# Patient Record
Sex: FEMALE | Race: WHITE | NOT HISPANIC OR LATINO | Employment: OTHER | URBAN - METROPOLITAN AREA
[De-identification: names, ages, dates, MRNs, and addresses within clinical notes are randomized per-mention and may not be internally consistent; named-entity substitution may affect disease eponyms.]

---

## 2017-01-16 ENCOUNTER — ALLSCRIPTS OFFICE VISIT (OUTPATIENT)
Dept: OTHER | Facility: OTHER | Age: 77
End: 2017-01-16

## 2017-02-06 DIAGNOSIS — I10 ESSENTIAL (PRIMARY) HYPERTENSION: ICD-10-CM

## 2017-02-07 ENCOUNTER — TRANSCRIBE ORDERS (OUTPATIENT)
Dept: LAB | Facility: CLINIC | Age: 77
End: 2017-02-07

## 2017-02-07 ENCOUNTER — APPOINTMENT (OUTPATIENT)
Dept: LAB | Facility: CLINIC | Age: 77
End: 2017-02-07
Payer: MEDICARE

## 2017-02-07 DIAGNOSIS — I10 ESSENTIAL HYPERTENSION, MALIGNANT: Primary | ICD-10-CM

## 2017-02-07 DIAGNOSIS — E87.6 HYPOKALEMIA: ICD-10-CM

## 2017-02-07 LAB
ANION GAP SERPL CALCULATED.3IONS-SCNC: 6 MMOL/L (ref 4–13)
BUN SERPL-MCNC: 12 MG/DL (ref 5–25)
CALCIUM SERPL-MCNC: 9 MG/DL (ref 8.3–10.1)
CHLORIDE SERPL-SCNC: 101 MMOL/L (ref 100–108)
CO2 SERPL-SCNC: 32 MMOL/L (ref 21–32)
CREAT SERPL-MCNC: 0.68 MG/DL (ref 0.6–1.3)
GFR SERPL CREATININE-BSD FRML MDRD: >60 ML/MIN/1.73SQ M
GLUCOSE SERPL-MCNC: 159 MG/DL (ref 65–140)
POTASSIUM SERPL-SCNC: 3.5 MMOL/L (ref 3.5–5.3)
SODIUM SERPL-SCNC: 139 MMOL/L (ref 136–145)

## 2017-02-07 PROCEDURE — 80048 BASIC METABOLIC PNL TOTAL CA: CPT

## 2017-02-07 PROCEDURE — 84244 ASSAY OF RENIN: CPT

## 2017-02-07 PROCEDURE — 82088 ASSAY OF ALDOSTERONE: CPT

## 2017-02-07 PROCEDURE — 36415 COLL VENOUS BLD VENIPUNCTURE: CPT

## 2017-02-08 ENCOUNTER — GENERIC CONVERSION - ENCOUNTER (OUTPATIENT)
Dept: OTHER | Facility: OTHER | Age: 77
End: 2017-02-08

## 2017-02-11 LAB — ALDOST SERPL-MCNC: 17.7 NG/DL (ref 0–30)

## 2017-02-15 LAB — RENIN PLAS-CCNC: <0.167 NG/ML/HR (ref 0.17–5.38)

## 2017-02-21 ENCOUNTER — APPOINTMENT (OUTPATIENT)
Dept: LAB | Facility: CLINIC | Age: 77
End: 2017-02-21
Payer: MEDICARE

## 2017-02-21 DIAGNOSIS — E11.9 TYPE 2 DIABETES MELLITUS WITHOUT COMPLICATIONS (HCC): ICD-10-CM

## 2017-02-21 LAB
EST. AVERAGE GLUCOSE BLD GHB EST-MCNC: 148 MG/DL
HBA1C MFR BLD: 6.8 % (ref 4.2–6.3)

## 2017-02-21 PROCEDURE — 36415 COLL VENOUS BLD VENIPUNCTURE: CPT

## 2017-02-21 PROCEDURE — 83036 HEMOGLOBIN GLYCOSYLATED A1C: CPT

## 2017-03-03 ENCOUNTER — ALLSCRIPTS OFFICE VISIT (OUTPATIENT)
Dept: OTHER | Facility: OTHER | Age: 77
End: 2017-03-03

## 2017-05-21 DIAGNOSIS — I10 ESSENTIAL (PRIMARY) HYPERTENSION: ICD-10-CM

## 2017-05-21 DIAGNOSIS — E78.5 HYPERLIPIDEMIA: ICD-10-CM

## 2017-05-21 DIAGNOSIS — Z12.31 ENCOUNTER FOR SCREENING MAMMOGRAM FOR MALIGNANT NEOPLASM OF BREAST: ICD-10-CM

## 2017-05-21 DIAGNOSIS — E11.29 TYPE 2 DIABETES MELLITUS WITH OTHER DIABETIC KIDNEY COMPLICATION (HCC): ICD-10-CM

## 2017-05-21 DIAGNOSIS — E55.9 VITAMIN D DEFICIENCY: ICD-10-CM

## 2017-05-23 ENCOUNTER — APPOINTMENT (OUTPATIENT)
Dept: LAB | Facility: CLINIC | Age: 77
End: 2017-05-23
Payer: MEDICARE

## 2017-05-23 DIAGNOSIS — E78.5 HYPERLIPIDEMIA: ICD-10-CM

## 2017-05-23 DIAGNOSIS — I10 ESSENTIAL (PRIMARY) HYPERTENSION: ICD-10-CM

## 2017-05-23 DIAGNOSIS — E11.29 TYPE 2 DIABETES MELLITUS WITH OTHER DIABETIC KIDNEY COMPLICATION (HCC): ICD-10-CM

## 2017-05-23 DIAGNOSIS — E55.9 VITAMIN D DEFICIENCY: ICD-10-CM

## 2017-05-23 LAB
25(OH)D3 SERPL-MCNC: 36.4 NG/ML (ref 30–100)
ALBUMIN SERPL BCP-MCNC: 3.5 G/DL (ref 3.5–5)
ALP SERPL-CCNC: 77 U/L (ref 46–116)
ALT SERPL W P-5'-P-CCNC: 18 U/L (ref 12–78)
ANION GAP SERPL CALCULATED.3IONS-SCNC: 5 MMOL/L (ref 4–13)
AST SERPL W P-5'-P-CCNC: 13 U/L (ref 5–45)
BACTERIA UR QL AUTO: ABNORMAL /HPF
BASOPHILS # BLD AUTO: 0.04 THOUSANDS/ΜL (ref 0–0.1)
BASOPHILS NFR BLD AUTO: 1 % (ref 0–1)
BILIRUB SERPL-MCNC: 0.45 MG/DL (ref 0.2–1)
BILIRUB UR QL STRIP: NEGATIVE
BUN SERPL-MCNC: 8 MG/DL (ref 5–25)
CALCIUM SERPL-MCNC: 9 MG/DL (ref 8.3–10.1)
CHLORIDE SERPL-SCNC: 101 MMOL/L (ref 100–108)
CHOLEST SERPL-MCNC: 167 MG/DL (ref 50–200)
CLARITY UR: ABNORMAL
CO2 SERPL-SCNC: 32 MMOL/L (ref 21–32)
COLOR UR: YELLOW
CREAT SERPL-MCNC: 0.62 MG/DL (ref 0.6–1.3)
CREAT UR-MCNC: 70.2 MG/DL
EOSINOPHIL # BLD AUTO: 0.28 THOUSAND/ΜL (ref 0–0.61)
EOSINOPHIL NFR BLD AUTO: 3 % (ref 0–6)
ERYTHROCYTE [DISTWIDTH] IN BLOOD BY AUTOMATED COUNT: 14.6 % (ref 11.6–15.1)
EST. AVERAGE GLUCOSE BLD GHB EST-MCNC: 137 MG/DL
GFR SERPL CREATININE-BSD FRML MDRD: >60 ML/MIN/1.73SQ M
GLUCOSE P FAST SERPL-MCNC: 142 MG/DL (ref 65–99)
GLUCOSE UR STRIP-MCNC: NEGATIVE MG/DL
HBA1C MFR BLD: 6.4 % (ref 4.2–6.3)
HCT VFR BLD AUTO: 40.6 % (ref 34.8–46.1)
HDLC SERPL-MCNC: 39 MG/DL (ref 40–60)
HGB BLD-MCNC: 12.8 G/DL (ref 11.5–15.4)
HGB UR QL STRIP.AUTO: NEGATIVE
HYALINE CASTS #/AREA URNS LPF: ABNORMAL /LPF
KETONES UR STRIP-MCNC: NEGATIVE MG/DL
LDLC SERPL CALC-MCNC: 78 MG/DL (ref 0–100)
LEUKOCYTE ESTERASE UR QL STRIP: NEGATIVE
LYMPHOCYTES # BLD AUTO: 2.31 THOUSANDS/ΜL (ref 0.6–4.47)
LYMPHOCYTES NFR BLD AUTO: 28 % (ref 14–44)
MCH RBC QN AUTO: 22.1 PG (ref 26.8–34.3)
MCHC RBC AUTO-ENTMCNC: 31.5 G/DL (ref 31.4–37.4)
MCV RBC AUTO: 70 FL (ref 82–98)
MICROALBUMIN UR-MCNC: 549 MG/L (ref 0–20)
MICROALBUMIN/CREAT 24H UR: 782 MG/G CREATININE (ref 0–30)
MONOCYTES # BLD AUTO: 0.67 THOUSAND/ΜL (ref 0.17–1.22)
MONOCYTES NFR BLD AUTO: 8 % (ref 4–12)
NEUTROPHILS # BLD AUTO: 4.85 THOUSANDS/ΜL (ref 1.85–7.62)
NEUTS SEG NFR BLD AUTO: 60 % (ref 43–75)
NITRITE UR QL STRIP: NEGATIVE
NON-SQ EPI CELLS URNS QL MICRO: ABNORMAL /HPF
NRBC BLD AUTO-RTO: 0 /100 WBCS
PH UR STRIP.AUTO: 8 [PH] (ref 4.5–8)
PLATELET # BLD AUTO: 252 THOUSANDS/UL (ref 149–390)
PMV BLD AUTO: 10.4 FL (ref 8.9–12.7)
POTASSIUM SERPL-SCNC: 3.1 MMOL/L (ref 3.5–5.3)
PROT SERPL-MCNC: 7.4 G/DL (ref 6.4–8.2)
PROT UR STRIP-MCNC: ABNORMAL MG/DL
RBC # BLD AUTO: 5.8 MILLION/UL (ref 3.81–5.12)
RBC #/AREA URNS AUTO: ABNORMAL /HPF
SODIUM SERPL-SCNC: 138 MMOL/L (ref 136–145)
SP GR UR STRIP.AUTO: 1.01 (ref 1–1.03)
TRIGL SERPL-MCNC: 252 MG/DL
TSH SERPL DL<=0.05 MIU/L-ACNC: 3.57 UIU/ML (ref 0.36–3.74)
UROBILINOGEN UR QL STRIP.AUTO: 0.2 E.U./DL
WBC # BLD AUTO: 8.16 THOUSAND/UL (ref 4.31–10.16)
WBC #/AREA URNS AUTO: ABNORMAL /HPF

## 2017-05-23 PROCEDURE — 80053 COMPREHEN METABOLIC PANEL: CPT

## 2017-05-23 PROCEDURE — 85025 COMPLETE CBC W/AUTO DIFF WBC: CPT

## 2017-05-23 PROCEDURE — 80061 LIPID PANEL: CPT

## 2017-05-23 PROCEDURE — 82306 VITAMIN D 25 HYDROXY: CPT

## 2017-05-23 PROCEDURE — 83036 HEMOGLOBIN GLYCOSYLATED A1C: CPT

## 2017-05-23 PROCEDURE — 81001 URINALYSIS AUTO W/SCOPE: CPT

## 2017-05-23 PROCEDURE — 36415 COLL VENOUS BLD VENIPUNCTURE: CPT

## 2017-05-23 PROCEDURE — 82570 ASSAY OF URINE CREATININE: CPT

## 2017-05-23 PROCEDURE — 84443 ASSAY THYROID STIM HORMONE: CPT

## 2017-05-23 PROCEDURE — 82043 UR ALBUMIN QUANTITATIVE: CPT

## 2017-06-06 ENCOUNTER — HOSPITAL ENCOUNTER (OUTPATIENT)
Dept: MAMMOGRAPHY | Facility: CLINIC | Age: 77
Discharge: HOME | End: 2017-06-06
Payer: MEDICARE

## 2017-06-06 ENCOUNTER — ALLSCRIPTS OFFICE VISIT (OUTPATIENT)
Dept: OTHER | Facility: OTHER | Age: 77
End: 2017-06-06

## 2017-06-06 ENCOUNTER — TRANSCRIBE ORDERS (OUTPATIENT)
Dept: MAMMOGRAPHY | Facility: CLINIC | Age: 77
End: 2017-06-06

## 2017-06-06 DIAGNOSIS — Z12.31 ENCOUNTER FOR SCREENING MAMMOGRAM FOR MALIGNANT NEOPLASM OF BREAST: ICD-10-CM

## 2017-06-06 PROCEDURE — G0202 SCR MAMMO BI INCL CAD: HCPCS

## 2017-06-19 ENCOUNTER — GENERIC CONVERSION - ENCOUNTER (OUTPATIENT)
Dept: OTHER | Facility: OTHER | Age: 77
End: 2017-06-19

## 2017-06-21 ENCOUNTER — LAB CONVERSION - ENCOUNTER (OUTPATIENT)
Dept: OTHER | Facility: OTHER | Age: 77
End: 2017-06-21

## 2017-06-21 LAB — FECAL GLOBIN BY IMMUNOCHEM (HISTORICAL): NORMAL

## 2017-07-03 DIAGNOSIS — I10 ESSENTIAL (PRIMARY) HYPERTENSION: ICD-10-CM

## 2017-07-25 ENCOUNTER — APPOINTMENT (OUTPATIENT)
Dept: LAB | Facility: CLINIC | Age: 77
End: 2017-07-25
Payer: MEDICARE

## 2017-07-25 ENCOUNTER — TRANSCRIBE ORDERS (OUTPATIENT)
Dept: LAB | Facility: CLINIC | Age: 77
End: 2017-07-25

## 2017-07-25 DIAGNOSIS — R80.9 PROTEINURIA, UNSPECIFIED TYPE: Primary | ICD-10-CM

## 2017-07-25 DIAGNOSIS — I10 ESSENTIAL HYPERTENSION, MALIGNANT: ICD-10-CM

## 2017-07-25 LAB
ALBUMIN SERPL BCP-MCNC: 3.7 G/DL (ref 3.5–5)
ALP SERPL-CCNC: 90 U/L (ref 46–116)
ALT SERPL W P-5'-P-CCNC: 19 U/L (ref 12–78)
ANION GAP SERPL CALCULATED.3IONS-SCNC: 6 MMOL/L (ref 4–13)
AST SERPL W P-5'-P-CCNC: 13 U/L (ref 5–45)
BACTERIA UR QL AUTO: NORMAL /HPF
BILIRUB SERPL-MCNC: 0.47 MG/DL (ref 0.2–1)
BILIRUB UR QL STRIP: NEGATIVE
BUN SERPL-MCNC: 10 MG/DL (ref 5–25)
CALCIUM SERPL-MCNC: 9 MG/DL (ref 8.3–10.1)
CHLORIDE SERPL-SCNC: 102 MMOL/L (ref 100–108)
CLARITY UR: CLEAR
CO2 SERPL-SCNC: 33 MMOL/L (ref 21–32)
COLOR UR: YELLOW
CREAT SERPL-MCNC: 0.58 MG/DL (ref 0.6–1.3)
CREAT UR-MCNC: 80.1 MG/DL
GFR SERPL CREATININE-BSD FRML MDRD: 91 ML/MIN/1.73SQ M
GLUCOSE P FAST SERPL-MCNC: 136 MG/DL (ref 65–99)
GLUCOSE UR STRIP-MCNC: NEGATIVE MG/DL
HCT VFR BLD AUTO: 40.4 % (ref 34.8–46.1)
HGB BLD-MCNC: 13.2 G/DL (ref 11.5–15.4)
HGB UR QL STRIP.AUTO: NEGATIVE
HYALINE CASTS #/AREA URNS LPF: NORMAL /LPF
KETONES UR STRIP-MCNC: NEGATIVE MG/DL
LEUKOCYTE ESTERASE UR QL STRIP: NEGATIVE
NITRITE UR QL STRIP: NEGATIVE
NON-SQ EPI CELLS URNS QL MICRO: NORMAL /HPF
PH UR STRIP.AUTO: 7 [PH] (ref 4.5–8)
PHOSPHATE SERPL-MCNC: 3.1 MG/DL (ref 2.3–4.1)
POTASSIUM SERPL-SCNC: 3.4 MMOL/L (ref 3.5–5.3)
PROT SERPL-MCNC: 7.7 G/DL (ref 6.4–8.2)
PROT UR STRIP-MCNC: ABNORMAL MG/DL
PROT UR-MCNC: 41 MG/DL
PROT/CREAT UR: 0.51 MG/G{CREAT} (ref 0–0.1)
RBC #/AREA URNS AUTO: NORMAL /HPF
SODIUM SERPL-SCNC: 141 MMOL/L (ref 136–145)
SP GR UR STRIP.AUTO: 1.02 (ref 1–1.03)
UROBILINOGEN UR QL STRIP.AUTO: 0.2 E.U./DL
WBC #/AREA URNS AUTO: NORMAL /HPF

## 2017-07-25 PROCEDURE — 85014 HEMATOCRIT: CPT

## 2017-07-25 PROCEDURE — 84156 ASSAY OF PROTEIN URINE: CPT

## 2017-07-25 PROCEDURE — 80053 COMPREHEN METABOLIC PANEL: CPT

## 2017-07-25 PROCEDURE — 85018 HEMOGLOBIN: CPT

## 2017-07-25 PROCEDURE — 36415 COLL VENOUS BLD VENIPUNCTURE: CPT

## 2017-07-25 PROCEDURE — 81001 URINALYSIS AUTO W/SCOPE: CPT

## 2017-07-25 PROCEDURE — 82570 ASSAY OF URINE CREATININE: CPT

## 2017-07-25 PROCEDURE — 84100 ASSAY OF PHOSPHORUS: CPT

## 2017-07-31 ENCOUNTER — ALLSCRIPTS OFFICE VISIT (OUTPATIENT)
Dept: OTHER | Facility: OTHER | Age: 77
End: 2017-07-31

## 2017-08-03 ENCOUNTER — ALLSCRIPTS OFFICE VISIT (OUTPATIENT)
Dept: OTHER | Facility: OTHER | Age: 77
End: 2017-08-03

## 2017-08-07 ENCOUNTER — TRANSCRIBE ORDERS (OUTPATIENT)
Dept: LAB | Facility: CLINIC | Age: 77
End: 2017-08-07

## 2017-08-07 ENCOUNTER — APPOINTMENT (OUTPATIENT)
Dept: LAB | Facility: CLINIC | Age: 77
End: 2017-08-07
Payer: MEDICARE

## 2017-08-07 DIAGNOSIS — R80.9 PROTEINURIA, UNSPECIFIED TYPE: Primary | ICD-10-CM

## 2017-08-07 DIAGNOSIS — E11.65 UNCONTROLLED TYPE 2 DIABETES MELLITUS WITH COMPLICATION, UNSPECIFIED LONG TERM INSULIN USE STATUS: ICD-10-CM

## 2017-08-07 DIAGNOSIS — E11.8 UNCONTROLLED TYPE 2 DIABETES MELLITUS WITH COMPLICATION, UNSPECIFIED LONG TERM INSULIN USE STATUS: ICD-10-CM

## 2017-08-07 DIAGNOSIS — I10 ESSENTIAL HYPERTENSION, MALIGNANT: ICD-10-CM

## 2017-08-07 DIAGNOSIS — E11.21 DIABETIC GLOMERULOPATHY (HCC): ICD-10-CM

## 2017-08-07 LAB
ANION GAP SERPL CALCULATED.3IONS-SCNC: 7 MMOL/L (ref 4–13)
BUN SERPL-MCNC: 15 MG/DL (ref 5–25)
CALCIUM SERPL-MCNC: 9.6 MG/DL (ref 8.3–10.1)
CHLORIDE SERPL-SCNC: 101 MMOL/L (ref 100–108)
CO2 SERPL-SCNC: 30 MMOL/L (ref 21–32)
CREAT SERPL-MCNC: 0.79 MG/DL (ref 0.6–1.3)
GFR SERPL CREATININE-BSD FRML MDRD: 74 ML/MIN/1.73SQ M
GLUCOSE P FAST SERPL-MCNC: 223 MG/DL (ref 65–99)
POTASSIUM SERPL-SCNC: 3.5 MMOL/L (ref 3.5–5.3)
SODIUM SERPL-SCNC: 138 MMOL/L (ref 136–145)

## 2017-08-07 PROCEDURE — 36415 COLL VENOUS BLD VENIPUNCTURE: CPT

## 2017-08-07 PROCEDURE — 80048 BASIC METABOLIC PNL TOTAL CA: CPT

## 2017-08-23 DIAGNOSIS — E11.9 TYPE 2 DIABETES MELLITUS WITHOUT COMPLICATIONS (HCC): ICD-10-CM

## 2017-08-29 ENCOUNTER — ALLSCRIPTS OFFICE VISIT (OUTPATIENT)
Dept: OTHER | Facility: OTHER | Age: 77
End: 2017-08-29

## 2017-09-07 ENCOUNTER — APPOINTMENT (OUTPATIENT)
Dept: LAB | Facility: CLINIC | Age: 77
End: 2017-09-07
Payer: MEDICARE

## 2017-09-07 ENCOUNTER — TRANSCRIBE ORDERS (OUTPATIENT)
Dept: LAB | Facility: CLINIC | Age: 77
End: 2017-09-07

## 2017-09-07 DIAGNOSIS — I10 ESSENTIAL HYPERTENSION, MALIGNANT: ICD-10-CM

## 2017-09-07 DIAGNOSIS — R80.9 PROTEINURIA, UNSPECIFIED TYPE: Primary | ICD-10-CM

## 2017-09-07 DIAGNOSIS — E11.9 TYPE 2 DIABETES MELLITUS WITHOUT COMPLICATIONS (HCC): ICD-10-CM

## 2017-09-07 DIAGNOSIS — E11.8 UNCONTROLLED TYPE 2 DIABETES MELLITUS WITH COMPLICATION, UNSPECIFIED LONG TERM INSULIN USE STATUS: ICD-10-CM

## 2017-09-07 DIAGNOSIS — E11.65 UNCONTROLLED TYPE 2 DIABETES MELLITUS WITH COMPLICATION, UNSPECIFIED LONG TERM INSULIN USE STATUS: ICD-10-CM

## 2017-09-07 DIAGNOSIS — E11.21 DIABETIC GLOMERULOPATHY (HCC): ICD-10-CM

## 2017-09-07 LAB
ANION GAP SERPL CALCULATED.3IONS-SCNC: 8 MMOL/L (ref 4–13)
BUN SERPL-MCNC: 14 MG/DL (ref 5–25)
CALCIUM SERPL-MCNC: 9.4 MG/DL (ref 8.3–10.1)
CHLORIDE SERPL-SCNC: 104 MMOL/L (ref 100–108)
CO2 SERPL-SCNC: 28 MMOL/L (ref 21–32)
CREAT SERPL-MCNC: 0.71 MG/DL (ref 0.6–1.3)
EST. AVERAGE GLUCOSE BLD GHB EST-MCNC: 143 MG/DL
GFR SERPL CREATININE-BSD FRML MDRD: 84 ML/MIN/1.73SQ M
GLUCOSE P FAST SERPL-MCNC: 127 MG/DL (ref 65–99)
HBA1C MFR BLD: 6.6 % (ref 4.2–6.3)
POTASSIUM SERPL-SCNC: 3.9 MMOL/L (ref 3.5–5.3)
SODIUM SERPL-SCNC: 140 MMOL/L (ref 136–145)

## 2017-09-07 PROCEDURE — 83036 HEMOGLOBIN GLYCOSYLATED A1C: CPT

## 2017-09-07 PROCEDURE — 36415 COLL VENOUS BLD VENIPUNCTURE: CPT

## 2017-09-07 PROCEDURE — 80048 BASIC METABOLIC PNL TOTAL CA: CPT

## 2017-09-21 ENCOUNTER — GENERIC CONVERSION - ENCOUNTER (OUTPATIENT)
Dept: OTHER | Facility: OTHER | Age: 77
End: 2017-09-21

## 2017-10-05 ENCOUNTER — GENERIC CONVERSION - ENCOUNTER (OUTPATIENT)
Dept: OTHER | Facility: OTHER | Age: 77
End: 2017-10-05

## 2017-10-09 ENCOUNTER — GENERIC CONVERSION - ENCOUNTER (OUTPATIENT)
Dept: FAMILY MEDICINE CLINIC | Facility: CLINIC | Age: 77
End: 2017-10-09

## 2017-12-07 DIAGNOSIS — E11.9 TYPE 2 DIABETES MELLITUS WITHOUT COMPLICATIONS (HCC): ICD-10-CM

## 2018-01-09 ENCOUNTER — ALLSCRIPTS OFFICE VISIT (OUTPATIENT)
Dept: OTHER | Facility: OTHER | Age: 78
End: 2018-01-09

## 2018-01-09 ENCOUNTER — APPOINTMENT (OUTPATIENT)
Dept: LAB | Facility: CLINIC | Age: 78
End: 2018-01-09
Payer: MEDICARE

## 2018-01-09 DIAGNOSIS — I10 ESSENTIAL (PRIMARY) HYPERTENSION: ICD-10-CM

## 2018-01-09 LAB
ALBUMIN SERPL BCP-MCNC: 3.7 G/DL (ref 3.5–5)
ALP SERPL-CCNC: 64 U/L (ref 46–116)
ALT SERPL W P-5'-P-CCNC: 15 U/L (ref 12–78)
ANION GAP SERPL CALCULATED.3IONS-SCNC: 7 MMOL/L (ref 4–13)
AST SERPL W P-5'-P-CCNC: 11 U/L (ref 5–45)
BILIRUB SERPL-MCNC: 0.44 MG/DL (ref 0.2–1)
BUN SERPL-MCNC: 24 MG/DL (ref 5–25)
CALCIUM SERPL-MCNC: 9.6 MG/DL (ref 8.3–10.1)
CHLORIDE SERPL-SCNC: 102 MMOL/L (ref 100–108)
CO2 SERPL-SCNC: 28 MMOL/L (ref 21–32)
CREAT SERPL-MCNC: 0.97 MG/DL (ref 0.6–1.3)
GFR SERPL CREATININE-BSD FRML MDRD: 57 ML/MIN/1.73SQ M
GLUCOSE SERPL-MCNC: 148 MG/DL (ref 65–140)
HBA1C MFR BLD HPLC: 6 %
POTASSIUM SERPL-SCNC: 4.3 MMOL/L (ref 3.5–5.3)
PROT SERPL-MCNC: 7.8 G/DL (ref 6.4–8.2)
SODIUM SERPL-SCNC: 137 MMOL/L (ref 136–145)

## 2018-01-09 PROCEDURE — 80053 COMPREHEN METABOLIC PANEL: CPT

## 2018-01-09 PROCEDURE — 36415 COLL VENOUS BLD VENIPUNCTURE: CPT

## 2018-01-10 NOTE — MISCELLANEOUS
Message   Recorded as Task   Date: 12/21/2016 10:01 AM, Created By: John Perales   Task Name: Call Back   Assigned To: Ramon Course   Regarding Patient: Glynn Cline, Status: Active   CommentVela John - 21 Dec 2016 10:01 AM     TASK CREATED  Caller: Self; [phone] (Home); [phone] (Work)  PT STATES ANOTHER DR José Miguel Zamorano HER TO INCREASE HER KLOR CON BUT SHE WANTS TO KNOW IF DR Nahomy Boothe IS OK WITH IT  PT # [phone]   Ramon Course - 23 Dec 2016 9:05 AM     TASK REPLIED TO: Previously Assigned To Arielle Evans - 23 Dec 2016 9:32 AM     TASK EDITED  pt informed          Signatures   Electronically signed by : Shannan Chun DO; Dec 23 2016  9:40AM EST                       (Author)

## 2018-01-11 NOTE — MISCELLANEOUS
Message   Recorded as Task   Date: 06/27/2016 10:26 AM, Created By: Bethany Jean-Baptiste   Task Name: Medical Complaint Callback   Assigned To: Cameron Desai   Regarding Patient: Jess Ardon, Status: Active   CommentEderk Hernan - 27 Jun 2016 10:26 AM     TASK CREATED  Caller: Self; Medical Complaint; [phone] (Home); [phone] (Work)  pt said you wanted her to come back to office to give urine sample  she was wondering if we can give her a lab slip to get her urine tested at a lab  She insisted on waiting for you to return tomorrow and she will go to a lab instead of giving sample here  Cameron Desai - 27 Jun 2016 2:56 PM     TASK REPLIED TO: Previously Assigned To Vishnu Diehl  Have one of the MAs explain exactly to her what a clean catch urine is  Ry Stakes - 27 Jun 2016 3:02 PM     TASK REASSIGNED: Previously Assigned To Amelia Reeves - 27 Jun 2016 5:09 PM     TASK REPLIED TO: Previously Assigned To Fatemeh Corrales with patient  She will stop in the office on Tuesday or Wed to give a clean catch urine sample to be sent to the lab          Signatures   Electronically signed by : Elaine Ley DO; Jun 28 2016  1:10PM EST                       (Author)

## 2018-01-12 VITALS
WEIGHT: 181.1 LBS | DIASTOLIC BLOOD PRESSURE: 70 MMHG | SYSTOLIC BLOOD PRESSURE: 130 MMHG | RESPIRATION RATE: 16 BRPM | BODY MASS INDEX: 35.56 KG/M2 | HEIGHT: 60 IN | HEART RATE: 60 BPM

## 2018-01-12 VITALS
DIASTOLIC BLOOD PRESSURE: 80 MMHG | WEIGHT: 180.4 LBS | HEART RATE: 80 BPM | SYSTOLIC BLOOD PRESSURE: 124 MMHG | RESPIRATION RATE: 16 BRPM | BODY MASS INDEX: 35.23 KG/M2

## 2018-01-13 VITALS
RESPIRATION RATE: 16 BRPM | TEMPERATURE: 98 F | WEIGHT: 177 LBS | HEART RATE: 72 BPM | BODY MASS INDEX: 34.75 KG/M2 | DIASTOLIC BLOOD PRESSURE: 80 MMHG | SYSTOLIC BLOOD PRESSURE: 138 MMHG | HEIGHT: 60 IN

## 2018-01-13 NOTE — RESULT NOTES
Verified Results  (1) RENAL FUNCTION PANEL [id number] 08:40AM RFEyeD Order Number: [id number]  TW Order Number: [id number]     Test Name Result Flag Reference   ANION GAP (CALC) 5 mmol/L  4-13   ALBUMIN 3 5 g/dL  3 5-5 0   BLOOD UREA NITROGEN 12 mg/dL  5-25   CALCIUM 9 1 mg/dL  8 3-10 1   CHLORIDE 101 mmol/L  100-108   CARBON DIOXIDE 34 mmol/L H 21-32   CREATININE 0 60 mg/dL  0 60-1 30   Standardized to IDMS reference method   GLUCOSE,RANDM 156 mg/dL H    POTASSIUM 3 2 mmol/L L 3 5-5 3   eGFR Non-African American      >60 0 ml/min/1 73sq [facility] Disease Education Program recommendations are as follows:  GFR calculation is accurate only with a steady state creatinine  Chronic Kidney disease less than 60 ml/min/1 73 sq  meters  Kidney failure less than 15 ml/min/1 73 sq  meters  SODIUM 140 mmol/L  136-145   PHOSPHORUS 2 8 mg/dL  2 3-4 1     (1) MICROALBUMIN CREATININE RATIO, RANDOM URINE [id number] 08:40AM RFEyeD Order Number: [id number]  TW Order Number: [id number]     Test Name Result Flag Reference   MICROALBUMIN/ CREAT R 304 mg/g creatinine H 0-30   MICROALBUMIN,URINE 275 0 mg/L H 0 0-20 0   CREATININE URINE 90 4 mg/dL         Plan  Hypertension    · Klor-Con M20 20 MEQ Oral Tablet Extended Release; TAKE 1 TABLET IN THE  MORNING  Hypokalemia    · (1) BASIC METABOLIC PROFILE; Status:Active;  Requested for:77Kxl5840;

## 2018-01-14 VITALS
BODY MASS INDEX: 35.39 KG/M2 | SYSTOLIC BLOOD PRESSURE: 146 MMHG | HEIGHT: 60 IN | DIASTOLIC BLOOD PRESSURE: 86 MMHG | HEART RATE: 72 BPM | RESPIRATION RATE: 16 BRPM | WEIGHT: 180.25 LBS

## 2018-01-14 VITALS
HEIGHT: 60 IN | HEART RATE: 72 BPM | DIASTOLIC BLOOD PRESSURE: 70 MMHG | WEIGHT: 180.6 LBS | SYSTOLIC BLOOD PRESSURE: 120 MMHG | RESPIRATION RATE: 16 BRPM | TEMPERATURE: 97.9 F | BODY MASS INDEX: 35.46 KG/M2

## 2018-01-14 NOTE — MISCELLANEOUS
Message   Recorded as Task   Date: 09/30/2016 11:28 AM, Created By: Rozina Gaspar   Task Name: Call Back   Assigned To: Rozina Gaspar   Regarding Patient: Moo Alvarado, Status: Active   [facility] - 30 Sep 2016 11:28 AM     TASK CREATED  Received a request for patient's diabetic medicine  She was to here in September with an A1c prior  A1c is in the order section  Please call patient and ask her to make an appointment for follow-up  No medications called in yet  If she needs them I will call in enough to get her to her appointment  Christina Chan - 30 Sep 2016 12:14 PM     TASK EDITED  left msg for pt to call back   Precious Godoy - 03 Oct 2016 9:51 AM     TASK REPLIED TO: Previously Assigned To Christina Chan  patient stopped in and given message  appt made for 10/17  Rozina Gaspar - 65 Oct 2016 1:02 PM     TASK REPLIED TO: Previously Assigned To Precious Godoy  Please call patient and see if she needs any medications called in before her appointment  Tenzin Lopez - 03 Oct 2016 1:08 PM     TASK REPLIED TO: Previously Assigned To Precious Godoy  I asked her before she left and she told me she is fine with meds          Signatures   Electronically signed by : Rosetta Main DO; Oct  3 2016  1:34PM EST                       (Author)

## 2018-01-14 NOTE — RESULT NOTES
Verified Results  (1) BASIC METABOLIC PROFILE 57FDI4563 09:42AM Digna Gonzalez Order Number: [id number]     Test Name Result Flag Reference   GLUCOSE,RANDM 159 mg/dL H    If the patient is fasting, the ADA then defines impaired fasting glucose as > 100 mg/dL and diabetes as > or equal to 123 mg/dL  SODIUM 139 mmol/L  136-145   POTASSIUM 3 5 mmol/L  3 5-5 3   CHLORIDE 101 mmol/L  100-108   CARBON DIOXIDE 32 mmol/L  21-32   ANION GAP (CALC) 6 mmol/L  4-13   BLOOD UREA NITROGEN 12 mg/dL  5-25   CREATININE 0 68 mg/dL  0 60-1 30   Standardized to IDMS reference method   CALCIUM 9 0 mg/dL  8 3-10 1   eGFR Non-African American      >60 0 ml/min/1 73sq m   [facility] Disease Education Program recommendations are as follows:  GFR calculation is accurate only with a steady state creatinine  Chronic Kidney disease less than 60 ml/min/1 73 sq  meters  Kidney failure less than 15 ml/min/1 73 sq  meters

## 2018-01-15 VITALS
HEART RATE: 68 BPM | DIASTOLIC BLOOD PRESSURE: 70 MMHG | SYSTOLIC BLOOD PRESSURE: 130 MMHG | WEIGHT: 181.4 LBS | RESPIRATION RATE: 16 BRPM | HEIGHT: 60 IN | BODY MASS INDEX: 35.61 KG/M2

## 2018-01-15 NOTE — CONSULTS
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)   2  Hypertension (401 9) (I10)   3  Hypokalemia (276 8) (E87 6)    Plan  Hypokalemia    · (1) BASIC METABOLIC PROFILE; Status:Active; Requested for:2016;    Perform:[facility]; [id number];EVBPPJH;  Lonzell Dale; Ordered By:Nigel Rosas;   · (1) MICROALBUMIN CREATININE RATIO, RANDOM URINE; Status:Active; Requested  for:2016;    Perform:[facility]; MMQ:36JGC6575;KSGAGDX;  Eduardl Dale; Ordered By:Nigel Rosas;   · (1) RENAL FUNCTION PANEL; Status:Active; Requested for:2016;    Perform:[facility]; ASQ:70HRC4762;BEOITYQ;  Ivette Hunter; Ordered By:Paresh Rosas;   · (Q) PROTEIN, TOTAL W/CREAT, 24 HOUR URINE; Status:Active; Requested  for:2016;    Perform:[facility]; Due:27Lsp5990;Ordered;  For:Hypokalemia; Ordered By:Paresh Rosas;   · VAS RENAL ARTERY COMPLETE BILATERAL; Status:Hold For - Scheduling; Requested  for:2016;    Perform:[facility] [place]; YGM:35IWQ7572;FAEOXGG;  Ivette Hunter; Ordered By:Nigel Rosas;   · Follow-up visit in 4 Months Evaluation and Treatment  Follow-up  Status: Hold For -  Scheduling  Requested for: 2016   Ordered; For: Hypokalemia; Ordered By: Erlene Goldberg Performed:  Due: 66XEM2835    Discussion/Summary    As far as Maxine's kidney function is concerned it appears that her creatinine is quite stable at 0 6  She does have microalbuminuria  This likely is from underlying hypertension as well as diabetes  In any case it appears low-grade but will confirm/quantify with a 24-hour urine for protein  In regards to her blood pressure management she does appear to be on fairly high dose lisinopril, I have not changed at this time he consider backing off to 40 mg as long she does not have significant proteinuria      In lieu of her blood pressure issues as well as hypokalemia have decided to check a renal Doppler for size echogenicity and any evidence of renal artery stenosis which certainly could explain her hypertension and hypokalemia  We'll also plan on repeating serum chemistries now that she is on potassium supplement  Unfortunately do not feel that an aldosteronoma/renin level would be helpful considering her feel high dose of lisinopril  So this point time we'll wait the above laboratory studies otherwise we'll plan to see her in approximately 3-4 months with repeat laboratory studies at that time  The was counseled regarding instructions for management  Reason For Visit  Proteinuria      History of Present Illness  With the pleasure of seeing Elma Kong for nephrology consultation secondary to microalbuminuria  As we know Elma Kong is a 70-year-old female with diabetes for at least 10 years and hypertension since the 1990s  Most recently she has started potassium due to hypokalemia  Does appear that she's on a 4 drug regimen for blood pressure control  Overall she states her sugars been fairly stable although certainly could be better in terms of dietary control  Review of Systems    Constitutional: no fever, no fatigue and not feeling poorly  Eyes: no eyesight problems  Cardiovascular: no chest pain and no lower extremity edema  Respiratory: no shortness of breath and no shortness of breath during exertion  Gastrointestinal: no abdominal pain, no nausea, no diarrhea and no vomiting  Genitourinary: no dysuria  Musculoskeletal: no arthralgias  Integumentary: no rashes  Neurological: no headache, no lightheadedness and no numbness  Endocrine: no muscle weakness  Hematologic/Lymphatic: no tendency for easy bleeding  Active Problems    1  Allergic rhinitis (477 9) (J30 9)   2  Colonoscopy (Fiberoptic) Screening   3  Encounter for screening mammogram for malignant neoplasm of breast (V76 12)   (Z12 31)   4  Encounter for therapeutic drug monitoring (V58 83) (Z51 81)   5   Ganglion cyst (727 43) (M67 40)   6  Hyperlipidemia (272 4) (E78 5)   7  Hypertension (401 9) (I10)   8  Hypokalemia (276 8) (E87 6)   9  History of Localized Osteoarthritis Of Hand (715 34)   10  Long term use of drug (V58 69) (Z79 899)   11  Mass of thigh, left (782 2) (R22 42)   12  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,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)   13  Need for diphtheria-tetanus-pertussis (Tdap) vaccine, adult/adolescent (V06 1) (Z23)   14  Need for prophylactic vaccination and inoculation against influenza (V04 81) (Z23)   15  Need for vaccination with 13-polyvalent pneumococcal conjugate vaccine (V03 82) (Z23)   16  OA (osteoarthritis) (715 90) (M19 90)   17  Osteopenia (733 90) (M85 80)   18  Other anaphylactic reaction (995 0) (T78 2XXA)   19  Proteinuria (791 0) (R80 9)   20  Screening for genitourinary condition (V81 6) (Z13 89)   21  Thalassemia minor (282 46) (D56 3)   22  Type 2 diabetes mellitus (250 00) (E11 9)   23  Type 2 diabetes mellitus with renal manifestations, 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)   24  Visit For: Screening Exam Neurological Disorders (V80 09)   25  Vitamin D deficiency (268 9) (E55 9)    Past Medical History    1  History of Contact dermatitis (692 9) (L25 9)   2  History of Cough (786 2) (R05)   3  History of Cough (786 2) (R05)   4  History of Disorder of tympanic membrane (384 9) (H73 90)   5  History of Dry Skin (782 9)   6  History of Dysfunction of eustachian tube, unspecified laterality (381 81) (H69 80)   7  History of Encounter for routine gynecological examination (V72 31) (Z01 419)   8  History of Glaucoma Screening   9  History of Hip pain, unspecified laterality   10  History of acute sinusitis (V12 69) (Z87 09)   11  History of anemia (V12 3) (Z86 2)   12  History of labyrinthitis (V12 49) (Z86 69)   13  History of upper respiratory infection (V12 09) (Z87 09)   14  History of vertigo (V12 49) (Z87 898)   15  History of Leg mass, left (782 2) (R22 42)   16   History of Limb pain (729 5) (M79 609)   17  History of Localized Osteoarthritis Of Hand (715 34)   18  History of Mammogram abnormal (793 80) (R92 8)   19  History of Mammogram abnormal (793 80) (R92 8)   20  Need for prophylactic vaccination and inoculation against influenza (V04 81) (Z23)   21  History of Pain in ankle joint (719 47) (M25 579)   22  History of Pain, foot (729 5) (M79 673)   23  History of Pes planus, unspecified laterality (734) (M21 40)    The active problems and past medical history were reviewed and updated today  Surgical History    1  History Of Prior Surgery    The surgical history was reviewed and updated today  Family History  Mother    1  Family history of Acute Myocardial Infarction (V17 3)   2  Family history of Mother  At Age 72  Father    3  Family history of Cerebral Embolism   4  Family history of Father  At Age 80   11  Family history of Lung Cancer (V16 1)  Sister    6  Family history of anemia (V18 2) (Z83 2)   7  Family history of glaucoma (V19 11) (Z83 511)   8  Family history of hypertension (V17 49) (Z82 49)   9  Family history of thyroid disease (V18 19) (Z83 49)  Brother    8  Family history of cardiac disorder (V17 49) (Z82 49)   11  Family history of diabetes mellitus (V18 0) (Z83 3)   12  Family history of hypertension (V17 49) (Z82 49)    The family history was reviewed and updated today  Social History    · Always uses seat belt   · Being A Social Drinker   · Daily caffeine consumption, 1 serving a day   · Drinks coffee   · Exercises, 3x week   · Former smoker (V15 82) (K49 044)   · No drug use  The social history was reviewed and updated today  Current Meds   1  AmLODIPine Besylate 10 MG Oral Tablet; take 1 tablet by mouth every evening; Therapy: 10VST9815 to (Cecil Acosta)  Requested for: 33QKT9812; Last   Rx:2016 Ordered   2  Aspirin 81 MG TABS; Take 1 tablet daily; Therapy: (Recorded:76Afu3174) to Recorded   3   Caltrate 600 1500 (600 Ca) MG Oral Tablet; One 3 times a day, that is one with each   meal;   Therapy: 71DSU9902 to (Last Rx:23Oct2012) Ordered   4  Carvedilol 25 MG Oral Tablet; take 1 tablet twice a day; Therapy: 55UBD2003 to (Evaluate:86Elu8936)  Requested for: 96NGL6899; Last   Rx:16Jun2016 Ordered   5  EpiPen 2-Delgado 0 3 MG/0 3ML Injection Solution Auto-injector; use as directed for insect   sting  Requested for: 34HFV3510; Last Rx:16Jun2016 Ordered   6  Fluticasone Propionate 50 MCG/ACT Nasal Suspension; USE 2 SPRAYS IN EACH   NOSTRIL DAILY; Therapy: 61Zeq0926 to (Evaluate:39Jpz2557)  Requested for: 11DGH2797; Last   Rx:16Jun2016 Ordered   7  Glimepiride 4 MG Oral Tablet; TAKE 2 TABLETS BY MOUTH WITH BREAKFAST; Therapy: 60EMJ6180 to (Evaluate:05Oct2016)  Requested for: 33KKD8140; Last   Rx:19Tan0415 Ordered   8  Glucosamine CAPS; TAKE 2 CAPSULE Daily; Therapy: (Recorded:16Jun2016) to Recorded   9  Hydrochlorothiazide 25 MG Oral Tablet; take 1 tablet by mouth every day; Therapy: 08MAF2763 to (Evaluate:21Uaj0834)  Requested for: 48MPT2924; Last   Rx:16Jun2016 Ordered   10  Lisinopril 40 MG Oral Tablet; take 1 tablet by mouth twice a day; Therapy: 80QMJ2467 to (Evaluate:15Dzj3886)  Requested for: 97KNJ4275; Last    Rx:16Jun2016 Ordered   11  MetFORMIN HCl  MG Oral Tablet Extended Release 24 Hour; take 2 tablets twice    a day; Therapy: 35COT8197 to (Evaluate:05Oct2016)  Requested for: 86ACI2337; Last    Rx:36Rgi1601 Ordered   12  Bolton-3 Fish Oil CAPS; TAKE 2 CAPSULE Daily; Therapy: (Recorded:16Jun2016) to Recorded   13  Potassium Chloride Ariela ER 20 MEQ Oral Tablet Extended Release; One tablet by    mouth each morning; Therapy: 29VEK4073 to (Last Rx:16Jun2016)  Requested for: 83WLU0610 Ordered   14  Simvastatin 20 MG Oral Tablet; take 1 tablet by mouth every day; Therapy: 25XEQ1110 to (Evaluate:01Apr2017)  Requested for: 46BHS3035; Last    Rx:06Apr2016 Ordered   15  Vitamin D3 2000 UNIT Oral Tablet;  Take 1 tablet daily; Therapy: 96Ezn3388 to (Last Rx:15Apr2014) Ordered   16  Womens One Daily TABS; TAKE 1 TABLET DAILY; Therapy: (Recorded:16Jun2016) to Recorded    The medication list was reviewed and updated today  Allergies    1  Cortisone POWD    2  Bee sting   3  Wasp   4  No Known Food Allergies    Vitals  Vital Signs    Recorded: 22Aug2016 09:44AM Recorded: 33NFC0479 31:50WV   Systolic 483, LUE, Sitting 162, RUE, Sitting   Diastolic 92, LUE, Sitting 100, RUE, Sitting   Heart Rate  68   Respiration  16   Height  5 ft    Weight  180 lb 4 00 oz   BMI Calculated  35 2   BSA Calculated  1 78     Physical Exam    Constitutional: General appearance: No acute distress, well appearing and well nourished  Eyes: Anicteric sclerae  JVD:  No JVD present  Pulmonary: Respiratory effort: No increased work of breathing or signs of respiratory distress  Auscultation of lungs: Clear to auscultation  Cardiovascular: Auscultation of heart: Normal rate and rhythm, normal S1 and S2, without murmurs  Abdomen: Non-tender, no masses  Extremities: No cyanosis, clubbing or edema  Rash: No rash present  Neurologic: Non Focal      Psychiatric: Orientation to person, place, and time: Normal   and Mood and affect: Normal        Results/Data  Urine Dip Non-Automated- POC 22Aug2016 09:23AM Burnie Ray     Test Name Result Flag Reference   Color Yellow     Clarity Transparent     Leukocytes trace     Nitrite -     Blood trace     Bilirubin -     Urobilinogen -     Protein 100     Ph 6 0     Specific Gravity 1 015     Ketone small     Glucose -       I have reviewed the office records as summarized above in the HPI        Future Appointments    Date/Time Provider Specialty Site   09/27/2016 09:20 AM Juanjose Yeager MD [facility]astro[facility]ology [facility] [address] [place] [address] SPECIAL     Signatures   Electronically signed by : Radha Goss DO; Aug 22 2016 10:45AM EST                       (Author)

## 2018-01-16 NOTE — RESULT NOTES
Verified Results  VAS RENAL ARTERY COMPLETE BILATERAL 09Guf0948 10:11AM Analy Mohamud Order Number: [id number]    - Patient Instructions: To schedule this appointment, please contact Central Scheduling at [phone]   Order Number: [id number]    - Patient Instructions: To schedule this appointment, please contact Central Scheduling at [phone]  Test Name Result Flag Reference   VAS RENAL ARTERY COMPLETE BILATERAL (Report)     [facility] REPORT   CLINICAL:   Indications: Benign Essential Hypertension [I10]  Pt  reports she has had   high blood pressure for years and it now seems to be somewhat controlled with   four medications  Risk Factors   The patient has history of hypertension, diabetes (NIDDM (oral meds)),   hyperlipidemia and previous smoking (quit >10yrs ago)  Clinical   Right Pressure: 140/82 mm Hg    Left Pressure: 138/82 mm Hg  FINDINGS:      Unilateral   PSV (cm/s) EDV (cm/s)  RI    Sup-Graciela Ao       74     10 0 87    Px Inf-Delvis Ao     90      2 0 98       Right     PSV (cm/s) EDV (cm/s)  RAR  RI Kidney (cm)    Prox Renal       83     19 1 12 0 80           Mid Renal       68     14 0 92 0 80           Dist Renal       63     17 0 86 0 72           Celiac Artery     23      7    0 69           Kidney                          11 60    Hilum 3        22      7    0 70           Hilum         14      6    0 61              Left      PSV (cm/s) EDV (cm/s)  RAR  RI Kidney (cm)    Prox Renal      164     34 2 23 0 79           Mid Renal       159     37 2 16 0 82           Dist Renal       66     19 0 89 0 71           Celiac Artery     24      8    0 66           Kidney                          12 30    Hilum         20      8    0 60                    CONCLUSION:      Impression   The abdominal aorta is widely patent and normal caliber, with the greatest   diameter measurement of 1 9cm  Distal aorta not visualized due to overlying   bowel gas        RIGHT RENAL: No evidence of significant arterial occlusive disease in the main renal artery  Adequate parenchymal flow noted with a renovascular resistive index of 0 69  Renal/Aorta Ratio: 1 12  The Kidney measures 11 6cm, Prior 12 6cm  Tech Note: Hypoechoic, avascular area measuring 1 96 x 2 30 x 2 43cm seen   within the renal       LEFT RENAL:   No evidence of significant arterial occlusive disease in the main renal artery  Adequate parenchymal flow noted with a renovascular resistive index of 0 66  Renal/Aorta Ratio: 2 23  The Kidney measures 12 3cm, Prior 12 7cm         Compared to previous study on 9/4/2009, there is no significant change  SIGNATURE:   Electronically Signed by: Dana White on 2016-08-26 06:54:55 PM     (1) PROTEIN, URINE 24HR 14Ajv4407 09:51AM Toni Idol     Test Name Result Flag Reference   24 HR URINE VOLUME 1350 mL     PROTEIN 24 HOUR URINE 243 mg/24 hrs H      (1) RENAL FUNCTION PANEL 67Fnx7046 11:06AM Toni Idol     Test Name Result Flag Reference   ANION GAP (CALC) 8 mmol/L  4-13   ALBUMIN 3 8 g/dL  3 5-5 0   BLOOD UREA NITROGEN 16 mg/dL  5-25   National Kidney Disease Education Program recommendations are as follows:  GFR calculation is accurate only with a steady state creatinine  Chronic Kidney disease less than 60 ml/min/1 73 sq  meters  Kidney failure less than 15 ml/min/1 73 sq  meters     CALCIUM 9 3 mg/dL  8 3-10 1   CHLORIDE 102 mmol/L  100-108   CARBON DIOXIDE 31 mmol/L  21-32   CREATININE 0 70 mg/dL  0 60-1 30   Standardized to IDMS reference method   GLUCOSE,RANDM 119 mg/dL     POTASSIUM 3 6 mmol/L  3 5-5 3   eGFR Non-African American      >60 0 ml/min/1 73sq m   SODIUM 141 mmol/L  136-145   PHOSPHORUS 4 2 mg/dL H 2 3-4 1     (1) MICROALBUMIN CREATININE RATIO, RANDOM URINE 51Zdd4046 11:06AM Toni Idol     Test Name Result Flag Reference   MICROALBUMIN/ CREAT R 73 mg/g creatinine H 0-30   MICROALBUMIN,URINE 99 9 mg/L H 0 0-20 0   CREATININE URINE 136 0 mg/dL

## 2018-01-22 VITALS
DIASTOLIC BLOOD PRESSURE: 80 MMHG | WEIGHT: 176.1 LBS | HEIGHT: 60 IN | HEART RATE: 72 BPM | SYSTOLIC BLOOD PRESSURE: 130 MMHG | RESPIRATION RATE: 16 BRPM | BODY MASS INDEX: 34.57 KG/M2

## 2018-01-23 VITALS
BODY MASS INDEX: 35.05 KG/M2 | RESPIRATION RATE: 16 BRPM | WEIGHT: 178.5 LBS | DIASTOLIC BLOOD PRESSURE: 62 MMHG | HEIGHT: 60 IN | SYSTOLIC BLOOD PRESSURE: 112 MMHG | HEART RATE: 72 BPM

## 2018-02-28 ENCOUNTER — OFFICE VISIT (OUTPATIENT)
Dept: GERIATRICS | Facility: CLINIC | Age: 78
End: 2018-02-28
Payer: MEDICARE

## 2018-02-28 VITALS
TEMPERATURE: 98.8 F | HEART RATE: 53 BPM | DIASTOLIC BLOOD PRESSURE: 60 MMHG | BODY MASS INDEX: 34.39 KG/M2 | WEIGHT: 176.1 LBS | RESPIRATION RATE: 16 BRPM | SYSTOLIC BLOOD PRESSURE: 112 MMHG

## 2018-02-28 DIAGNOSIS — R05.9 COUGH: Primary | ICD-10-CM

## 2018-02-28 DIAGNOSIS — J06.9 VIRAL UPPER RESPIRATORY TRACT INFECTION: Primary | ICD-10-CM

## 2018-02-28 DIAGNOSIS — R80.9 TYPE 2 DIABETES MELLITUS WITH MICROALBUMINURIA, WITHOUT LONG-TERM CURRENT USE OF INSULIN (HCC): ICD-10-CM

## 2018-02-28 DIAGNOSIS — E11.21 MICROALBUMINURIC DIABETIC NEPHROPATHY (HCC): ICD-10-CM

## 2018-02-28 DIAGNOSIS — E11.29 TYPE 2 DIABETES MELLITUS WITH MICROALBUMINURIA, WITHOUT LONG-TERM CURRENT USE OF INSULIN (HCC): ICD-10-CM

## 2018-02-28 PROBLEM — J30.9 CHRONIC ALLERGIC RHINITIS: Status: ACTIVE | Noted: 2017-11-13

## 2018-02-28 PROCEDURE — 99214 OFFICE O/P EST MOD 30 MIN: CPT | Performed by: INTERNAL MEDICINE

## 2018-02-28 RX ORDER — ATORVASTATIN CALCIUM 10 MG/1
1 TABLET, FILM COATED ORAL
COMMUNITY
Start: 2017-03-03 | End: 2018-03-16 | Stop reason: SDUPTHER

## 2018-02-28 RX ORDER — AMLODIPINE BESYLATE 10 MG/1
1 TABLET ORAL
COMMUNITY
Start: 2012-04-06 | End: 2018-04-03 | Stop reason: SDUPTHER

## 2018-02-28 RX ORDER — POTASSIUM CHLORIDE 20 MEQ/1
TABLET, EXTENDED RELEASE ORAL DAILY
COMMUNITY
Start: 2016-09-01 | End: 2018-04-11

## 2018-02-28 RX ORDER — CHLORAL HYDRATE 500 MG
1 CAPSULE ORAL DAILY
COMMUNITY

## 2018-02-28 RX ORDER — MULTIVIT WITH CALCIUM,IRON,MIN 27MG-0.4MG
1 TABLET ORAL DAILY
COMMUNITY

## 2018-02-28 RX ORDER — ACETAMINOPHEN 160 MG
1 TABLET,DISINTEGRATING ORAL DAILY
COMMUNITY
Start: 2014-04-15

## 2018-02-28 RX ORDER — FLUTICASONE PROPIONATE 50 MCG
2 SPRAY, SUSPENSION (ML) NASAL DAILY
COMMUNITY
Start: 2012-08-09 | End: 2018-04-21 | Stop reason: SDUPTHER

## 2018-02-28 RX ORDER — HYDROCHLOROTHIAZIDE 25 MG/1
1 TABLET ORAL DAILY
COMMUNITY
Start: 2013-07-29 | End: 2018-03-16 | Stop reason: SDUPTHER

## 2018-02-28 RX ORDER — METFORMIN HYDROCHLORIDE 500 MG/1
2 TABLET, EXTENDED RELEASE ORAL 2 TIMES DAILY
COMMUNITY
Start: 2013-05-23 | End: 2018-03-16 | Stop reason: SDUPTHER

## 2018-02-28 RX ORDER — LISINOPRIL 40 MG/1
1 TABLET ORAL 2 TIMES DAILY
COMMUNITY
Start: 2012-04-06 | End: 2018-05-16 | Stop reason: SDUPTHER

## 2018-02-28 RX ORDER — CARVEDILOL 25 MG/1
1 TABLET ORAL 2 TIMES DAILY
COMMUNITY
Start: 2012-06-01 | End: 2018-05-16 | Stop reason: SDUPTHER

## 2018-02-28 RX ORDER — GUAIFENESIN AND CODEINE PHOSPHATE 100; 10 MG/5ML; MG/5ML
5 SOLUTION ORAL 4 TIMES DAILY PRN
Qty: 120 ML | Refills: 0 | Status: SHIPPED | OUTPATIENT
Start: 2018-02-28 | End: 2018-03-10

## 2018-02-28 RX ORDER — GLIMEPIRIDE 4 MG/1
TABLET ORAL
COMMUNITY
Start: 2012-04-23 | End: 2018-04-03 | Stop reason: SDUPTHER

## 2018-02-28 RX ORDER — SPIRONOLACTONE 25 MG/1
1 TABLET ORAL DAILY
COMMUNITY
Start: 2017-07-31 | End: 2018-05-16 | Stop reason: SDUPTHER

## 2018-02-28 RX ORDER — GLUCOSAMINE SULFATE 500 MG
1 CAPSULE ORAL DAILY
COMMUNITY

## 2018-02-28 NOTE — PATIENT INSTRUCTIONS
1 -to take Robitussin with 5 cc at nighttime as a cough suppressant      2 -routine diabetic blood work    3 -reassess blood work in 4 weeks    4 -if not improved she is to call

## 2018-03-02 DIAGNOSIS — R05.8 PRODUCTIVE COUGH: Primary | ICD-10-CM

## 2018-03-02 NOTE — PROGRESS NOTES
C/o productive cough with no improvement since OV 02/28  Per Kady Morin verbal please order stat chest x-ray

## 2018-03-16 DIAGNOSIS — I10 HYPERTENSION, UNSPECIFIED TYPE: ICD-10-CM

## 2018-03-16 DIAGNOSIS — E11.8 TYPE 2 DIABETES MELLITUS WITH COMPLICATION, WITHOUT LONG-TERM CURRENT USE OF INSULIN (HCC): ICD-10-CM

## 2018-03-16 DIAGNOSIS — E78.5 HYPERLIPIDEMIA, UNSPECIFIED HYPERLIPIDEMIA TYPE: Primary | ICD-10-CM

## 2018-03-19 RX ORDER — HYDROCHLOROTHIAZIDE 25 MG/1
25 TABLET ORAL DAILY
Qty: 90 TABLET | Refills: 1 | Status: SHIPPED | OUTPATIENT
Start: 2018-03-19 | End: 2018-08-28 | Stop reason: SDUPTHER

## 2018-03-19 RX ORDER — ATORVASTATIN CALCIUM 10 MG/1
10 TABLET, FILM COATED ORAL DAILY
Qty: 90 TABLET | Refills: 1 | Status: SHIPPED | OUTPATIENT
Start: 2018-03-19 | End: 2018-08-28 | Stop reason: SDUPTHER

## 2018-03-19 RX ORDER — METFORMIN HYDROCHLORIDE 500 MG/1
TABLET, EXTENDED RELEASE ORAL
Qty: 360 TABLET | Refills: 1 | Status: SHIPPED | OUTPATIENT
Start: 2018-03-19 | End: 2018-08-28 | Stop reason: SDUPTHER

## 2018-04-03 DIAGNOSIS — I10 HYPERTENSION, UNSPECIFIED TYPE: Primary | ICD-10-CM

## 2018-04-03 DIAGNOSIS — E11.8 TYPE 2 DIABETES MELLITUS WITH COMPLICATION, WITHOUT LONG-TERM CURRENT USE OF INSULIN (HCC): ICD-10-CM

## 2018-04-03 RX ORDER — GLIMEPIRIDE 4 MG/1
TABLET ORAL
Qty: 180 TABLET | Refills: 0 | OUTPATIENT
Start: 2018-04-03

## 2018-04-03 RX ORDER — AMLODIPINE BESYLATE 10 MG/1
TABLET ORAL
Qty: 90 TABLET | Refills: 3 | OUTPATIENT
Start: 2018-04-03

## 2018-04-03 RX ORDER — GLIMEPIRIDE 4 MG/1
TABLET ORAL
Qty: 180 TABLET | Refills: 1 | Status: SHIPPED | OUTPATIENT
Start: 2018-04-03 | End: 2018-10-13 | Stop reason: SDUPTHER

## 2018-04-03 RX ORDER — AMLODIPINE BESYLATE 10 MG/1
TABLET ORAL
Qty: 90 TABLET | Refills: 1 | Status: SHIPPED | OUTPATIENT
Start: 2018-04-03 | End: 2018-08-28 | Stop reason: SDUPTHER

## 2018-04-03 NOTE — TELEPHONE ENCOUNTER
Patient moved to [facility]  Her provider there will call in her prescriptions therefore we had to refuse them

## 2018-04-03 NOTE — TELEPHONE ENCOUNTER
Patient called our office for renewal of meds( glimepiride 4mg and [id number]  Epic showed that an order for glimepiride was also pended by Dr Justino Loyd for approval  Patient stated this is an error  No longer following Dr Justino Loyd office

## 2018-04-05 ENCOUNTER — OFFICE VISIT (OUTPATIENT)
Dept: GERIATRICS | Facility: CLINIC | Age: 78
End: 2018-04-05
Payer: MEDICARE

## 2018-04-05 VITALS
HEART RATE: 58 BPM | BODY MASS INDEX: 35.02 KG/M2 | WEIGHT: 179.3 LBS | SYSTOLIC BLOOD PRESSURE: 124 MMHG | OXYGEN SATURATION: 97 % | RESPIRATION RATE: 16 BRPM | DIASTOLIC BLOOD PRESSURE: 60 MMHG

## 2018-04-05 DIAGNOSIS — R25.2 LEG CRAMPS: Primary | ICD-10-CM

## 2018-04-05 PROCEDURE — 99213 OFFICE O/P EST LOW 20 MIN: CPT | Performed by: NURSE PRACTITIONER

## 2018-04-05 NOTE — ASSESSMENT & PLAN NOTE
· Bilateral mild leg cramps x3 months  · Worse at night, able to relieve with stretching exercises  · Labs done 2 weeks ago - patient is unsure how much potassium she was taking  Is now taking aldactone and 20mEq KCl x3 days - unsure if this is helping cramps  · Repeat CMP with current KCl supplementation, check Magnesium  · Continue to stay hydrated drinking 60-80oz fluids/day  · See Aminta Wellington for gentle stretching exercises to increase strength and flexibility of muscles  · Follow up next week with Dr Anna Soliman to review labs and report on status of cramps

## 2018-04-05 NOTE — PROGRESS NOTES
Assessment/Plan:    Leg cramps  · Bilateral mild leg cramps x3 months  · Worse at night, able to relieve with stretching exercises  · Labs done 2 weeks ago - patient is unsure how much potassium she was taking  Is now taking aldactone and 20mEq KCl x3 days - unsure if this is helping cramps  · Repeat CMP with current KCl supplementation, check Magnesium  · Continue to stay hydrated drinking 60-80oz fluids/day  · See Tasia Noland for gentle stretching exercises to increase strength and flexibility of muscles  · Follow up next week with Dr Morelia Plascencia to review labs and report on status of cramps  Diagnoses and all orders for this visit:    Leg cramps  -     Comprehensive metabolic panel; Future  -     Magnesium; Future        Subjective:   CC  C/o leg cramps x 2wks   Previously D/C Potassu[facility] meds based on Dr Daniel Smith order but started it again 3 days ago because she thought it would help with her Sx   Patient ID: Ben Muniz is a 78 y o  female  Pt c/o ongoing leg cramps which started about 3 mo ago  These are mild in nature and relieved with point/flex of ankle  Patient reports decreased activity since moving to [facility], she notices more difficulties with stair climbing d/t muscle weakness since the decreased activity  Patient reports h/o "flat footed" - has seen therapy, orthopedics  Has had specialty shoes and inserts  Reports good shoes at this time  No recent falls  Patient has started to use potassium 20mEq last 3 days  She had previous blood work, but is unsure whether she was taking potassium supplement - requesting recheck with current supplementation- thinks cramps are a little better  The following portions of the patient's history were reviewed and updated as appropriate: past family history, past social history and past surgical history  Review of Systems   Constitutional: Negative  HENT: Negative  Cardiovascular: Negative  Gastrointestinal: Negative      Genitourinary: Negative  Musculoskeletal: Positive for gait problem and myalgias  Neurological: Negative for dizziness, light-headedness and numbness  Psychiatric/Behavioral: Negative  Objective:      /60 (BP Location: Right arm, Patient Position: Sitting, Cuff Size: Standard)   Pulse 58   Resp 16   Wt 81 3 kg (179 lb 4 8 oz)   SpO2 97%   BMI 35 02 kg/m²          Physical Exam   Constitutional: She is oriented to person, place, and time  She appears well-developed and well-nourished  No distress  HENT:   Head: Normocephalic  Cardiovascular: Normal rate and normal heart sounds  Exam reveals no gallop and no friction rub  No murmur heard  Pulmonary/Chest: Effort normal and breath sounds normal  No respiratory distress  She has no wheezes  She has no rales  Abdominal: Soft  Bowel sounds are normal  She exhibits no distension  There is no tenderness  There is no rebound  Musculoskeletal: Normal range of motion  Neurological: She is alert and oriented to person, place, and time  Skin: Skin is warm and dry  She is not diaphoretic  Psychiatric: She has a normal mood and affect  Her behavior is normal  Judgment and thought content normal    Nursing note and vitals reviewed

## 2018-04-05 NOTE — PATIENT INSTRUCTIONS
Leg cramps  · Repeat CMP with current KCl supplementation, check Magnesium  · Continue to stay hydrated drinking 60-80oz fluids/day  · See Vania Atwood for gentle stretching exercises to increase strength and flexibility of muscles  · Follow up next week with Dr Gaviota Garnett to review labs and report on status of cramps  Leg Cramps   WHAT YOU SHOULD KNOW:   A leg cramp is a sudden, painful squeeze in your calf (lower leg) or thigh (upper leg) muscles  The muscle may twitch under the skin or feel hard  Your leg may feel sore long after the muscles relax  INSTRUCTIONS:   To stretch your leg: Warm up your muscles before you stretch  Take a short walk or run slowly in place  If you get leg cramps while you sleep, you may also want to stretch before bedtime  The following exercises stretch the calves and thighs to help stop or prevent leg cramps:  · To stretch your calf and heel:     ? Stand up and place the palms of your hands against a wall     ? Lean into the wall, with one leg behind the other  Bend the front leg and keep the back leg straight  ? Press the heel of your back leg into the floor  ? Hold for 15 to 30 seconds, then switch sides  · To stretch the back of your knee and thigh:     ? Sit on the floor with your legs straight in front of you  Do not point your toes  ? Place the palms of your hands at your sides on the floor  Slide your hands past your hips toward your ankles  ? Move toward your ankles until you feel a stretch in the back of your legs  Do not try to touch your head to your knees or round your back  ? Hold for 30 seconds  Drink plenty of liquids during exercise: Water and other liquids help prevent cramps by replacing fluids lost in sweat  Drink more liquids when you are active in hot weather  To stop a leg cramp if it happens again:  · Stretch and massage your muscle to stop the cramp  · Apply heat or ice packs to the cramp   A heating pad or hot pack may help relieve tight muscles  An ice pack or crushed ice in a bag, wrapped in a towel can soothe tender or sore muscles  Take your medicine as directed: Call your primary healthcare provider if you think your medicine is not helping or if you have side effects  Tell him if you are taking any vitamins, herbs, or other medicines  Keep a list of the medicines you take  Include the amounts, and when and why you take them  Bring the list or the pill bottles to follow-up visits  Follow up with your primary healthcare provider as directed: Write down any questions you have so you remember to ask them in your follow-up visits  Contact your primary healthcare provider if:  · Your leg cramps get worse or happen more often  · Your leg feels numb  · Your leg cramps do not go away with stretching or massage  · You feel dizzy, lightheaded, or confused when you exercise in hot weather  You may have a headache or blurred vision  © 2014 [address] is for End User's use only and may not be sold, redistributed or otherwise used for commercial purposes  All illustrations and images included in CareNotes® are the copyrighted property of [employer]  or [facility]  The above information is an  only  It is not intended as medical advice for individual conditions or treatments  Talk to your doctor, nurse or pharmacist before following any medical regimen to see if it is safe and effective for you

## 2018-04-11 ENCOUNTER — OFFICE VISIT (OUTPATIENT)
Dept: GERIATRICS | Facility: CLINIC | Age: 78
End: 2018-04-11
Payer: MEDICARE

## 2018-04-11 VITALS
RESPIRATION RATE: 16 BRPM | WEIGHT: 178.6 LBS | SYSTOLIC BLOOD PRESSURE: 140 MMHG | BODY MASS INDEX: 34.88 KG/M2 | OXYGEN SATURATION: 97 % | DIASTOLIC BLOOD PRESSURE: 78 MMHG | HEART RATE: 60 BPM

## 2018-04-11 DIAGNOSIS — E11.29 CONTROLLED TYPE 2 DIABETES MELLITUS WITH MICROALBUMINURIA, WITHOUT LONG-TERM CURRENT USE OF INSULIN (HCC): ICD-10-CM

## 2018-04-11 DIAGNOSIS — R80.9 CONTROLLED TYPE 2 DIABETES MELLITUS WITH MICROALBUMINURIA, WITHOUT LONG-TERM CURRENT USE OF INSULIN (HCC): ICD-10-CM

## 2018-04-11 DIAGNOSIS — I10 ESSENTIAL HYPERTENSION: Primary | ICD-10-CM

## 2018-04-11 PROCEDURE — 99214 OFFICE O/P EST MOD 30 MIN: CPT | Performed by: INTERNAL MEDICINE

## 2018-04-11 RX ORDER — EPINEPHRINE 0.3 MG/.3ML
INJECTION SUBCUTANEOUS
COMMUNITY
End: 2018-08-31 | Stop reason: SDUPTHER

## 2018-04-11 NOTE — PROGRESS NOTES
Assessment/Plan:  1 -diabetes mellitus type 2 -well controlled at present will recheck a full diabetic panel in approximately 3 months and reassess her numbers  She should stop her potassium I will recheck a CMP in 2 weeks and review those test with her and make sure she is not developing leg cramps  2 -essential hypertension  -well controlled at present  No problem-specific Assessment & Plan notes found for this encounter  Diagnoses and all orders for this visit:    Controlled type 2 diabetes mellitus with microalbuminuria, without long-term current use of insulin (HCC)    Essential hypertension    Other orders  -     EPINEPHrine (EPIPEN 2-LIVIA) 0 3 mg/0 3 mL SOAJ; Inject as directed          Subjective:   CC  Pt here for 1wk f/u- Leg cramps  Review BW done 04/10/18  Pt was previously R/S due to weather  Review OV notes 02/28/18   Patient ID: Oli Hubbard is a 78 y o  female  Patient returns to the office she currently is 78years old has had history of diabetes mellitus type 2 well controlled with a hemoglobin A1c of 6 5 estimated blood sugar of 140  The patient has kidney function with a GFR of 70 electrolytes reveal a sodium 139 potassium 5 1 chloride 105 CO2 is 28 fasting blood sugar is 115  I am concerned with the fact that the patient is taking Aldactone, lisinopril, and potassium replacement  Both of Dr  zach and lisinopril retain potassium I will need to discontinue her potassium altogether and recheck her numbers in approximately 2 weeks  The only medication that would play a role in decreasing her potassium levels is the hydrochlorothiazide however her potassium on April 10th of this year was 5 1 top normal   She states that she took the potassium because she was getting leg cramps but I doubt that this was secondary to low potassium her magnesium level is 2 2  Patient will need to continue exercising regularly and follow dietary modification cutting back on her carbohydrates          The following portions of the patient's history were reviewed and updated as appropriate: allergies, current medications, past family history, past medical history, past social history, past surgical history and problem list     Review of Systems   Constitutional: Negative  HENT: Negative  Eyes: Negative  Respiratory: Negative  Cardiovascular: Negative  Gastrointestinal: Negative  Endocrine: Negative  Genitourinary: Negative  Musculoskeletal: Negative  Skin: Negative  Allergic/Immunologic: Negative  Neurological: Negative  Hematological: Negative  Psychiatric/Behavioral: Negative  Objective:      /78 (BP Location: Right arm, Patient Position: Sitting, Cuff Size: Standard)   Pulse 60   Resp 16   Wt 81 kg (178 lb 9 6 oz)   BMI 34 88 kg/m²          Physical Exam   Constitutional: She is oriented to person, place, and time  She appears well-developed and well-nourished  HENT:   Head: Normocephalic and atraumatic  Right Ear: External ear normal    Left Ear: External ear normal    Nose: Nose normal    Mouth/Throat: Oropharynx is clear and moist    Eyes: Conjunctivae and EOM are normal  Pupils are equal, round, and reactive to light  Neck: Normal range of motion  Neck supple  Cardiovascular: Normal rate, regular rhythm, normal heart sounds and intact distal pulses  Pulmonary/Chest: Effort normal and breath sounds normal    Abdominal: Soft  Bowel sounds are normal    Musculoskeletal: Normal range of motion  Neurological: She is alert and oriented to person, place, and time  She has normal reflexes  Skin: Skin is warm  Psychiatric: She has a normal mood and affect  Her behavior is normal  Judgment and thought content normal    Nursing note and vitals reviewed

## 2018-04-14 ENCOUNTER — APPOINTMENT (OUTPATIENT)
Dept: RADIOLOGY | Age: 78
End: 2018-04-14
Payer: MEDICARE

## 2018-04-14 ENCOUNTER — TRANSCRIBE ORDERS (OUTPATIENT)
Dept: ADMINISTRATIVE | Age: 78
End: 2018-04-14

## 2018-04-14 ENCOUNTER — OFFICE VISIT (OUTPATIENT)
Dept: URGENT CARE | Age: 78
End: 2018-04-14
Payer: MEDICARE

## 2018-04-14 VITALS
WEIGHT: 175 LBS | DIASTOLIC BLOOD PRESSURE: 68 MMHG | HEART RATE: 60 BPM | TEMPERATURE: 98.1 F | RESPIRATION RATE: 18 BRPM | SYSTOLIC BLOOD PRESSURE: 129 MMHG | BODY MASS INDEX: 34.18 KG/M2 | OXYGEN SATURATION: 96 %

## 2018-04-14 DIAGNOSIS — S99.922A FOOT INJURY, LEFT, INITIAL ENCOUNTER: Primary | ICD-10-CM

## 2018-04-14 DIAGNOSIS — S99.922A FOOT INJURY, LEFT, INITIAL ENCOUNTER: ICD-10-CM

## 2018-04-14 PROCEDURE — 73630 X-RAY EXAM OF FOOT: CPT

## 2018-04-14 PROCEDURE — G0463 HOSPITAL OUTPT CLINIC VISIT: HCPCS | Performed by: FAMILY MEDICINE

## 2018-04-14 PROCEDURE — 99214 OFFICE O/P EST MOD 30 MIN: CPT | Performed by: FAMILY MEDICINE

## 2018-04-14 RX ORDER — LIDOCAINE 50 MG/G
1 PATCH TOPICAL DAILY
Qty: 30 PATCH | Refills: 0 | Status: SHIPPED | OUTPATIENT
Start: 2018-04-14 | End: 2018-08-03 | Stop reason: ALTCHOICE

## 2018-04-14 NOTE — PROGRESS NOTES
[facility]        NAME: Leopoldo Cove is a 78 y o  female  : 1940    MRN: [id number]  DATE: 2018  TIME: 2:53 PM    Assessment and Plan   Foot injury, left, initial encounter [L59 [id number]]  1  Foot injury, left, initial encounter  XR foot 3+ vw left    Cane    Compression bandages     Cane provided and use demonstrated in office  Ace bandage applied in office by provider  Patient Instructions   Use cane for all walking and activity  RICE (rest, ice, compression, elevation) measures as discussed  Rest and elevate injured area as much as possible  Ice for 20 to 30 minutes at time 3-4 times per day  Wear compression device during all waking hours and remove at bedtime  Tylenol as directed, for pain  If you develop numbness, tingling, increased pain, redness or swelling that is not resolved by position changing  go to the ER  Follow up with your family doctor in 3-5 days  Proceed to the ER if symptoms worsen  Chief Complaint     Chief Complaint   Patient presents with    Ankle Injury     stood up on left akle and went down      History of Present Illness   77 y/o female presenting with c/o left foot pain x today  She notes legs often become numb after sitting on a cushioned chair for a prolonged period of time, which occurred this am  She stood up abruptly, despite this sensation and fell laterally  She is unsure of how foot was injured but notes immediate pain and subsequent swelling of the outer foot  She does have a history of rupture/tear of multiple ligaments in the foot and ankle, reportedly due to activity on flat feet  No numbness, tingling, or weakness  No radiation of pain, heal or ankle pain, or calf pain  She denies any cardiac, respiratory, or other neurological sx relating to fall  She denies hitting head or LOC  Review of Systems   Review of Systems   Eyes: Negative for visual disturbance     Respiratory: Negative for cough, chest tightness, shortness of breath and wheezing  Cardiovascular: Negative for chest pain and palpitations  Gastrointestinal: Negative for abdominal pain, nausea and vomiting  Musculoskeletal: Negative for neck pain  Skin: Negative for rash and wound  Neurological: Negative for dizziness, tremors, syncope, facial asymmetry, weakness, light-headedness, numbness and headaches  Psychiatric/Behavioral: Negative for confusion and decreased concentration       Current Medications       Current Outpatient Prescriptions:     amLODIPine (NORVASC) 10 mg tablet, TAKE ONE TABLET BY MOUTH IN THE EVENING, Disp: 90 tablet, Rfl: 1    aspirin 81 MG tablet, Take 1 tablet by mouth daily, Disp: , Rfl:     atorvastatin (LIPITOR) 10 mg tablet, Take 1 tablet (10 mg total) by mouth daily, Disp: 90 tablet, Rfl: 1    Calcium Carbonate (CALTRATE 600) 1500 (600 Ca) MG TABS, Take by mouth, Disp: , Rfl:     carvedilol (COREG) 25 mg tablet, Take 1 tablet by mouth 2 (two) times a day, Disp: , Rfl:     Cholecalciferol (VITAMIN D3) 2000 units capsule, Take 1 tablet by mouth daily, Disp: , Rfl:     EPINEPHrine (EPIPEN 2-LIVIA) 0 3 mg/0 3 mL SOAJ, Inject as directed, Disp: , Rfl:     fluticasone (FLONASE) 50 mcg/act nasal spray, 2 sprays into each nostril daily, Disp: , Rfl:     glimepiride (AMARYL) 4 mg tablet, Take two tablets by mouth with breakfast, Disp: 180 tablet, Rfl: 1    glucosamine 500 MG CAPS capsule, Take 2 capsules by mouth daily, Disp: , Rfl:     hydrochlorothiazide (HYDRODIURIL) 25 mg tablet, Take 1 tablet (25 mg total) by mouth daily, Disp: 90 tablet, Rfl: 1    lisinopril (ZESTRIL) 40 mg tablet, Take 1 tablet by mouth 2 (two) times a day, Disp: , Rfl:     metFORMIN (GLUCOPHAGE-XR) 500 mg 24 hr tablet, TAKE 2 TABLETS TWICE A DAY, Disp: 360 tablet, Rfl: 1    Multiple Vitamins-Minerals (WOMENS ONE DAILY) TABS, Take 1 tablet by mouth daily, Disp: , Rfl:     Omega-3 Fatty Acids (OMEGA-3 FISH OIL) 1000 MG CAPS, Take 2 capsules by mouth daily, Disp: , Rfl:   spironolactone (ALDACTONE) 25 mg tablet, Take 1 tablet by mouth daily, Disp: , Rfl:     pseudoephedrine-codeine-guaifenesin (MYTUSSIN DAC) 30- MG/5ML solution, Take 10 mL by mouth 4 (four) times a day as needed for allergies, Disp: 120 mL, Rfl: 0    Current Allergies     Allergies as of 04/14/2018 - Reviewed 04/14/2018   Allergen Reaction Noted    Cortisone Hyperactivity 04/14/2018          The following portions of the patient's history were reviewed and updated as appropriate: allergies, current medications, past family history, past medical history, past social history, past surgical history and problem list      Past Medical History:   Diagnosis Date    Abnormal mammogram     Acute sinusitis     Allergic reaction     Subsequent encounter, Resolved - 1/9/18    Allergic rhinitis 01/16/2012    Anaphylactic reaction     Other, Last assessed - 6/16/16    Anemia 01/16/2012    Ataxia     Cellulitis of great toe of right foot     Chronic allergic rhinitis     Unspec seasonality, unspec trigger;  Last assessed - 11/13/17    Contact dermatitis     Last assessed - 6/3/14    Dermatitis     Diabetes mellitus ([facility] [address] )     Disorder of tympanic membrane     Last assessed - 10/15/12    Dry skin     Last assessed - 4/15/14    Dysfunction of eustachian tube     Unspec laterality, Last assessed - 10/19/12    Ganglion cyst     Glaucoma     Hip pain, acute, unspecified laterality     Hyperlipidemia     Hypertension     Labyrinthitis 11/17/2011    Leg mass, left     Limb pain     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    Long term use of drug     Last assessed - 4/6/14    Mammogram abnormal     Last assessed - 4/11/14    Mass of thigh, left     Pain in ankle joint     Pes planus     Unspec laterality, Last assessed - 4/9/13    Shoulder pain, right     Vertigo 01/16/2012     Past Surgical History:   Procedure Laterality Date    OTHER SURGICAL HISTORY      Surgery unk     TONSILLECTOMY Family History   Problem Relation Age of Onset    Heart attack Mother      Ac MI   Sarina Christensen Lung cancer Father     Other Father      Cerebral Embolism    Anemia Sister    Sarina Christensen Glaucoma Sister     Hypertension Sister     Thyroid disease Sister     Diabetes Brother     Hypertension Brother     Other Brother      Cardiac Disorder   Medications have been verified  Objective   /68 (BP Location: Left arm, Patient Position: Sitting, Cuff Size: Large)   Pulse 60   Temp 98 1 °F (36 7 °C) (Temporal)   Resp 18   Wt 79 4 kg (175 lb)   SpO2 96%   BMI 34 18 kg/m²      Left foot Xr: no acute osseous abn  Will await final report  Physical Exam     Physical Exam   Constitutional: She is oriented to person, place, and time  She appears well-developed and well-nourished  No distress  HENT:   Head: Normocephalic and atraumatic  Eyes: Conjunctivae are normal    Cardiovascular: Normal rate, regular rhythm and normal heart sounds  Pulmonary/Chest: Effort normal and breath sounds normal  No respiratory distress  Musculoskeletal:        Left ankle: Normal  She exhibits normal range of motion, no swelling, no ecchymosis, no deformity, no laceration and normal pulse  No tenderness  Achilles tendon normal  Achilles tendon exhibits no pain, no defect and normal Stevenson's test results  Right foot: There is tenderness (overlying distal 3rd-5th metatarsals  ) and swelling (of mid to distal 3rd - 5th metatarsals  )  There is normal range of motion, no bony tenderness, normal capillary refill, no crepitus, no deformity and no laceration  Patient has flat feet  Neurological: She is alert and oriented to person, place, and time  She has normal reflexes  No cranial nerve deficit  She exhibits normal muscle tone  Coordination normal    Skin: Skin is warm and dry  No rash noted  She is not diaphoretic  Psychiatric: She has a normal mood and affect   Her behavior is normal    Nursing note and vitals reviewed

## 2018-04-14 NOTE — PATIENT INSTRUCTIONS
Use cane for all walking and activity  RICE (rest, ice, compression, elevation) measures as discussed  Rest and elevate injured area as much as possible  Ice for 20 to 30 minutes at time 3-4 times per day  Wear compression device during all waking hours and remove at bedtime  Tylenol as directed, for pain  If you develop numbness, tingling, increased pain, redness or swelling that is not resolved by position changing  go to the ER  Follow up with your family doctor in 3-5 days  Proceed to the ER if symptoms worsen

## 2018-04-16 ENCOUNTER — OFFICE VISIT (OUTPATIENT)
Dept: GERIATRICS | Facility: CLINIC | Age: 78
End: 2018-04-16
Payer: MEDICARE

## 2018-04-16 VITALS
DIASTOLIC BLOOD PRESSURE: 70 MMHG | BODY MASS INDEX: 34.45 KG/M2 | WEIGHT: 176.4 LBS | SYSTOLIC BLOOD PRESSURE: 124 MMHG | HEART RATE: 58 BPM | OXYGEN SATURATION: 98 % | RESPIRATION RATE: 18 BRPM | TEMPERATURE: 97.4 F

## 2018-04-16 DIAGNOSIS — S99.922A FOOT INJURY, LEFT, INITIAL ENCOUNTER: ICD-10-CM
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
## 2018-04-16 PROCEDURE — 99214 OFFICE O/P EST MOD 30 MIN: CPT | Performed by: INTERNAL MEDICINE

## 2018-04-16 NOTE — PROGRESS NOTES
Assessment/Plan:  1 -left foot injury initial encounter -I will order an MRI of her left foot also will order follow-up Orthopedics  2 -fall -she has had numbness in her feet in the past she has to be very careful when she gets out of the chair  Should have some form of supportive device to help  No problem-specific Assessment & Plan notes found for this encounter  Diagnoses and all orders for this visit:    Fall, initial encounter    Foot injury, left, initial encounter          Subjective:   CC  Pt here for f/u S/P Fall 04/14/18- Review Urgent Care notes and  Xray 04/14/18   Patient ID: Vito Marsh is a 78 y o  female  Patient comes to the office she sustained a left foot injury on April 14th  She noted that her legs become numb of her sitting on a question chair for prolonged period of time  Apparently on the morning of the event this happened she stood up abruptly and fell laterally  She is unsure as our foot was injured but noted immediate pain and subsequent swelling of the outer foot  X-ray reveals a radiolucency in that area and she has point tenderness at the base of the 5th metatarsal   She does note a previous history of rupture tear on multiple ligaments in both feet in the past and she is concerned about a recurrence of issues  She also has history of claustrophobia  I will order an MRI of that foot I spoke with the MRI technologist and she assured me that they can do up to the waist in the MRI for her foot imaging  The patient will also be referred to Orthopedics for follow-up  The following portions of the patient's history were reviewed and updated as appropriate: allergies, current medications, past family history, past medical history, past social history, past surgical history and problem list     Review of Systems   Constitutional: Negative  HENT: Negative  Eyes: Negative  Respiratory: Negative  Cardiovascular: Negative  Gastrointestinal: Negative  Endocrine: Negative  Genitourinary: Negative  Musculoskeletal: Positive for gait problem  Patient with increased discomfort at the base of her 5th metatarsal    Skin: Negative  Allergic/Immunologic: Negative  Hematological: Negative  Objective:      /70 (BP Location: Left arm, Patient Position: Sitting, Cuff Size: Large)   Pulse 58   Resp 18   Wt 80 kg (176 lb 6 4 oz)   BMI 34 45 kg/m²          Physical Exam   Constitutional: She is oriented to person, place, and time  She appears well-developed and well-nourished  HENT:   Head: Normocephalic and atraumatic  Right Ear: External ear normal    Left Ear: External ear normal    Mouth/Throat: Oropharynx is clear and moist    Eyes: Conjunctivae and EOM are normal  Pupils are equal, round, and reactive to light  Neck: Normal range of motion  Neck supple  Cardiovascular: Normal rate, normal heart sounds and intact distal pulses  Pulmonary/Chest: Effort normal and breath sounds normal    Abdominal: Soft  Bowel sounds are normal    Musculoskeletal: Normal range of motion  Neurological: She is alert and oriented to person, place, and time  She has normal reflexes  Skin: Skin is warm and dry  Psychiatric: She has a normal mood and affect  Her behavior is normal  Judgment and thought content normal    Nursing note and vitals reviewed

## 2018-04-16 NOTE — PATIENT INSTRUCTIONS
1 -have order MRI of her left foot and also on orthopedic follow-up  2 -will follow the patient up next week make sure that she is doing well

## 2018-04-20 ENCOUNTER — HOSPITAL ENCOUNTER (OUTPATIENT)
Dept: RADIOLOGY | Age: 78
Discharge: HOME | End: 2018-04-20
Payer: MEDICARE

## 2018-04-20 DIAGNOSIS — S99.922A FOOT INJURY, LEFT, INITIAL ENCOUNTER: ICD-10-CM

## 2018-04-20 PROCEDURE — 73718 MRI LOWER EXTREMITY W/O DYE: CPT

## 2018-04-21 DIAGNOSIS — J30.89 ALLERGIC RHINITIS DUE TO OTHER ALLERGIC TRIGGER, UNSPECIFIED SEASONALITY: Primary | ICD-10-CM

## 2018-04-22 RX ORDER — FLUTICASONE PROPIONATE 50 MCG
SPRAY, SUSPENSION (ML) NASAL
Qty: 1 BOTTLE | Refills: 4 | Status: SHIPPED | OUTPATIENT
Start: 2018-04-22 | End: 2018-08-03 | Stop reason: ALTCHOICE

## 2018-04-26 ENCOUNTER — OFFICE VISIT (OUTPATIENT)
Dept: OBGYN CLINIC | Facility: CLINIC | Age: 78
End: 2018-04-26
Payer: MEDICARE

## 2018-04-26 VITALS — DIASTOLIC BLOOD PRESSURE: 73 MMHG | HEIGHT: 61 IN | SYSTOLIC BLOOD PRESSURE: 113 MMHG | HEART RATE: 59 BPM

## 2018-04-26 DIAGNOSIS — S92.355D CLOSED NONDISPLACED FRACTURE OF FIFTH METATARSAL BONE OF LEFT FOOT WITH ROUTINE HEALING: Primary | ICD-10-CM

## 2018-04-26 PROCEDURE — 99213 OFFICE O/P EST LOW 20 MIN: CPT | Performed by: ORTHOPAEDIC SURGERY

## 2018-04-26 PROCEDURE — 28470 CLTX METATARSAL FX WO MNP EA: CPT | Performed by: ORTHOPAEDIC SURGERY

## 2018-04-26 NOTE — PROGRESS NOTES
Assessment:     1  Closed nondisplaced fracture of fifth metatarsal bone of left foot with routine healing          Plan:     Problem List Items Addressed This Visit        Musculoskeletal and Integument    Closed nondisplaced fracture of fifth metatarsal bone of left foot with routine healing - Primary     74-year-old female with a left 5th metatarsal base fracture  I reviewed the radiographs with her and discussed treatment options  We discussed appropriate footwear  She was counseled on ice and elevation  She has no restrictions  I will plan on seeing her back in six weeks with x-rays of the left foot  Patient ID: Erna Diez is a 78 y o  female  Chief Complaint:  Left foot injury    Subjective:  74-year-old female here today for her left foot which she injured on April 12, 2018  She fell when she got up from a chair after her leg fall asleep  After the 1st few days she started feeling better and better and up and doing more  She is now complaining of increased pain and swelling compared to a week ago  She had an MRI of the foot  She has had history of injury to her bilateral feet with multiple ligament tears many many years ago  She is anxious to get back to her normal daily activities  Last week she was able to wear a standard sneaker, this week she is wearing a sandal in using a cane  Allergy:  Allergies   Allergen Reactions    Cortisone Hyperactivity    Bee Pollen Edema     Category: Allergy; Medications:  all current active meds have been reviewed    ROS:  Review of Systems   Constitutional: Negative  HENT: Negative  Eyes: Negative  Respiratory: Negative  Gastrointestinal: Negative  Endocrine: Negative  Genitourinary: Negative  Musculoskeletal: Positive for arthralgias and joint swelling  Skin: Negative  Allergic/Immunologic: Negative  Neurological: Negative  Hematological: Negative  Psychiatric/Behavioral: Negative  Objective:  BP Readings from Last 1 Encounters:   04/26/18 113/73      Wt Readings from Last 1 Encounters:   04/20/18 79 4 kg (175 lb)        Exam:   Physical Exam  Left Ankle Exam     Comments:  Patient's left foot has brisk cap refill of all toes, sensation light touch is intact throughout the toes  She has good ankle range of motion  Ecchymosis around the base of the toes  Tenderness to palpation on the lateral aspect of the foot at the base of the 5th metatarsal   Significant swelling of the entire foot and toes  No lacerations or abrasions  Radiographs:  I have personally reviewed pertinent films in PACS and my interpretation is X-rays of the left foot and an MRI of the left foot is reviewed  It is consistent with a 5th metatarsal base fracture, nondisplaced  Carmen Cuneladia

## 2018-04-26 NOTE — ASSESSMENT & PLAN NOTE
65-year-old female with a left 5th metatarsal base fracture  I reviewed the radiographs with her and discussed treatment options  We discussed appropriate footwear  She was counseled on ice and elevation  She has no restrictions  I will plan on seeing her back in six weeks with x-rays of the left foot

## 2018-04-27 ENCOUNTER — APPOINTMENT (OUTPATIENT)
Dept: LAB | Facility: CLINIC | Age: 78
End: 2018-04-27
Payer: MEDICARE

## 2018-04-27 DIAGNOSIS — I10 ESSENTIAL HYPERTENSION: ICD-10-CM

## 2018-04-27 LAB
ANION GAP SERPL CALCULATED.3IONS-SCNC: 6 MMOL/L (ref 4–13)
BUN SERPL-MCNC: 24 MG/DL (ref 5–25)
CALCIUM SERPL-MCNC: 9.2 MG/DL (ref 8.3–10.1)
CHLORIDE SERPL-SCNC: 105 MMOL/L (ref 100–108)
CO2 SERPL-SCNC: 28 MMOL/L (ref 21–32)
CREAT SERPL-MCNC: 0.92 MG/DL (ref 0.6–1.3)
GFR SERPL CREATININE-BSD FRML MDRD: 61 ML/MIN/1.73SQ M
GLUCOSE P FAST SERPL-MCNC: 108 MG/DL (ref 65–99)
POTASSIUM SERPL-SCNC: 4.1 MMOL/L (ref 3.5–5.3)
SODIUM SERPL-SCNC: 139 MMOL/L (ref 136–145)

## 2018-04-27 PROCEDURE — 80048 BASIC METABOLIC PNL TOTAL CA: CPT

## 2018-04-27 PROCEDURE — 36415 COLL VENOUS BLD VENIPUNCTURE: CPT

## 2018-05-02 ENCOUNTER — OFFICE VISIT (OUTPATIENT)
Dept: GERIATRICS | Facility: CLINIC | Age: 78
End: 2018-05-02
Payer: MEDICARE

## 2018-05-02 VITALS
SYSTOLIC BLOOD PRESSURE: 118 MMHG | RESPIRATION RATE: 18 BRPM | WEIGHT: 172.4 LBS | OXYGEN SATURATION: 99 % | HEART RATE: 59 BPM | DIASTOLIC BLOOD PRESSURE: 60 MMHG | BODY MASS INDEX: 33.85 KG/M2 | HEIGHT: 60 IN

## 2018-05-02 DIAGNOSIS — E13.9 DIABETES 1.5, MANAGED AS TYPE 2 (HCC): Primary | ICD-10-CM

## 2018-05-02 DIAGNOSIS — S92.355D CLOSED NONDISPLACED FRACTURE OF FIFTH METATARSAL BONE OF LEFT FOOT WITH ROUTINE HEALING: ICD-10-CM

## 2018-05-02 PROCEDURE — 99214 OFFICE O/P EST MOD 30 MIN: CPT | Performed by: INTERNAL MEDICINE

## 2018-05-02 NOTE — PROGRESS NOTES
Assessment/Plan:  1 -fracture of 5th metatarsal on the left foot -this is healing at present  She was seen by Orthopedics and recommendations were made  No problem-specific Assessment & Plan notes found for this encounter  Diagnoses and all orders for this visit:    Closed nondisplaced fracture of fifth metatarsal bone of left foot with routine healing          Subjective:   CC  Pt here for f/u- Left foot injury     Patient ID: Dre Trimble is a 78 y o  female  Patient is a 79-year-old  female who had injured her left foot  She had initial x-rays of the foot which were read initially as no fracture however she still had symptoms  An MRI was done of the foot revealing evidence of a fracture on on further review it was also noticeable on the plain x-ray  The patient appears to be improving as far as her discomfort is concerned  She is walking without any particular problems  She would like a temporary handicap sticker for her longer hikes specially the market  Review of Systems   Constitutional: Negative  HENT: Negative  Eyes: Negative  Respiratory: Negative  Cardiovascular: Negative  Gastrointestinal: Negative  Endocrine: Negative  Genitourinary: Negative  Musculoskeletal: Negative  Skin: Negative  Allergic/Immunologic: Negative  Neurological: Negative  Hematological: Negative  Psychiatric/Behavioral: Negative  Objective:      /60 (BP Location: Right arm, Patient Position: Sitting, Cuff Size: Large)   Pulse 59   Resp 18   Ht 5' 0 05" (1 525 m)   Wt 78 2 kg (172 lb 6 4 oz)   LMP  (LMP Unknown)   BMI 33 61 kg/m²          Physical Exam   Constitutional: She is oriented to person, place, and time  She appears well-developed and well-nourished  HENT:   Head: Normocephalic and atraumatic     Right Ear: External ear normal    Left Ear: External ear normal    Mouth/Throat: Oropharynx is clear and moist    Eyes: Conjunctivae and EOM are normal  Pupils are equal, round, and reactive to light  Neck: Normal range of motion  Neck supple  Cardiovascular: Normal rate, regular rhythm, normal heart sounds and intact distal pulses  Pulmonary/Chest: Effort normal and breath sounds normal    Abdominal: Soft  Bowel sounds are normal    Musculoskeletal: Normal range of motion  Neurological: She is alert and oriented to person, place, and time  She has normal reflexes  Skin: Skin is warm and dry  Psychiatric: She has a normal mood and affect   Her behavior is normal  Judgment and thought content normal

## 2018-05-16 DIAGNOSIS — I10 HYPERTENSION, UNSPECIFIED TYPE: Primary | ICD-10-CM

## 2018-05-17 RX ORDER — CARVEDILOL 25 MG/1
25 TABLET ORAL 2 TIMES DAILY
Qty: 180 TABLET | Refills: 1 | Status: SHIPPED | OUTPATIENT
Start: 2018-05-17 | End: 2018-11-12 | Stop reason: SDUPTHER

## 2018-05-17 RX ORDER — SPIRONOLACTONE 25 MG/1
25 TABLET ORAL DAILY
Qty: 90 TABLET | Refills: 1 | Status: SHIPPED | OUTPATIENT
Start: 2018-05-17 | End: 2018-11-30 | Stop reason: SDUPTHER

## 2018-05-17 RX ORDER — LISINOPRIL 40 MG/1
40 TABLET ORAL 2 TIMES DAILY
Qty: 180 TABLET | Refills: 1 | Status: SHIPPED | OUTPATIENT
Start: 2018-05-17 | End: 2018-11-12 | Stop reason: SDUPTHER

## 2018-07-17 ENCOUNTER — APPOINTMENT (OUTPATIENT)
Dept: RADIOLOGY | Facility: CLINIC | Age: 78
End: 2018-07-17
Payer: MEDICARE

## 2018-07-17 ENCOUNTER — OFFICE VISIT (OUTPATIENT)
Dept: OBGYN CLINIC | Facility: CLINIC | Age: 78
End: 2018-07-17

## 2018-07-17 VITALS
SYSTOLIC BLOOD PRESSURE: 108 MMHG | WEIGHT: 178.8 LBS | DIASTOLIC BLOOD PRESSURE: 67 MMHG | HEIGHT: 60 IN | HEART RATE: 67 BPM | BODY MASS INDEX: 35.1 KG/M2

## 2018-07-17 DIAGNOSIS — S92.352K CLOSED DISPLACED FRACTURE OF FIFTH METATARSAL BONE OF LEFT FOOT WITH NONUNION: ICD-10-CM

## 2018-07-17 DIAGNOSIS — S92.352K CLOSED DISPLACED FRACTURE OF FIFTH METATARSAL BONE OF LEFT FOOT WITH NONUNION: Primary | ICD-10-CM

## 2018-07-17 DIAGNOSIS — S92.355D CLOSED NONDISPLACED FRACTURE OF FIFTH METATARSAL BONE OF LEFT FOOT WITH ROUTINE HEALING: ICD-10-CM

## 2018-07-17 PROCEDURE — 73630 X-RAY EXAM OF FOOT: CPT

## 2018-07-17 PROCEDURE — 99024 POSTOP FOLLOW-UP VISIT: CPT | Performed by: ORTHOPAEDIC SURGERY

## 2018-07-17 NOTE — ASSESSMENT & PLAN NOTE
51-year-old female with a left 5th metatarsal base fracture-healed  I reviewed the radiographs with her  She has no restrictions  Resume activities as tolerated  Advised her to still be careful on uneven ground to avoid it as much as possible  Follow-up as needed if any problems arise  Plan discussed with Dr Doug Cardozo

## 2018-07-17 NOTE — PROGRESS NOTES
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    2  Closed nondisplaced fracture of fifth metatarsal bone of left foot with routine healing          Plan:     Problem List Items Addressed This Visit        Musculoskeletal and Integument    Closed nondisplaced fracture of fifth metatarsal bone of left foot with routine healing     72-year-old female with a left 5th metatarsal base fracture-healed  I reviewed the radiographs with her  She has no restrictions  Resume activities as tolerated  Advised her to still be careful on uneven ground to avoid it as much as possible  Follow-up as needed if any problems arise  Plan discussed with Dr Trinidad Luo  Other Visit Diagnoses     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    -  Primary    Relevant Orders    XR foot 3+ vw left           Patient ID: Heather Agustin is a 78 y o  female  Chief Complaint:  Left foot injury    Subjective:  72-year-old female here today following up for a left proximal 5th metatarsal fracture  She injured on April 12, 2018  She fell when she got up from a chair after her leg fall asleep  She is walking with a normal sneaker with no assistance  She has no pain with walking  Allergy:  Allergies   Allergen Reactions    Cortisone Hyperactivity    Bee Pollen Edema     Category: Allergy; Medications:  all current active meds have been reviewed    ROS:  Review of Systems   Constitutional: Negative  HENT: Negative  Eyes: Negative  Respiratory: Negative  Gastrointestinal: Negative  Endocrine: Negative  Genitourinary: Negative  Musculoskeletal: Negative for arthralgias and joint swelling  Skin: Negative  Allergic/Immunologic: Negative  Neurological: Negative  Hematological: Negative  Psychiatric/Behavioral: Negative          Objective:  BP Readings from Last 1 Encounters:   07/17/18 108/67      Wt Readings from Last 1 Encounters:   07/17/18 81 1 kg (178 lb 12 8 oz)        Exam:   Physical Exam  Right Ankle Exam   Right ankle exam is normal       Left Ankle Exam   Swelling: none    Tenderness   The patient is experiencing no tenderness  Range of Motion   The patient has normal left ankle ROM  Muscle Strength   The patient has normal left ankle strength  Other   Erythema: absent  Sensation: normal  Pulse: present              Radiographs:   X-rays of the left foot were reviewed by myself and Dr Baron Dubois  They reveal a nondisplaced proximal 5th metatarsal fracture with evidence of healing

## 2018-08-03 ENCOUNTER — OFFICE VISIT (OUTPATIENT)
Dept: GERIATRICS | Facility: CLINIC | Age: 78
End: 2018-08-03
Payer: MEDICARE

## 2018-08-03 VITALS
WEIGHT: 177.4 LBS | HEIGHT: 61 IN | OXYGEN SATURATION: 98 % | DIASTOLIC BLOOD PRESSURE: 80 MMHG | BODY MASS INDEX: 33.49 KG/M2 | RESPIRATION RATE: 16 BRPM | SYSTOLIC BLOOD PRESSURE: 128 MMHG | HEART RATE: 56 BPM

## 2018-08-03 DIAGNOSIS — D56.3 THALASSEMIA MINOR: ICD-10-CM

## 2018-08-03 DIAGNOSIS — M19.90 OSTEOARTHRITIS, UNSPECIFIED OSTEOARTHRITIS TYPE, UNSPECIFIED SITE: ICD-10-CM

## 2018-08-03 DIAGNOSIS — E11.21 MICROALBUMINURIC DIABETIC NEPHROPATHY (HCC): ICD-10-CM

## 2018-08-03 DIAGNOSIS — M75.81 RIGHT ROTATOR CUFF TENDINITIS: ICD-10-CM

## 2018-08-03 DIAGNOSIS — E55.9 VITAMIN D DEFICIENCY: ICD-10-CM

## 2018-08-03 DIAGNOSIS — E11.29 CONTROLLED TYPE 2 DIABETES MELLITUS WITH MICROALBUMINURIA, WITHOUT LONG-TERM CURRENT USE OF INSULIN (HCC): Primary | ICD-10-CM

## 2018-08-03 DIAGNOSIS — E78.5 HYPERLIPIDEMIA, UNSPECIFIED HYPERLIPIDEMIA TYPE: ICD-10-CM

## 2018-08-03 DIAGNOSIS — I10 ESSENTIAL HYPERTENSION: ICD-10-CM

## 2018-08-03 DIAGNOSIS — R80.9 CONTROLLED TYPE 2 DIABETES MELLITUS WITH MICROALBUMINURIA, WITHOUT LONG-TERM CURRENT USE OF INSULIN (HCC): Primary | ICD-10-CM

## 2018-08-03 PROCEDURE — 99215 OFFICE O/P EST HI 40 MIN: CPT | Performed by: INTERNAL MEDICINE

## 2018-08-03 RX ORDER — FLUTICASONE PROPIONATE 50 MCG
1 SPRAY, SUSPENSION (ML) NASAL DAILY PRN
COMMUNITY
End: 2019-03-16 | Stop reason: SDUPTHER

## 2018-08-03 NOTE — ASSESSMENT & PLAN NOTE
Patient was asked to take tonic water at nighttime for her cramps she can drink a 4 oz glass of tonic water at night

## 2018-08-03 NOTE — ASSESSMENT & PLAN NOTE
Will refer patient to physical and occupational therapy for her right shoulder tendinitis if it this not improve she is to let me know so I can refer her to the orthopedic specialist

## 2018-08-03 NOTE — PROGRESS NOTES
Assessment/Plan:    Type 2 diabetes mellitus with renal manifestations, controlled ([address] )  Lab Results   Component Value Date    HGBA1C 6 0 01/09/2018       No results for input(s): POCGLU in the last 72 hours  Blood Sugar Average: Last 72 hrs:   patient's diabetes has been under excellent control with the last hemoglobin A1c being 6 5 back in April she will need to have repeat blood work along with a micro albumin    Microalbuminuric diabetic nephropathy ([address] )  Lab Results   Component Value Date    HGBA1C 6 0 01/09/2018       No results for input(s): POCGLU in the last 72 hours      Blood Sugar Average: Last 72 hrs:   patient will have a microalbumin done she does follow with Nephrology for this particular problem    Hypertension  Blood pressure is under excellent control at present    OA (osteoarthritis)  Stable at present      Thalassemia minor  Will check a CBC with diff    Leg cramps  Patient was asked to take tonic water at nighttime for her cramps she can drink a 4 oz glass of tonic water at night    Hyperlipidemia  Patient needs a lipid panel    Right rotator cuff tendinitis  Will refer patient to physical and occupational therapy for her right shoulder tendinitis if it this not improve she is to let me know so I can refer her to the orthopedic specialist        Diagnoses and all orders for this visit:    Controlled type 2 diabetes mellitus with microalbuminuria, without long-term current use of insulin ([facility] [address] )    Microalbuminuric diabetic nephropathy ([facility] [address] )    Essential hypertension    Osteoarthritis, unspecified osteoarthritis type, unspecified site    Vitamin D deficiency    Thalassemia minor    Hyperlipidemia, unspecified hyperlipidemia type    Right rotator cuff tendinitis    Other orders  -     fluticasone (FLONASE) 50 mcg/act nasal spray; 1 spray into each nostril daily as needed for rhinitis          Subjective:   CC Pt here for 3mth routine and Physical Exam  c/o right shoulder pain x 3days  Neck pain that started in the AM     Patient ID: Talya Krishna is a 78 y o  female  Patient is a 79-year-old  female who enjoys all of her basic and instrumental activities of daily living  She does have history of diabetes mellitus type 2 which is under excellent control with hemoglobin A1cs of 6 5  The patient also has history of hypertension and micro albuminuria reason for which she is followed by Nephrology  She also states that she had some difficulty with her potassium management it should be noted that the patient is on hydrochlorothiazide which can deplete potassium and lisinopril and spironolactone which can retain potassium  The patient states that last colonoscopy was performed 9 years ago and she is not looking forward tours getting another one  Her vaccination does appear to be up-to-date she is uncertain whether she received the Prevnar 13  The patient does complain of increased cramping at night  The patient denies any chest pain denies any shortness of breath she has been living in this facility for approximately 7 months is getting engaged in some of the committees  There is no previous history of cancer in her family  Patient has had issues with her shoulders in the past   She has discomfort in her right shoulder in particular over the past 4 days  She was caring a suitcase when this happened  The following portions of the patient's history were reviewed and updated as appropriate: allergies, current medications, past family history, past medical history, past social history, past surgical history and problem list     Review of Systems   Constitutional: Negative  HENT: Negative  Eyes: Negative  Respiratory: Negative  Cardiovascular: Negative  Gastrointestinal: Negative  Endocrine: Negative  Genitourinary: Negative  Musculoskeletal: Negative  Skin: Negative  Allergic/Immunologic: Negative  Neurological: Negative  Hematological: Negative  Psychiatric/Behavioral: Negative  Objective:      /80 (BP Location: Left arm, Patient Position: Sitting, Cuff Size: Standard)   Pulse 56   Resp 16   Ht 5' 1 25" (1 556 m)   Wt 80 5 kg (177 lb 6 4 oz)   LMP  (LMP Unknown)   SpO2 98%   BMI 33 25 kg/m²          Physical Exam   Constitutional: She appears well-developed and well-nourished  HENT:   Head: Normocephalic and atraumatic  Right Ear: External ear normal    Left Ear: External ear normal    Mouth/Throat: Oropharynx is clear and moist    Eyes: Conjunctivae and EOM are normal  Pupils are equal, round, and reactive to light  Neck: Normal range of motion  Neck supple  JVD present  No tracheal deviation present  No thyromegaly present  Cardiovascular: Normal rate, regular rhythm, normal heart sounds and intact distal pulses  Pulmonary/Chest: Effort normal and breath sounds normal  No stridor  Abdominal: Soft  Bowel sounds are normal    Patient's rectal exam revealed heme-negative stools   Musculoskeletal: She exhibits tenderness  Patient has difficulty abducting her right arm she has tenderness in the right shoulder and weakness which I feel is reactive to pain  Neurological: She is alert  She has normal reflexes  Skin: Skin is warm and dry  Psychiatric: She has a normal mood and affect  Her behavior is normal  Judgment and thought content normal    Nursing note and vitals reviewed

## 2018-08-03 NOTE — ASSESSMENT & PLAN NOTE
Lab Results   Component Value Date    HGBA1C 6 0 01/09/2018       No results for input(s): POCGLU in the last 72 hours      Blood Sugar Average: Last 72 hrs:   patient's diabetes has been under excellent control with the last hemoglobin A1c being 6 5 back in April she will need to have repeat blood work along with a micro albumin

## 2018-08-03 NOTE — PATIENT INSTRUCTIONS
1 -patient is to get her routine diabetic blood work plus a lipid panel    2 -patient will be referred to physical and occupational therapy for shoulder exercises  3 -patient will return in 4 weeks to review her labs and her progress as far as her right shoulder is concerned

## 2018-08-03 NOTE — ASSESSMENT & PLAN NOTE
Lab Results   Component Value Date    HGBA1C 6 0 01/09/2018       No results for input(s): POCGLU in the last 72 hours      Blood Sugar Average: Last 72 hrs:   patient will have a microalbumin done she does follow with Nephrology for this particular problem

## 2018-08-15 ENCOUNTER — APPOINTMENT (OUTPATIENT)
Dept: LAB | Facility: CLINIC | Age: 78
End: 2018-08-15
Payer: MEDICARE

## 2018-08-15 ENCOUNTER — TRANSCRIBE ORDERS (OUTPATIENT)
Dept: LAB | Facility: CLINIC | Age: 78
End: 2018-08-15

## 2018-08-15 DIAGNOSIS — I10 HYPERTENSION, ESSENTIAL: ICD-10-CM

## 2018-08-15 DIAGNOSIS — R80.9 CONTROLLED TYPE 2 DIABETES MELLITUS WITH MICROALBUMINURIA, WITHOUT LONG-TERM CURRENT USE OF INSULIN (HCC): ICD-10-CM

## 2018-08-15 DIAGNOSIS — IMO0002 UNCONTROLLED TYPE 2 DIABETES MELLITUS WITH OTHER DIABETIC KIDNEY COMPLICATION, WITHOUT LONG-TERM CURRENT USE OF INSULIN: ICD-10-CM

## 2018-08-15 DIAGNOSIS — E78.5 HYPERLIPIDEMIA, UNSPECIFIED HYPERLIPIDEMIA TYPE: ICD-10-CM

## 2018-08-15 DIAGNOSIS — E11.29 CONTROLLED TYPE 2 DIABETES MELLITUS WITH MICROALBUMINURIA, WITHOUT LONG-TERM CURRENT USE OF INSULIN (HCC): ICD-10-CM

## 2018-08-15 DIAGNOSIS — R80.9 PROTEINURIA, UNSPECIFIED TYPE: Primary | ICD-10-CM

## 2018-08-15 LAB
ALBUMIN SERPL BCP-MCNC: 3.7 G/DL (ref 3.5–5)
ALP SERPL-CCNC: 65 U/L (ref 46–116)
ALT SERPL W P-5'-P-CCNC: 16 U/L (ref 12–78)
ANION GAP SERPL CALCULATED.3IONS-SCNC: 7 MMOL/L (ref 4–13)
AST SERPL W P-5'-P-CCNC: 13 U/L (ref 5–45)
BILIRUB SERPL-MCNC: 0.47 MG/DL (ref 0.2–1)
BUN SERPL-MCNC: 23 MG/DL (ref 5–25)
CALCIUM SERPL-MCNC: 9.4 MG/DL (ref 8.3–10.1)
CHLORIDE SERPL-SCNC: 104 MMOL/L (ref 100–108)
CHOLEST SERPL-MCNC: 175 MG/DL (ref 50–200)
CO2 SERPL-SCNC: 28 MMOL/L (ref 21–32)
CREAT SERPL-MCNC: 1.06 MG/DL (ref 0.6–1.3)
CREAT UR-MCNC: 119 MG/DL
CREAT UR-MCNC: 119 MG/DL
ERYTHROCYTE [DISTWIDTH] IN BLOOD BY AUTOMATED COUNT: 14.6 % (ref 11.6–15.1)
GFR SERPL CREATININE-BSD FRML MDRD: 51 ML/MIN/1.73SQ M
GLUCOSE P FAST SERPL-MCNC: 128 MG/DL (ref 65–99)
HCT VFR BLD AUTO: 38.1 % (ref 34.8–46.1)
HDLC SERPL-MCNC: 38 MG/DL (ref 40–60)
HGB BLD-MCNC: 11.9 G/DL (ref 11.5–15.4)
LDLC SERPL CALC-MCNC: 84 MG/DL (ref 0–100)
MAGNESIUM SERPL-MCNC: 2.5 MG/DL (ref 1.6–2.6)
MCH RBC QN AUTO: 22.7 PG (ref 26.8–34.3)
MCHC RBC AUTO-ENTMCNC: 31.2 G/DL (ref 31.4–37.4)
MCV RBC AUTO: 73 FL (ref 82–98)
MICROALBUMIN UR-MCNC: 56.9 MG/L (ref 0–20)
MICROALBUMIN/CREAT 24H UR: 48 MG/G CREATININE (ref 0–30)
NONHDLC SERPL-MCNC: 137 MG/DL
PLATELET # BLD AUTO: 270 THOUSANDS/UL (ref 149–390)
PMV BLD AUTO: 10.3 FL (ref 8.9–12.7)
POTASSIUM SERPL-SCNC: 4.3 MMOL/L (ref 3.5–5.3)
PROT SERPL-MCNC: 7.9 G/DL (ref 6.4–8.2)
PROT UR-MCNC: 19 MG/DL
PROT/CREAT UR: 0.16 MG/G{CREAT} (ref 0–0.1)
RBC # BLD AUTO: 5.25 MILLION/UL (ref 3.81–5.12)
SODIUM SERPL-SCNC: 139 MMOL/L (ref 136–145)
TRIGL SERPL-MCNC: 263 MG/DL
WBC # BLD AUTO: 8.18 THOUSAND/UL (ref 4.31–10.16)

## 2018-08-15 PROCEDURE — 83735 ASSAY OF MAGNESIUM: CPT

## 2018-08-15 PROCEDURE — 85027 COMPLETE CBC AUTOMATED: CPT

## 2018-08-15 PROCEDURE — 82043 UR ALBUMIN QUANTITATIVE: CPT | Performed by: INTERNAL MEDICINE

## 2018-08-15 PROCEDURE — 84156 ASSAY OF PROTEIN URINE: CPT

## 2018-08-15 PROCEDURE — 82570 ASSAY OF URINE CREATININE: CPT | Performed by: INTERNAL MEDICINE

## 2018-08-15 PROCEDURE — 80061 LIPID PANEL: CPT

## 2018-08-15 PROCEDURE — 82570 ASSAY OF URINE CREATININE: CPT

## 2018-08-15 PROCEDURE — 80053 COMPREHEN METABOLIC PANEL: CPT

## 2018-08-28 DIAGNOSIS — E78.5 HYPERLIPIDEMIA, UNSPECIFIED HYPERLIPIDEMIA TYPE: ICD-10-CM

## 2018-08-28 DIAGNOSIS — I10 HYPERTENSION, UNSPECIFIED TYPE: ICD-10-CM

## 2018-08-28 DIAGNOSIS — E11.8 TYPE 2 DIABETES MELLITUS WITH COMPLICATION, WITHOUT LONG-TERM CURRENT USE OF INSULIN (HCC): ICD-10-CM

## 2018-08-29 RX ORDER — HYDROCHLOROTHIAZIDE 25 MG/1
25 TABLET ORAL DAILY
Qty: 90 TABLET | Refills: 1 | Status: SHIPPED | OUTPATIENT
Start: 2018-08-29 | End: 2018-11-30 | Stop reason: SDUPTHER

## 2018-08-29 RX ORDER — METFORMIN HYDROCHLORIDE 500 MG/1
TABLET, EXTENDED RELEASE ORAL
Qty: 360 TABLET | Refills: 1 | Status: SHIPPED | OUTPATIENT
Start: 2018-08-29 | End: 2018-10-24 | Stop reason: SINTOL

## 2018-08-29 RX ORDER — ATORVASTATIN CALCIUM 10 MG/1
10 TABLET, FILM COATED ORAL DAILY
Qty: 90 TABLET | Refills: 1 | Status: SHIPPED | OUTPATIENT
Start: 2018-08-29 | End: 2018-10-24 | Stop reason: SINTOL

## 2018-08-29 RX ORDER — AMLODIPINE BESYLATE 10 MG/1
TABLET ORAL
Qty: 90 TABLET | Refills: 1 | Status: SHIPPED | OUTPATIENT
Start: 2018-08-29 | End: 2018-12-03 | Stop reason: DRUGHIGH

## 2018-08-31 ENCOUNTER — OFFICE VISIT (OUTPATIENT)
Dept: GERIATRICS | Facility: CLINIC | Age: 78
End: 2018-08-31
Payer: MEDICARE

## 2018-08-31 VITALS
BODY MASS INDEX: 33.93 KG/M2 | DIASTOLIC BLOOD PRESSURE: 80 MMHG | HEIGHT: 61 IN | WEIGHT: 179.7 LBS | OXYGEN SATURATION: 99 % | RESPIRATION RATE: 16 BRPM | SYSTOLIC BLOOD PRESSURE: 146 MMHG | HEART RATE: 56 BPM

## 2018-08-31 DIAGNOSIS — E11.21 MICROALBUMINURIC DIABETIC NEPHROPATHY (HCC): ICD-10-CM

## 2018-08-31 DIAGNOSIS — Z91.030 H/O BEE STING ALLERGY: ICD-10-CM

## 2018-08-31 DIAGNOSIS — I10 ESSENTIAL HYPERTENSION: ICD-10-CM

## 2018-08-31 DIAGNOSIS — R25.2 MUSCLE CRAMPS: Primary | ICD-10-CM

## 2018-08-31 DIAGNOSIS — E11.21 CONTROLLED TYPE 2 DIABETES MELLITUS WITH DIABETIC NEPHROPATHY, WITHOUT LONG-TERM CURRENT USE OF INSULIN (HCC): ICD-10-CM

## 2018-08-31 DIAGNOSIS — E78.00 PURE HYPERCHOLESTEROLEMIA: ICD-10-CM

## 2018-08-31 PROCEDURE — 99214 OFFICE O/P EST MOD 30 MIN: CPT | Performed by: INTERNAL MEDICINE

## 2018-08-31 RX ORDER — EPINEPHRINE 0.3 MG/.3ML
0.3 INJECTION SUBCUTANEOUS ONCE
Qty: 1 EACH | Refills: 0 | Status: SHIPPED | OUTPATIENT
Start: 2018-08-31 | End: 2020-07-20

## 2018-08-31 NOTE — ASSESSMENT & PLAN NOTE
Lab Results   Component Value Date    HGBA1C 6 2 08/15/2018       No results for input(s): POCGLU in the last 72 hours  Blood Sugar Average: Last 72 hrs:   patient's current hemoglobin A1c is 6 2 which is excellent I will recheck another level prior to his next visit in 3 months

## 2018-08-31 NOTE — PROGRESS NOTES
Assessment/Plan:    Type 2 diabetes mellitus with renal manifestations, controlled ([address] )  Lab Results   Component Value Date    HGBA1C 6 2 08/15/2018       No results for input(s): POCGLU in the last 72 hours  Blood Sugar Average: Last 72 hrs:   patient's current hemoglobin A1c is 6 2 which is excellent I will recheck another level prior to his next visit in 3 months  Hypertension  Blood pressure is elevated today however patient did not take her medications for blood pressure  Hyperlipidemia  Under excellent control at present    Muscle cramps  Will need to do stretching exercises  Diagnoses and all orders for this visit:    Muscle cramps    H/O bee sting allergy  -     EPINEPHrine (EPIPEN 2-LIVIA) 0 3 mg/0 3 mL SOAJ; Inject 0 3 mL (0 3 mg total) into a muscle once for 1 dose    Microalbuminuric diabetic nephropathy ([address] )    Controlled type 2 diabetes mellitus with diabetic nephropathy, without long-term current use of insulin ([address] )    Essential hypertension    Pure hypercholesterolemia          Subjective:   CC  Pt here for 4wk f/u- Leg cramps, DM type2,HTN,Hyperlipidemia  Review BW done 08/15/18     Patient ID: Joseph Campos is a 78 y o  female  Patient is a 44-year-old  female who returns to the office for follow-up she has history of diabetes mellitus type 2 her hemoglobin A1c has been excellent at 6 2 this month  She also follows with Nephrology for micro albuminuria she appears to be doing well from that standpoint     Her lipids are under excellent control her total cholesterol is 175 she does have a mild elevation of her triglycerides at 263 that is consistent with people who have elevated blood sugars  Patient's LDL level is 84  Her electrolytes revealed a GFR of 51  She does relate that she does have loose stools at times she is on metformin she would be hesitant about stopping the medication altogether  Her blood pressure appears to be elevated today in the 140-150 range  The patient unfortunately did not take her blood pressure medications this morning and this could be playing a role as to why her numbers are up  The following portions of the patient's history were reviewed and updated as appropriate: allergies, current medications, past family history, past medical history, past social history, past surgical history and problem list     Review of Systems   Constitutional: Negative  HENT: Negative  Respiratory: Negative  Cardiovascular: Negative  Gastrointestinal: Positive for diarrhea  Negative for nausea  Endocrine: Negative  Genitourinary: Negative  Musculoskeletal: Negative  Skin: Negative  Allergic/Immunologic: Negative  Neurological: Negative  Hematological: Negative  Psychiatric/Behavioral: Negative  Objective:      /80 (BP Location: Right arm, Patient Position: Sitting, Cuff Size: Large)   Pulse 56   Resp 16   Ht 5' 1 25" (1 556 m)   Wt 81 5 kg (179 lb 11 2 oz)   LMP  (LMP Unknown)   SpO2 99%   BMI 33 68 kg/m²          Physical Exam   Constitutional: She is oriented to person, place, and time  She appears well-developed and well-nourished  HENT:   Head: Normocephalic and atraumatic  Right Ear: External ear normal    Left Ear: External ear normal    Mouth/Throat: Oropharynx is clear and moist    Eyes: Conjunctivae and EOM are normal  Pupils are equal, round, and reactive to light  Neck: Normal range of motion  Cardiovascular: Normal rate, regular rhythm, normal heart sounds and intact distal pulses  Pulmonary/Chest: Effort normal and breath sounds normal    Abdominal: Soft  Bowel sounds are normal    Musculoskeletal: Normal range of motion  She exhibits no edema  Neurological: She is alert and oriented to person, place, and time  Skin: Skin is warm and dry  Dry skin    Psychiatric: She has a normal mood and affect   Her behavior is normal  Judgment and thought content normal    Nursing note and vitals reviewed

## 2018-10-02 ENCOUNTER — TELEPHONE (OUTPATIENT)
Dept: GERIATRICS | Facility: CLINIC | Age: 78
End: 2018-10-02

## 2018-10-02 NOTE — TELEPHONE ENCOUNTER
[facility] staff concern with persistent cough and wheezing and request that our office f/u with patient  I called the patient and she stated that cough has improved and she's feeling much better today  Pt will f/u with our office as needed

## 2018-10-13 DIAGNOSIS — E11.8 TYPE 2 DIABETES MELLITUS WITH COMPLICATION, WITHOUT LONG-TERM CURRENT USE OF INSULIN (HCC): ICD-10-CM

## 2018-10-16 RX ORDER — GLIMEPIRIDE 4 MG/1
TABLET ORAL
Qty: 180 TABLET | Refills: 1 | Status: SHIPPED | OUTPATIENT
Start: 2018-10-16 | End: 2018-11-30 | Stop reason: SDUPTHER

## 2018-10-24 ENCOUNTER — OFFICE VISIT (OUTPATIENT)
Dept: GERIATRICS | Facility: CLINIC | Age: 78
End: 2018-10-24
Payer: MEDICARE

## 2018-10-24 VITALS
TEMPERATURE: 98 F | BODY MASS INDEX: 33.93 KG/M2 | HEIGHT: 61 IN | HEART RATE: 54 BPM | SYSTOLIC BLOOD PRESSURE: 162 MMHG | RESPIRATION RATE: 16 BRPM | DIASTOLIC BLOOD PRESSURE: 80 MMHG | WEIGHT: 179.7 LBS | OXYGEN SATURATION: 93 %

## 2018-10-24 DIAGNOSIS — E11.21 CONTROLLED TYPE 2 DIABETES MELLITUS WITH DIABETIC NEPHROPATHY, WITHOUT LONG-TERM CURRENT USE OF INSULIN (HCC): ICD-10-CM

## 2018-10-24 DIAGNOSIS — R19.7 DIARRHEA, UNSPECIFIED TYPE: ICD-10-CM

## 2018-10-24 DIAGNOSIS — R25.2 MUSCLE CRAMPS: Primary | ICD-10-CM

## 2018-10-24 DIAGNOSIS — E78.00 PURE HYPERCHOLESTEROLEMIA: ICD-10-CM

## 2018-10-24 DIAGNOSIS — I10 ESSENTIAL HYPERTENSION: ICD-10-CM

## 2018-10-24 PROCEDURE — 99214 OFFICE O/P EST MOD 30 MIN: CPT | Performed by: INTERNAL MEDICINE

## 2018-10-24 NOTE — ASSESSMENT & PLAN NOTE
Lipitor will be d/c on the patient due to her persistent muscle cramps  A lipid panel will be checked to determine patient's levels  Further treatment will be determined upon lab results

## 2018-10-24 NOTE — ASSESSMENT & PLAN NOTE
Likely secondary to the statin therapy  Patient will be d/c Lipitor due to her persistent muscle cramps while on the medication  A lipid panel will be done to evaluate current lipid levels

## 2018-10-24 NOTE — ASSESSMENT & PLAN NOTE
Lab Results   Component Value Date    HGBA1C 6 2 08/15/2018       No results for input(s): POCGLU in the last 72 hours  Blood Sugar Average: Last 72 hrs:     Under excellent control at present with recent Hgb A1C of 6 2%  Will d/c Metformin due to her persistent diarrhea  Patient was encouraged to monitor her blood sugars at home or the office will check for her

## 2018-10-24 NOTE — PROGRESS NOTES
Assessment/Plan:    Hypertension  BP recheck revealed 138/80 mmHg in both UE  Appears to be well controlled at present  Hyperlipidemia  Lipitor will be d/c on the patient due to her persistent muscle cramps  A lipid panel will be checked to determine patient's levels  Further treatment will be determined upon lab results  Muscle cramps  Likely secondary to the statin therapy  Patient will be d/c Lipitor due to her persistent muscle cramps while on the medication  A lipid panel will be done to evaluate current lipid levels  Type 2 diabetes mellitus with renal manifestations, controlled ([facility] [address] )  Lab Results   Component Value Date    HGBA1C 6 2 08/15/2018       No results for input(s): POCGLU in the last 72 hours  Blood Sugar Average: Last 72 hrs:     Under excellent control at present with recent Hgb A1C of 6 2%  Will d/c Metformin due to her persistent diarrhea  Patient was encouraged to monitor her blood sugars at home or the office will check for her  Diarrhea  Patient has been experiencing persistent watery diarrhea for at least 2 months  Likely secondary to her Metformin therapy, but other causes must be evaluated such as bacterial overgrowth, IBS, etc  A stool panel will be ordered to evaluate for infectious causes  Additionally, CMP and CBC w/ diff as well as a magnesium level will be ordered  Diagnoses and all orders for this visit:    Muscle cramps  -     Cancel: Lipid panel; Future    Essential hypertension    Diarrhea, unspecified type  -     Comprehensive metabolic panel; Future  -     CBC and differential; Future  -     Stool Enteric Bacterial Panel by PCR; Future  -     Magnesium; Future    Pure hypercholesterolemia  -     Lipid panel; Future    Controlled type 2 diabetes mellitus with diabetic nephropathy, without long-term current use of insulin (HCC)          Subjective:      Patient ID: Isabel Schwab is a 70 y o  female      Patient is 71 y/o female with a Hx of DM II, HTN, and osteoporosis who presents to the office for a GI complaint  She states she has been experiencing watery diarrhea now for a few months  Yesterday, she states it happened all day and into the night  She tried taking a Peptobismol substitute and was able to sleep through the night  She is not currently on any antibiotics and has not taken any recently  In the past, she has had significant diarrhea while being on Metformin  She also continues to complain of muscle cramping in her legs while at rest  There has been no improvement since her last appointment  Most recent Hgb A1C was 6 2% on 08/15/18  The following portions of the patient's history were reviewed and updated as appropriate: allergies, current medications, past family history, past medical history, past social history, past surgical history and problem list     Review of Systems   Constitutional: Negative for appetite change, chills, diaphoresis, fatigue, fever and unexpected weight change  HENT: Negative for ear discharge, ear pain, hearing loss, sore throat, tinnitus and trouble swallowing  Eyes: Negative for pain, discharge, redness, itching and visual disturbance  Respiratory: Negative for cough, chest tightness, shortness of breath and wheezing  Cardiovascular: Negative for chest pain, palpitations and leg swelling  Gastrointestinal: Positive for diarrhea  Negative for abdominal pain, blood in stool, constipation, nausea and vomiting  Genitourinary: Negative for difficulty urinating, dysuria and hematuria  Musculoskeletal: Positive for arthralgias (OA) and myalgias (muscle cramping)  Neurological: Negative for dizziness, syncope, light-headedness and headaches           Objective:      /80 (BP Location: Right arm, Patient Position: Sitting, Cuff Size: Large)   Pulse (!) 54   Temp 98 °F (36 7 °C) (Tympanic)   Resp 16   Ht 5' 1 25" (1 556 m)   Wt 81 5 kg (179 lb 11 2 oz)   LMP  (LMP Unknown)   SpO2 93% BMI 33 68 kg/m²          Physical Exam   Constitutional: She is oriented to person, place, and time  She appears well-developed and well-nourished  No distress  HENT:   Head: Normocephalic and atraumatic  Right Ear: External ear normal    Left Ear: External ear normal    Mouth/Throat: Oropharynx is clear and moist  No oropharyngeal exudate  Eyes: Pupils are equal, round, and reactive to light  Conjunctivae and EOM are normal  Right eye exhibits no discharge  Left eye exhibits no discharge  No scleral icterus  Neck: Normal range of motion  Neck supple  No JVD present  Cardiovascular: Normal rate, regular rhythm, normal heart sounds and intact distal pulses  Exam reveals no gallop and no friction rub  Pulmonary/Chest: Effort normal and breath sounds normal  No respiratory distress  She has no wheezes  She has no rales  She exhibits no tenderness  Abdominal: Soft  Bowel sounds are normal  She exhibits no distension and no mass  There is no tenderness  Musculoskeletal: Normal range of motion  She exhibits no tenderness or deformity  Neurological: She is alert and oriented to person, place, and time  Skin: Skin is warm and dry  No rash noted  She is not diaphoretic  No erythema  Psychiatric: She has a normal mood and affect  Her behavior is normal  Judgment and thought content normal    Nursing note and vitals reviewed

## 2018-10-24 NOTE — ASSESSMENT & PLAN NOTE
Patient has been experiencing persistent watery diarrhea for at least 2 months  Likely secondary to her Metformin therapy, but other causes must be evaluated such as bacterial overgrowth, IBS, etc  A stool panel will be ordered to evaluate for infectious causes  Additionally, CMP and CBC w/ diff as well as a magnesium level will be ordered

## 2018-10-24 NOTE — PATIENT INSTRUCTIONS
1  Patient will do a stool culture to evaluate for any infectious causes of her diarrhea  She will also undergo lab eval      2  Patient will d/c metformin to determine if that will alleviate symptoms  3  Patient will d/c lipitor to alleviate muscle cramps  4  Return to office in 1 week

## 2018-10-26 ENCOUNTER — APPOINTMENT (OUTPATIENT)
Dept: LAB | Facility: CLINIC | Age: 78
End: 2018-10-26
Payer: MEDICARE

## 2018-10-26 ENCOUNTER — TRANSCRIBE ORDERS (OUTPATIENT)
Dept: ADMINISTRATIVE | Facility: HOSPITAL | Age: 78
End: 2018-10-26

## 2018-10-26 DIAGNOSIS — R80.9 PROTEINURIA, UNSPECIFIED TYPE: ICD-10-CM

## 2018-10-26 DIAGNOSIS — E11.21 DIABETIC GLOMERULOPATHY (HCC): ICD-10-CM

## 2018-10-26 DIAGNOSIS — R19.7 DIARRHEA, UNSPECIFIED TYPE: ICD-10-CM

## 2018-10-26 DIAGNOSIS — R80.9 PROTEINURIA, UNSPECIFIED TYPE: Primary | ICD-10-CM

## 2018-10-26 DIAGNOSIS — I10 ESSENTIAL HYPERTENSION, MALIGNANT: ICD-10-CM

## 2018-10-26 DIAGNOSIS — E11.649 UNCONTROLLED TYPE 2 DIABETES MELLITUS WITH HYPOGLYCEMIA, UNSPECIFIED HYPOGLYCEMIA COMA STATUS (HCC): ICD-10-CM

## 2018-10-26 DIAGNOSIS — E78.00 PURE HYPERCHOLESTEROLEMIA: ICD-10-CM

## 2018-10-26 LAB
ALBUMIN SERPL BCP-MCNC: 3.5 G/DL (ref 3.5–5)
ALP SERPL-CCNC: 68 U/L (ref 46–116)
ALT SERPL W P-5'-P-CCNC: 18 U/L (ref 12–78)
ANION GAP SERPL CALCULATED.3IONS-SCNC: 5 MMOL/L (ref 4–13)
AST SERPL W P-5'-P-CCNC: 10 U/L (ref 5–45)
BASOPHILS # BLD AUTO: 0.05 THOUSANDS/ΜL (ref 0–0.1)
BASOPHILS NFR BLD AUTO: 1 % (ref 0–1)
BILIRUB SERPL-MCNC: 0.32 MG/DL (ref 0.2–1)
BUN SERPL-MCNC: 27 MG/DL (ref 5–25)
CALCIUM SERPL-MCNC: 9 MG/DL (ref 8.3–10.1)
CHLORIDE SERPL-SCNC: 105 MMOL/L (ref 100–108)
CHOLEST SERPL-MCNC: 159 MG/DL (ref 50–200)
CO2 SERPL-SCNC: 27 MMOL/L (ref 21–32)
CREAT SERPL-MCNC: 1.11 MG/DL (ref 0.6–1.3)
CREAT UR-MCNC: 124 MG/DL
EOSINOPHIL # BLD AUTO: 0.19 THOUSAND/ΜL (ref 0–0.61)
EOSINOPHIL NFR BLD AUTO: 3 % (ref 0–6)
ERYTHROCYTE [DISTWIDTH] IN BLOOD BY AUTOMATED COUNT: 14.3 % (ref 11.6–15.1)
GFR SERPL CREATININE-BSD FRML MDRD: 48 ML/MIN/1.73SQ M
GLUCOSE P FAST SERPL-MCNC: 114 MG/DL (ref 65–99)
HCT VFR BLD AUTO: 37.4 % (ref 34.8–46.1)
HDLC SERPL-MCNC: 34 MG/DL (ref 40–60)
HGB BLD-MCNC: 11.6 G/DL (ref 11.5–15.4)
IMM GRANULOCYTES # BLD AUTO: 0.03 THOUSAND/UL (ref 0–0.2)
IMM GRANULOCYTES NFR BLD AUTO: 0 % (ref 0–2)
LDLC SERPL CALC-MCNC: 71 MG/DL (ref 0–100)
LYMPHOCYTES # BLD AUTO: 2.34 THOUSANDS/ΜL (ref 0.6–4.47)
LYMPHOCYTES NFR BLD AUTO: 31 % (ref 14–44)
MAGNESIUM SERPL-MCNC: 2.2 MG/DL (ref 1.6–2.6)
MCH RBC QN AUTO: 22.7 PG (ref 26.8–34.3)
MCHC RBC AUTO-ENTMCNC: 31 G/DL (ref 31.4–37.4)
MCV RBC AUTO: 73 FL (ref 82–98)
MONOCYTES # BLD AUTO: 0.64 THOUSAND/ΜL (ref 0.17–1.22)
MONOCYTES NFR BLD AUTO: 8 % (ref 4–12)
NEUTROPHILS # BLD AUTO: 4.39 THOUSANDS/ΜL (ref 1.85–7.62)
NEUTS SEG NFR BLD AUTO: 57 % (ref 43–75)
NONHDLC SERPL-MCNC: 125 MG/DL
NRBC BLD AUTO-RTO: 0 /100 WBCS
PHOSPHATE SERPL-MCNC: 3.1 MG/DL (ref 2.3–4.1)
PLATELET # BLD AUTO: 249 THOUSANDS/UL (ref 149–390)
PMV BLD AUTO: 10.9 FL (ref 8.9–12.7)
POTASSIUM SERPL-SCNC: 4.4 MMOL/L (ref 3.5–5.3)
PROT SERPL-MCNC: 7.6 G/DL (ref 6.4–8.2)
PROT UR-MCNC: 13 MG/DL
PROT/CREAT UR: 0.1 MG/G{CREAT} (ref 0–0.1)
RBC # BLD AUTO: 5.12 MILLION/UL (ref 3.81–5.12)
SODIUM SERPL-SCNC: 137 MMOL/L (ref 136–145)
TRIGL SERPL-MCNC: 271 MG/DL
URATE SERPL-MCNC: 8.1 MG/DL (ref 2–6.8)
WBC # BLD AUTO: 7.64 THOUSAND/UL (ref 4.31–10.16)

## 2018-10-26 PROCEDURE — 84100 ASSAY OF PHOSPHORUS: CPT

## 2018-10-26 PROCEDURE — 36415 COLL VENOUS BLD VENIPUNCTURE: CPT

## 2018-10-26 PROCEDURE — 84550 ASSAY OF BLOOD/URIC ACID: CPT

## 2018-10-26 PROCEDURE — 80061 LIPID PANEL: CPT

## 2018-10-26 PROCEDURE — 84156 ASSAY OF PROTEIN URINE: CPT | Performed by: INTERNAL MEDICINE

## 2018-10-26 PROCEDURE — 80053 COMPREHEN METABOLIC PANEL: CPT

## 2018-10-26 PROCEDURE — 82570 ASSAY OF URINE CREATININE: CPT | Performed by: INTERNAL MEDICINE

## 2018-10-26 PROCEDURE — 83735 ASSAY OF MAGNESIUM: CPT

## 2018-10-26 PROCEDURE — 85025 COMPLETE CBC W/AUTO DIFF WBC: CPT

## 2018-10-30 ENCOUNTER — APPOINTMENT (OUTPATIENT)
Dept: LAB | Age: 78
End: 2018-10-30
Payer: MEDICARE

## 2018-10-30 DIAGNOSIS — R19.7 DIARRHEA, UNSPECIFIED TYPE: ICD-10-CM

## 2018-10-30 PROCEDURE — 87505 NFCT AGENT DETECTION GI: CPT

## 2018-10-31 LAB
CAMPYLOBACTER DNA SPEC NAA+PROBE: NORMAL
SALMONELLA DNA SPEC QL NAA+PROBE: NORMAL
SHIGA TOXIN STX GENE SPEC NAA+PROBE: NORMAL
SHIGELLA DNA SPEC QL NAA+PROBE: NORMAL

## 2018-11-07 ENCOUNTER — DOCUMENTATION (OUTPATIENT)
Dept: GERIATRICS | Facility: CLINIC | Age: 78
End: 2018-11-07

## 2018-11-07 NOTE — PROGRESS NOTES
Per Provider cancel visit for today  Pt was not evaluated  Pt also did not bring med list and maybe taking meds that should have been D/C in the past  Pt will also have A1C BW done prior to next OV 11/30

## 2018-11-12 ENCOUNTER — APPOINTMENT (OUTPATIENT)
Dept: LAB | Facility: CLINIC | Age: 78
End: 2018-11-12
Payer: MEDICARE

## 2018-11-12 ENCOUNTER — TRANSCRIBE ORDERS (OUTPATIENT)
Dept: ADMINISTRATIVE | Facility: HOSPITAL | Age: 78
End: 2018-11-12

## 2018-11-12 DIAGNOSIS — E11.21 CONTROLLED TYPE 2 DIABETES MELLITUS WITH DIABETIC NEPHROPATHY, WITHOUT LONG-TERM CURRENT USE OF INSULIN (HCC): ICD-10-CM

## 2018-11-12 DIAGNOSIS — R25.2 MUSCLE CRAMPS: Primary | ICD-10-CM

## 2018-11-12 DIAGNOSIS — I10 HYPERTENSION, UNSPECIFIED TYPE: ICD-10-CM

## 2018-11-12 RX ORDER — LISINOPRIL 40 MG/1
TABLET ORAL
Qty: 180 TABLET | Refills: 1 | Status: SHIPPED | OUTPATIENT
Start: 2018-11-12 | End: 2018-11-30 | Stop reason: SDUPTHER

## 2018-11-12 RX ORDER — CARVEDILOL 25 MG/1
TABLET ORAL
Qty: 180 TABLET | Refills: 1 | Status: SHIPPED | OUTPATIENT
Start: 2018-11-12 | End: 2018-11-30 | Stop reason: SDUPTHER

## 2018-11-29 ENCOUNTER — APPOINTMENT (OUTPATIENT)
Dept: LAB | Age: 78
End: 2018-11-29
Payer: MEDICARE

## 2018-11-29 DIAGNOSIS — R25.2 MUSCLE CRAMPS: ICD-10-CM

## 2018-11-29 LAB
ALBUMIN SERPL BCP-MCNC: 3.5 G/DL (ref 3.5–5)
ALP SERPL-CCNC: 70 U/L (ref 46–116)
ALT SERPL W P-5'-P-CCNC: 17 U/L (ref 12–78)
ANION GAP SERPL CALCULATED.3IONS-SCNC: 5 MMOL/L (ref 4–13)
AST SERPL W P-5'-P-CCNC: 9 U/L (ref 5–45)
BASOPHILS # BLD AUTO: 0.06 THOUSANDS/ΜL (ref 0–0.1)
BASOPHILS NFR BLD AUTO: 1 % (ref 0–1)
BILIRUB SERPL-MCNC: 0.29 MG/DL (ref 0.2–1)
BUN SERPL-MCNC: 25 MG/DL (ref 5–25)
CALCIUM SERPL-MCNC: 10.1 MG/DL (ref 8.3–10.1)
CHLORIDE SERPL-SCNC: 104 MMOL/L (ref 100–108)
CHOLEST SERPL-MCNC: 190 MG/DL (ref 50–200)
CO2 SERPL-SCNC: 27 MMOL/L (ref 21–32)
CREAT SERPL-MCNC: 1.1 MG/DL (ref 0.6–1.3)
EOSINOPHIL # BLD AUTO: 0.21 THOUSAND/ΜL (ref 0–0.61)
EOSINOPHIL NFR BLD AUTO: 3 % (ref 0–6)
ERYTHROCYTE [DISTWIDTH] IN BLOOD BY AUTOMATED COUNT: 14.2 % (ref 11.6–15.1)
GFR SERPL CREATININE-BSD FRML MDRD: 49 ML/MIN/1.73SQ M
GLUCOSE P FAST SERPL-MCNC: 145 MG/DL (ref 65–99)
HCT VFR BLD AUTO: 37.6 % (ref 34.8–46.1)
HDLC SERPL-MCNC: 40 MG/DL (ref 40–60)
HGB BLD-MCNC: 11.4 G/DL (ref 11.5–15.4)
IMM GRANULOCYTES # BLD AUTO: 0.02 THOUSAND/UL (ref 0–0.2)
IMM GRANULOCYTES NFR BLD AUTO: 0 % (ref 0–2)
LDLC SERPL CALC-MCNC: 87 MG/DL (ref 0–100)
LYMPHOCYTES # BLD AUTO: 2.6 THOUSANDS/ΜL (ref 0.6–4.47)
LYMPHOCYTES NFR BLD AUTO: 32 % (ref 14–44)
MAGNESIUM SERPL-MCNC: 2.3 MG/DL (ref 1.6–2.6)
MCH RBC QN AUTO: 22.4 PG (ref 26.8–34.3)
MCHC RBC AUTO-ENTMCNC: 30.3 G/DL (ref 31.4–37.4)
MCV RBC AUTO: 74 FL (ref 82–98)
MONOCYTES # BLD AUTO: 0.61 THOUSAND/ΜL (ref 0.17–1.22)
MONOCYTES NFR BLD AUTO: 8 % (ref 4–12)
NEUTROPHILS # BLD AUTO: 4.66 THOUSANDS/ΜL (ref 1.85–7.62)
NEUTS SEG NFR BLD AUTO: 56 % (ref 43–75)
NONHDLC SERPL-MCNC: 150 MG/DL
NRBC BLD AUTO-RTO: 0 /100 WBCS
PLATELET # BLD AUTO: 255 THOUSANDS/UL (ref 149–390)
PMV BLD AUTO: 9.9 FL (ref 8.9–12.7)
POTASSIUM SERPL-SCNC: 4.1 MMOL/L (ref 3.5–5.3)
PROT SERPL-MCNC: 7.6 G/DL (ref 6.4–8.2)
RBC # BLD AUTO: 5.1 MILLION/UL (ref 3.81–5.12)
SODIUM SERPL-SCNC: 136 MMOL/L (ref 136–145)
TRIGL SERPL-MCNC: 314 MG/DL
WBC # BLD AUTO: 8.16 THOUSAND/UL (ref 4.31–10.16)

## 2018-11-29 PROCEDURE — 83735 ASSAY OF MAGNESIUM: CPT

## 2018-11-29 PROCEDURE — 36415 COLL VENOUS BLD VENIPUNCTURE: CPT

## 2018-11-29 PROCEDURE — 85025 COMPLETE CBC W/AUTO DIFF WBC: CPT

## 2018-11-29 PROCEDURE — 80053 COMPREHEN METABOLIC PANEL: CPT

## 2018-11-29 PROCEDURE — 80061 LIPID PANEL: CPT

## 2018-11-30 ENCOUNTER — APPOINTMENT (OUTPATIENT)
Dept: LAB | Age: 78
End: 2018-11-30
Payer: MEDICARE

## 2018-11-30 DIAGNOSIS — I10 HYPERTENSION, UNSPECIFIED TYPE: ICD-10-CM

## 2018-11-30 DIAGNOSIS — E11.8 TYPE 2 DIABETES MELLITUS WITH COMPLICATION, WITHOUT LONG-TERM CURRENT USE OF INSULIN (HCC): ICD-10-CM

## 2018-11-30 DIAGNOSIS — R25.2 MUSCLE CRAMPS: ICD-10-CM

## 2018-11-30 PROCEDURE — 87505 NFCT AGENT DETECTION GI: CPT

## 2018-12-03 ENCOUNTER — OFFICE VISIT (OUTPATIENT)
Dept: GERIATRICS | Facility: CLINIC | Age: 78
End: 2018-12-03
Payer: MEDICARE

## 2018-12-03 VITALS
DIASTOLIC BLOOD PRESSURE: 84 MMHG | SYSTOLIC BLOOD PRESSURE: 152 MMHG | BODY MASS INDEX: 34.36 KG/M2 | OXYGEN SATURATION: 97 % | WEIGHT: 182 LBS | RESPIRATION RATE: 16 BRPM | HEART RATE: 74 BPM | HEIGHT: 61 IN

## 2018-12-03 DIAGNOSIS — E78.2 MIXED HYPERLIPIDEMIA: ICD-10-CM

## 2018-12-03 DIAGNOSIS — E11.22 CONTROLLED TYPE 2 DIABETES MELLITUS WITH CHRONIC KIDNEY DISEASE, UNSPECIFIED CKD STAGE, UNSPECIFIED WHETHER LONG TERM INSULIN USE (HCC): Primary | ICD-10-CM

## 2018-12-03 DIAGNOSIS — R19.7 DIARRHEA, UNSPECIFIED TYPE: ICD-10-CM

## 2018-12-03 DIAGNOSIS — I10 ESSENTIAL HYPERTENSION: ICD-10-CM

## 2018-12-03 PROCEDURE — 99214 OFFICE O/P EST MOD 30 MIN: CPT | Performed by: INTERNAL MEDICINE

## 2018-12-03 RX ORDER — HYDROCHLOROTHIAZIDE 25 MG/1
25 TABLET ORAL DAILY
Qty: 90 TABLET | Refills: 1 | Status: SHIPPED | OUTPATIENT
Start: 2018-12-03 | End: 2019-05-15

## 2018-12-03 RX ORDER — SPIRONOLACTONE 25 MG/1
25 TABLET ORAL DAILY
Qty: 90 TABLET | Refills: 1 | Status: SHIPPED | OUTPATIENT
Start: 2018-12-03 | End: 2019-08-15 | Stop reason: SDUPTHER

## 2018-12-03 RX ORDER — LISINOPRIL 40 MG/1
TABLET ORAL
Qty: 180 TABLET | Refills: 1 | Status: SHIPPED | OUTPATIENT
Start: 2018-12-03 | End: 2019-05-20 | Stop reason: SDUPTHER

## 2018-12-03 RX ORDER — AMLODIPINE BESYLATE 10 MG/1
10 TABLET ORAL EVERY EVENING
Qty: 90 TABLET | Refills: 1 | Status: SHIPPED | OUTPATIENT
Start: 2018-12-03 | End: 2019-01-16 | Stop reason: DRUGHIGH

## 2018-12-03 RX ORDER — ATORVASTATIN CALCIUM 10 MG/1
5 TABLET, FILM COATED ORAL DAILY
Refills: 1 | COMMUNITY
Start: 2018-11-21 | End: 2019-08-15 | Stop reason: SDUPTHER

## 2018-12-03 RX ORDER — CARVEDILOL 25 MG/1
25 TABLET ORAL 2 TIMES DAILY
Qty: 180 TABLET | Refills: 1 | Status: SHIPPED | OUTPATIENT
Start: 2018-12-03 | End: 2019-05-20 | Stop reason: SDUPTHER

## 2018-12-03 RX ORDER — GLIMEPIRIDE 4 MG/1
TABLET ORAL
Qty: 180 TABLET | Refills: 1 | Status: SHIPPED | OUTPATIENT
Start: 2018-12-03 | End: 2019-05-28 | Stop reason: CLARIF

## 2018-12-03 RX ORDER — AMLODIPINE BESYLATE 10 MG/1
TABLET ORAL
COMMUNITY
End: 2019-01-16

## 2018-12-03 NOTE — PATIENT INSTRUCTIONS
1 -she will return in 4 weeks and will reassess her diabetes at that time we should note that she is still taking her atorvastatin 10 mg on the Glucophage i e  metformin 500 mg twice daily  2 -on her next visit I will try to discontinue metformin and place her on another agent

## 2018-12-03 NOTE — ASSESSMENT & PLAN NOTE
Lab Results   Component Value Date    HGBA1C 6 6 (H) 11/12/2018       No results for input(s): POCGLU in the last 72 hours  Blood Sugar Average: Last 72 hrs:   patient's hemoglobin A1c is 6 6 she will need to be maintained between 6 5 and 7 5  I did discuss with her that numbers below 6 really put her at greater risk of having hypoglycemia which is definitely worsened hyperglycemia

## 2018-12-03 NOTE — ASSESSMENT & PLAN NOTE
Patient's diarrhea has improved since her metformin was cut in half she currently is taking 500 mg twice a day but she has noticed a dramatic improvement in her loose stools

## 2018-12-03 NOTE — PROGRESS NOTES
Assessment/Plan:    Diarrhea  Patient's diarrhea has improved since her metformin was cut in half she currently is taking 500 mg twice a day but she has noticed a dramatic improvement in her loose stools  Hyperlipidemia  Triglycerides are slightly elevated this goes hand in hand with the elevated blood sugar of 145    Type 2 diabetes mellitus with renal manifestations, controlled ([facility] [address] )  Lab Results   Component Value Date    HGBA1C 6 6 (H) 11/12/2018       No results for input(s): POCGLU in the last 72 hours  Blood Sugar Average: Last 72 hrs:   patient's hemoglobin A1c is 6 6 she will need to be maintained between 6 5 and 7 5  I did discuss with her that numbers below 6 really put her at greater risk of having hypoglycemia which is definitely worsened hyperglycemia  Diagnoses and all orders for this visit:    Controlled type 2 diabetes mellitus with chronic kidney disease, unspecified CKD stage, unspecified whether long term insulin use ([facility] )    Essential hypertension    Mixed hyperlipidemia    Diarrhea, unspecified type    Other orders  -     atorvastatin (LIPITOR) 10 mg tablet; Take 10 mg by mouth daily  -     amLODIPine (NORVASC) 10 mg tablet; TAKE 1/2 TABLET IN THE EVENING  -     metFORMIN (GLUCOPHAGE) 500 mg tablet; Take 500 mg by mouth 2 (two) times a day with meals          Subjective:      Patient ID: Braden Laws is a 70 y o  female  Patient is a 28-year-old  female who will be traveling to [facility] at the end of the year the so we will not make any modification in her medications at present  I did discuss with her the possibility of stopping the metformin and placing her on Januvia  Her diarrhea has improved since her metformin was diminished  The patient would like to have her hemoglobin A1c is around 6 I did discuss with her to do some research on ideal management of diabetics with hemoglobin A1cs    Our goal is to prevent hypoglycemia and try to maintain hemoglobin A1cs between 6 5 and 7 5  The patient will attempt weight loss of approximately 10 lb I think this would help in bringing that hemoglobin A1c down  The following portions of the patient's history were reviewed and updated as appropriate: allergies, current medications, past family history, past medical history, past social history, past surgical history and problem list     Review of Systems   Constitutional: Negative  HENT: Negative  Eyes: Negative  Respiratory: Negative  Cardiovascular: Negative  Gastrointestinal: Negative  Endocrine: Negative  Genitourinary: Negative  Musculoskeletal: Negative  Skin: Negative  Allergic/Immunologic: Negative  Neurological: Negative  Hematological: Negative  Psychiatric/Behavioral: Negative  Objective:      /84 (BP Location: Right arm, Patient Position: Sitting, Cuff Size: Large)   Pulse 74   Resp 16   Ht 5' 1 25" (1 556 m)   Wt 82 6 kg (182 lb)   LMP  (LMP Unknown)   SpO2 97%   BMI 34 11 kg/m²          Physical Exam   Constitutional: She is oriented to person, place, and time  She appears well-developed and well-nourished  HENT:   Head: Normocephalic and atraumatic  Right Ear: External ear normal    Left Ear: External ear normal    Mouth/Throat: Oropharynx is clear and moist    Eyes: Pupils are equal, round, and reactive to light  Conjunctivae and EOM are normal    Neck: Normal range of motion  Neck supple  Cardiovascular: Normal rate, regular rhythm, normal heart sounds and intact distal pulses  Pulmonary/Chest: Effort normal and breath sounds normal    Abdominal: Soft  Musculoskeletal: Normal range of motion  Neurological: She is alert and oriented to person, place, and time  She has normal reflexes  Skin: Skin is warm and dry  Psychiatric: She has a normal mood and affect  Her behavior is normal  Judgment and thought content normal    Nursing note and vitals reviewed

## 2019-01-11 ENCOUNTER — APPOINTMENT (OUTPATIENT)
Dept: LAB | Age: 79
End: 2019-01-11
Payer: MEDICARE

## 2019-01-11 ENCOUNTER — TRANSCRIBE ORDERS (OUTPATIENT)
Dept: ADMINISTRATIVE | Age: 79
End: 2019-01-11

## 2019-01-11 DIAGNOSIS — R19.7 DIARRHEA OF PRESUMED INFECTIOUS ORIGIN: Primary | ICD-10-CM

## 2019-01-11 DIAGNOSIS — R25.2 LEG CRAMPS: ICD-10-CM

## 2019-01-11 DIAGNOSIS — R19.7 DIARRHEA OF PRESUMED INFECTIOUS ORIGIN: ICD-10-CM

## 2019-01-11 LAB
ALBUMIN SERPL BCP-MCNC: 3.7 G/DL (ref 3.5–5)
ALP SERPL-CCNC: 77 U/L (ref 46–116)
ALT SERPL W P-5'-P-CCNC: 18 U/L (ref 12–78)
ANION GAP SERPL CALCULATED.3IONS-SCNC: 6 MMOL/L (ref 4–13)
AST SERPL W P-5'-P-CCNC: 12 U/L (ref 5–45)
BASOPHILS # BLD AUTO: 0.08 THOUSANDS/ΜL (ref 0–0.1)
BASOPHILS NFR BLD AUTO: 1 % (ref 0–1)
BILIRUB SERPL-MCNC: 0.41 MG/DL (ref 0.2–1)
BUN SERPL-MCNC: 24 MG/DL (ref 5–25)
CALCIUM SERPL-MCNC: 9.5 MG/DL (ref 8.3–10.1)
CHLORIDE SERPL-SCNC: 103 MMOL/L (ref 100–108)
CHOLEST SERPL-MCNC: 213 MG/DL (ref 50–200)
CO2 SERPL-SCNC: 27 MMOL/L (ref 21–32)
CREAT SERPL-MCNC: 1.17 MG/DL (ref 0.6–1.3)
EOSINOPHIL # BLD AUTO: 0.35 THOUSAND/ΜL (ref 0–0.61)
EOSINOPHIL NFR BLD AUTO: 5 % (ref 0–6)
ERYTHROCYTE [DISTWIDTH] IN BLOOD BY AUTOMATED COUNT: 14.6 % (ref 11.6–15.1)
GFR SERPL CREATININE-BSD FRML MDRD: 45 ML/MIN/1.73SQ M
GLUCOSE P FAST SERPL-MCNC: 135 MG/DL (ref 65–99)
HCT VFR BLD AUTO: 39.7 % (ref 34.8–46.1)
HDLC SERPL-MCNC: 41 MG/DL (ref 40–60)
HGB BLD-MCNC: 12 G/DL (ref 11.5–15.4)
IMM GRANULOCYTES # BLD AUTO: 0.02 THOUSAND/UL (ref 0–0.2)
IMM GRANULOCYTES NFR BLD AUTO: 0 % (ref 0–2)
LDLC SERPL CALC-MCNC: 96 MG/DL (ref 0–100)
LYMPHOCYTES # BLD AUTO: 2.49 THOUSANDS/ΜL (ref 0.6–4.47)
LYMPHOCYTES NFR BLD AUTO: 34 % (ref 14–44)
MAGNESIUM SERPL-MCNC: 2.6 MG/DL (ref 1.6–2.6)
MCH RBC QN AUTO: 22.3 PG (ref 26.8–34.3)
MCHC RBC AUTO-ENTMCNC: 30.2 G/DL (ref 31.4–37.4)
MCV RBC AUTO: 74 FL (ref 82–98)
MONOCYTES # BLD AUTO: 0.55 THOUSAND/ΜL (ref 0.17–1.22)
MONOCYTES NFR BLD AUTO: 7 % (ref 4–12)
NEUTROPHILS # BLD AUTO: 3.92 THOUSANDS/ΜL (ref 1.85–7.62)
NEUTS SEG NFR BLD AUTO: 53 % (ref 43–75)
NONHDLC SERPL-MCNC: 172 MG/DL
NRBC BLD AUTO-RTO: 0 /100 WBCS
PLATELET # BLD AUTO: 245 THOUSANDS/UL (ref 149–390)
PMV BLD AUTO: 10.4 FL (ref 8.9–12.7)
POTASSIUM SERPL-SCNC: 4.5 MMOL/L (ref 3.5–5.3)
PROT SERPL-MCNC: 7.8 G/DL (ref 6.4–8.2)
RBC # BLD AUTO: 5.38 MILLION/UL (ref 3.81–5.12)
SODIUM SERPL-SCNC: 136 MMOL/L (ref 136–145)
TRIGL SERPL-MCNC: 378 MG/DL
WBC # BLD AUTO: 7.41 THOUSAND/UL (ref 4.31–10.16)

## 2019-01-11 PROCEDURE — 36415 COLL VENOUS BLD VENIPUNCTURE: CPT

## 2019-01-11 PROCEDURE — 80053 COMPREHEN METABOLIC PANEL: CPT

## 2019-01-11 PROCEDURE — 80061 LIPID PANEL: CPT

## 2019-01-11 PROCEDURE — 83735 ASSAY OF MAGNESIUM: CPT

## 2019-01-11 PROCEDURE — 85025 COMPLETE CBC W/AUTO DIFF WBC: CPT

## 2019-01-16 ENCOUNTER — OFFICE VISIT (OUTPATIENT)
Dept: GERIATRICS | Facility: CLINIC | Age: 79
End: 2019-01-16
Payer: MEDICARE

## 2019-01-16 VITALS
WEIGHT: 181.7 LBS | SYSTOLIC BLOOD PRESSURE: 152 MMHG | HEIGHT: 61 IN | HEART RATE: 58 BPM | OXYGEN SATURATION: 98 % | RESPIRATION RATE: 16 BRPM | BODY MASS INDEX: 34.31 KG/M2 | DIASTOLIC BLOOD PRESSURE: 80 MMHG

## 2019-01-16 DIAGNOSIS — E11.22 CONTROLLED TYPE 2 DIABETES MELLITUS WITH CHRONIC KIDNEY DISEASE, WITHOUT LONG-TERM CURRENT USE OF INSULIN, UNSPECIFIED CKD STAGE (HCC): Primary | ICD-10-CM

## 2019-01-16 DIAGNOSIS — I10 HYPERTENSION, UNSPECIFIED TYPE: ICD-10-CM

## 2019-01-16 DIAGNOSIS — E11.21 MICROALBUMINURIC DIABETIC NEPHROPATHY (HCC): ICD-10-CM

## 2019-01-16 DIAGNOSIS — D56.3 THALASSEMIA MINOR: ICD-10-CM

## 2019-01-16 PROCEDURE — 99214 OFFICE O/P EST MOD 30 MIN: CPT | Performed by: INTERNAL MEDICINE

## 2019-01-16 RX ORDER — AMLODIPINE BESYLATE 10 MG/1
10 TABLET ORAL
COMMUNITY
End: 2019-02-11 | Stop reason: SDUPTHER

## 2019-01-16 NOTE — ASSESSMENT & PLAN NOTE
Lab Results   Component Value Date    HGBA1C 6 6 (H) 11/12/2018       No results for input(s): POCGLU in the last 72 hours  Blood Sugar Average: Last 72 hrs:       Stop Metformin due to stage 3 chronic kidney disease  Start Tradjenta  Will get A1C in 4 weeks

## 2019-01-16 NOTE — ASSESSMENT & PLAN NOTE
Lab Results   Component Value Date    HGBA1C 6 6 (H) 11/12/2018       No results for input(s): POCGLU in the last 72 hours  Blood Sugar Average: Last 72 hrs:     GFR dropped from January of last year to this January it was 39

## 2019-01-16 NOTE — PATIENT INSTRUCTIONS
1 - stop metformin due to renal function  1 - start Tradjenta  3 - will repeat A1c and BMP in 4 weeks

## 2019-01-16 NOTE — PROGRESS NOTES
Assessment/Plan:    Type 2 diabetes mellitus with renal manifestations, controlled ([facility] [address] )  Lab Results   Component Value Date    HGBA1C 6 6 (H) 11/12/2018       No results for input(s): POCGLU in the last 72 hours  Blood Sugar Average: Last 72 hrs:       Stop Metformin due to stage 3 chronic kidney disease  Start Tradjenta  Will get A1C in 4 weeks  Thalassemia minor  Consistent with bloodwork obtained on 1/11/19  Microalbuminuric diabetic nephropathy (HCC)  Lab Results   Component Value Date    HGBA1C 6 6 (H) 11/12/2018       No results for input(s): POCGLU in the last 72 hours  Blood Sugar Average: Last 72 hrs:     GFR dropped from January of last year to this January it was 39  Hypertension  Continue Lisinopril 40 mg  Continue Norvasc 10 mg            Diagnoses and all orders for this visit:    Controlled type 2 diabetes mellitus with chronic kidney disease, without long-term current use of insulin, unspecified CKD stage (HCC)  -     Linagliptin 5 MG TABS; Take 5 mg by mouth daily  -     Hemoglobin A1C; Future    Thalassemia minor    Microalbuminuric diabetic nephropathy (HCC)    Hypertension, unspecified type          Subjective:      Patient ID: Noah Parrish is a 70 y o  female  Patient is a 53-year-old female who presents today for for a follow-up  They have been following this patient for diarrhea which has improved since decreasing metformin to 500 mg b i d , and following her diabetes type 2  We went over her lab results with her today  Her last A1c was obtained on 11/12/2018 and was 6 6  It metformin was stopped today in charge and does we went started due to gradual decrease in kidney function  Patient is being followed by nephrologist for her diabetic nephropathy and microalbumin in her urine  He believes it is secondary to longstanding hypertension  Patient states that she has eye doctor every year with Dr Keith Fischer      Other comorbidities include osteoarthritis, hyperlipidemia, and vitamin-D deficiency  Patient has not had a current vitamin-D level  The following portions of the patient's history were reviewed and updated as appropriate: allergies, current medications, past family history, past medical history, past social history, past surgical history and problem list     Review of Systems   Constitutional: Negative for activity change, appetite change and fatigue  HENT: Negative for congestion, hearing loss, postnasal drip, sore throat and trouble swallowing  Eyes: Negative  Respiratory: Negative for cough, shortness of breath and wheezing  Cardiovascular: Negative for chest pain, palpitations and leg swelling  Gastrointestinal: Negative for abdominal pain, constipation, diarrhea, nausea and vomiting  Diarrhea has improved  Genitourinary: Negative for difficulty urinating, dysuria and urgency  Musculoskeletal: Negative for arthralgias, back pain, gait problem and myalgias  Neurological: Negative for dizziness, weakness, light-headedness, numbness and headaches  Psychiatric/Behavioral: Negative  Objective:      /80 (BP Location: Right arm, Patient Position: Sitting, Cuff Size: Large)   Pulse 58   Resp 16   Ht 5' 1 25" (1 556 m)   Wt 82 4 kg (181 lb 11 2 oz)   LMP  (LMP Unknown)   SpO2 98%   BMI 34 05 kg/m²          Physical Exam   Constitutional: She is oriented to person, place, and time  She appears well-developed and well-nourished  HENT:   Head: Normocephalic and atraumatic  Right Ear: External ear normal    Left Ear: External ear normal    Mouth/Throat: Oropharynx is clear and moist    Eyes: Pupils are equal, round, and reactive to light  Conjunctivae are normal    Cardiovascular: Normal rate, regular rhythm and normal heart sounds  Exam reveals no gallop and no friction rub  No murmur heard  Pulmonary/Chest: Effort normal and breath sounds normal  She has no wheezes  She has no rales  Abdominal: Soft  Bowel sounds are normal    Musculoskeletal: She exhibits no edema  Neurological: She is alert and oriented to person, place, and time  Psychiatric: She has a normal mood and affect   Her behavior is normal

## 2019-02-11 DIAGNOSIS — I10 ESSENTIAL HYPERTENSION: Primary | ICD-10-CM

## 2019-02-12 DIAGNOSIS — I10 ESSENTIAL HYPERTENSION: ICD-10-CM

## 2019-02-12 RX ORDER — AMLODIPINE BESYLATE 10 MG/1
5 TABLET ORAL DAILY
Qty: 30 TABLET | Refills: 1 | Status: SHIPPED | OUTPATIENT
Start: 2019-02-12 | End: 2019-02-12 | Stop reason: SDUPTHER

## 2019-02-12 RX ORDER — AMLODIPINE BESYLATE 10 MG/1
5 TABLET ORAL DAILY
Qty: 30 TABLET | Refills: 1 | Status: SHIPPED | OUTPATIENT
Start: 2019-02-12 | End: 2019-02-13 | Stop reason: ALTCHOICE

## 2019-02-13 ENCOUNTER — DOCUMENTATION (OUTPATIENT)
Dept: GERIATRICS | Facility: CLINIC | Age: 79
End: 2019-02-13

## 2019-02-13 RX ORDER — AMLODIPINE BESYLATE 5 MG/1
10 TABLET ORAL DAILY
COMMUNITY
End: 2019-07-15 | Stop reason: ALTCHOICE

## 2019-02-13 NOTE — PROGRESS NOTES
Called patient to clarify Amlodipine meds per Dr Cyndi Escobar  Pt stated that she's currently taking 5mg tablet daily   Chart updated to reflect the changes

## 2019-02-14 ENCOUNTER — APPOINTMENT (OUTPATIENT)
Dept: LAB | Age: 79
End: 2019-02-14
Payer: MEDICARE

## 2019-02-14 DIAGNOSIS — I10 HYPERTENSION, UNSPECIFIED TYPE: ICD-10-CM

## 2019-02-14 DIAGNOSIS — E11.21 MICROALBUMINURIC DIABETIC NEPHROPATHY (HCC): ICD-10-CM

## 2019-02-14 DIAGNOSIS — E11.22 CONTROLLED TYPE 2 DIABETES MELLITUS WITH CHRONIC KIDNEY DISEASE, WITHOUT LONG-TERM CURRENT USE OF INSULIN, UNSPECIFIED CKD STAGE (HCC): ICD-10-CM

## 2019-02-14 LAB
ANION GAP SERPL CALCULATED.3IONS-SCNC: 8 MMOL/L (ref 4–13)
BUN SERPL-MCNC: 24 MG/DL (ref 5–25)
CALCIUM SERPL-MCNC: 9.5 MG/DL (ref 8.3–10.1)
CHLORIDE SERPL-SCNC: 105 MMOL/L (ref 100–108)
CO2 SERPL-SCNC: 25 MMOL/L (ref 21–32)
CREAT SERPL-MCNC: 1.13 MG/DL (ref 0.6–1.3)
EST. AVERAGE GLUCOSE BLD GHB EST-MCNC: 148 MG/DL
GFR SERPL CREATININE-BSD FRML MDRD: 47 ML/MIN/1.73SQ M
GLUCOSE P FAST SERPL-MCNC: 137 MG/DL (ref 65–99)
HBA1C MFR BLD: 6.8 % (ref 4.2–6.3)
POTASSIUM SERPL-SCNC: 4.4 MMOL/L (ref 3.5–5.3)
SODIUM SERPL-SCNC: 138 MMOL/L (ref 136–145)

## 2019-02-14 PROCEDURE — 83036 HEMOGLOBIN GLYCOSYLATED A1C: CPT | Performed by: INTERNAL MEDICINE

## 2019-02-14 PROCEDURE — 36415 COLL VENOUS BLD VENIPUNCTURE: CPT

## 2019-02-14 PROCEDURE — 80048 BASIC METABOLIC PNL TOTAL CA: CPT

## 2019-02-15 ENCOUNTER — OFFICE VISIT (OUTPATIENT)
Dept: GERIATRICS | Facility: CLINIC | Age: 79
End: 2019-02-15
Payer: MEDICARE

## 2019-02-15 VITALS
DIASTOLIC BLOOD PRESSURE: 90 MMHG | HEIGHT: 61 IN | BODY MASS INDEX: 34.27 KG/M2 | WEIGHT: 181.5 LBS | RESPIRATION RATE: 16 BRPM | SYSTOLIC BLOOD PRESSURE: 148 MMHG | HEART RATE: 66 BPM

## 2019-02-15 DIAGNOSIS — I10 ESSENTIAL HYPERTENSION: ICD-10-CM

## 2019-02-15 DIAGNOSIS — E78.00 PURE HYPERCHOLESTEROLEMIA: ICD-10-CM

## 2019-02-15 DIAGNOSIS — E11.21 CONTROLLED TYPE 2 DIABETES MELLITUS WITH DIABETIC NEPHROPATHY, WITHOUT LONG-TERM CURRENT USE OF INSULIN (HCC): Primary | ICD-10-CM

## 2019-02-15 PROCEDURE — 99214 OFFICE O/P EST MOD 30 MIN: CPT | Performed by: INTERNAL MEDICINE

## 2019-02-15 RX ORDER — METFORMIN HYDROCHLORIDE 500 MG/1
TABLET, EXTENDED RELEASE ORAL
COMMUNITY
End: 2021-08-17 | Stop reason: SDUPTHER

## 2019-02-15 NOTE — ASSESSMENT & PLAN NOTE
Patient's blood pressure is on the elevated side today systolic 388 but she did not take her medications  She will be seeing the nephrologist soon

## 2019-02-15 NOTE — PATIENT INSTRUCTIONS
1 -patient will get her routine diabetic blood work will return in 3 months for a general evaluation  At that time we should discuss the need for bone density and mammogram   Patient has had her vaccinations through her pharmacy

## 2019-02-15 NOTE — PROGRESS NOTES
Assessment/Plan:    Hypertension  Patient's blood pressure is on the elevated side today systolic 031 but she did not take her medications  She will be seeing the nephrologist soon  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, without long-term current use of insulin ([facility])  Lab Results   Component Value Date    HGBA1C 6 8 (H) 02/14/2019       No results for input(s): POCGLU in the last 72 hours  Blood Sugar Average: Last 72 hrs:   patient will have a hemoglobin A1c done prior to her next visit she needs to follow with Ophthalmology and Nephrology on a regular basis  Hyperlipidemia  Will check a lipid panel prior to her next visit       Diagnoses and all orders for this visit:    Controlled type 2 diabetes mellitus with diabetic nephropathy, without long-term current use of insulin ([address] )    Essential hypertension    Pure hypercholesterolemia    Other orders  -     metFORMIN (GLUCOPHAGE) 500 mg tablet; Take 500 mg by mouth 2 (two) times a day with meals          Subjective:      Patient ID: Quoc Nogueira is a 70 y o  female  Patient is a 59-year-old  female who enjoys all her basic and instrumental activities of daily living  Patient's metformin was decreased to 500 mg a day because of her chronic renal failure stage 3  Patient was maintained on Amaryl her hemoglobin A1c is 6 8 with an average blood sugar running around 137  The patient's hypertension currently being treated with lisinopril and amlodipine she also takes spironolactone I did check a potassium level and it was 4 4  Eleanor [place]ald[place] She returns to the office today for blood pressure assessment and evaluation  She states that she still taking the metformin 500 mg twice a day will need to confirm this with [facility] pharmacy at [address] [facility]  Since decreasing her metformin her GI issues have improved markedly no longer having episodes of diarrhea    Patient will be following with Ophthalmology on a regular basis she does follow with Nephrology for micro albuminuria with Dr Liliana Perez  She states that she did not take her blood pressure medications this morning I did get a systolic of approximately 150 today  The following portions of the patient's history were reviewed and updated as appropriate: allergies, current medications, past family history, past medical history, past social history, past surgical history and problem list     Review of Systems   Constitutional: Negative  HENT: Negative  Eyes: Negative  Respiratory: Negative  Cardiovascular: Negative  Endocrine: Negative  Genitourinary: Negative  Musculoskeletal: Positive for arthralgias  Skin: Negative  Allergic/Immunologic: Negative  Neurological: Negative  Hematological: Negative  Psychiatric/Behavioral: Negative  Objective:      /90 (BP Location: Left arm, Patient Position: Sitting, Cuff Size: Large)   Pulse 66   Resp 16   Ht 5' 1 25" (1 556 m)   Wt 82 3 kg (181 lb 8 oz)   LMP  (LMP Unknown)   BMI 34 01 kg/m²          Physical Exam   Constitutional: She is oriented to person, place, and time  She appears well-developed and well-nourished  HENT:   Head: Normocephalic and atraumatic  Right Ear: External ear normal    Left Ear: External ear normal    Mouth/Throat: Oropharynx is clear and moist    Eyes: Pupils are equal, round, and reactive to light  Conjunctivae and EOM are normal    Neck: Normal range of motion  Neck supple  Cardiovascular: Normal rate, regular rhythm, normal heart sounds and intact distal pulses  Pulmonary/Chest: Effort normal and breath sounds normal    Abdominal: Soft  Bowel sounds are normal    Musculoskeletal: Normal range of motion  Neurological: She is alert and oriented to person, place, and time  Skin: Skin is warm and dry  Psychiatric: She has a normal mood and affect   Her behavior is normal  Judgment and thought content normal

## 2019-02-15 NOTE — ASSESSMENT & PLAN NOTE
Lab Results   Component Value Date    HGBA1C 6 8 (H) 02/14/2019       No results for input(s): POCGLU in the last 72 hours  Blood Sugar Average: Last 72 hrs:   patient will have a hemoglobin A1c done prior to her next visit she needs to follow with Ophthalmology and Nephrology on a regular basis

## 2019-02-19 DIAGNOSIS — I10 HYPERTENSION, UNSPECIFIED TYPE: ICD-10-CM

## 2019-02-19 DIAGNOSIS — E78.5 HYPERLIPIDEMIA, UNSPECIFIED HYPERLIPIDEMIA TYPE: ICD-10-CM

## 2019-02-19 RX ORDER — HYDROCHLOROTHIAZIDE 25 MG/1
25 TABLET ORAL DAILY
Qty: 90 TABLET | Refills: 1 | Status: SHIPPED | OUTPATIENT
Start: 2019-02-19 | End: 2019-08-15 | Stop reason: SDUPTHER

## 2019-02-19 RX ORDER — ATORVASTATIN CALCIUM 10 MG/1
10 TABLET, FILM COATED ORAL DAILY
Qty: 90 TABLET | Refills: 1 | Status: SHIPPED | OUTPATIENT
Start: 2019-02-19 | End: 2019-05-15

## 2019-02-27 ENCOUNTER — TRANSCRIBE ORDERS (OUTPATIENT)
Dept: ADMINISTRATIVE | Age: 79
End: 2019-02-27

## 2019-02-27 ENCOUNTER — APPOINTMENT (OUTPATIENT)
Dept: LAB | Age: 79
End: 2019-02-27
Payer: MEDICARE

## 2019-02-27 DIAGNOSIS — E11.21 DIABETIC GLOMERULOPATHY (HCC): Primary | ICD-10-CM

## 2019-02-27 LAB
CREAT UR-MCNC: 129 MG/DL
MICROALBUMIN UR-MCNC: 14.4 MG/L (ref 0–20)
MICROALBUMIN/CREAT 24H UR: 11 MG/G CREATININE (ref 0–30)

## 2019-02-27 PROCEDURE — 82570 ASSAY OF URINE CREATININE: CPT | Performed by: INTERNAL MEDICINE

## 2019-02-27 PROCEDURE — 82043 UR ALBUMIN QUANTITATIVE: CPT | Performed by: INTERNAL MEDICINE

## 2019-03-15 DIAGNOSIS — J30.89 ALLERGIC RHINITIS DUE TO OTHER ALLERGIC TRIGGER, UNSPECIFIED SEASONALITY: ICD-10-CM

## 2019-03-16 RX ORDER — FLUTICASONE PROPIONATE 50 MCG
SPRAY, SUSPENSION (ML) NASAL
Qty: 1 BOTTLE | Refills: 3 | Status: SHIPPED | OUTPATIENT
Start: 2019-03-16 | End: 2019-08-22 | Stop reason: SDUPTHER

## 2019-04-15 DIAGNOSIS — E11.8 TYPE 2 DIABETES MELLITUS WITH COMPLICATION, WITHOUT LONG-TERM CURRENT USE OF INSULIN (HCC): ICD-10-CM

## 2019-04-16 DIAGNOSIS — E11.8 TYPE 2 DIABETES MELLITUS WITH COMPLICATION, WITHOUT LONG-TERM CURRENT USE OF INSULIN (HCC): ICD-10-CM

## 2019-04-16 DIAGNOSIS — I10 HYPERTENSION, UNSPECIFIED TYPE: ICD-10-CM

## 2019-04-16 RX ORDER — METFORMIN HYDROCHLORIDE 500 MG/1
TABLET, EXTENDED RELEASE ORAL
Qty: 360 TABLET | Refills: 1 | Status: SHIPPED | OUTPATIENT
Start: 2019-04-16 | End: 2019-05-28 | Stop reason: CLARIF

## 2019-04-16 RX ORDER — GLIMEPIRIDE 4 MG/1
TABLET ORAL
Qty: 180 TABLET | Refills: 1 | Status: SHIPPED | OUTPATIENT
Start: 2019-04-16 | End: 2019-08-29 | Stop reason: SDUPTHER

## 2019-04-16 RX ORDER — AMLODIPINE BESYLATE 10 MG/1
TABLET ORAL
Qty: 90 TABLET | Refills: 1 | Status: SHIPPED | OUTPATIENT
Start: 2019-04-16 | End: 2019-05-28 | Stop reason: CLARIF

## 2019-05-14 DIAGNOSIS — I10 HYPERTENSION, UNSPECIFIED TYPE: ICD-10-CM

## 2019-05-15 ENCOUNTER — OFFICE VISIT (OUTPATIENT)
Dept: DERMATOLOGY | Facility: CLINIC | Age: 79
End: 2019-05-15
Payer: MEDICARE

## 2019-05-15 VITALS — HEIGHT: 60 IN | BODY MASS INDEX: 36.71 KG/M2 | WEIGHT: 187 LBS | TEMPERATURE: 97.8 F

## 2019-05-15 DIAGNOSIS — D22.9 MULTIPLE MELANOCYTIC NEVI: ICD-10-CM

## 2019-05-15 DIAGNOSIS — Q80.0 ICHTHYOSIS VULGARIS: ICD-10-CM

## 2019-05-15 DIAGNOSIS — L57.8 ACTINIC SKIN DAMAGE: ICD-10-CM

## 2019-05-15 DIAGNOSIS — L82.1 SEBORRHEIC KERATOSIS: Primary | ICD-10-CM

## 2019-05-15 PROCEDURE — 99204 OFFICE O/P NEW MOD 45 MIN: CPT | Performed by: DERMATOLOGY

## 2019-05-15 RX ORDER — AMMONIUM LACTATE 12 G/100G
CREAM TOPICAL AS NEEDED
Qty: 385 G | Refills: 0 | Status: SHIPPED | OUTPATIENT
Start: 2019-05-15 | End: 2020-07-20 | Stop reason: ALTCHOICE

## 2019-05-20 RX ORDER — LISINOPRIL 40 MG/1
TABLET ORAL
Qty: 180 TABLET | Refills: 1 | Status: SHIPPED | OUTPATIENT
Start: 2019-05-20 | End: 2020-02-20

## 2019-05-20 RX ORDER — CARVEDILOL 25 MG/1
TABLET ORAL
Qty: 180 TABLET | Refills: 1 | Status: SHIPPED | OUTPATIENT
Start: 2019-05-20 | End: 2019-08-15 | Stop reason: SDUPTHER

## 2019-07-12 ENCOUNTER — OFFICE VISIT (OUTPATIENT)
Dept: GERIATRICS | Facility: CLINIC | Age: 79
End: 2019-07-12
Payer: MEDICARE

## 2019-07-12 VITALS
SYSTOLIC BLOOD PRESSURE: 130 MMHG | DIASTOLIC BLOOD PRESSURE: 80 MMHG | RESPIRATION RATE: 16 BRPM | BODY MASS INDEX: 36.11 KG/M2 | WEIGHT: 183.9 LBS | HEIGHT: 60 IN | HEART RATE: 55 BPM | TEMPERATURE: 98.7 F | OXYGEN SATURATION: 97 %

## 2019-07-12 DIAGNOSIS — E11.29 CONTROLLED TYPE 2 DIABETES MELLITUS WITH MICROALBUMINURIA, WITHOUT LONG-TERM CURRENT USE OF INSULIN (HCC): ICD-10-CM

## 2019-07-12 DIAGNOSIS — R80.9 CONTROLLED TYPE 2 DIABETES MELLITUS WITH MICROALBUMINURIA, WITHOUT LONG-TERM CURRENT USE OF INSULIN (HCC): ICD-10-CM

## 2019-07-12 DIAGNOSIS — R09.82 POST-NASAL DRIP: Primary | ICD-10-CM

## 2019-07-12 DIAGNOSIS — R19.7 DIARRHEA, UNSPECIFIED TYPE: ICD-10-CM

## 2019-07-12 DIAGNOSIS — I10 ESSENTIAL HYPERTENSION: ICD-10-CM

## 2019-07-12 DIAGNOSIS — E11.21 MICROALBUMINURIC DIABETIC NEPHROPATHY (HCC): ICD-10-CM

## 2019-07-12 PROCEDURE — 99213 OFFICE O/P EST LOW 20 MIN: CPT | Performed by: INTERNAL MEDICINE

## 2019-07-12 RX ORDER — LORATADINE 10 MG/1
10 TABLET ORAL DAILY
Qty: 14 TABLET | Refills: 0 | Status: SHIPPED | OUTPATIENT
Start: 2019-07-12 | End: 2019-10-08 | Stop reason: ALTCHOICE

## 2019-07-12 NOTE — ASSESSMENT & PLAN NOTE
Lab Results   Component Value Date    HGBA1C 6 8 (H) 02/14/2019     Currently stable on medications  Will order HbA1c for recheck

## 2019-07-12 NOTE — PATIENT INSTRUCTIONS
1 - Claritin 10mg PO qd x 14 days for postnasal drip  2 - CMP, microalbumin, HbA1c, cholesterol panel ordered   3 - follow up in 2 weeks

## 2019-07-12 NOTE — PROGRESS NOTES
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, without long-term current use of insulin ([facility] [address] )     Lab Results   Component Value Date    HGBA1C 6 8 (H) 02/14/2019     Currently stable on medications  Will order HbA1c for recheck  Cardiovascular and Mediastinum    Hypertension     BP today 130/80  Patient concerned about being on Claritin with HTN but advised to only take for 14 days  Prescription only filled for 14 days and patient will follow up  Other    Diarrhea     Patient concerned because fruits and vegetables give her GI issues  Advised to get FiberCon OTC  Post-nasal drip - Primary     Prescribed Claritin 10mg PO qd x 14 days  Patient will follow up in 2 weeks  Relevant Medications    loratadine (CLARITIN) 10 mg tablet          All medications and routine orders were reviewed and updated as needed  Plan discussed with: Patient    Chief Complaint     Chief Complaint   Patient presents with    Follow-up        History of Present Illness     Jeaneth Calderón is a 70year old  female who presents today with a persistent dry cough for at last one month  She also began to experience a wheezing about 2-3 weeks ago  She denies SOB  States she always has a nasal quality to her voice  She began using the Flonase every night about 2 weeks ago and states that it has helped with the wheezing but not the cough  She took Robitussin for coughing in the pm sometimes when it was keeping her up  She reports that she had a bad case of bronchitis previously  Was supposed to see nephrologist, but they had to cancel appointment so she plans to reschedule it   Went to ophthalmologist on June 28th for diabetic eye exam and had 20/20 vision without corrective lenses  She is currently experiencing dry eyes as well because the artificial tears she was given did not help  Plan to recheck cholesterol panel, CMP, microalbumin, and HbA1c  HISTORY:  Past Surgical History:   Procedure Laterality Date    OTHER SURGICAL HISTORY      Surgery unk     TONSILLECTOMY        Past Medical History:   Diagnosis Date    Abnormal mammogram     Acute sinusitis     Allergic reaction     Subsequent encounter, Resolved - 1/9/18    Allergic rhinitis 01/16/2012    Anaphylactic reaction     Other, Last assessed - 6/16/16    Anemia 01/16/2012    Ataxia     Cellulitis of great toe of right foot     Chronic allergic rhinitis     Unspec seasonality, unspec trigger;  Last assessed - 11/13/17    Contact dermatitis     Last assessed - 6/3/14    Dermatitis     Diabetes mellitus ([facility] [address] )     Disorder of tympanic membrane     Last assessed - 10/15/12    Dry skin     Last assessed - 4/15/14    Dysfunction of eustachian tube     Unspec laterality, Last assessed - 10/19/12    Ganglion cyst     Glaucoma     Hip pain, acute, unspecified laterality     Hyperlipidemia     Hypertension     Labyrinthitis 11/17/2011    Leg mass, left     Limb pain     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    Long term use of drug     Last assessed - 4/6/14    Mammogram abnormal     Last assessed - 4/11/14    Mass of thigh, left     Pain in ankle joint     Pes planus     Unspec laterality, Last assessed - 4/9/13    Shoulder pain, right     Vertigo 01/16/2012     Family History   Problem Relation Age of Onset    Heart attack Mother         Ac MI   [facility] Lung cancer Father     Other Father         Cerebral Embolism    Anemia Sister     Glaucoma Sister     Hypertension Sister     Thyroid disease Sister     Diabetes Brother     Hypertension Brother     Other Brother         Cardiac Disorder     Social History Socioeconomic History    Marital status: Single     Spouse name: Not on file    Number of children: Not on file    Years of education: Not on file    Highest education level: Not on file   Occupational History    Not on file   Social Needs    Financial resource strain: Not on file    Food insecurity:     Worry: Not on file     Inability: Not on file    Transportation needs:     Medical: Not on file     Non-medical: Not on file   Tobacco Use    Smoking status: Former Smoker     Last attempt to quit:      Years since quittin 5    Smokeless tobacco: Never Used   Substance and Sexual Activity    Alcohol use: Yes     Alcohol/week: 1 0 - 2 0 standard drinks     Types: 1 - 2 Glasses of wine per week     Comment: Social     Drug use: No    Sexual activity: Not on file   Lifestyle    Physical activity:     Days per week: Not on file     Minutes per session: Not on file    Stress: Not on file   Relationships    Social connections:     Talks on phone: Not on file     Gets together: Not on file     Attends Anglican service: Not on file     Active member of club or organization: Not on file     Attends meetings of clubs or organizations: Not on file     Relationship status: Not on file    Intimate partner violence:     Fear of current or ex partner: Not on file     Emotionally abused: Not on file     Physically abused: Not on file     Forced sexual activity: Not on file   Other Topics Concern    Not on file   Social History Narrative    Advance directive on file    Always uses seat belt    Daily caffeine consumption, 1 serving a day    Drinks coffee - 1 cup per day    Exercises 3x per week            Allergies: Allergies   Allergen Reactions    Cortisone Hyperactivity    Bee Pollen Edema     Category: Allergy; Review of Systems     Review of Systems   Constitutional: Negative  HENT: Positive for postnasal drip  Eyes: Negative  Respiratory: Positive for cough and wheezing  Cardiovascular: Negative  Gastrointestinal: Negative  Endocrine: Negative  Genitourinary: Negative  Musculoskeletal: Negative  Skin: Negative  Allergic/Immunologic: Negative  Neurological: Negative  Hematological: Negative  Psychiatric/Behavioral: Negative          PHQ-9 Depression Screening    PHQ-9:    Frequency of the following problems over the past two weeks:              Medications and orders       Current Outpatient Medications:     amLODIPine (NORVASC) 5 mg tablet, Take 5 mg by mouth daily, Disp: , Rfl:     ammonium lactate (LAC-HYDRIN) 12 % cream, Apply topically as needed for dry skin, Disp: 385 g, Rfl: 0    aspirin 81 MG tablet, Take 1 tablet by mouth daily, Disp: , Rfl:     atorvastatin (LIPITOR) 10 mg tablet, Take 5 mg by mouth daily , Disp: , Rfl: 1    Calcium Carbonate (CALTRATE 600) 1500 (600 Ca) MG TABS, Take by mouth, Disp: , Rfl:     carvedilol (COREG) 25 mg tablet, TAKE 1 TABLET BY MOUTH TWICE A DAY (Patient taking differently: TAKE 1 TABLET BY MOUTH TWO TIMES DAILY), Disp: 180 tablet, Rfl: 1    Cholecalciferol (VITAMIN D3) 2000 units capsule, Take 1 tablet by mouth daily, Disp: , Rfl:     EPINEPHrine (EPIPEN 2-LIVIA) 0 3 mg/0 3 mL SOAJ, Inject 0 3 mL (0 3 mg total) into a muscle once for 1 dose, Disp: 1 each, Rfl: 0    fluticasone (FLONASE) 50 mcg/act nasal spray, USE 2 SPRAYS IN EACH NOSTRIL EVERY DAY, Disp: 1 Bottle, Rfl: 3    glimepiride (AMARYL) 4 mg tablet, TAKE TWO TABLETS BY MOUTH WITH BREAKFAST, Disp: 180 tablet, Rfl: 1    glucosamine 500 MG CAPS capsule, Take 2 capsules by mouth daily, Disp: , Rfl:     hydrochlorothiazide (HYDRODIURIL) 25 mg tablet, TAKE 1 TABLET (25 MG TOTAL) BY MOUTH DAILY (Patient taking differently: Take 25 mg by mouth daily AM), Disp: 90 tablet, Rfl: 1    lisinopril (ZESTRIL) 40 mg tablet, TAKE 1 TABLET BY MOUTH TWICE A DAY, Disp: 180 tablet, Rfl: 1    metFORMIN (GLUCOPHAGE-XR) 500 mg 24 hr tablet, TAKE 1 TABLET TWICE DAILY, Disp: , Rfl:     Multiple Vitamins-Minerals (WOMENS ONE DAILY) TABS, Take 1 tablet by mouth daily, Disp: , Rfl:     Omega-3 Fatty Acids (OMEGA-3 FISH OIL) 1000 MG CAPS, Take 2 capsules by mouth daily, Disp: , Rfl:     spironolactone (ALDACTONE) 25 mg tablet, Take 1 tablet (25 mg total) by mouth daily, Disp: 90 tablet, Rfl: 1       Objective     Vitals:   Vitals:    07/12/19 1045   BP: 130/80   BP Location: Left arm   Patient Position: Sitting   Cuff Size: Large   Pulse: 55   Resp: 16   Temp: 98 7 °F (37 1 °C)   TempSrc: Tympanic   SpO2: 97%   Weight: 83 4 kg (183 lb 14 4 oz)   Height: 5' (1 524 m)       Physical Exam   Constitutional: She is oriented to person, place, and time  She appears well-developed and well-nourished  HENT:   Head: Normocephalic and atraumatic  Right Ear: External ear normal    Left Ear: External ear normal    Nose: Nose normal    Mouth/Throat: Uvula is midline and mucous membranes are normal    Cobblestoning of the posterior pharynx associated with post-nasal drip   Eyes: Pupils are equal, round, and reactive to light  Conjunctivae and EOM are normal    Neck: Normal range of motion  Neck supple  No JVD present  No tracheal deviation present  No thyromegaly present  Cardiovascular: Normal rate, regular rhythm, normal heart sounds and intact distal pulses  Exam reveals no gallop and no friction rub  No murmur heard  Pulmonary/Chest: Effort normal and breath sounds normal  No stridor  No respiratory distress  She has no wheezes  She has no rales  She exhibits no tenderness  Abdominal: Soft  Bowel sounds are normal  She exhibits no distension and no mass  There is no tenderness  There is no rebound and no guarding  No hernia  Musculoskeletal: Normal range of motion  She exhibits no edema, tenderness or deformity  Lymphadenopathy:     She has no cervical adenopathy  Neurological: She is alert and oriented to person, place, and time  Skin: Skin is warm and dry  No rash noted  No erythema  No pallor  Psychiatric: She has a normal mood and affect  Her behavior is normal  Judgment and thought content normal    Vitals reviewed  Pertinent Laboratory/Diagnostic Studies: The following labs/studies were reviewed please see facility chart for details

## 2019-07-12 NOTE — ASSESSMENT & PLAN NOTE
BP today 130/80  Patient concerned about being on Claritin with HTN but advised to only take for 14 days  Prescription only filled for 14 days and patient will follow up

## 2019-07-15 ENCOUNTER — OFFICE VISIT (OUTPATIENT)
Dept: GERIATRICS | Facility: CLINIC | Age: 79
End: 2019-07-15
Payer: MEDICARE

## 2019-07-15 VITALS
OXYGEN SATURATION: 98 % | WEIGHT: 186.1 LBS | HEART RATE: 63 BPM | BODY MASS INDEX: 36.53 KG/M2 | DIASTOLIC BLOOD PRESSURE: 64 MMHG | HEIGHT: 60 IN | SYSTOLIC BLOOD PRESSURE: 136 MMHG | TEMPERATURE: 96.7 F

## 2019-07-15 DIAGNOSIS — J06.9 UPPER RESPIRATORY TRACT INFECTION, UNSPECIFIED TYPE: Primary | ICD-10-CM

## 2019-07-15 PROCEDURE — 99213 OFFICE O/P EST LOW 20 MIN: CPT | Performed by: INTERNAL MEDICINE

## 2019-07-15 RX ORDER — AZITHROMYCIN 250 MG/1
250 TABLET, FILM COATED ORAL EVERY 24 HOURS
Qty: 6 TABLET | Refills: 0 | Status: SHIPPED | OUTPATIENT
Start: 2019-07-15 | End: 2019-07-21

## 2019-07-15 NOTE — ASSESSMENT & PLAN NOTE
Prescribed Z-pack  Patient advised to take as directed  Continue with Claritin and can take Robitussin DM for cough if she would like  Follow up on Friday to make sure URI has resolved  Lungs sound clear b/l at this time so CXR not indicated

## 2019-07-15 NOTE — PATIENT INSTRUCTIONS
1 - Prescribed azithromycin antibiotic to take as a z pack   2 - Continue with Claritin qd  3 - Can continue with Robitussin DM for cough if it is helping  4 - Follow up on Friday to make sure symptoms have resolved

## 2019-07-15 NOTE — PROGRESS NOTES
[facility] Progress Note    NAME: Gabby Holliday  AGE: 70 y o  SEX: female [id number]    DATE OF ENCOUNTER: 7/15/2019    Assessment and Plan     Problem List Items Addressed This Visit        Respiratory    URI (upper respiratory infection) - Primary     Prescribed Z-pack  Patient advised to take as directed  Continue with Claritin and can take Robitussin DM for cough if she would like  Follow up on Friday to make sure URI has resolved  Lungs sound clear b/l at this time so CXR not indicated  All medications and routine orders were reviewed and updated as needed  Plan discussed with: Patient    Chief Complaint     Chief Complaint   Patient presents with    Cough    URI        History of Present Illness     Alicia Navas is a 70year old  female who presents today for URI follow up  She was seen here on Friday for a URI  She was prescribed Claritin and Flonase  She states they have not helped and she feels congested  She began taking the Claritin yesterday and did not notice any change yet  She has been experiencing dripping in the back of her throat that causes itching  She has a productive cough of clear sputum  She had also complained of increased sinus congestion  She has taken Robitussin for the cough when it initially started  Last night she used cough drops, which she reports helped a lot  She had wheezing at appointment on Friday, but has not noticed it since  She states these symptoms are different from her usual reaction to seasonal allergies  Patient is intolerant to cortisone  Advised to continue with Claritin and continue with Robitussin DM if she would like  Plan to prescribe z-pack and follow up with patient on Friday to make sure symptoms have resolved           HISTORY:  Past Surgical History:   Procedure Laterality Date    OTHER SURGICAL HISTORY      Surgery unk     TONSILLECTOMY        Past Medical History:   Diagnosis Date    Abnormal mammogram     Acute sinusitis     Allergic reaction     Subsequent encounter, Resolved - 1/9/18    Allergic rhinitis 01/16/2012    Anaphylactic reaction     Other, Last assessed - 6/16/16    Anemia 01/16/2012    Ataxia     Cellulitis of great toe of right foot     Chronic allergic rhinitis     Unspec seasonality, unspec trigger;  Last assessed - 11/13/17    Contact dermatitis     Last assessed - 6/3/14    Dermatitis     Diabetes mellitus ([address] )     Disorder of tympanic membrane     Last assessed - 10/15/12    Dry skin     Last assessed - 4/15/14    Dysfunction of eustachian tube     Unspec laterality, Last assessed - 10/19/12    Ganglion cyst     Glaucoma     Hip pain, acute, unspecified laterality     Hyperlipidemia     Hypertension     Labyrinthitis 11/17/2011    Leg mass, left     Limb pain     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    Long term use of drug     Last assessed - 4/6/14    Mammogram abnormal     Last assessed - 4/11/14    Mass of thigh, left     Pain in ankle joint     Pes planus     Unspec laterality, Last assessed - 4/9/13    Shoulder pain, right     Vertigo 01/16/2012     Family History   Problem Relation Age of Onset    Heart attack Mother         Ac [facility] Lung cancer Father     Other Father         Cerebral Embolism    Anemia Sister     Glaucoma Sister     Hypertension Sister     Thyroid disease Sister     Diabetes Brother     Hypertension Brother     Other Brother         Cardiac Disorder     Social History     Socioeconomic History    Marital status: Single     Spouse name: None    Number of children: None    Years of education: None    Highest education level: None   Occupational History    None   Social Needs    Financial resource strain: None    Food insecurity:     Worry: None     Inability: None    Transportation needs:     Medical: None     Non-medical: None   Tobacco Use    Smoking status: Former Smoker     Last attempt to quit: 1976 Years since quittin 9    Smokeless tobacco: Never Used   Substance and Sexual Activity    Alcohol use: Yes     Alcohol/week: 1 0 - 2 0 standard drinks     Types: 1 - 2 Glasses of wine per week     Comment: Social     Drug use: No    Sexual activity: None   Lifestyle    Physical activity:     Days per week: None     Minutes per session: None    Stress: None   Relationships    Social connections:     Talks on phone: None     Gets together: None     Attends Scientologist service: None     Active member of club or organization: None     Attends meetings of clubs or organizations: None     Relationship status: None    Intimate partner violence:     Fear of current or ex partner: None     Emotionally abused: None     Physically abused: None     Forced sexual activity: None   Other Topics Concern    None   Social History Narrative    Advance directive on file    Always uses seat belt    Daily caffeine consumption, 1 serving a day    Drinks coffee - 1 cup per day    Exercises 3x per week            Allergies: Allergies   Allergen Reactions    Cortisone Hyperactivity    Bee Pollen Edema     Category: Allergy; Review of Systems     Review of Systems   Constitutional: Negative  HENT: Positive for postnasal drip and sore throat  Eyes: Negative  Respiratory: Positive for cough  Cardiovascular: Negative  Gastrointestinal: Negative  Endocrine: Negative  Genitourinary: Negative  Musculoskeletal: Negative  Skin: Negative  Allergic/Immunologic: Negative  Neurological: Negative  Hematological: Negative  Psychiatric/Behavioral: Negative          PHQ-9 Depression Screening    PHQ-9:    Frequency of the following problems over the past two weeks:              Medications and orders       Current Outpatient Medications:     aspirin 81 MG tablet, Take 1 tablet by mouth daily, Disp: , Rfl:     atorvastatin (LIPITOR) 10 mg tablet, Take 5 mg by mouth daily , Disp: , Rfl: 1   Calcium Carbonate (CALTRATE 600) 1500 (600 Ca) MG TABS, Take by mouth, Disp: , Rfl:     carvedilol (COREG) 25 mg tablet, TAKE 1 TABLET BY MOUTH TWICE A DAY (Patient taking differently: No sig reported), Disp: 180 tablet, Rfl: 1    Cholecalciferol (VITAMIN D3) 2000 units capsule, Take 1 tablet by mouth daily, Disp: , Rfl:     fluticasone (FLONASE) 50 mcg/act nasal spray, USE 2 SPRAYS IN EACH NOSTRIL EVERY DAY, Disp: 1 Bottle, Rfl: 3    glimepiride (AMARYL) 4 mg tablet, TAKE TWO TABLETS BY MOUTH WITH BREAKFAST, Disp: 180 tablet, Rfl: 1    glucosamine 500 MG CAPS capsule, Take 1 capsule by mouth daily , Disp: , Rfl:     hydrochlorothiazide (HYDRODIURIL) 25 mg tablet, TAKE 1 TABLET (25 MG TOTAL) BY MOUTH DAILY (Patient taking differently: No sig reported), Disp: 90 tablet, Rfl: 1    lisinopril (ZESTRIL) 40 mg tablet, TAKE 1 TABLET BY MOUTH TWICE A DAY, Disp: 180 tablet, Rfl: 1    metFORMIN (GLUCOPHAGE-XR) 500 mg 24 hr tablet, TAKE 1 TABLET TWICE DAILY, Disp: , Rfl:     Multiple Vitamins-Minerals (WOMENS ONE DAILY) TABS, Take 1 tablet by mouth daily, Disp: , Rfl:     Omega-3 Fatty Acids (OMEGA-3 FISH OIL) 1000 MG CAPS, Take 1 capsule by mouth daily , Disp: , Rfl:     spironolactone (ALDACTONE) 25 mg tablet, Take 1 tablet (25 mg total) by mouth daily, Disp: 90 tablet, Rfl: 1    ammonium lactate (LAC-HYDRIN) 12 % cream, Apply topically as needed for dry skin (Patient not taking: Reported on 7/15/2019), Disp: 385 g, Rfl: 0    EPINEPHrine (EPIPEN 2-LIVIA) 0 3 mg/0 3 mL SOAJ, Inject 0 3 mL (0 3 mg total) into a muscle once for 1 dose, Disp: 1 each, Rfl: 0    loratadine (CLARITIN) 10 mg tablet, Take 1 tablet (10 mg total) by mouth daily (Patient not taking: Reported on 7/15/2019), Disp: 14 tablet, Rfl: 0       Objective     Vitals:   Vitals:    07/15/19 1106   BP: 136/64   Pulse: 63   Temp: (!) 96 7 °F (35 9 °C)   TempSrc: Tympanic   SpO2: 98%   Weight: 84 4 kg (186 lb 1 6 oz)   Height: 5' (1 524 m)       Physical Exam   Constitutional: She is oriented to person, place, and time  She appears well-developed and well-nourished  No distress  Temp 96 7   HENT:   Head: Normocephalic and atraumatic  Right Ear: External ear normal    Left Ear: External ear normal    Nose: Nose normal    Mouth/Throat: Oropharynx is clear and moist  No oropharyngeal exudate  TMs pearly grey and intact b/l, with no erythema or signs of infection    Erythema and cobblestoning present on visualization of posterior pharynx    Eyes: Pupils are equal, round, and reactive to light  Right eye exhibits no discharge  Left eye exhibits no discharge  Neck: Normal range of motion  Neck supple  Cardiovascular: Normal rate, regular rhythm, normal heart sounds and intact distal pulses  Exam reveals no gallop and no friction rub  No murmur heard  Pulmonary/Chest: Effort normal and breath sounds normal  No stridor  She has no wheezes  She has no rales  She exhibits no tenderness  Abdominal: Soft  Bowel sounds are normal  She exhibits no distension  There is no tenderness  Musculoskeletal: Normal range of motion  She exhibits no edema, tenderness or deformity  Neurological: She is alert and oriented to person, place, and time  Skin: Skin is warm  No rash noted  She is not diaphoretic  No erythema  No pallor  Psychiatric: She has a normal mood and affect  Her behavior is normal  Judgment and thought content normal    Vitals reviewed  Pertinent Laboratory/Diagnostic Studies: The following labs/studies were reviewed please see facility chart for details

## 2019-07-19 ENCOUNTER — OFFICE VISIT (OUTPATIENT)
Dept: GERIATRICS | Facility: CLINIC | Age: 79
End: 2019-07-19
Payer: MEDICARE

## 2019-07-19 VITALS
HEART RATE: 61 BPM | SYSTOLIC BLOOD PRESSURE: 140 MMHG | HEIGHT: 60 IN | DIASTOLIC BLOOD PRESSURE: 78 MMHG | OXYGEN SATURATION: 97 % | WEIGHT: 186 LBS | BODY MASS INDEX: 36.52 KG/M2

## 2019-07-19 DIAGNOSIS — E11.29 CONTROLLED TYPE 2 DIABETES MELLITUS WITH MICROALBUMINURIA, WITHOUT LONG-TERM CURRENT USE OF INSULIN (HCC): ICD-10-CM

## 2019-07-19 DIAGNOSIS — J30.9 CHRONIC ALLERGIC RHINITIS: ICD-10-CM

## 2019-07-19 DIAGNOSIS — R80.9 CONTROLLED TYPE 2 DIABETES MELLITUS WITH MICROALBUMINURIA, WITHOUT LONG-TERM CURRENT USE OF INSULIN (HCC): ICD-10-CM

## 2019-07-19 DIAGNOSIS — J06.9 UPPER RESPIRATORY TRACT INFECTION, UNSPECIFIED TYPE: Primary | ICD-10-CM

## 2019-07-19 DIAGNOSIS — I10 ESSENTIAL HYPERTENSION: ICD-10-CM

## 2019-07-19 PROCEDURE — 99214 OFFICE O/P EST MOD 30 MIN: CPT | Performed by: INTERNAL MEDICINE

## 2019-07-19 NOTE — PATIENT INSTRUCTIONS
Follow up visit in a week or before if symptoms gets worse    1  Upper respirtory infection      Post nasal drip /dry cough      Patient is completing her Zithromax/ today last day      Starting Robitussin with Codien       Follow up in a week or before if symptoms get worse      Will do Chest x-alanis if productive cough/fever/chills    2   Hypertension under controll      On Lisinopril 40 mg      Consider switching to Losartan if dry cough persistent

## 2019-07-19 NOTE — PROGRESS NOTES
[address] [place] [address]    NAME: Aneta Brown  AGE: 70 y o  SEX: female    DATE OF ENCOUNTER: 7/19/2019    Assessment and Plan     1  Upper respirtory infection      Post nasal drip /dry cough      Patient is completing her Zithromax/ today last dose      Starting Robitussin with Codien       Follow up in a week or before if symptoms get worse      Will do Chest x-alanis if productive cough/fever/chills    2  Hypertension under controll      On Lisinopril 40 mg      Consider switching to Losartan if dry cough persistent     Chief Complaint     Chief Complaint   Patient presents with    URI     follow up     History of Present Illness     Disha Doe is a 70year old  female who presents today for URI follow up which is going on for two weeks  Patient still have persistant dry cough on and off  Cough did not increase with lying down or with any excercise  She was prescibed with  Zithromax and today is her last dose  She uses Flonase for her post nasal discharge  Her nasal discharge clear now  Patient stopped taking her Claritin  Patient denies any fever, chills, chest pain, or SOB  Patient had poor sleep on and off and requesting for cough syrup with codien  She has known medical history of postnasal drip, allergic rhinitis, HTN, and DM type 2  Patiet is on Lisinopril for past 20 years and did not had any history of cough before  Consider Losartan in future, if dry cough persistent  The following portions of the patient's history were reviewed and updated as appropriate: allergies, current medications, past family history, past medical history, past social history, past surgical history and problem list     Review of Systems     Review of Systems   Constitutional: Positive for fatigue  Negative for fever  Lack of sleep on and off   HENT: Positive for postnasal drip  Negative for sinus pressure and sore throat  Eyes: Negative for discharge and itching  Respiratory: Positive for cough  Negative for chest tightness, shortness of breath and wheezing  Dry cough on and off  Cardiovascular: Negative for chest pain, palpitations and leg swelling  Gastrointestinal: Negative  Endocrine: Negative  Genitourinary: Negative  Musculoskeletal: Negative  Neurological: Negative  Psychiatric/Behavioral: Negative  All other systems reviewed and are negative  Active Problem List     Patient Active Problem List   Diagnosis    Chronic allergic rhinitis    Ganglion cyst    Hyperlipidemia    Hypertension    Hypokalemia    Microalbuminuric diabetic nephropathy (HCC)    OA (osteoarthritis)    Osteopenia    Thalassemia minor    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, without long-term current use of insulin (HCC)    Vitamin D deficiency    URI (upper respiratory infection)    Muscle cramps    Fall    Foot injury, left, initial encounter    Closed nondisplaced fracture of fifth metatarsal bone of left foot with routine healing    Right rotator cuff tendinitis    Diarrhea    Post-nasal drip       Objective     Vitals: Bp- 140/78  ID- 61/m SpO2 - 97% Temp- 98 1     Physical Exam   Constitutional: She is oriented to person, place, and time  She appears well-developed and well-nourished  HENT:   Head: Normocephalic and atraumatic  Mouth/Throat: Oropharynx is clear and moist  No oropharyngeal exudate  Eyes: Pupils are equal, round, and reactive to light  Right eye exhibits no discharge  Left eye exhibits no discharge  Neck: Normal range of motion  Cardiovascular: Normal rate, regular rhythm and normal heart sounds  Exam reveals no friction rub  No murmur heard  Pulmonary/Chest: Effort normal and breath sounds normal  No respiratory distress  She has no wheezes  She has no rales  She exhibits no tenderness  Abdominal: Soft  Bowel sounds are normal  She exhibits no distension and no mass  Lymphadenopathy:     She has no cervical adenopathy  Neurological: She is alert and oriented to person, place, and time  Psychiatric: She has a normal mood and affect  Her behavior is normal  Judgment and thought content normal    Nursing note and vitals reviewed        Current Medications   Current medication reviewed    Health Maintenance     Health Maintenance   Topic Date Due    Depression Screening PHQ  1940    Medicare Annual Wellness Visit (AWV)  1942    SLP PLAN OF CARE  1942    CRC Screening: Colonoscopy  1942    BMI: Followup Plan  09/13/1960    HEPATITIS B VACCINES (1 of 3 - Risk 3-dose series) 09/13/1961    Fall Risk  09/13/2007    Urinary Incontinence Screening  09/13/2007    DTaP,Tdap,and Td Vaccines (1 - Tdap) 12/09/2009    Pneumococcal Vaccine: 65+ Years (2 of 2 - PPSV23) 06/16/2017    Diabetic Foot Exam  08/03/2018    DM Eye Exam  06/21/2019    INFLUENZA VACCINE  07/01/2019    HEMOGLOBIN A1C  08/14/2019    URINE MICROALBUMIN  02/27/2020    BMI: Adult  07/19/2020    Pneumococcal Vaccine: Pediatrics (0 to 5 Years) and At-Risk Patients (6 to 59 Years)  Aged [employer] History   Administered Date(s) Administered    INFLUENZA 11/06/2007, 10/15/2010, 09/09/2011    Influenza Split High Dose Preservative Free IM 10/31/2014, 10/01/2015, 10/17/2016, 10/09/2017    Influenza TIV (IM) 11/07/2012    Pneumococcal Conjugate 13-Valent 06/16/2016    Pneumococcal Polysaccharide PPV23 09/01/2007    Td (adult), adsorbed 06/01/1998, 12/08/2009    Zoster 10/31/2014         Ismael GUILLERMO  Geriatric Medicine  7/19/2019 11:01 AM

## 2019-07-24 ENCOUNTER — TRANSCRIBE ORDERS (OUTPATIENT)
Dept: ADMINISTRATIVE | Age: 79
End: 2019-07-24

## 2019-07-24 ENCOUNTER — APPOINTMENT (OUTPATIENT)
Dept: LAB | Age: 79
End: 2019-07-24
Payer: MEDICARE

## 2019-07-24 DIAGNOSIS — N18.2 CHRONIC KIDNEY DISEASE, STAGE II (MILD): ICD-10-CM

## 2019-07-24 DIAGNOSIS — R80.9 CONTROLLED TYPE 2 DIABETES MELLITUS WITH MICROALBUMINURIA, WITHOUT LONG-TERM CURRENT USE OF INSULIN (HCC): ICD-10-CM

## 2019-07-24 DIAGNOSIS — I10 ESSENTIAL HYPERTENSION: ICD-10-CM

## 2019-07-24 DIAGNOSIS — E11.21 CONTROLLED TYPE 2 DIABETES MELLITUS WITH DIABETIC NEPHROPATHY, WITHOUT LONG-TERM CURRENT USE OF INSULIN (HCC): ICD-10-CM

## 2019-07-24 DIAGNOSIS — E11.649 UNCONTROLLED TYPE 2 DIABETES MELLITUS WITH HYPOGLYCEMIA WITHOUT COMA (HCC): ICD-10-CM

## 2019-07-24 DIAGNOSIS — IMO0002 DIABETES MELLITUS WITH RENAL MANIFESTATIONS, UNCONTROLLED: ICD-10-CM

## 2019-07-24 DIAGNOSIS — R80.9 PROTEINURIA, UNSPECIFIED TYPE: ICD-10-CM

## 2019-07-24 DIAGNOSIS — E11.21 MICROALBUMINURIC DIABETIC NEPHROPATHY (HCC): ICD-10-CM

## 2019-07-24 DIAGNOSIS — R80.9 PROTEINURIA, UNSPECIFIED TYPE: Primary | ICD-10-CM

## 2019-07-24 DIAGNOSIS — E11.29 CONTROLLED TYPE 2 DIABETES MELLITUS WITH MICROALBUMINURIA, WITHOUT LONG-TERM CURRENT USE OF INSULIN (HCC): ICD-10-CM

## 2019-07-24 LAB
ALBUMIN SERPL BCP-MCNC: 3.6 G/DL (ref 3.5–5)
ALP SERPL-CCNC: 75 U/L (ref 46–116)
ALT SERPL W P-5'-P-CCNC: 14 U/L (ref 12–78)
ANION GAP SERPL CALCULATED.3IONS-SCNC: 7 MMOL/L (ref 4–13)
AST SERPL W P-5'-P-CCNC: 12 U/L (ref 5–45)
BASOPHILS # BLD AUTO: 0.05 THOUSANDS/ΜL (ref 0–0.1)
BASOPHILS NFR BLD AUTO: 1 % (ref 0–1)
BILIRUB SERPL-MCNC: 0.49 MG/DL (ref 0.2–1)
BUN SERPL-MCNC: 26 MG/DL (ref 5–25)
CALCIUM SERPL-MCNC: 9.6 MG/DL (ref 8.3–10.1)
CHLORIDE SERPL-SCNC: 106 MMOL/L (ref 100–108)
CHOLEST SERPL-MCNC: 166 MG/DL (ref 50–200)
CO2 SERPL-SCNC: 28 MMOL/L (ref 21–32)
CREAT SERPL-MCNC: 1.18 MG/DL (ref 0.6–1.3)
CREAT UR-MCNC: 162 MG/DL
EOSINOPHIL # BLD AUTO: 0.31 THOUSAND/ΜL (ref 0–0.61)
EOSINOPHIL NFR BLD AUTO: 4 % (ref 0–6)
ERYTHROCYTE [DISTWIDTH] IN BLOOD BY AUTOMATED COUNT: 14.3 % (ref 11.6–15.1)
EST. AVERAGE GLUCOSE BLD GHB EST-MCNC: 146 MG/DL
GFR SERPL CREATININE-BSD FRML MDRD: 45 ML/MIN/1.73SQ M
GLUCOSE P FAST SERPL-MCNC: 143 MG/DL (ref 65–99)
HBA1C MFR BLD: 6.7 % (ref 4.2–6.3)
HCT VFR BLD AUTO: 41.5 % (ref 34.8–46.1)
HDLC SERPL-MCNC: 38 MG/DL (ref 40–60)
HGB BLD-MCNC: 12.8 G/DL (ref 11.5–15.4)
IMM GRANULOCYTES # BLD AUTO: 0.02 THOUSAND/UL (ref 0–0.2)
IMM GRANULOCYTES NFR BLD AUTO: 0 % (ref 0–2)
LDLC SERPL CALC-MCNC: 71 MG/DL (ref 0–100)
LYMPHOCYTES # BLD AUTO: 2.63 THOUSANDS/ΜL (ref 0.6–4.47)
LYMPHOCYTES NFR BLD AUTO: 31 % (ref 14–44)
MAGNESIUM SERPL-MCNC: 2.6 MG/DL (ref 1.6–2.6)
MCH RBC QN AUTO: 22.5 PG (ref 26.8–34.3)
MCHC RBC AUTO-ENTMCNC: 30.8 G/DL (ref 31.4–37.4)
MCV RBC AUTO: 73 FL (ref 82–98)
MICROALBUMIN UR-MCNC: 24.4 MG/L (ref 0–20)
MICROALBUMIN/CREAT 24H UR: 15 MG/G CREATININE (ref 0–30)
MONOCYTES # BLD AUTO: 0.61 THOUSAND/ΜL (ref 0.17–1.22)
MONOCYTES NFR BLD AUTO: 7 % (ref 4–12)
NEUTROPHILS # BLD AUTO: 4.75 THOUSANDS/ΜL (ref 1.85–7.62)
NEUTS SEG NFR BLD AUTO: 57 % (ref 43–75)
NONHDLC SERPL-MCNC: 128 MG/DL
NRBC BLD AUTO-RTO: 0 /100 WBCS
PHOSPHATE SERPL-MCNC: 3.8 MG/DL (ref 2.3–4.1)
PLATELET # BLD AUTO: 262 THOUSANDS/UL (ref 149–390)
PMV BLD AUTO: 11.1 FL (ref 8.9–12.7)
POTASSIUM SERPL-SCNC: 4.4 MMOL/L (ref 3.5–5.3)
PROT SERPL-MCNC: 7.4 G/DL (ref 6.4–8.2)
RBC # BLD AUTO: 5.7 MILLION/UL (ref 3.81–5.12)
SODIUM SERPL-SCNC: 141 MMOL/L (ref 136–145)
TRIGL SERPL-MCNC: 284 MG/DL
URATE SERPL-MCNC: 7 MG/DL (ref 2–6.8)
WBC # BLD AUTO: 8.37 THOUSAND/UL (ref 4.31–10.16)

## 2019-07-24 PROCEDURE — 84100 ASSAY OF PHOSPHORUS: CPT

## 2019-07-24 PROCEDURE — 82043 UR ALBUMIN QUANTITATIVE: CPT | Performed by: INTERNAL MEDICINE

## 2019-07-24 PROCEDURE — 36415 COLL VENOUS BLD VENIPUNCTURE: CPT

## 2019-07-24 PROCEDURE — 85025 COMPLETE CBC W/AUTO DIFF WBC: CPT

## 2019-07-24 PROCEDURE — 80061 LIPID PANEL: CPT

## 2019-07-24 PROCEDURE — 83735 ASSAY OF MAGNESIUM: CPT

## 2019-07-24 PROCEDURE — 82570 ASSAY OF URINE CREATININE: CPT | Performed by: INTERNAL MEDICINE

## 2019-07-24 PROCEDURE — 83036 HEMOGLOBIN GLYCOSYLATED A1C: CPT

## 2019-07-24 PROCEDURE — 80053 COMPREHEN METABOLIC PANEL: CPT

## 2019-07-24 PROCEDURE — 84550 ASSAY OF BLOOD/URIC ACID: CPT

## 2019-07-26 ENCOUNTER — OFFICE VISIT (OUTPATIENT)
Dept: GERIATRICS | Facility: CLINIC | Age: 79
End: 2019-07-26
Payer: MEDICARE

## 2019-07-26 VITALS
DIASTOLIC BLOOD PRESSURE: 78 MMHG | SYSTOLIC BLOOD PRESSURE: 138 MMHG | OXYGEN SATURATION: 98 % | WEIGHT: 186 LBS | HEART RATE: 58 BPM | BODY MASS INDEX: 36.52 KG/M2 | HEIGHT: 60 IN

## 2019-07-26 DIAGNOSIS — R80.9 CONTROLLED TYPE 2 DIABETES MELLITUS WITH MICROALBUMINURIA, WITHOUT LONG-TERM CURRENT USE OF INSULIN (HCC): ICD-10-CM

## 2019-07-26 DIAGNOSIS — N18.30 CKD (CHRONIC KIDNEY DISEASE) STAGE 3, GFR 30-59 ML/MIN (HCC): ICD-10-CM

## 2019-07-26 DIAGNOSIS — Z12.39 SCREENING FOR MALIGNANT NEOPLASM OF BREAST: Primary | ICD-10-CM

## 2019-07-26 DIAGNOSIS — J06.9 UPPER RESPIRATORY TRACT INFECTION, UNSPECIFIED TYPE: ICD-10-CM

## 2019-07-26 DIAGNOSIS — E78.00 PURE HYPERCHOLESTEROLEMIA: ICD-10-CM

## 2019-07-26 DIAGNOSIS — E11.29 CONTROLLED TYPE 2 DIABETES MELLITUS WITH MICROALBUMINURIA, WITHOUT LONG-TERM CURRENT USE OF INSULIN (HCC): ICD-10-CM

## 2019-07-26 DIAGNOSIS — I10 ESSENTIAL HYPERTENSION: ICD-10-CM

## 2019-07-26 PROCEDURE — 99214 OFFICE O/P EST MOD 30 MIN: CPT | Performed by: INTERNAL MEDICINE

## 2019-07-26 NOTE — PATIENT INSTRUCTIONS
Follow up visit in one month  1  Upper respiratory tract infection:  Patient completed her antibiotics and on cough syrup and her cough subsided significiantly  She will follow up in a month  She will visit early if needed  Patient continues to use flonase for her postnasal drip  2  Controlled type 2 diabetes mellitus with microalbuminuria, without long-term current use of insulin ([facility])           HbA1c- 6 7%  Continue Metformin and Glimepiride  Will continue with current dosage and recommended for Diabetic diet and repeat blood work in 3 months  3  Essential hypertension         Patient is stable with her blood pressure continue Lisinopril 40 mg, Coreg 25mg    4  CKD (chronic kidney disease) stage 3, GFR 30-59 ml/min ([facility])      Bun/Cr- 26/1 8^  GFR- 45   Patient has diabetic nephropathy and has follow up visit with her nephrologist    5  Screening for malignant neoplasm of breast    Patient had previous mammogram BIRAD-2 - BENIGN   - Mammo screening bilateral w cad;  Futur

## 2019-07-26 NOTE — PROGRESS NOTES
[facility]    NAME: Aneta Brown  AGE: 70 y o  SEX: female    DATE OF ENCOUNTER: 7/26/2019    Assessment and Plan     1  Upper respiratory tract infection:  Patient completed her antibiotics and on cough syrup and her cough subsided significiantly  She will follow up in a month  She will visit early if needed  Patient continues to use flonase for her postnasal drip  2  Controlled type 2 diabetes mellitus with microalbuminuria, without long-term current use of insulin ([facility])           HbA1c- 6 7%  Continue Metformin and Glimepiride  Will continue with current dosage and recommended for Diabetic diet and repeat blood work in 3 months  3  Essential hypertension         Patient is stable with her blood pressure continue Lisinopril 40 mg, Coreg 25mg    4  CKD (chronic kidney disease) stage 3, GFR 30-59 ml/min ([facility])      Bun/Cr- 26/1 8^  GFR- 45   Patient has diabetic nephropathy and has follow up visit with her nephrologist      Repeat CMP in 3 months    5  Screening for malignant neoplasm of breast    Patient had previous mammogram BIRAD-2 - BENIGN   - Mammo screening bilateral w cad; Future    Follow up visit after mammogram     Orders Placed This Encounter   Procedures    Mammo screening bilateral w cad       - Medication Side Effects: Adverse side effects of medications were reviewed with the patient/guardian today  Chief Complaint     Follow up visit with an on-going cough    History of Present Illness     Aneta Brown 70 y o female patient here for follow up visit with her on-going cough symptoms for past 2-3 weeks  Patient completed her antibiotics last week and started on cough medicine  She presented today with 75% cough reduction than before  Patient denies any fever, chills, SOB  Has postnasal drip, and some nasal congestion  Patient recently had her blood work done   Results were discussed with the patient   Patient was explained about her chronic kidney disease stage 3 and she has her Nephrologist follow up visit  HbA1c- 6 7% and she is on Metformin 500 mg and Glimpiride 4 mg  Patient was recommended to follow diabetic diet regimen  Will repeatt blood test in 3 months  Her lipids are in the positive trend and will follow up in 3 months  Patient is requesting for routine mammogram order  Patient denies any previous history of breast cancer or any family history of breast cancer  Her previous mammogram BIRAD-2 - BENIGN  Patient was educated with the current preventive regimn  The following portions of the patient's history were reviewed and updated as appropriate: allergies, current medications, past family history, past medical history, past social history, past surgical history and problem list     Review of Systems     Review of Systems   Constitutional: Negative for fatigue, fever and unexpected weight change  HENT: Positive for postnasal drip  Negative for sinus pressure, sore throat and trouble swallowing  Eyes: Negative  Respiratory: Positive for cough  Negative for chest tightness, shortness of breath and wheezing  Cough reduced 75%   Cardiovascular: Negative for chest pain, palpitations and leg swelling  Gastrointestinal: Negative  Negative for abdominal pain  Genitourinary: Negative  Musculoskeletal: Negative  Neurological: Negative  Psychiatric/Behavioral: Negative  All other systems reviewed and are negative        Active Problem List     Patient Active Problem List   Diagnosis    Chronic allergic rhinitis    Ganglion cyst    Hyperlipidemia    Hypertension    Hypokalemia    Microalbuminuric diabetic nephropathy (HCC)    OA (osteoarthritis)    Osteopenia    Thalassemia minor    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, without long-term current use of insulin (HCC)    Vitamin D deficiency    URI (upper respiratory infection)    Muscle cramps    Fall    Foot injury, left, initial encounter    Closed nondisplaced fracture of fifth metatarsal bone of left foot with routine healing    Right rotator cuff tendinitis    Diarrhea    Post-nasal drip    CKD (chronic kidney disease) stage 3, GFR 30-59 ml/min ([facility])       Objective     Bp- 138/78  SPO2- 98% in RA, [place]- 58/m    Physical Exam   Constitutional: She is oriented to person, place, and time  She appears well-developed and well-nourished  No distress  HENT:   Head: Normocephalic and atraumatic  Mouth/Throat: Oropharynx is clear and moist    Eyes: Pupils are equal, round, and reactive to light  EOM are normal    Neck: Normal range of motion  Neck supple  No JVD present  Cardiovascular: Normal rate, regular rhythm and normal heart sounds  Exam reveals no friction rub  No murmur heard  Pulmonary/Chest: Effort normal and breath sounds normal  No respiratory distress  She has no wheezes  She exhibits no tenderness  Abdominal: Soft  Bowel sounds are normal  There is no tenderness  Musculoskeletal: Normal range of motion  She exhibits no edema  Neurological: She is alert and oriented to person, place, and time  No cranial nerve deficit  Skin: Skin is warm and dry  Psychiatric: She has a normal mood and affect  Her behavior is normal  Thought content normal    Nursing note and vitals reviewed  Pertinent Laboratory/Diagnostic Studies:  Triglycerides <=150 mg/dL 284High       7/24/19  9:08 AM 1/11/19 10:31 AM     WBC 4 31 - 10 16 Thousand/uL 8 37  7 41    RBC 3 81 - 5 12 Million/uL 5  70High   5  38High     Hemoglobin 11 5 - 15 4 g/dL 12 8  12 0    Hematocrit 34 8 - 46 1 % 41 5  39 7    MCV 82 - 98 fL 73Low   74Low     MCH 26 8 - 34 3 pg 22 5Low   22 3Low     MCHC 31 4 - 37 4 g/dL 30 8Low   30 2Low     RDW 11 6 - 15 1 % 14 3  14 6      BUN 5 - 25 mg/dL 26High   24    Creatinine 0 60 - 1 30 mg/dL 1 18  1 13 CM   Comment: Standardized to IDMS reference method   Glucose, Fasting 65 - 99 mg/dL 143High   137High  CM Current Medications     Current Outpatient Medications:     ammonium lactate (LAC-HYDRIN) 12 % cream, Apply topically as needed for dry skin (Patient not taking: Reported on 7/15/2019), Disp: 385 g, Rfl: 0    aspirin 81 MG tablet, Take 1 tablet by mouth daily, Disp: , Rfl:     atorvastatin (LIPITOR) 10 mg tablet, Take 5 mg by mouth daily , Disp: , Rfl: 1    Calcium Carbonate (CALTRATE 600) 1500 (600 Ca) MG TABS, Take by mouth, Disp: , Rfl:     carvedilol (COREG) 25 mg tablet, TAKE 1 TABLET BY MOUTH TWICE A DAY (Patient taking differently: No sig reported), Disp: 180 tablet, Rfl: 1    Cholecalciferol (VITAMIN D3) 2000 units capsule, Take 1 tablet by mouth daily, Disp: , Rfl:     EPINEPHrine (EPIPEN 2-LIVIA) 0 3 mg/0 3 mL SOAJ, Inject 0 3 mL (0 3 mg total) into a muscle once for 1 dose, Disp: 1 each, Rfl: 0    fluticasone (FLONASE) 50 mcg/act nasal spray, USE 2 SPRAYS IN EACH NOSTRIL EVERY DAY, Disp: 1 Bottle, Rfl: 3    glimepiride (AMARYL) 4 mg tablet, TAKE TWO TABLETS BY MOUTH WITH BREAKFAST, Disp: 180 tablet, Rfl: 1    glucosamine 500 MG CAPS capsule, Take 1 capsule by mouth daily , Disp: , Rfl:     hydrochlorothiazide (HYDRODIURIL) 25 mg tablet, TAKE 1 TABLET (25 MG TOTAL) BY MOUTH DAILY (Patient taking differently: No sig reported), Disp: 90 tablet, Rfl: 1    lisinopril (ZESTRIL) 40 mg tablet, TAKE 1 TABLET BY MOUTH TWICE A DAY, Disp: 180 tablet, Rfl: 1    loratadine (CLARITIN) 10 mg tablet, Take 1 tablet (10 mg total) by mouth daily (Patient not taking: Reported on 7/15/2019), Disp: 14 tablet, Rfl: 0    metFORMIN (GLUCOPHAGE-XR) 500 mg 24 hr tablet, TAKE 1 TABLET TWICE DAILY, Disp: , Rfl:     Multiple Vitamins-Minerals (WOMENS ONE DAILY) TABS, Take 1 tablet by mouth daily, Disp: , Rfl:     Omega-3 Fatty Acids (OMEGA-3 FISH OIL) 1000 MG CAPS, Take 1 capsule by mouth daily , Disp: , Rfl:     pseudoephedrine-codeine-guaifenesin (MYTUSSIN DAC) 30- MG/5ML solution, Take 10 mL by mouth 4 (four) times a day as needed for allergies, Disp: 120 mL, Rfl: 0    spironolactone (ALDACTONE) 25 mg tablet, Take 1 tablet (25 mg total) by mouth daily, Disp: 90 tablet, Rfl: 1    Health Maintenance     Health Maintenance   Topic Date Due    Depression Screening PHQ  1940    Medicare Annual Wellness Visit (AWV)  1942    SLP PLAN OF CARE  1942    CRC Screening: Colonoscopy  1942    BMI: Followup Plan  09/13/1960    HEPATITIS B VACCINES (1 of 3 - Risk 3-dose series) 09/13/1961    Fall Risk  09/13/2007    Urinary Incontinence Screening  09/13/2007    DTaP,Tdap,and Td Vaccines (1 - Tdap) 12/09/2009    Pneumococcal Vaccine: 65+ Years (2 of 2 - PPSV23) 06/16/2017    Diabetic Foot Exam  08/03/2018    DM Eye Exam  06/21/2019    INFLUENZA VACCINE  07/01/2019    HEMOGLOBIN A1C  01/24/2020    URINE MICROALBUMIN  07/24/2020    BMI: Adult  07/26/2020    Pneumococcal Vaccine: Pediatrics (0 to 5 Years) and At-Risk Patients (6 to 59 Years)  Aged [employer] History   Administered Date(s) Administered    INFLUENZA 11/06/2007, 10/15/2010, 09/09/2011    Influenza Split High Dose Preservative Free IM 10/31/2014, 10/01/2015, 10/17/2016, 10/09/2017    Influenza TIV (IM) 11/07/2012    Pneumococcal Conjugate 13-Valent 06/16/2016    Pneumococcal Polysaccharide PPV23 09/01/2007    Td (adult), adsorbed 06/01/1998, 12/08/2009    Zoster 10/31/2014       Willis GUILLERMO  Geriatric Fellow  7/26/2019 11:20 AM

## 2019-08-15 DIAGNOSIS — E78.00 PURE HYPERCHOLESTEROLEMIA: ICD-10-CM

## 2019-08-15 DIAGNOSIS — I10 HYPERTENSION, UNSPECIFIED TYPE: Primary | ICD-10-CM

## 2019-08-15 RX ORDER — SPIRONOLACTONE 25 MG/1
25 TABLET ORAL DAILY
Qty: 90 TABLET | Refills: 3 | Status: SHIPPED | OUTPATIENT
Start: 2019-08-15 | End: 2019-12-03

## 2019-08-15 RX ORDER — CARVEDILOL 25 MG/1
25 TABLET ORAL 2 TIMES DAILY
Qty: 180 TABLET | Refills: 3 | Status: SHIPPED | OUTPATIENT
Start: 2019-08-15 | End: 2020-11-12 | Stop reason: SDUPTHER

## 2019-08-15 RX ORDER — HYDROCHLOROTHIAZIDE 25 MG/1
25 TABLET ORAL DAILY
Qty: 90 TABLET | Refills: 3 | Status: SHIPPED | OUTPATIENT
Start: 2019-08-15 | End: 2019-09-24 | Stop reason: SDUPTHER

## 2019-08-15 RX ORDER — ATORVASTATIN CALCIUM 10 MG/1
10 TABLET, FILM COATED ORAL DAILY
Qty: 90 TABLET | Refills: 3 | Status: SHIPPED | OUTPATIENT
Start: 2019-08-15 | End: 2019-09-24 | Stop reason: SDUPTHER

## 2019-08-17 DIAGNOSIS — E78.5 HYPERLIPIDEMIA, UNSPECIFIED HYPERLIPIDEMIA TYPE: ICD-10-CM

## 2019-08-17 DIAGNOSIS — I10 HYPERTENSION, UNSPECIFIED TYPE: ICD-10-CM

## 2019-08-22 DIAGNOSIS — J30.89 ALLERGIC RHINITIS DUE TO OTHER ALLERGIC TRIGGER, UNSPECIFIED SEASONALITY: ICD-10-CM

## 2019-08-23 RX ORDER — FLUTICASONE PROPIONATE 50 MCG
SPRAY, SUSPENSION (ML) NASAL
Qty: 48 ML | Refills: 2 | Status: SHIPPED | OUTPATIENT
Start: 2019-08-23

## 2019-08-29 DIAGNOSIS — E11.8 TYPE 2 DIABETES MELLITUS WITH COMPLICATION, WITHOUT LONG-TERM CURRENT USE OF INSULIN (HCC): ICD-10-CM

## 2019-08-30 DIAGNOSIS — I10 HYPERTENSION, UNSPECIFIED TYPE: Primary | ICD-10-CM

## 2019-08-30 RX ORDER — GLIMEPIRIDE 4 MG/1
TABLET ORAL
Qty: 180 TABLET | Refills: 1 | Status: SHIPPED | OUTPATIENT
Start: 2019-08-30 | End: 2019-10-04 | Stop reason: SDUPTHER

## 2019-08-30 RX ORDER — AMLODIPINE BESYLATE 10 MG/1
TABLET ORAL
Qty: 60 TABLET | Refills: 1 | Status: SHIPPED | OUTPATIENT
Start: 2019-08-30 | End: 2019-10-25 | Stop reason: SDUPTHER

## 2019-09-12 RX ORDER — OXYBUTYNIN CHLORIDE 5 MG/1
TABLET ORAL
Refills: 0 | COMMUNITY
Start: 2019-07-19 | End: 2021-04-05

## 2019-09-12 RX ORDER — POTASSIUM CHLORIDE 20 MEQ/1
TABLET, EXTENDED RELEASE ORAL
COMMUNITY
Start: 2016-09-01 | End: 2019-10-08 | Stop reason: ALTCHOICE

## 2019-09-12 RX ORDER — SIMVASTATIN 20 MG
TABLET ORAL
COMMUNITY
Start: 2012-04-06 | End: 2019-09-24 | Stop reason: SDUPTHER

## 2019-09-16 ENCOUNTER — OFFICE VISIT (OUTPATIENT)
Dept: OBGYN CLINIC | Facility: CLINIC | Age: 79
End: 2019-09-16
Payer: MEDICARE

## 2019-09-16 ENCOUNTER — APPOINTMENT (OUTPATIENT)
Dept: RADIOLOGY | Facility: AMBULARY SURGERY CENTER | Age: 79
End: 2019-09-16
Payer: MEDICARE

## 2019-09-16 VITALS
HEIGHT: 60 IN | HEART RATE: 70 BPM | WEIGHT: 185 LBS | BODY MASS INDEX: 36.32 KG/M2 | DIASTOLIC BLOOD PRESSURE: 85 MMHG | SYSTOLIC BLOOD PRESSURE: 133 MMHG

## 2019-09-16 DIAGNOSIS — M79.672 BILATERAL FOOT PAIN: ICD-10-CM

## 2019-09-16 DIAGNOSIS — M79.671 BILATERAL FOOT PAIN: ICD-10-CM

## 2019-09-16 DIAGNOSIS — M21.42 PES PLANUS OF BOTH FEET: ICD-10-CM

## 2019-09-16 DIAGNOSIS — M21.41 PES PLANUS OF BOTH FEET: ICD-10-CM

## 2019-09-16 DIAGNOSIS — M76.829 POSTERIOR TIBIALIS TENDON INSUFFICIENCY: Primary | ICD-10-CM

## 2019-09-16 PROBLEM — M21.40 FLAT FOOT: Status: ACTIVE | Noted: 2019-09-16

## 2019-09-16 PROCEDURE — 99214 OFFICE O/P EST MOD 30 MIN: CPT | Performed by: ORTHOPAEDIC SURGERY

## 2019-09-16 PROCEDURE — 73630 X-RAY EXAM OF FOOT: CPT

## 2019-09-16 NOTE — PROGRESS NOTES
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, without long-term current use of insulin (HCC)    Vitamin D deficiency    URI (upper respiratory infection)    Muscle cramps    Fall    Foot injury, left, initial encounter    Closed nondisplaced fracture of fifth metatarsal bone of left foot with routine healing    Right rotator cuff tendinitis    Diarrhea    Post-nasal drip    CKD (chronic kidney disease) stage 3, GFR 30-59 ml/min ([facility])    Posterior tibialis tendon insufficiency    Flat foot       Discussion/Summary:    70 y o  female with bilateral PT tendon insufficiency  Discussed treatment options with the patient  Including [facility] and [facility] orthoses  Also discussed surgical arch reconstruction, she is not interested in any surgical intervention at this time  Will refer her to [facility] DPT for custom orthosis and PT  Follow up PRN  The patient was seen and examined by Dr Elly Khan and myself  The assessment and plan were formulated by Dr Elly Khan and I assisted in carrying it out  Subjective:   Patient ID: Mayuri Godoy is a 70 y o  female   HPI     Patient presents to the office complaining of bilateral foot pain  Symptoms began 20-25 years ago, both fee the "ligaments and tendons got shredded" was training for a cancer walk by repetitive walking training when this happened was externally rotating and pronating feet  Had a base 5MT fracture last year as well was treated by Dr Lili Quiros  Pain started worsening over the past year no specific time or event but eventually decided she needed to make this appointment  One foot is not worse than the other  Pain is located medial plantar aspects of the feet  Pain is described as dull and aching, intermittent  The pain does not radiate   The pain is 4-5/10 at worst, 0/10 at best  It is made worse by going upstairs and downstairs, walking fast "rocking on the feet"  It is made better by nothing  So far the patient has tried PT and orthotics, tried [employer] as well which didn't work well  The patient denies any numbness or tingling  Does not smoke  She is diabetic  The following portions of the patient's history were reviewed and updated as appropriate: allergies, current medications, past family history, past social history, past surgical history and problem list     Social History     Socioeconomic History    Marital status: Single     Spouse name: Not on file    Number of children: Not on file    Years of education: Not on file    Highest education level: Not on file   Occupational History    Not on file   Social Needs    Financial resource strain: Not on file    Food insecurity:     Worry: Not on file     Inability: Not on file    Transportation needs:     Medical: Not on file     Non-medical: Not on file   Tobacco Use    Smoking status: Former Smoker     Last attempt to quit:      Years since quittin 7    Smokeless tobacco: Never Used   Substance and Sexual Activity    Alcohol use:  Yes     Alcohol/week: 1 0 - 2 0 standard drinks     Types: 1 - 2 Glasses of wine per week     Comment: Social     Drug use: No    Sexual activity: Not on file   Lifestyle    Physical activity:     Days per week: Not on file     Minutes per session: Not on file    Stress: Not on file   Relationships    Social connections:     Talks on phone: Not on file     Gets together: Not on file     Attends Jain service: Not on file     Active member of club or organization: Not on file     Attends meetings of clubs or organizations: Not on file     Relationship status: Not on file    Intimate partner violence:     Fear of current or ex partner: Not on file     Emotionally abused: Not on file     Physically abused: Not on file     Forced sexual activity: Not on file   Other Topics Concern    Not on file   Social History Narrative    Advance directive on file    Always uses seat belt    Daily caffeine consumption, 1 serving a day    Drinks coffee - 1 cup per day    Exercises 3x per week          Past Medical History:   Diagnosis Date    Abnormal mammogram     Acute sinusitis     Allergic reaction     Subsequent encounter, Resolved - 1/9/18    Allergic rhinitis 01/16/2012    Anaphylactic reaction     Other, Last assessed - 6/16/16    Anemia 01/16/2012    Ataxia     Cellulitis of great toe of right foot     Chronic allergic rhinitis     Unspec seasonality, unspec trigger;  Last assessed - 11/13/17    Contact dermatitis     Last assessed - 6/3/14    Dermatitis     Diabetes mellitus ([facility] [address] )     Disorder of tympanic membrane     Last assessed - 10/15/12    Dry skin     Last assessed - 4/15/14    Dysfunction of eustachian tube     Unspec laterality, Last assessed - 10/19/12    Ganglion cyst     Glaucoma     Hip pain, acute, unspecified laterality     Hyperlipidemia     Hypertension     Labyrinthitis 11/17/2011    Leg mass, left     Limb pain     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    Long term use of drug     Last assessed - 4/6/14    Mammogram abnormal     Last assessed - 4/11/14    Mass of thigh, left     Pain in ankle joint     Pes planus     Unspec laterality, Last assessed - 4/9/13    Shoulder pain, right     Vertigo 01/16/2012     Past Surgical History:   Procedure Laterality Date    OTHER SURGICAL HISTORY      Surgery unk     TONSILLECTOMY       Allergies   Allergen Reactions    Cortisone Hyperactivity    Bee Pollen Edema     Category:  Allergy;      Current Outpatient Medications on File Prior to Visit   Medication Sig Dispense Refill    potassium chloride (KLOR-CON M20) 20 mEq tablet Take by mouth      simvastatin (ZOCOR) 20 mg tablet Take by mouth      amLODIPine (NORVASC) 10 mg tablet TAKE 1/2 TABLET(5 MG TOTAL) BY MOUTH IN THE EVENING 60 tablet 1    ammonium lactate (LAC-HYDRIN) 12 % cream Apply topically as needed for dry skin (Patient not taking: Reported on 7/15/2019) 385 g 0    aspirin 81 MG tablet Take 1 tablet by mouth daily      atorvastatin (LIPITOR) 10 mg tablet Take 1 tablet (10 mg total) by mouth daily 90 tablet 3    Calcium Carbonate (CALTRATE 600) 1500 (600 Ca) MG TABS Take by mouth      carvedilol (COREG) 25 mg tablet Take 1 tablet (25 mg total) by mouth 2 (two) times a day 180 tablet 3    Cholecalciferol (VITAMIN D3) 2000 units capsule Take 1 tablet by mouth daily      EPINEPHrine (EPIPEN 2-LIVIA) 0 3 mg/0 3 mL SOAJ Inject 0 3 mL (0 3 mg total) into a muscle once for 1 dose 1 each 0    fluticasone (FLONASE) 50 mcg/act nasal spray USE 1 SPRAY IN EACH NOSTRIL EVERY DAY 48 mL 2    glimepiride (AMARYL) 4 mg tablet Take 2 tablets daily with Breakfast 180 tablet 1    glucosamine 500 MG CAPS capsule Take 1 capsule by mouth daily       hydrochlorothiazide (HYDRODIURIL) 25 mg tablet Take 1 tablet (25 mg total) by mouth daily 90 tablet 3    lisinopril (ZESTRIL) 40 mg tablet TAKE 1 TABLET BY MOUTH TWICE A  tablet 1    loratadine (CLARITIN) 10 mg tablet Take 1 tablet (10 mg total) by mouth daily (Patient not taking: Reported on 7/15/2019) 14 tablet 0    metFORMIN (GLUCOPHAGE-XR) 500 mg 24 hr tablet TAKE 1 TABLET TWICE DAILY      Multiple Vitamins-Minerals (WOMENS ONE DAILY) TABS Take 1 tablet by mouth daily      Omega-3 Fatty Acids (OMEGA-3 FISH OIL) 1000 MG CAPS Take 1 capsule by mouth daily       pseudoephedrine-codeine-guaifenesin (MYTUSSIN DAC) 30- MG/5ML solution Take 10 mL by mouth 4 (four) times a day as needed for allergies 120 mL 0    spironolactone (ALDACTONE) 25 mg tablet Take 1 tablet (25 mg total) by mouth daily 90 tablet 3    VIRTUSSIN A/C 100-10 MG/5ML liquid TK 10ML PO QID PRF ALLERGIES  0     No current facility-administered medications on file prior to visit  Review of Systems      Objective:    Vitals:    09/16/19 1139   BP: 133/85   Pulse: 70       Physical Exam   Constitutional: She is oriented to person, place, and time  She appears well-developed and well-nourished  HENT:   Head: Normocephalic and atraumatic  Eyes: Conjunctivae are normal  No scleral icterus  Neck: Neck supple  Cardiovascular:   No discernible arrhthymias   Pulmonary/Chest: Effort normal  No stridor  No respiratory distress  Abdominal: Soft  She exhibits no distension  Neurological: She is alert and oriented to person, place, and time  Skin: Skin is warm and dry  No erythema  Psychiatric: She has a normal mood and affect  Her behavior is normal        Right Ankle Exam   Swelling: none    Range of Motion   Dorsiflexion: normal   Plantar flexion: normal   Eversion: normal   Inversion: normal     Muscle Strength   Right ankle normal muscle strength: EHL and FHL 5/5 strength  Dorsiflexion:  5/5  Plantar flexion:  5/5  Anterior tibial:  5/5  Posterior tibial:  5/5  Gastrocsoleus:  5/5  Peroneal muscle:  5/5    Tests   Anterior drawer: negative  Varus tilt: negative    Other   Erythema: absent  Scars: absent  Sensation: normal  Right ankle pulse absent: 2+ DP pulse       Comments:    Tenderness to palpation over  Medial insertion of posterior tibial tendon  On the navicular  And proximal   patient unable to perform single leg raise   hyperpronated feet   pes planus  Fixed calcaneal valgus deformity 30 degrees      Left Ankle Exam   Swelling: none    Range of Motion   Dorsiflexion: normal   Plantar flexion: normal   Eversion: normal   Inversion: normal     Muscle Strength   Left ankle normal muscle strength: FHL and EHL 5/5  Dorsiflexion:  5/5   Plantar flexion:  5/5   Anterior tibial:  5/5   Posterior tibial:  5/5  Gastrocsoleus:  5/5  Peroneal muscle:  5/5    Tests   Anterior drawer: negative  Varus tilt: negative    Other   Erythema: absent  Scars: absent  Sensation: normal  Left ankle pulse absent: 2+ DP pulse  Comments:  Tenderness to palpation over  Medial insertion of posterior tibial tendon  On the navicular  And proximally  Patient unable to perform  Single leg raise   hyperpronated feet   pes planus  Fixed calcaneal valgus deformity 45 degrees            I have personally reviewed pertinent films in PACS and my interpretation is bilateral foot XR shows partial midfoot collapse not complete loss of arch, left worse than right, hallux valgus left worse than right, no acute fractures  Procedures  No Procedures performed today    Portions of the record may have been created with voice recognition software  Occasional wrong word or "sound a like" substitutions may have occurred due to the inherent limitations of voice recognition software  Read the chart carefully and recognize, using context, where substitutions have occurred

## 2019-09-16 NOTE — PROGRESS NOTES
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, without long-term current use of insulin ([facility])    Vitamin D deficiency    URI (upper respiratory infection)    Muscle cramps    Fall    Foot injury, left, initial encounter    Closed nondisplaced fracture of fifth metatarsal bone of left foot with routine healing    Right rotator cuff tendinitis    Diarrhea    Post-nasal drip    CKD (chronic kidney disease) stage 3, GFR 30-59 ml/min ([facility])           Plan      ***            Subjective:     Patient ID:    Chief Complaint:Maxine Lucas 70 y o  female      HPI    Patient comes in today with regards to ***  The patient reports that the pain is in the *** and has been going on for ***  The pain is rated at{Pain Score 0-10, 0 default:47074} at its best and{Pain Score 0-10, 0 default:27028} at its worst   The pain is described as ***  It is worsened with ***, and is made better with ***  The patient has taken *** for treatment        The following portions of the patient's history were reviewed and updated as appropriate: allergies, current medications, past family history, past social history, past surgical history and problem list         Social History     Socioeconomic History    Marital status: Single     Spouse name: Not on file    Number of children: Not on file    Years of education: Not on file    Highest education level: Not on file   Occupational History    Not on file   Social Needs    Financial resource strain: Not on file    Food insecurity:     Worry: Not on file     Inability: Not on file    Transportation needs:     Medical: Not on file Non-medical: Not on file   Tobacco Use    Smoking status: Former Smoker     Last attempt to quit:      Years since quittin 7    Smokeless tobacco: Never Used   Substance and Sexual Activity    Alcohol use: Yes     Alcohol/week: 1 0 - 2 0 standard drinks     Types: 1 - 2 Glasses of wine per week     Comment: Social     Drug use: No    Sexual activity: Not on file   Lifestyle    Physical activity:     Days per week: Not on file     Minutes per session: Not on file    Stress: Not on file   Relationships    Social connections:     Talks on phone: Not on file     Gets together: Not on file     Attends Religion service: Not on file     Active member of club or organization: Not on file     Attends meetings of clubs or organizations: Not on file     Relationship status: Not on file    Intimate partner violence:     Fear of current or ex partner: Not on file     Emotionally abused: Not on file     Physically abused: Not on file     Forced sexual activity: Not on file   Other Topics Concern    Not on file   Social History Narrative    Advance directive on file    Always uses seat belt    Daily caffeine consumption, 1 serving a day    Drinks coffee - 1 cup per day    Exercises 3x per week          Past Medical History:   Diagnosis Date    Abnormal mammogram     Acute sinusitis     Allergic reaction     Subsequent encounter, Resolved - 18    Allergic rhinitis 2012    Anaphylactic reaction     Other, Last assessed - 16    Anemia 2012    Ataxia     Cellulitis of great toe of right foot     Chronic allergic rhinitis     Unspec seasonality, unspec trigger;  Last assessed - 17    Contact dermatitis     Last assessed - 6/3/14    Dermatitis     Diabetes mellitus ([facility] [address] )     Disorder of tympanic membrane     Last assessed - 10/15/12    Dry skin     Last assessed - 4/15/14    Dysfunction of eustachian tube     Unspec laterality, Last assessed - 10/19/12    Ganglion cyst     Glaucoma     Hip pain, acute, unspecified laterality     Hyperlipidemia     Hypertension     Labyrinthitis 11/17/2011    Leg mass, left     Limb pain     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    Long term use of drug     Last assessed - 4/6/14    Mammogram abnormal     Last assessed - 4/11/14    Mass of thigh, left     Pain in ankle joint     Pes planus     Unspec laterality, Last assessed - 4/9/13    Shoulder pain, right     Vertigo 01/16/2012     Past Surgical History:   Procedure Laterality Date    OTHER SURGICAL HISTORY      Surgery unk     TONSILLECTOMY       Allergies   Allergen Reactions    Cortisone Hyperactivity    Bee Pollen Edema     Category:  Allergy;      Current Outpatient Medications on File Prior to Visit   Medication Sig Dispense Refill    potassium chloride (KLOR-CON M20) 20 mEq tablet Take by mouth      simvastatin (ZOCOR) 20 mg tablet Take by mouth      amLODIPine (NORVASC) 10 mg tablet TAKE 1/2 TABLET(5 MG TOTAL) BY MOUTH IN THE EVENING 60 tablet 1    ammonium lactate (LAC-HYDRIN) 12 % cream Apply topically as needed for dry skin (Patient not taking: Reported on 7/15/2019) 385 g 0    aspirin 81 MG tablet Take 1 tablet by mouth daily      atorvastatin (LIPITOR) 10 mg tablet Take 1 tablet (10 mg total) by mouth daily 90 tablet 3    Calcium Carbonate (CALTRATE 600) 1500 (600 Ca) MG TABS Take by mouth      carvedilol (COREG) 25 mg tablet Take 1 tablet (25 mg total) by mouth 2 (two) times a day 180 tablet 3    Cholecalciferol (VITAMIN D3) 2000 units capsule Take 1 tablet by mouth daily      EPINEPHrine (EPIPEN 2-LIVIA) 0 3 mg/0 3 mL SOAJ Inject 0 3 mL (0 3 mg total) into a muscle once for 1 dose 1 each 0    fluticasone (FLONASE) 50 mcg/act nasal spray USE 1 SPRAY IN EACH NOSTRIL EVERY DAY 48 mL 2    glimepiride (AMARYL) 4 mg tablet Take 2 tablets daily with Breakfast 180 tablet 1    glucosamine 500 MG CAPS capsule Take 1 capsule by mouth daily       hydrochlorothiazide (HYDRODIURIL) 25 mg tablet Take 1 tablet (25 mg total) by mouth daily 90 tablet 3    lisinopril (ZESTRIL) 40 mg tablet TAKE 1 TABLET BY MOUTH TWICE A  tablet 1    loratadine (CLARITIN) 10 mg tablet Take 1 tablet (10 mg total) by mouth daily (Patient not taking: Reported on 7/15/2019) 14 tablet 0    metFORMIN (GLUCOPHAGE-XR) 500 mg 24 hr tablet TAKE 1 TABLET TWICE DAILY      Multiple Vitamins-Minerals (WOMENS ONE DAILY) TABS Take 1 tablet by mouth daily      Omega-3 Fatty Acids (OMEGA-3 FISH OIL) 1000 MG CAPS Take 1 capsule by mouth daily       pseudoephedrine-codeine-guaifenesin (MYTUSSIN DAC) 30- MG/5ML solution Take 10 mL by mouth 4 (four) times a day as needed for allergies 120 mL 0    spironolactone (ALDACTONE) 25 mg tablet Take 1 tablet (25 mg total) by mouth daily 90 tablet 3    VIRTUSSIN A/C 100-10 MG/5ML liquid TK 10ML PO QID PRF ALLERGIES  0     No current facility-administered medications on file prior to visit  Objective:    Review of Systems    Ortho Exam    Physical Exam    Procedures  {Was Pro[facility] done:88568::"No Procedures performed today"}    {Imaging Review Statement:[id number]}      Portions of the record may have been created with voice recognition software   Occasional wrong word or "sound a like" substitutions may have occurred due to the inherent limitations of voice recognition software   Read the chart carefully and recognize, using context, where substitutions have occurred

## 2019-09-24 RX ORDER — HYDROCHLOROTHIAZIDE 25 MG/1
25 TABLET ORAL DAILY
Qty: 90 TABLET | Refills: 1 | Status: SHIPPED | OUTPATIENT
Start: 2019-09-24 | End: 2021-08-17 | Stop reason: SDUPTHER

## 2019-09-24 RX ORDER — ATORVASTATIN CALCIUM 10 MG/1
10 TABLET, FILM COATED ORAL DAILY
Qty: 90 TABLET | Refills: 1 | Status: SHIPPED | OUTPATIENT
Start: 2019-09-24

## 2019-10-04 DIAGNOSIS — E11.8 TYPE 2 DIABETES MELLITUS WITH COMPLICATION, WITHOUT LONG-TERM CURRENT USE OF INSULIN (HCC): ICD-10-CM

## 2019-10-04 RX ORDER — GLIMEPIRIDE 4 MG/1
TABLET ORAL
Qty: 180 TABLET | Refills: 1 | Status: SHIPPED | OUTPATIENT
Start: 2019-10-04 | End: 2019-11-06 | Stop reason: SDUPTHER

## 2019-10-08 ENCOUNTER — OFFICE VISIT (OUTPATIENT)
Dept: GERIATRICS | Facility: CLINIC | Age: 79
End: 2019-10-08
Payer: MEDICARE

## 2019-10-08 VITALS
DIASTOLIC BLOOD PRESSURE: 82 MMHG | WEIGHT: 186.3 LBS | HEIGHT: 60 IN | OXYGEN SATURATION: 97 % | HEART RATE: 66 BPM | BODY MASS INDEX: 36.57 KG/M2 | SYSTOLIC BLOOD PRESSURE: 134 MMHG

## 2019-10-08 DIAGNOSIS — E78.5 HYPERLIPIDEMIA, UNSPECIFIED HYPERLIPIDEMIA TYPE: ICD-10-CM

## 2019-10-08 DIAGNOSIS — J30.9 CHRONIC ALLERGIC RHINITIS: ICD-10-CM

## 2019-10-08 DIAGNOSIS — Z79.899 POLYPHARMACY: Primary | ICD-10-CM

## 2019-10-08 DIAGNOSIS — J30.89 ALLERGIC RHINITIS DUE TO OTHER ALLERGIC TRIGGER, UNSPECIFIED SEASONALITY: ICD-10-CM

## 2019-10-08 PROCEDURE — 99213 OFFICE O/P EST LOW 20 MIN: CPT | Performed by: NURSE PRACTITIONER

## 2019-10-08 RX ORDER — ATORVASTATIN CALCIUM 10 MG/1
10 TABLET, FILM COATED ORAL DAILY
Qty: 90 TABLET | Refills: 1 | Status: CANCELLED | OUTPATIENT
Start: 2019-10-08

## 2019-10-08 RX ORDER — FLUTICASONE PROPIONATE 50 MCG
1 SPRAY, SUSPENSION (ML) NASAL DAILY
Qty: 48 ML | Refills: 2 | Status: CANCELLED | OUTPATIENT
Start: 2019-10-08

## 2019-10-08 NOTE — ASSESSMENT & PLAN NOTE
· Recent labs done, pt c/o muscle weakness in thighs and cramping  · Would like to trial decrease to see if this changes  · Will decrease atorvastatin 5mg po daily x 2wks then notify office of effect

## 2019-10-08 NOTE — PROGRESS NOTES
Assessment/Plan:    Polypharmacy  · Pt requested med review  · Meds adjusted per what pt is taking  Reviewed each medication  · Pt voiced understanding to medications and purpose    Hyperlipidemia  · Recent labs done, pt c/o muscle weakness in thighs and cramping  · Would like to trial decrease to see if this changes  · Will decrease atorvastatin 5mg po daily x 2wks then notify office of effect    Chronic allergic rhinitis  · Not taking loratadine anymore symptoms stable  · Will take off list       There are no diagnoses linked to this encounter  Subjective:      Patient ID: Sharonda Crenshaw is a 70 y o  female  Patient here for med review  C/O occasional thigh weakness and occasional leg cramping  Denies cp/sob/cough  Denies gi/gu distress  The following portions of the patient's history were reviewed and updated as appropriate: past family history, past medical history, past social history and past surgical history  Review of Systems   Constitutional: Negative for activity change, appetite change, chills and fatigue  HENT: Negative for congestion  Respiratory: Negative for cough and shortness of breath  Cardiovascular: Negative for chest pain  Gastrointestinal: Negative for abdominal pain, constipation, diarrhea, nausea and vomiting  Genitourinary: Negative for difficulty urinating  Musculoskeletal: Positive for myalgias  Neurological: Negative for dizziness and light-headedness  Objective:      /82   Pulse 66   Ht 5' (1 524 m)   Wt 84 5 kg (186 lb 4 8 oz)   LMP  (LMP Unknown)   SpO2 97%   BMI 36 38 kg/m²          Physical Exam   Constitutional: She is oriented to person, place, and time  She appears well-developed and well-nourished  No distress  HENT:   Head: Normocephalic  Cardiovascular: Normal rate and normal heart sounds  Exam reveals no gallop and no friction rub  No murmur heard    Pulmonary/Chest: Effort normal and breath sounds normal  No respiratory distress  She has no wheezes  She has no rales  Abdominal: Soft  Bowel sounds are normal  She exhibits no distension  There is no tenderness  There is no rebound  Musculoskeletal: Normal range of motion  Neurological: She is alert and oriented to person, place, and time  Skin: Skin is warm and dry  She is not diaphoretic  Psychiatric: She has a normal mood and affect  Her behavior is normal    Vitals reviewed

## 2019-10-08 NOTE — ASSESSMENT & PLAN NOTE
· Pt requested med review  · Meds adjusted per what pt is taking    Reviewed each medication  · Pt voiced understanding to medications and purpose

## 2019-10-25 DIAGNOSIS — I10 HYPERTENSION, UNSPECIFIED TYPE: ICD-10-CM

## 2019-10-25 RX ORDER — AMLODIPINE BESYLATE 10 MG/1
TABLET ORAL
Qty: 60 TABLET | Refills: 1 | Status: SHIPPED | OUTPATIENT
Start: 2019-10-25 | End: 2020-02-20 | Stop reason: ALTCHOICE

## 2019-11-26 DIAGNOSIS — I10 HYPERTENSION, UNSPECIFIED TYPE: ICD-10-CM

## 2019-12-03 ENCOUNTER — OFFICE VISIT (OUTPATIENT)
Dept: GERIATRICS | Facility: CLINIC | Age: 79
End: 2019-12-03
Payer: MEDICARE

## 2019-12-03 VITALS
SYSTOLIC BLOOD PRESSURE: 110 MMHG | HEIGHT: 60 IN | RESPIRATION RATE: 12 BRPM | OXYGEN SATURATION: 99 % | TEMPERATURE: 98.3 F | BODY MASS INDEX: 36.3 KG/M2 | WEIGHT: 184.9 LBS | DIASTOLIC BLOOD PRESSURE: 64 MMHG | HEART RATE: 72 BPM

## 2019-12-03 DIAGNOSIS — I10 HYPERTENSION, UNSPECIFIED TYPE: ICD-10-CM

## 2019-12-03 DIAGNOSIS — E78.5 HYPERLIPIDEMIA, UNSPECIFIED HYPERLIPIDEMIA TYPE: ICD-10-CM

## 2019-12-03 DIAGNOSIS — L30.9 DERMATITIS: Primary | ICD-10-CM

## 2019-12-03 PROCEDURE — 99213 OFFICE O/P EST LOW 20 MIN: CPT | Performed by: NURSE PRACTITIONER

## 2019-12-03 RX ORDER — LISINOPRIL 40 MG/1
40 TABLET ORAL 2 TIMES DAILY
Qty: 180 TABLET | Refills: 1 | OUTPATIENT
Start: 2019-12-03

## 2019-12-03 RX ORDER — SPIRONOLACTONE 25 MG/1
25 TABLET ORAL DAILY
Qty: 90 TABLET | Refills: 3 | OUTPATIENT
Start: 2019-12-03

## 2019-12-03 RX ORDER — SPIRONOLACTONE 25 MG/1
25 TABLET ORAL DAILY
Qty: 90 TABLET | Refills: 0 | Status: SHIPPED | OUTPATIENT
Start: 2019-12-03 | End: 2020-02-20 | Stop reason: SDUPTHER

## 2019-12-03 RX ORDER — CARVEDILOL 25 MG/1
25 TABLET ORAL 2 TIMES DAILY
Qty: 180 TABLET | Refills: 3 | OUTPATIENT
Start: 2019-12-03

## 2019-12-03 RX ORDER — ATORVASTATIN CALCIUM 10 MG/1
10 TABLET, FILM COATED ORAL DAILY
Qty: 90 TABLET | Refills: 1 | OUTPATIENT
Start: 2019-12-03

## 2019-12-03 NOTE — PROGRESS NOTES
Assessment/Plan:    Hypertension  · Reviewed med list with patient regarding HTN medications  · Bp in office today 110/64  · Ordered BMP to monitor electrolytes and kidney fxn    Dermatitis  · Patient c/o right pruritic nipple, exacerbated with cold temperature  · Recommended patient to f/u with scheduled mammogram  · Trial use of lanolin cream for pruritic relief  · If s/s persist future trial of topical steroid cream       There are no diagnoses linked to this encounter  Subjective:      Patient ID: Ileana Farrell is a 70 y o  female  Ms Makayla Dyer is a 68yo female presenting to the office for a pruritic right breast nipple  Patient reports this pruritis has been intermittent over the last four years, tends to exacerbate in the colder months  Patient denies rash, discoloration, no discharge  No associated fever/chills  Patient has been seen by a dermatologist for hx of dry skin in her b/l ears and face, has tried several different lotions without relief  Patient currently using Cereve  Patient never breast fed her two children  No hx of breast cancer in her family to her knowledge  Last mammogram in 2017 with routine follow up  Had a Rx for another routine mammogram screening but lost paperwork  The following portions of the patient's history were reviewed and updated as appropriate: past family history, past medical history, past social history and past surgical history  Review of Systems   Constitutional: Negative for activity change, appetite change, chills and fatigue  HENT: Negative for congestion  Respiratory: Negative for cough and shortness of breath  Cardiovascular: Negative for chest pain  Gastrointestinal: Negative for abdominal pain, constipation, diarrhea, nausea and vomiting  Genitourinary: Negative for difficulty urinating  Musculoskeletal: Negative for arthralgias, back pain and gait problem     Skin:        pruritic right nipple   Neurological: Negative for dizziness and light-headedness  Psychiatric/Behavioral: Negative for decreased concentration (forgetful)  Objective:      /64 (BP Location: Left arm, Patient Position: Sitting, Cuff Size: Large)   Pulse 72   Temp 98 3 °F (36 8 °C) (Temporal)   Resp 12   Ht 5' (1 524 m)   Wt 83 9 kg (184 lb 14 4 oz)   LMP  (LMP Unknown)   SpO2 99%   BMI 36 11 kg/m²          Physical Exam   Constitutional: She is oriented to person, place, and time  She appears well-developed and well-nourished  No distress  HENT:   Head: Normocephalic and atraumatic  Cardiovascular: Normal rate, regular rhythm, normal heart sounds and intact distal pulses  Exam reveals no gallop and no friction rub  No murmur heard  Pulmonary/Chest: Effort normal and breath sounds normal  No respiratory distress  She has no wheezes  She has no rales  Right breast exhibits no inverted nipple, no nipple discharge, no skin change and no tenderness  Left breast exhibits no inverted nipple, no nipple discharge, no skin change and no tenderness  Breasts are symmetrical    Abdominal: Soft  Bowel sounds are normal  She exhibits no distension  There is no tenderness  There is no rebound  Musculoskeletal: Normal range of motion  Neurological: She is alert and oriented to person, place, and time  Skin: Skin is warm and dry  She is not diaphoretic  Vitals reviewed

## 2019-12-03 NOTE — ASSESSMENT & PLAN NOTE
· Reviewed med list with patient regarding HTN medications  · Bp in office today 110/64  · Ordered BMP to monitor electrolytes and kidney fxn

## 2019-12-03 NOTE — ASSESSMENT & PLAN NOTE
· Patient c/o right pruritic nipple, exacerbated with cold temperature  · Recommended patient to f/u with scheduled mammogram  · Trial use of lanolin cream for pruritic relief  · If s/s persist future trial of topical steroid cream

## 2019-12-03 NOTE — PATIENT INSTRUCTIONS
· Patient to schedule routine mammogram- reprinted script for patient  · BMP labwork ordered to check electrolytes and kidney function  · Lanolin cream to right nipple trial to obtain relief of itchiness  · Called in Aldactone to [employer] per patient request   · Notify if itchiness persists  · Follow up with annual Medicare Wellness Visit

## 2020-01-27 ENCOUNTER — HOSPITAL ENCOUNTER (OUTPATIENT)
Dept: RADIOLOGY | Age: 80
Discharge: HOME | End: 2020-01-27

## 2020-01-27 VITALS — HEIGHT: 60 IN | BODY MASS INDEX: 36.91 KG/M2 | WEIGHT: 188 LBS

## 2020-01-27 DIAGNOSIS — Z12.39 SCREENING FOR MALIGNANT NEOPLASM OF BREAST: ICD-10-CM

## 2020-01-28 DIAGNOSIS — N64.59 NIPPLE SYMPTOM OR SIGN IN FEMALE: Primary | ICD-10-CM

## 2020-01-28 NOTE — PROGRESS NOTES
Pt called stating that when she when to have mammogram done, she had the incorrect script  Central scheduling had placed a script for a screening and the patient needed to be seen for her right nipple which has been itching  She will call to make a new appointment and the correct script has now been placed

## 2020-01-29 ENCOUNTER — HOSPITAL ENCOUNTER (OUTPATIENT)
Dept: MAMMOGRAPHY | Facility: CLINIC | Age: 80
Discharge: HOME | End: 2020-01-29
Payer: MEDICARE

## 2020-01-29 ENCOUNTER — HOSPITAL ENCOUNTER (OUTPATIENT)
Dept: ULTRASOUND IMAGING | Facility: CLINIC | Age: 80
Discharge: HOME | End: 2020-01-29
Payer: MEDICARE

## 2020-01-29 VITALS — BODY MASS INDEX: 36.91 KG/M2 | HEIGHT: 60 IN | WEIGHT: 188 LBS

## 2020-01-29 DIAGNOSIS — N64.59 NIPPLE PROBLEM: ICD-10-CM

## 2020-01-29 DIAGNOSIS — N64.59 NIPPLE SYMPTOM OR SIGN IN FEMALE: ICD-10-CM

## 2020-01-29 PROCEDURE — 76642 ULTRASOUND BREAST LIMITED: CPT

## 2020-01-29 PROCEDURE — G0279 TOMOSYNTHESIS, MAMMO: HCPCS

## 2020-01-29 PROCEDURE — 77066 DX MAMMO INCL CAD BI: CPT

## 2020-02-19 DIAGNOSIS — I10 HYPERTENSION, UNSPECIFIED TYPE: ICD-10-CM

## 2020-02-20 DIAGNOSIS — I10 ESSENTIAL HYPERTENSION: ICD-10-CM

## 2020-02-20 DIAGNOSIS — I10 HYPERTENSION, UNSPECIFIED TYPE: ICD-10-CM

## 2020-02-20 DIAGNOSIS — E11.8 TYPE 2 DIABETES MELLITUS WITH COMPLICATION, WITHOUT LONG-TERM CURRENT USE OF INSULIN (HCC): ICD-10-CM

## 2020-02-20 RX ORDER — LISINOPRIL 40 MG/1
TABLET ORAL
Qty: 180 TABLET | Refills: 0 | Status: SHIPPED | OUTPATIENT
Start: 2020-02-20 | End: 2020-05-14 | Stop reason: SDUPTHER

## 2020-02-20 RX ORDER — AMLODIPINE BESYLATE 5 MG/1
5 TABLET ORAL DAILY
Qty: 90 TABLET | Refills: 3 | Status: SHIPPED | OUTPATIENT
Start: 2020-02-20 | End: 2021-02-03 | Stop reason: SDUPTHER

## 2020-02-20 RX ORDER — SPIRONOLACTONE 25 MG/1
25 TABLET ORAL DAILY
Qty: 90 TABLET | Refills: 0 | Status: SHIPPED | OUTPATIENT
Start: 2020-02-20 | End: 2020-05-14 | Stop reason: SDUPTHER

## 2020-02-20 RX ORDER — GLIMEPIRIDE 4 MG/1
4 TABLET ORAL
Qty: 180 TABLET | Refills: 1 | Status: SHIPPED | OUTPATIENT
Start: 2020-02-20 | End: 2020-04-10 | Stop reason: ALTCHOICE

## 2020-02-21 ENCOUNTER — OFFICE VISIT (OUTPATIENT)
Dept: GERIATRICS | Facility: CLINIC | Age: 80
End: 2020-02-21
Payer: MEDICARE

## 2020-02-21 VITALS
TEMPERATURE: 97.1 F | SYSTOLIC BLOOD PRESSURE: 126 MMHG | DIASTOLIC BLOOD PRESSURE: 80 MMHG | OXYGEN SATURATION: 97 % | WEIGHT: 186.5 LBS | HEART RATE: 60 BPM | BODY MASS INDEX: 35.21 KG/M2 | HEIGHT: 61 IN | RESPIRATION RATE: 16 BRPM

## 2020-02-21 DIAGNOSIS — N18.30 CKD (CHRONIC KIDNEY DISEASE) STAGE 3, GFR 30-59 ML/MIN (HCC): ICD-10-CM

## 2020-02-21 DIAGNOSIS — R80.9 CONTROLLED TYPE 2 DIABETES MELLITUS WITH MICROALBUMINURIA, WITHOUT LONG-TERM CURRENT USE OF INSULIN (HCC): ICD-10-CM

## 2020-02-21 DIAGNOSIS — E11.29 CONTROLLED TYPE 2 DIABETES MELLITUS WITH MICROALBUMINURIA, WITHOUT LONG-TERM CURRENT USE OF INSULIN (HCC): ICD-10-CM

## 2020-02-21 DIAGNOSIS — G47.00 INSOMNIA, UNSPECIFIED TYPE: Primary | ICD-10-CM

## 2020-02-21 DIAGNOSIS — I10 ESSENTIAL HYPERTENSION: ICD-10-CM

## 2020-02-21 DIAGNOSIS — Z87.898 H/O ABNORMAL MAMMOGRAM: ICD-10-CM

## 2020-02-21 DIAGNOSIS — E55.9 VITAMIN D DEFICIENCY: ICD-10-CM

## 2020-02-21 PROCEDURE — 99214 OFFICE O/P EST MOD 30 MIN: CPT | Performed by: INTERNAL MEDICINE

## 2020-02-21 NOTE — ASSESSMENT & PLAN NOTE
Has trouble continuing her sleep   Start sleeping after 10 pm and she wake up at 12  30 AM and awake until 4  30 am going on for about an year   Denies any coffin intake before be/ nocturia  Start on Melatonin 2 mg and will titrate as needed

## 2020-02-21 NOTE — PATIENT INSTRUCTIONS
Insomnia  Start sleeping after 10 pm and she wake up at 12  30 AM and awake until 4  30 am   Denies any coffin intake before bed  Going on for one year   Start on Melatonin 2 mg and will titrate as needed  Will do CBC/CMP/ HBA1c/ Vitamin D level/ Microalbuminuria in urine and will follow up in a month   Had mammogram 1/29/2020:  Right breast:  No mammographic evidence of malignancy  No suspicious findings on targeted retroareolar ultrasound  Clinical examination is recommended    If there are changes of the nipple concerning for Paget's disease or a suspicious palpable mass then referral to a breast surgeon and possible biopsy are recommended        Left breast:  No mammographic evidence of malignancy       Repeat mammogram in one year

## 2020-02-21 NOTE — ASSESSMENT & PLAN NOTE
Lab Results   Component Value Date    HGBA1C 6 7 (H) 07/24/2019   Stable on Glucophage XR- 500 mg po BID

## 2020-02-21 NOTE — PROGRESS NOTES
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, without long-term current use of insulin ([facility])       Lab Results   Component Value Date    HGBA1C 6 7 (H) 07/24/2019   Stable on Glucophage XR- 500 mg po BID         Relevant Orders    CBC and Platelet    Comprehensive metabolic panel    Lipid panel    Microalbumin / creatinine urine ratio       Cardiovascular and Mediastinum    Hypertension       Genitourinary    CKD (chronic kidney disease) stage 3, GFR 30-59 ml/min ([facility])    Relevant Orders    Microalbumin / creatinine urine ratio       Other    Vitamin D deficiency    Relevant Orders    Vitamin D 25 hydroxy    Insomnia - Primary     Has trouble continuing her sleep   Start sleeping after 10 pm and she wake up at 12  30 AM and awake until 4  30 am going on for about an year   Denies any coffin intake before be/ nocturia  Start on Melatonin 2 mg and will titrate as needed  Relevant Orders    TSH, 3rd generation    H/O abnormal mammogram     01/2020  Right breast:  No mammographic evidence of malignancy  No suspicious findings on targeted retroareolar ultrasound  Clinical examination is recommended  If there are changes of the nipple concerning for Paget's disease or a suspicious palpable mass then referral to a breast surgeon and possible biopsy are recommended        Left breast:  No mammographic evidence of malignancy       Repeat mammogram in one year                  Orders Placed This Encounter   Procedures    CBC and Platelet    Comprehensive metabolic panel    Lipid panel    Microalbumin / creatinine urine ratio    TSH, 3rd generation    Vitamin D 25 hydroxy       - Medication Side Effects:  Adverse side effects of medications were reviewed with the patient/guardian today  Chief Complaint     Chief Complaint   Patient presents with    Follow-up     Discuss starting Melatonin and needs rx for labs       History of Present Illness     HPI   70 y  o male patient is here to follow up with her sleep problem  Patent reports to have trouble to continue her sleep and wake up in the middle of night and has difficulty in going back to sleep  Patient denies any excess amount coffin intake or other breathing problem  As patient is currently on multiple medication, she was consulted with the pharmacy regarding initaiting melatonin 2 mg for her sleep issue  Since there is not much interaction with other medication, she will started on melatonin 2 mg and will be titrated if needed  Patient also has known medical history of HTN, DM, hyerlipidemia and she is due for her blood work  Will order appropriate blood test and will follow up in a month  Patient had her mammogram and breast US done for her breast screening and nipple pruritis  The reports are not shown any pathological changes and recommendation was given to repeat in one year  The following portions of the patient's history were reviewed and updated as appropriate: allergies, current medications, past family history, past medical history, past social history, past surgical history and problem list     Review of Systems     Review of Systems   Constitutional: Negative for activity change, appetite change, chills, fatigue and fever  HENT: Negative for congestion, dental problem, hearing loss, sore throat, tinnitus, trouble swallowing and voice change  Eyes: Negative for photophobia, discharge and visual disturbance  Respiratory: Negative for apnea, cough, chest tightness and wheezing  Cardiovascular: Negative for chest pain, palpitations and leg swelling  Gastrointestinal: Negative for abdominal distention, abdominal pain, constipation and diarrhea  Endocrine: Negative      Genitourinary: Negative for difficulty urinating, dysuria, frequency and urgency  Musculoskeletal: Negative for arthralgias, back pain, gait problem, myalgias and neck pain  Skin: Negative  Neurological: Negative for dizziness, tremors, facial asymmetry, speech difficulty and weakness  Psychiatric/Behavioral: Negative for agitation, behavioral problems, confusion, decreased concentration and sleep disturbance  The patient is not nervous/anxious  All other systems reviewed and are negative  Active Problem List     Patient Active Problem List   Diagnosis    Chronic allergic rhinitis    Ganglion cyst    Hyperlipidemia    Hypertension    Hypokalemia    [facility]albuminuric diabetic nephropathy ([facility])    OA (osteoarthritis)    Osteopenia    Thalassemia minor    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, without long-term current use of insulin ([facility])    Vitamin D deficiency    URI (upper respiratory infection)    Muscle cramps    Fall    Foot injury, left, initial encounter    Closed nondisplaced fracture of fifth metatarsal bone of left foot with routine healing    Right rotator cuff tendinitis    Diarrhea    Post-nasal drip    CKD (chronic kidney disease) stage 3, GFR 30-59 ml/min ([facility])    Posterior tibialis tendon insufficiency    Flat foot    Polypharmacy    Dermatitis    Insomnia    H/O abnormal mammogram       Objective     /80 (BP Location: Left arm, Patient Position: Sitting, Cuff Size: Large)   Pulse 60   Temp (!) 97 1 °F (36 2 °C) (Temporal)   Resp 16   Ht 5' 1 25" (1 556 m) Comment: with shoes  Wt 84 6 kg (186 lb 8 oz) Comment: with shoes  LMP  (LMP Unknown)   SpO2 97%   BMI 34 95 kg/m²     Physical Exam   Constitutional: She is oriented to person, place, and time  She appears well-developed and well-nourished  No distress  HENT:   Head: Normocephalic and atraumatic     Right Ear: External ear normal    Left Ear: External ear normal    Mouth/Throat: Oropharynx is clear and moist    Eyes: Pupils are equal, round, and reactive to light  Conjunctivae and EOM are normal  Right eye exhibits no discharge  Left eye exhibits no discharge  No scleral icterus  Neck: Normal range of motion  Neck supple  No JVD present  No thyromegaly present  Cardiovascular: Normal rate, regular rhythm, normal heart sounds and intact distal pulses  Exam reveals no friction rub  No murmur heard  Pulmonary/Chest: Effort normal and breath sounds normal  She has no wheezes  Abdominal: Soft  Bowel sounds are normal  She exhibits no distension  There is no tenderness  Musculoskeletal: She exhibits no edema, tenderness or deformity  Neurological: She is alert and oriented to person, place, and time  No cranial nerve deficit or sensory deficit  Coordination normal    Skin: Skin is warm and dry  No rash noted  No erythema  Psychiatric: She has a normal mood and affect  Her behavior is normal  Thought content normal    Nursing note and vitals reviewed        Pertinent Laboratory/Diagnostic Studies:  CBC:   Lab Results   Component Value Date/Time    WBC 8 37 07/24/2019 09:08 AM    WBC 8 46 03/26/2015 09:13 AM    RBC 5 70 (H) 07/24/2019 09:08 AM    RBC 5 55 (H) 03/26/2015 09:13 AM    HGB 12 8 07/24/2019 09:08 AM    HGB 12 1 03/26/2015 09:13 AM    HCT 41 5 07/24/2019 09:08 AM    HCT 38 6 03/26/2015 09:13 AM    MCV 73 (L) 07/24/2019 09:08 AM    MCV 70 (L) 03/26/2015 09:13 AM    MCH 22 5 (L) 07/24/2019 09:08 AM    MCH 21 8 (L) 03/26/2015 09:13 AM    MCHC 30 8 (L) 07/24/2019 09:08 AM    MCHC 31 3 (L) 03/26/2015 09:13 AM    RDW 14 3 07/24/2019 09:08 AM    RDW 14 8 03/26/2015 09:13 AM    MPV 11 1 07/24/2019 09:08 AM    MPV 10 7 03/26/2015 09:13 AM     07/24/2019 09:08 AM     03/26/2015 09:13 AM    NRBC 0 07/24/2019 09:08 AM    NEUTOPHILPCT 57 07/24/2019 09:08 AM    NEUTOPHILPCT 61 03/26/2015 09:13 AM    LYMPHOPCT 31 07/24/2019 09:08 AM    LYMPHOPCT 29 03/26/2015 09:13 AM    MONOPCT 7 07/24/2019 09:08 AM    MONOPCT 7 03/26/2015 09:13 AM    EOSPCT 4 07/24/2019 09:08 AM    EOSPCT 2 03/26/2015 09:13 AM    BASOPCT 1 07/24/2019 09:08 AM    BASOPCT 1 03/26/2015 09:13 AM    NEUTROABS 4 75 07/24/2019 09:08 AM    NEUTROABS 5 16 03/26/2015 09:13 AM    LYMPHSABS 2 63 07/24/2019 09:08 AM    LYMPHSABS 2 45 03/26/2015 09:13 AM    MONOSABS 0 61 07/24/2019 09:08 AM    MONOSABS 0 59 03/26/2015 09:13 AM    EOSABS 0 31 07/24/2019 09:08 AM    EOSABS 0 17 03/26/2015 09:13 AM       Current Medications     Current Outpatient Medications:     amLODIPine (NORVASC) 5 mg tablet, Take 1 tablet (5 mg total) by mouth daily, Disp: 90 tablet, Rfl: 3    ammonium lactate (LAC-HYDRIN) 12 % cream, Apply topically as needed for dry skin (Patient not taking: Reported on 7/15/2019), Disp: 385 g, Rfl: 0    aspirin 81 MG tablet, Take 1 tablet by mouth daily, Disp: , Rfl:     atorvastatin (LIPITOR) 10 mg tablet, TAKE 1 TABLET (10 MG TOTAL) BY MOUTH DAILY, Disp: 90 tablet, Rfl: 1    Calcium Carbonate (CALTRATE 600) 1500 (600 Ca) MG TABS, Take by mouth, Disp: , Rfl:     carvedilol (COREG) 25 mg tablet, Take 1 tablet (25 mg total) by mouth 2 (two) times a day, Disp: 180 tablet, Rfl: 3    Cholecalciferol (VITAMIN D3) 2000 units capsule, Take 1 tablet by mouth daily, Disp: , Rfl:     EPINEPHrine (EPIPEN 2-LIVIA) 0 3 mg/0 3 mL SOAJ, Inject 0 3 mL (0 3 mg total) into a muscle once for 1 dose, Disp: 1 each, Rfl: 0    fluticasone (FLONASE) 50 mcg/act nasal spray, USE 1 SPRAY IN EACH NOSTRIL EVERY DAY, Disp: 48 mL, Rfl: 2    glimepiride (AMARYL) 4 mg tablet, Take 1 tablet (4 mg total) by mouth daily with breakfast, Disp: 180 tablet, Rfl: 1    glucosamine 500 MG CAPS capsule, Take 1 capsule by mouth daily , Disp: , Rfl:     hydrochlorothiazide (HYDRODIURIL) 25 mg tablet, TAKE 1 TABLET (25 MG TOTAL) BY MOUTH DAILY, Disp: 90 tablet, Rfl: 1    lisinopril (ZESTRIL) 40 mg tablet, TAKE 1 TABLET BY MOUTH TWO TIMES DAILY, Disp: 180 tablet, Rfl: 0    metFORMIN (GLUCOPHAGE-XR) 500 mg 24 hr tablet, TAKE 1 TABLET TWICE DAILY, Disp: , Rfl:     Multiple Vitamins-Minerals (WOMENS ONE DAILY) TABS, Take 1 tablet by mouth daily, Disp: , Rfl:     Omega-3 Fatty Acids (OMEGA-3 FISH OIL) 1000 MG CAPS, Take 1 capsule by mouth daily , Disp: , Rfl:     pseudoephedrine-codeine-guaifenesin (MYTUSSIN DAC) 30- MG/5ML solution, Take 10 mL by mouth 4 (four) times a day as needed for allergies, Disp: 120 mL, Rfl: 0    spironolactone (ALDACTONE) 25 mg tablet, Take 1 tablet (25 mg total) by mouth daily, Disp: 90 tablet, Rfl: 0    VIRTUSSIN A/C 100-10 MG/5ML liquid, TK 10ML PO QID PRF ALLERGIES, Disp: , Rfl: 0    Health Maintenance     Health Maintenance   Topic Date Due    Depression Screening PHQ  1940    Medicare Annual Wellness Visit (AWV)  1940    SLP PLAN OF CARE  1940    CRC Screening: Colonoscopy  1940    DTaP,Tdap,and Td Vaccines (1 - Tdap) 09/13/1953    BMI: Followup Plan  09/13/1960    Fall Risk  09/13/2007    Pneumococcal Vaccine: 65+ Years (2 of 2 - PPSV23) 06/16/2017    Diabetic Foot Exam  08/03/2018    DM Eye Exam  06/21/2019    Influenza Vaccine  07/01/2019    HEMOGLOBIN A1C  01/24/2020    BMI: Adult  01/29/2021    Pneumococcal Vaccine: Pediatrics (0 to 5 Years) and At-Risk Patients (6 to 59 Years)  Aged Out    HIB Vaccine  Aged Out    Hepatitis B Vaccine  Aged Out    IPV Vaccine  Aged Out    Hepatitis A Vaccine  Aged Out    Meningococcal ACWY Vaccine  Aged Out    HPV Vaccine  Aged [employer] History   Administered Date(s) Administered    INFLUENZA 11/06/2007, 10/15/2010, 09/09/2011    Influenza Split High Dose Preservative Free IM 10/31/2014, 10/01/2015, 10/17/2016, 10/09/2017    Influenza TIV (IM) 11/07/2012    Pneumococcal Conjugate 13-Valent 06/16/2016    Pneumococcal Polysaccharide PPV23 09/01/2007    Td (adult), adsorbed 06/01/1998, 12/08/2009    Zoster 10/31/2014       Jerilyn Pepe Rafy GUILLERMO  Geriatric Medicine  2/21/2020 10:31 AM

## 2020-02-21 NOTE — ASSESSMENT & PLAN NOTE
01/2020  Right breast:  No mammographic evidence of malignancy  No suspicious findings on targeted retroareolar ultrasound  Clinical examination is recommended    If there are changes of the nipple concerning for Paget's disease or a suspicious palpable mass then referral to a breast surgeon and possible biopsy are recommended        Left breast:  No mammographic evidence of malignancy       Repeat mammogram in one year

## 2020-03-17 DIAGNOSIS — R06.02 SHORTNESS OF BREATH: Primary | ICD-10-CM

## 2020-03-26 LAB
CREAT ?TM UR-SCNC: 95.5 UMOL/L
EXT MICROALBUMIN URINE RANDOM: 0.9
MICROALBUMIN/CREAT UR: NORMAL MG/G{CREAT}

## 2020-04-01 ENCOUNTER — TELEPHONE (OUTPATIENT)
Dept: GERIATRICS | Facility: CLINIC | Age: 80
End: 2020-04-01

## 2020-04-06 ENCOUNTER — TELEMEDICINE (OUTPATIENT)
Dept: GERIATRICS | Facility: CLINIC | Age: 80
End: 2020-04-06
Payer: MEDICARE

## 2020-04-06 DIAGNOSIS — E11.21 CONTROLLED TYPE 2 DIABETES MELLITUS WITH DIABETIC NEPHROPATHY, WITHOUT LONG-TERM CURRENT USE OF INSULIN (HCC): Primary | ICD-10-CM

## 2020-04-06 DIAGNOSIS — E11.8 TYPE 2 DIABETES MELLITUS WITH COMPLICATION, WITHOUT LONG-TERM CURRENT USE OF INSULIN (HCC): ICD-10-CM

## 2020-04-06 DIAGNOSIS — N18.30 CRF (CHRONIC RENAL FAILURE), STAGE 3 (MODERATE) (HCC): ICD-10-CM

## 2020-04-06 DIAGNOSIS — I10 ESSENTIAL HYPERTENSION: ICD-10-CM

## 2020-04-06 PROCEDURE — 99214 OFFICE O/P EST MOD 30 MIN: CPT | Performed by: INTERNAL MEDICINE

## 2020-04-07 LAB — HBA1C MFR BLD HPLC: 7.6 %

## 2020-04-10 DIAGNOSIS — E11.8 TYPE 2 DIABETES MELLITUS WITH COMPLICATION, WITHOUT LONG-TERM CURRENT USE OF INSULIN (HCC): Primary | ICD-10-CM

## 2020-05-14 DIAGNOSIS — I10 HYPERTENSION, UNSPECIFIED TYPE: ICD-10-CM

## 2020-05-19 RX ORDER — SPIRONOLACTONE 25 MG/1
25 TABLET ORAL DAILY
Qty: 90 TABLET | Refills: 3 | Status: SHIPPED | OUTPATIENT
Start: 2020-05-19 | End: 2021-06-07 | Stop reason: SDUPTHER

## 2020-05-19 RX ORDER — LISINOPRIL 40 MG/1
40 TABLET ORAL 2 TIMES DAILY
Qty: 180 TABLET | Refills: 3 | Status: SHIPPED | OUTPATIENT
Start: 2020-05-19

## 2020-06-04 ENCOUNTER — OFFICE VISIT (OUTPATIENT)
Dept: PODIATRY | Facility: CLINIC | Age: 80
End: 2020-06-04
Payer: MEDICARE

## 2020-06-04 VITALS — WEIGHT: 184 LBS | BODY MASS INDEX: 36.12 KG/M2 | HEIGHT: 60 IN

## 2020-06-04 DIAGNOSIS — M21.619 BUNION: ICD-10-CM

## 2020-06-04 DIAGNOSIS — E11.40 TYPE 2 DIABETES MELLITUS WITH DIABETIC NEUROPATHY, WITHOUT LONG-TERM CURRENT USE OF INSULIN (HCC): Primary | ICD-10-CM

## 2020-06-04 DIAGNOSIS — M21.42 PES PLANUS OF BOTH FEET: ICD-10-CM

## 2020-06-04 DIAGNOSIS — M21.41 PES PLANUS OF BOTH FEET: ICD-10-CM

## 2020-06-04 DIAGNOSIS — B35.1 ONYCHOMYCOSIS: ICD-10-CM

## 2020-06-04 PROCEDURE — 99203 OFFICE O/P NEW LOW 30 MIN: CPT | Performed by: PODIATRIST

## 2020-06-04 PROCEDURE — 11721 DEBRIDE NAIL 6 OR MORE: CPT | Performed by: PODIATRIST

## 2020-07-15 DIAGNOSIS — E11.21 MICROALBUMINURIC DIABETIC NEPHROPATHY (HCC): ICD-10-CM

## 2020-07-15 DIAGNOSIS — E11.8 TYPE 2 DIABETES MELLITUS WITH COMPLICATION, WITHOUT LONG-TERM CURRENT USE OF INSULIN (HCC): Primary | ICD-10-CM

## 2020-07-16 LAB — HBA1C MFR BLD HPLC: 7 %

## 2020-07-20 ENCOUNTER — OFFICE VISIT (OUTPATIENT)
Dept: GERIATRICS | Facility: CLINIC | Age: 80
End: 2020-07-20
Payer: MEDICARE

## 2020-07-20 VITALS
BODY MASS INDEX: 36.22 KG/M2 | SYSTOLIC BLOOD PRESSURE: 120 MMHG | HEIGHT: 60 IN | RESPIRATION RATE: 16 BRPM | HEART RATE: 65 BPM | DIASTOLIC BLOOD PRESSURE: 70 MMHG | WEIGHT: 184.5 LBS | TEMPERATURE: 98.5 F | OXYGEN SATURATION: 98 %

## 2020-07-20 DIAGNOSIS — E11.21 CONTROLLED TYPE 2 DIABETES MELLITUS WITH DIABETIC NEPHROPATHY, WITHOUT LONG-TERM CURRENT USE OF INSULIN (HCC): Primary | ICD-10-CM

## 2020-07-20 DIAGNOSIS — E78.2 MIXED HYPERLIPIDEMIA: ICD-10-CM

## 2020-07-20 DIAGNOSIS — E11.21 MICROALBUMINURIC DIABETIC NEPHROPATHY (HCC): ICD-10-CM

## 2020-07-20 DIAGNOSIS — I10 ESSENTIAL HYPERTENSION: ICD-10-CM

## 2020-07-20 DIAGNOSIS — N18.30 CKD (CHRONIC KIDNEY DISEASE) STAGE 3, GFR 30-59 ML/MIN (HCC): ICD-10-CM

## 2020-07-20 DIAGNOSIS — N18.30 CRF (CHRONIC RENAL FAILURE), STAGE 3 (MODERATE) (HCC): ICD-10-CM

## 2020-07-20 PROCEDURE — 99214 OFFICE O/P EST MOD 30 MIN: CPT | Performed by: INTERNAL MEDICINE

## 2020-07-20 RX ORDER — GLIMEPIRIDE 4 MG/1
TABLET ORAL
COMMUNITY
Start: 2020-05-05 | End: 2020-09-25 | Stop reason: SDUPTHER

## 2020-07-20 RX ORDER — EPINEPHRINE 0.3 MG/.3ML
1 INJECTION SUBCUTANEOUS
COMMUNITY

## 2020-07-20 RX ORDER — GLIMEPIRIDE 4 MG/1
2 TABLET ORAL
COMMUNITY
Start: 2020-05-06 | End: 2021-08-17 | Stop reason: SDUPTHER

## 2020-07-20 NOTE — ASSESSMENT & PLAN NOTE
Lab Results   Component Value Date    HGBA1C 7 0 (H) 07/16/2020    Patient currently on metformin 500 mg twice daily her hemoglobin A1c currently is 7 which shows good control of her diabetes  We try to keep them between 6 5 and 7 5 for hemoglobin A1c  Will try to get her a glucometer and degenerated use it so she can have better knowledge of her glycemic control

## 2020-07-20 NOTE — PATIENT INSTRUCTIONS
1 -will order diabetic blood work prior to her next visit  2  -patient should continue to exercise regularly  3 -cut back on carbohydrates breads, pastas

## 2020-07-20 NOTE — ASSESSMENT & PLAN NOTE
Patient is cholesterol is 174 her LDL is 68 which is excellent  Her triglycerides are up at 3:31 a m  Which we would expect because of her diabetes once her diabetes gets better controlled her triglycerides will come down    In fact she should always fast 12 hours before we do a lipid panel

## 2020-07-20 NOTE — ASSESSMENT & PLAN NOTE
Patient's blood pressure is under excellent control at present patient is on lisinopril 40 mg twice daily and also on spironolactone within the potassium retaining medication her potassium on recent blood work was 5 1 which is still within normal range    So

## 2020-07-20 NOTE — PROGRESS NOTES
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, without long-term current use of insulin ([facility]) - Primary       Lab Results   Component Value Date    HGBA1C 7 0 (H) 07/16/2020    Patient currently on metformin 500 mg twice daily her hemoglobin A1c currently is 7 which shows good control of her diabetes  We try to keep them between 6 5 and 7 5 for hemoglobin A1c  Will try to get her a glucometer and degenerated use it so she can have better knowledge of her glycemic control  Relevant Medications    glimepiride (AMARYL) 4 mg tablet    glimepiride (AMARYL) 4 mg tablet       Cardiovascular and Mediastinum    Hypertension     Patient's blood pressure is under excellent control at present patient is on lisinopril 40 mg twice daily and also on spironolactone within the potassium retaining medication her potassium on recent blood work was 5 1 which is still within normal range  So         Relevant Medications    EPINEPHrine (EPIPEN) 0 3 mg/0 3 mL SOAJ       Genitourinary    CKD (chronic kidney disease) stage 3, GFR 30-59 ml/min ([facility])    CRF (chronic renal failure), stage 3 (moderate) ([facility])     Patient with history of chronic renal failure stage 3 her most recent GFR is 50 that is excellent            Other    Hyperlipidemia     Patient is cholesterol is 174 her LDL is 68 which is excellent  Her triglycerides are up at 3:31 a m   Which we would expect because of her diabetes once her diabetes gets better controlled her triglycerides will come down  In fact she should always fast 12 hours before we do a lipid panel               All medications and routine orders were reviewed and updated as needed  Plan discussed with: Patient    Chief Complaint     Chief Complaint   Patient presents with    Follow-up     f/u labs possible foot exam        History of Present Illness     Patient is a 53-year-old  female who enjoys all of her basic and instrumental activities of daily living  The patient lives independently she currently is being followed for history of hypertension, diabetes mellitus type 2, peripheral neuropathy, chronic renal failure stage 3, and albuminuria  The patient also has history of hyperlipidemia which appears to be well controlled at present except for her triglycerides which are probably high because of her diabetes  Patient's hemoglobin A1c is 7 showing fairly good control she was asked that she should follow-up with Ophthalmology on a yearly basis  The patient is back to exercising regularly I did advise her to cut back on her breads IV carbohydrates  The patient will be getting her urine test and I will recheck a whole diabetic panel again in 3 months time  If her neuropathy becomes more pronounced and begins to affective quality of life we can always start her on gabapentin  Her hypertension is under excellent control at present we recheck a CMP her potassium was 5 1  We do need to keep an eye on her potassium levels because the patient is both on lisinopril and spironolactone and she is beginning to have evidence of chronic renal failure stage 3 with a GFR of 50 so she would be at high risk of developing hyperkalemia  This will need to be followed          HISTORY:  Past Surgical History:   Procedure Laterality Date    OTHER SURGICAL HISTORY      Surgery unk     TONSILLECTOMY        Past Medical History:   Diagnosis Date    Abnormal mammogram     Acute sinusitis     Allergic reaction     Subsequent encounter, Resolved - 1/9/18    Allergic rhinitis 01/16/2012    Anaphylactic reaction     Other, Last assessed - 6/16/16    Anemia 01/16/2012    Ataxia     Cellulitis of great toe of right foot     Chronic allergic rhinitis     Unspec seasonality, unspec trigger;  Last assessed - 11/13/17    Contact dermatitis     Last assessed - 6/3/14    Dermatitis     Diabetes mellitus ([address] )     Disorder of tympanic membrane     Last assessed - 10/15/12    Dry skin     Last assessed - 4/15/14    Dysfunction of eustachian tube     Unspec laterality, Last assessed - 10/19/12    Ganglion cyst     Glaucoma     Hip pain, acute, unspecified laterality     Hyperlipidemia     Hypertension     Labyrinthitis 11/17/2011    Leg mass, left     Limb pain     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    Long term use of drug     Last assessed - 4/6/14    Mammogram abnormal     Last assessed - 4/11/14    Mass of thigh, left     Pain in ankle joint     Pes planus     Unspec laterality, Last assessed - 4/9/13    Shoulder pain, right     Vertigo 01/16/2012     Family History   Problem Relation Age of Onset    Heart attack Mother         Ac MI   [place] Lung cancer Father     Other Father         Cerebral Embolism    Anemia Sister     Glaucoma Sister     Hypertension Sister     Thyroid disease Sister     Diabetes Brother     Hypertension Brother     Other Brother         Cardiac Disorder    No Known Problems Daughter     No Known Problems Sister     No Known Problems Daughter     No Known Problems Maternal Aunt     No Known Problems Paternal Aunt      Social History     Socioeconomic History    Marital status: Single     Spouse name: Not on file    Number of children: Not on file    Years of education: Not on file    Highest education level: Not on file   Occupational History    Not on file   Social Needs    Financial resource strain: Not on file    Food insecurity:     Worry: Not on file     Inability: Not on file    Transportation needs:     Medical: Not on file     Non-medical: Not on file   Tobacco Use    Smoking status: Former Smoker     Last attempt to quit:      Years since quittin 5    Smokeless tobacco: Never Used   Substance and Sexual Activity    Alcohol use: Yes     Alcohol/week: 1 0 - 2 0 standard drinks     Types: 1 - 2 Glasses of wine per week     Comment: Social     Drug use: No    Sexual activity: Not on file   Lifestyle    Physical activity:     Days per week: Not on file     Minutes per session: Not on file    Stress: Not on file   Relationships    Social connections:     Talks on phone: Not on file     Gets together: Not on file     Attends Zoroastrian service: Not on file     Active member of club or organization: Not on file     Attends meetings of clubs or organizations: Not on file     Relationship status: Not on file    Intimate partner violence:     Fear of current or ex partner: Not on file     Emotionally abused: Not on file     Physically abused: Not on file     Forced sexual activity: Not on file   Other Topics Concern    Not on file   Social History Narrative    Advance directive on file    Always uses seat belt    Daily caffeine consumption, 1 serving a day    Drinks coffee - 1 cup per day    Exercises 3x per week            Allergies: Allergies   Allergen Reactions    Cortisone Hyperactivity    Bee Pollen Edema     Category: Allergy; Review of Systems     Review of Systems   Constitutional: Negative  HENT: Negative  Eyes: Negative  Respiratory: Negative  Cardiovascular: Negative  Gastrointestinal: Negative  Endocrine: Negative  Genitourinary: Negative  Musculoskeletal: Negative  Skin: Negative  Allergic/Immunologic: Negative  Neurological: Negative  Hematological: Negative  Psychiatric/Behavioral: Negative          PHQ-9 Depression Screening    PHQ-9: Frequency of the following problems over the past two weeks:              Medications and orders       Current Outpatient Medications:     amLODIPine (NORVASC) 5 mg tablet, Take 1 tablet (5 mg total) by mouth daily, Disp: 90 tablet, Rfl: 3    aspirin 81 MG tablet, Take 1 tablet by mouth daily, Disp: , Rfl:     atorvastatin (LIPITOR) 10 mg tablet, TAKE 1 TABLET (10 MG TOTAL) BY MOUTH DAILY, Disp: 90 tablet, Rfl: 1    Calcium Carbonate (CALTRATE 600) 1500 (600 Ca) MG TABS, Take by mouth, Disp: , Rfl:     carvedilol (COREG) 25 mg tablet, Take 1 tablet (25 mg total) by mouth 2 (two) times a day, Disp: 180 tablet, Rfl: 3    Cholecalciferol (VITAMIN D3) 2000 units capsule, Take 1 tablet by mouth daily, Disp: , Rfl:     EPINEPHrine (EPIPEN) 0 3 mg/0 3 mL SOAJ, Inject 1 Syringe into a muscle, Disp: , Rfl:     fluticasone (FLONASE) 50 mcg/act nasal spray, USE 1 SPRAY IN EACH NOSTRIL EVERY DAY, Disp: 48 mL, Rfl: 2    glimepiride (AMARYL) 4 mg tablet, TAKE 2 TABLETS BY MOUTH EVERY DAY WITH BREAKFAST, Disp: , Rfl:     glimepiride (AMARYL) 4 mg tablet, , Disp: , Rfl:     glucosamine 500 MG CAPS capsule, Take 1 capsule by mouth daily , Disp: , Rfl:     hydrochlorothiazide (HYDRODIURIL) 25 mg tablet, TAKE 1 TABLET (25 MG TOTAL) BY MOUTH DAILY, Disp: 90 tablet, Rfl: 1    lisinopril (ZESTRIL) 40 mg tablet, Take 1 tablet (40 mg total) by mouth 2 (two) times a day, Disp: 180 tablet, Rfl: 3    metFORMIN (GLUCOPHAGE-XR) 500 mg 24 hr tablet, TAKE 1 TABLET TWICE DAILY, Disp: , Rfl:     Multiple Vitamins-Minerals (WOMENS ONE DAILY) TABS, Take 1 tablet by mouth daily, Disp: , Rfl:     Omega-3 Fatty Acids (OMEGA-3 FISH OIL) 1000 MG CAPS, Take 1 capsule by mouth daily , Disp: , Rfl:     pseudoephedrine-codeine-guaifenesin (MYTUSSIN DAC) 30- MG/5ML solution, Take 10 mL by mouth 4 (four) times a day as needed for allergies, Disp: 120 mL, Rfl: 0    spironolactone (ALDACTONE) 25 mg tablet, Take 1 tablet (25 mg total) by mouth daily, Disp: 90 tablet, Rfl: 3    VIRTUSSIN A/C 100-10 MG/5ML liquid, TK 10ML PO QID PRF ALLERGIES, Disp: , Rfl: 0       Objective     Vitals:   Vitals:    07/20/20 1359   BP: 120/70   BP Location: Left arm   Patient Position: Sitting   Cuff Size: Standard   Pulse: 65   Resp: 16   Temp: 98 5 °F (36 9 °C)   TempSrc: Tympanic   SpO2: 98%   Weight: 83 7 kg (184 lb 8 oz)   Height: 5' (1 524 m)       Physical Exam   Constitutional: She appears well-developed and well-nourished  HENT:   Head: Normocephalic and atraumatic  Right Ear: External ear normal    Left Ear: External ear normal    Nose: Nose normal    Mouth/Throat: Oropharynx is clear and moist    Eyes: Pupils are equal, round, and reactive to light  Conjunctivae and EOM are normal    Neck: Normal range of motion  Neck supple  Cardiovascular: Normal rate, regular rhythm, normal heart sounds and intact distal pulses  No murmur heard  Pulmonary/Chest: Breath sounds normal  No respiratory distress  She has no wheezes  She has no rales  Abdominal: Soft  Bowel sounds are normal  There is no tenderness  Musculoskeletal: She exhibits no edema or tenderness  Neurological: She is alert  Skin: Skin is warm and dry  She is not diaphoretic  Psychiatric: She has a normal mood and affect  Her behavior is normal  Thought content normal    Vitals reviewed  Pertinent Laboratory/Diagnostic Studies: The following labs/studies were reviewed please see facility chart for details

## 2020-09-25 ENCOUNTER — TELEPHONE (OUTPATIENT)
Dept: GERIATRICS | Facility: CLINIC | Age: 80
End: 2020-09-25

## 2020-09-25 NOTE — TELEPHONE ENCOUNTER
Patient called questioning if she should be taking 1 or 2 glimiperide with breakfast  After checking in the chart, both orders are documented  Per Dr Danica Calzada, the patient should only be taking 1 tablet daily and this was communicated to the patient  She understands and verbalized agreement

## 2020-10-19 ENCOUNTER — OFFICE VISIT (OUTPATIENT)
Dept: GERIATRICS | Facility: CLINIC | Age: 80
End: 2020-10-19
Payer: MEDICARE

## 2020-10-19 ENCOUNTER — APPOINTMENT (OUTPATIENT)
Dept: RADIOLOGY | Age: 80
End: 2020-10-19
Payer: MEDICARE

## 2020-10-19 VITALS
RESPIRATION RATE: 16 BRPM | HEART RATE: 65 BPM | WEIGHT: 183.3 LBS | DIASTOLIC BLOOD PRESSURE: 100 MMHG | HEIGHT: 60 IN | BODY MASS INDEX: 35.99 KG/M2 | TEMPERATURE: 97 F | OXYGEN SATURATION: 98 % | SYSTOLIC BLOOD PRESSURE: 150 MMHG

## 2020-10-19 DIAGNOSIS — G47.30 SLEEP APNEA, UNSPECIFIED TYPE: ICD-10-CM

## 2020-10-19 DIAGNOSIS — N18.30 STAGE 3 CHRONIC KIDNEY DISEASE, UNSPECIFIED WHETHER STAGE 3A OR 3B CKD (HCC): ICD-10-CM

## 2020-10-19 DIAGNOSIS — M25.552 LEFT HIP PAIN: ICD-10-CM

## 2020-10-19 DIAGNOSIS — E11.22 CONTROLLED TYPE 2 DIABETES MELLITUS WITH CHRONIC KIDNEY DISEASE, WITHOUT LONG-TERM CURRENT USE OF INSULIN, UNSPECIFIED CKD STAGE (HCC): Primary | ICD-10-CM

## 2020-10-19 DIAGNOSIS — G47.33 SLEEP APNEA, OBSTRUCTIVE: ICD-10-CM

## 2020-10-19 DIAGNOSIS — I10 ESSENTIAL HYPERTENSION: ICD-10-CM

## 2020-10-19 PROCEDURE — 73502 X-RAY EXAM HIP UNI 2-3 VIEWS: CPT

## 2020-10-19 PROCEDURE — 99214 OFFICE O/P EST MOD 30 MIN: CPT | Performed by: INTERNAL MEDICINE

## 2020-11-09 ENCOUNTER — OFFICE VISIT (OUTPATIENT)
Dept: GERIATRICS | Facility: CLINIC | Age: 80
End: 2020-11-09
Payer: MEDICARE

## 2020-11-09 VITALS
SYSTOLIC BLOOD PRESSURE: 132 MMHG | HEART RATE: 73 BPM | DIASTOLIC BLOOD PRESSURE: 84 MMHG | TEMPERATURE: 98.3 F | BODY MASS INDEX: 36.01 KG/M2 | HEIGHT: 60 IN | WEIGHT: 183.4 LBS | OXYGEN SATURATION: 99 %

## 2020-11-09 DIAGNOSIS — E11.22 CONTROLLED TYPE 2 DIABETES MELLITUS WITH CHRONIC KIDNEY DISEASE, WITHOUT LONG-TERM CURRENT USE OF INSULIN, UNSPECIFIED CKD STAGE (HCC): ICD-10-CM

## 2020-11-09 DIAGNOSIS — M25.552 HIP PAIN, LEFT: ICD-10-CM

## 2020-11-09 DIAGNOSIS — G47.9 SLEEP DISTURBANCE: ICD-10-CM

## 2020-11-09 DIAGNOSIS — N18.32 STAGE 3B CHRONIC KIDNEY DISEASE (HCC): ICD-10-CM

## 2020-11-09 DIAGNOSIS — I10 ESSENTIAL HYPERTENSION: Primary | ICD-10-CM

## 2020-11-09 PROCEDURE — 99214 OFFICE O/P EST MOD 30 MIN: CPT | Performed by: INTERNAL MEDICINE

## 2020-11-10 ENCOUNTER — OFFICE VISIT (OUTPATIENT)
Dept: DERMATOLOGY | Facility: CLINIC | Age: 80
End: 2020-11-10
Payer: MEDICARE

## 2020-11-10 VITALS — WEIGHT: 185 LBS | HEIGHT: 61 IN | BODY MASS INDEX: 34.93 KG/M2 | TEMPERATURE: 99.5 F

## 2020-11-10 DIAGNOSIS — L85.3 XEROSIS OF SKIN: ICD-10-CM

## 2020-11-10 DIAGNOSIS — L82.1 SEBORRHEIC KERATOSIS: Primary | ICD-10-CM

## 2020-11-10 PROCEDURE — 99213 OFFICE O/P EST LOW 20 MIN: CPT | Performed by: DERMATOLOGY

## 2020-11-12 DIAGNOSIS — I10 HYPERTENSION, UNSPECIFIED TYPE: ICD-10-CM

## 2020-11-15 RX ORDER — CARVEDILOL 25 MG/1
25 TABLET ORAL 2 TIMES DAILY
Qty: 180 TABLET | Refills: 3 | Status: SHIPPED | OUTPATIENT
Start: 2020-11-15 | End: 2022-02-21 | Stop reason: SDUPTHER

## 2020-11-17 ENCOUNTER — OFFICE VISIT (OUTPATIENT)
Dept: OBGYN CLINIC | Facility: HOSPITAL | Age: 80
End: 2020-11-17
Attending: INTERNAL MEDICINE
Payer: MEDICARE

## 2020-11-17 VITALS
SYSTOLIC BLOOD PRESSURE: 130 MMHG | WEIGHT: 185 LBS | DIASTOLIC BLOOD PRESSURE: 71 MMHG | HEIGHT: 61 IN | HEART RATE: 69 BPM | BODY MASS INDEX: 34.93 KG/M2

## 2020-11-17 DIAGNOSIS — M76.32 ILIOTIBIAL BAND SYNDROME OF LEFT SIDE: Primary | ICD-10-CM

## 2020-11-17 DIAGNOSIS — M70.62 TROCHANTERIC BURSITIS OF LEFT HIP: ICD-10-CM

## 2020-11-17 DIAGNOSIS — M47.818 ARTHROPATHY OF LEFT SACROILIAC JOINT: ICD-10-CM

## 2020-11-17 DIAGNOSIS — M25.552 LEFT HIP PAIN: ICD-10-CM

## 2020-11-17 PROCEDURE — 99213 OFFICE O/P EST LOW 20 MIN: CPT | Performed by: ORTHOPAEDIC SURGERY

## 2020-11-22 NOTE — ASSESSMENT & PLAN NOTE
Lab Results   Component Value Date    HGBA1C 7 0 (H) 07/16/2020    Awaiting results of her urine test she does have it at home and will get that done  non-verbal indicators of pain/discomfort absent

## 2020-12-04 ENCOUNTER — TELEPHONE (OUTPATIENT)
Dept: OBGYN CLINIC | Facility: HOSPITAL | Age: 80
End: 2020-12-04

## 2020-12-09 ENCOUNTER — APPOINTMENT (EMERGENCY)
Dept: RADIOLOGY | Facility: HOSPITAL | Age: 80
End: 2020-12-09
Payer: MEDICARE

## 2020-12-09 ENCOUNTER — HOSPITAL ENCOUNTER (EMERGENCY)
Facility: HOSPITAL | Age: 80
Discharge: HOME | End: 2020-12-09
Attending: EMERGENCY MEDICINE | Admitting: EMERGENCY MEDICINE
Payer: MEDICARE

## 2020-12-09 VITALS
WEIGHT: 184.97 LBS | TEMPERATURE: 98.8 F | DIASTOLIC BLOOD PRESSURE: 62 MMHG | SYSTOLIC BLOOD PRESSURE: 140 MMHG | OXYGEN SATURATION: 98 % | HEART RATE: 62 BPM | RESPIRATION RATE: 17 BRPM | BODY MASS INDEX: 34.95 KG/M2

## 2020-12-09 DIAGNOSIS — R10.9 ABDOMINAL PAIN: ICD-10-CM

## 2020-12-09 DIAGNOSIS — K52.9 COLITIS: Primary | ICD-10-CM

## 2020-12-09 DIAGNOSIS — K92.1 FRANK BLOOD IN STOOL: Primary | ICD-10-CM

## 2020-12-09 LAB
ABO GROUP BLD: NORMAL
ABO GROUP BLD: NORMAL
ALBUMIN SERPL BCP-MCNC: 3.5 G/DL (ref 3.5–5)
ALP SERPL-CCNC: 79 U/L (ref 46–116)
ALT SERPL W P-5'-P-CCNC: 17 U/L (ref 12–78)
ANION GAP SERPL CALCULATED.3IONS-SCNC: 8 MMOL/L (ref 4–13)
AST SERPL W P-5'-P-CCNC: 7 U/L (ref 5–45)
BASOPHILS # BLD AUTO: 0.04 THOUSANDS/ΜL (ref 0–0.1)
BASOPHILS NFR BLD AUTO: 0 % (ref 0–1)
BILIRUB SERPL-MCNC: 0.53 MG/DL (ref 0.2–1)
BLD GP AB SCN SERPL QL: NEGATIVE
BUN SERPL-MCNC: 29 MG/DL (ref 5–25)
CALCIUM SERPL-MCNC: 10.3 MG/DL (ref 8.3–10.1)
CHLORIDE SERPL-SCNC: 99 MMOL/L (ref 100–108)
CO2 SERPL-SCNC: 27 MMOL/L (ref 21–32)
CREAT SERPL-MCNC: 1.19 MG/DL (ref 0.6–1.3)
EOSINOPHIL # BLD AUTO: 0.04 THOUSAND/ΜL (ref 0–0.61)
EOSINOPHIL NFR BLD AUTO: 0 % (ref 0–6)
ERYTHROCYTE [DISTWIDTH] IN BLOOD BY AUTOMATED COUNT: 15.2 % (ref 11.6–15.1)
FLUAV RNA RESP QL NAA+PROBE: NEGATIVE
FLUBV RNA RESP QL NAA+PROBE: NEGATIVE
GFR SERPL CREATININE-BSD FRML MDRD: 44 ML/MIN/1.73SQ M
GLUCOSE SERPL-MCNC: 123 MG/DL (ref 65–140)
GLUCOSE SERPL-MCNC: 152 MG/DL (ref 65–140)
HCT VFR BLD AUTO: 40.8 % (ref 34.8–46.1)
HGB BLD-MCNC: 12.8 G/DL (ref 11.5–15.4)
IMM GRANULOCYTES # BLD AUTO: 0.12 THOUSAND/UL (ref 0–0.2)
IMM GRANULOCYTES NFR BLD AUTO: 1 % (ref 0–2)
LACTATE SERPL-SCNC: 1.4 MMOL/L (ref 0.5–2)
LIPASE SERPL-CCNC: 158 U/L (ref 73–393)
LYMPHOCYTES # BLD AUTO: 2.54 THOUSANDS/ΜL (ref 0.6–4.47)
LYMPHOCYTES NFR BLD AUTO: 13 % (ref 14–44)
MCH RBC QN AUTO: 22.3 PG (ref 26.8–34.3)
MCHC RBC AUTO-ENTMCNC: 31.4 G/DL (ref 31.4–37.4)
MCV RBC AUTO: 71 FL (ref 82–98)
MONOCYTES # BLD AUTO: 1.35 THOUSAND/ΜL (ref 0.17–1.22)
MONOCYTES NFR BLD AUTO: 7 % (ref 4–12)
NEUTROPHILS # BLD AUTO: 14.8 THOUSANDS/ΜL (ref 1.85–7.62)
NEUTS SEG NFR BLD AUTO: 79 % (ref 43–75)
NRBC BLD AUTO-RTO: 0 /100 WBCS
PLATELET # BLD AUTO: 285 THOUSANDS/UL (ref 149–390)
PMV BLD AUTO: 10 FL (ref 8.9–12.7)
POTASSIUM SERPL-SCNC: 4.2 MMOL/L (ref 3.5–5.3)
PROT SERPL-MCNC: 7.7 G/DL (ref 6.4–8.2)
RBC # BLD AUTO: 5.73 MILLION/UL (ref 3.81–5.12)
RH BLD: POSITIVE
RH BLD: POSITIVE
RSV RNA RESP QL NAA+PROBE: NEGATIVE
SARS-COV-2 RNA RESP QL NAA+PROBE: NEGATIVE
SODIUM SERPL-SCNC: 134 MMOL/L (ref 136–145)
SPECIMEN EXPIRATION DATE: NORMAL
WBC # BLD AUTO: 18.89 THOUSAND/UL (ref 4.31–10.16)

## 2020-12-09 PROCEDURE — 0241U HB NFCT DS VIR RESP RNA 4 TRGT: CPT | Performed by: EMERGENCY MEDICINE

## 2020-12-09 PROCEDURE — 83605 ASSAY OF LACTIC ACID: CPT | Performed by: EMERGENCY MEDICINE

## 2020-12-09 PROCEDURE — 96360 HYDRATION IV INFUSION INIT: CPT

## 2020-12-09 PROCEDURE — 86901 BLOOD TYPING SEROLOGIC RH(D): CPT | Performed by: EMERGENCY MEDICINE

## 2020-12-09 PROCEDURE — 83690 ASSAY OF LIPASE: CPT | Performed by: EMERGENCY MEDICINE

## 2020-12-09 PROCEDURE — 85025 COMPLETE CBC W/AUTO DIFF WBC: CPT | Performed by: EMERGENCY MEDICINE

## 2020-12-09 PROCEDURE — 86900 BLOOD TYPING SEROLOGIC ABO: CPT | Performed by: EMERGENCY MEDICINE

## 2020-12-09 PROCEDURE — 80053 COMPREHEN METABOLIC PANEL: CPT | Performed by: EMERGENCY MEDICINE

## 2020-12-09 PROCEDURE — 99284 EMERGENCY DEPT VISIT MOD MDM: CPT

## 2020-12-09 PROCEDURE — G1004 CDSM NDSC: HCPCS

## 2020-12-09 PROCEDURE — 74174 CTA ABD&PLVS W/CONTRAST: CPT

## 2020-12-09 PROCEDURE — 82948 REAGENT STRIP/BLOOD GLUCOSE: CPT

## 2020-12-09 PROCEDURE — 99284 EMERGENCY DEPT VISIT MOD MDM: CPT | Performed by: EMERGENCY MEDICINE

## 2020-12-09 PROCEDURE — 36415 COLL VENOUS BLD VENIPUNCTURE: CPT | Performed by: EMERGENCY MEDICINE

## 2020-12-09 PROCEDURE — 86850 RBC ANTIBODY SCREEN: CPT | Performed by: EMERGENCY MEDICINE

## 2020-12-09 PROCEDURE — 96361 HYDRATE IV INFUSION ADD-ON: CPT

## 2020-12-09 RX ORDER — AMOXICILLIN AND CLAVULANATE POTASSIUM 875; 125 MG/1; MG/1
1 TABLET, FILM COATED ORAL ONCE
Status: COMPLETED | OUTPATIENT
Start: 2020-12-09 | End: 2020-12-09

## 2020-12-09 RX ORDER — AMOXICILLIN AND CLAVULANATE POTASSIUM 875; 125 MG/1; MG/1
1 TABLET, FILM COATED ORAL 3 TIMES DAILY
Qty: 21 TABLET | Refills: 0 | Status: SHIPPED | OUTPATIENT
Start: 2020-12-09 | End: 2020-12-16

## 2020-12-09 RX ORDER — DICYCLOMINE HCL 20 MG
20 TABLET ORAL ONCE
Status: DISCONTINUED | OUTPATIENT
Start: 2020-12-09 | End: 2020-12-09 | Stop reason: HOSPADM

## 2020-12-09 RX ORDER — CEFAZOLIN SODIUM 2 G/50ML
2000 SOLUTION INTRAVENOUS EVERY 8 HOURS
Status: DISCONTINUED | OUTPATIENT
Start: 2020-12-09 | End: 2020-12-09

## 2020-12-09 RX ADMIN — SODIUM CHLORIDE 1000 ML: 0.9 INJECTION, SOLUTION INTRAVENOUS at 17:17

## 2020-12-09 RX ADMIN — IOHEXOL 100 ML: 350 INJECTION, SOLUTION INTRAVENOUS at 18:35

## 2020-12-09 RX ADMIN — AMOXICILLIN AND CLAVULANATE POTASSIUM 1 TABLET: 875; 125 TABLET, FILM COATED ORAL at 21:19

## 2020-12-11 ENCOUNTER — TELEPHONE (OUTPATIENT)
Dept: OTHER | Facility: OTHER | Age: 80
End: 2020-12-11

## 2020-12-12 ENCOUNTER — TELEPHONE (OUTPATIENT)
Dept: OTHER | Facility: OTHER | Age: 80
End: 2020-12-12

## 2020-12-12 PROBLEM — S99.922A FOOT INJURY, LEFT, INITIAL ENCOUNTER: Status: RESOLVED | Noted: 2018-04-16 | Resolved: 2020-12-12

## 2020-12-12 PROBLEM — J06.9 URI (UPPER RESPIRATORY INFECTION): Status: RESOLVED | Noted: 2018-02-28 | Resolved: 2020-12-12

## 2020-12-12 PROBLEM — L30.9 DERMATITIS: Status: RESOLVED | Noted: 2019-12-03 | Resolved: 2020-12-12

## 2020-12-12 PROBLEM — K52.9 COLITIS, ACUTE: Status: ACTIVE | Noted: 2020-12-12

## 2020-12-12 PROBLEM — Z79.899 POLYPHARMACY: Status: RESOLVED | Noted: 2019-10-08 | Resolved: 2020-12-12

## 2020-12-12 PROBLEM — G47.00 INSOMNIA: Status: RESOLVED | Noted: 2020-02-21 | Resolved: 2020-12-12

## 2020-12-12 PROBLEM — N18.30 CKD (CHRONIC KIDNEY DISEASE) STAGE 3, GFR 30-59 ML/MIN (HCC): Chronic | Status: ACTIVE | Noted: 2019-07-26

## 2020-12-12 PROBLEM — R25.2 MUSCLE CRAMPS: Status: RESOLVED | Noted: 2018-04-05 | Resolved: 2020-12-12

## 2020-12-12 PROBLEM — R19.7 DIARRHEA: Status: RESOLVED | Noted: 2018-10-24 | Resolved: 2020-12-12

## 2020-12-12 PROBLEM — G47.9 SLEEP DISTURBANCE: Status: RESOLVED | Noted: 2020-10-19 | Resolved: 2020-12-12

## 2020-12-12 PROBLEM — K52.9 COLITIS, ACUTE: Chronic | Status: ACTIVE | Noted: 2020-12-12

## 2020-12-12 PROBLEM — R09.82 POST-NASAL DRIP: Status: RESOLVED | Noted: 2019-07-12 | Resolved: 2020-12-12
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
## 2020-12-14 ENCOUNTER — OFFICE VISIT (OUTPATIENT)
Dept: GERIATRICS | Facility: CLINIC | Age: 80
End: 2020-12-14
Payer: MEDICARE

## 2020-12-14 VITALS
BODY MASS INDEX: 33.4 KG/M2 | HEART RATE: 67 BPM | WEIGHT: 176.9 LBS | OXYGEN SATURATION: 98 % | TEMPERATURE: 97.3 F | HEIGHT: 61 IN | DIASTOLIC BLOOD PRESSURE: 70 MMHG | SYSTOLIC BLOOD PRESSURE: 138 MMHG

## 2020-12-14 DIAGNOSIS — N18.32 STAGE 3B CHRONIC KIDNEY DISEASE (HCC): Chronic | ICD-10-CM

## 2020-12-14 DIAGNOSIS — E11.22 CONTROLLED TYPE 2 DIABETES MELLITUS WITH CHRONIC KIDNEY DISEASE, WITHOUT LONG-TERM CURRENT USE OF INSULIN, UNSPECIFIED CKD STAGE (HCC): Chronic | ICD-10-CM

## 2020-12-14 DIAGNOSIS — I10 ESSENTIAL HYPERTENSION: ICD-10-CM

## 2020-12-14 DIAGNOSIS — K52.9 COLITIS, ACUTE: Primary | Chronic | ICD-10-CM

## 2020-12-14 PROCEDURE — 99213 OFFICE O/P EST LOW 20 MIN: CPT | Performed by: NURSE PRACTITIONER

## 2020-12-14 RX ORDER — SACCHAROMYCES BOULARDII 250 MG
250 CAPSULE ORAL 2 TIMES DAILY
Qty: 50 CAPSULE | Refills: 0 | Status: SHIPPED | OUTPATIENT
Start: 2020-12-14

## 2020-12-15 ENCOUNTER — TELEPHONE (OUTPATIENT)
Dept: GERIATRICS | Age: 80
End: 2020-12-15

## 2020-12-15 ENCOUNTER — TELEPHONE (OUTPATIENT)
Dept: OBGYN CLINIC | Facility: HOSPITAL | Age: 80
End: 2020-12-15

## 2020-12-21 ENCOUNTER — TELEMEDICINE (OUTPATIENT)
Dept: GERIATRICS | Facility: CLINIC | Age: 80
End: 2020-12-21
Payer: MEDICARE

## 2020-12-21 DIAGNOSIS — K52.9 COLITIS, ACUTE: Primary | Chronic | ICD-10-CM

## 2020-12-21 DIAGNOSIS — I10 ESSENTIAL HYPERTENSION: ICD-10-CM

## 2020-12-21 PROCEDURE — 99442 PR PHYS/QHP TELEPHONE EVALUATION 11-20 MIN: CPT | Performed by: NURSE PRACTITIONER

## 2020-12-23 ENCOUNTER — TELEPHONE (OUTPATIENT)
Dept: GERIATRICS | Facility: CLINIC | Age: 80
End: 2020-12-23

## 2020-12-24 ENCOUNTER — TELEPHONE (OUTPATIENT)
Dept: GERIATRICS | Facility: CLINIC | Age: 80
End: 2020-12-24

## 2020-12-24 DIAGNOSIS — E11.22 CONTROLLED TYPE 2 DIABETES MELLITUS WITH CHRONIC KIDNEY DISEASE, WITHOUT LONG-TERM CURRENT USE OF INSULIN, UNSPECIFIED CKD STAGE (HCC): Primary | Chronic | ICD-10-CM

## 2020-12-28 DIAGNOSIS — E11.22 CONTROLLED TYPE 2 DIABETES MELLITUS WITH CHRONIC KIDNEY DISEASE, WITHOUT LONG-TERM CURRENT USE OF INSULIN, UNSPECIFIED CKD STAGE (HCC): Primary | ICD-10-CM

## 2020-12-28 DIAGNOSIS — K52.9 COLITIS, ACUTE: ICD-10-CM

## 2020-12-31 ENCOUNTER — TELEMEDICINE (OUTPATIENT)
Dept: GERIATRICS | Facility: CLINIC | Age: 80
End: 2020-12-31
Payer: MEDICARE

## 2020-12-31 VITALS
OXYGEN SATURATION: 98 % | HEIGHT: 61 IN | TEMPERATURE: 97.3 F | HEART RATE: 72 BPM | SYSTOLIC BLOOD PRESSURE: 108 MMHG | DIASTOLIC BLOOD PRESSURE: 60 MMHG | WEIGHT: 176.3 LBS | BODY MASS INDEX: 33.29 KG/M2

## 2020-12-31 DIAGNOSIS — N18.32 STAGE 3B CHRONIC KIDNEY DISEASE (HCC): Chronic | ICD-10-CM

## 2020-12-31 DIAGNOSIS — I10 ESSENTIAL HYPERTENSION: ICD-10-CM

## 2020-12-31 DIAGNOSIS — K52.9 COLITIS, ACUTE: Chronic | ICD-10-CM

## 2020-12-31 DIAGNOSIS — R53.81 PHYSICAL DECONDITIONING: ICD-10-CM

## 2020-12-31 DIAGNOSIS — E11.22 CONTROLLED TYPE 2 DIABETES MELLITUS WITH CHRONIC KIDNEY DISEASE, WITHOUT LONG-TERM CURRENT USE OF INSULIN, UNSPECIFIED CKD STAGE (HCC): Primary | Chronic | ICD-10-CM

## 2020-12-31 PROCEDURE — 99214 OFFICE O/P EST MOD 30 MIN: CPT | Performed by: NURSE PRACTITIONER

## 2021-01-14 LAB — HBA1C MFR BLD HPLC: 6.8 %

## 2021-01-19 ENCOUNTER — TELEMEDICINE (OUTPATIENT)
Dept: GASTROENTEROLOGY | Facility: CLINIC | Age: 81
End: 2021-01-19
Payer: MEDICARE

## 2021-01-19 ENCOUNTER — IMMUNIZATIONS (OUTPATIENT)
Dept: FAMILY MEDICINE CLINIC | Facility: HOSPITAL | Age: 81
End: 2021-01-19

## 2021-01-19 DIAGNOSIS — K52.9 COLITIS, ACUTE: Chronic | ICD-10-CM

## 2021-01-19 DIAGNOSIS — Z23 ENCOUNTER FOR IMMUNIZATION: Primary | ICD-10-CM

## 2021-01-19 DIAGNOSIS — K92.1 MELENA: ICD-10-CM

## 2021-01-19 DIAGNOSIS — K92.1 HEMATOCHEZIA: Primary | ICD-10-CM

## 2021-01-19 DIAGNOSIS — R19.8 ALTERNATING CONSTIPATION AND DIARRHEA: ICD-10-CM

## 2021-01-19 PROCEDURE — 0001A SARS-COV-2 / COVID-19 MRNA VACCINE (PFIZER-BIONTECH) 30 MCG: CPT

## 2021-01-19 PROCEDURE — 91300 SARS-COV-2 / COVID-19 MRNA VACCINE (PFIZER-BIONTECH) 30 MCG: CPT

## 2021-01-19 PROCEDURE — G2012 BRIEF CHECK IN BY MD/QHP: HCPCS | Performed by: INTERNAL MEDICINE

## 2021-01-19 NOTE — PATIENT INSTRUCTIONS
Colitis  - check blood tests for CBC (blood count), inflammation called CRP (will have office mail requisitions)  - schedule colonoscopy in 4 weeks  - discussed prep instructions with miralax prep (if co-pay is more than $30 give office a call and we can order different prep)  - reviewed details of procedure, risks and benefits of procedure    Blood in stool and black stool  - schedule EGD in 4 weeks  - reviewed details of procedure, risks and benefits of procedure    Alternating bowel habits  - start fiber supplement (metamucil/psyllium 5-7 grams per day) daily (over the counter, generic)    Follow up in 3-4 months

## 2021-01-19 NOTE — PROGRESS NOTES
[facility] - Brief telephone visit  Dinora Sanchez 68 y o  female MRN: [id number]  Encounter: [id number]          ASSESSMENT AND PLAN:      1  Colitis  - most likely due to infectious versus ischemic colitis, less likely IBD  - check blood tests for CBC (blood count), inflammation called CRP (will have office mail requisitions)  - schedule colonoscopy in 4 weeks (after 2nd dose of COVID vaccine)  - discussed prep instructions with miralax prep   - reviewed details of procedure, risks and benefits of procedure    2  Hematochezia and melena  - most likely related to colitis but will rule upper source with EGD  - schedule EGD 4 weeks  - reviewed details of procedure, risks and benefits of procedure    3  Alternating bowel habits  - start fiber supplement (metamucil/psyllium 5-7 grams per day) daily    4  Co-morbidities: DM, allergies, HTN, OA, osteopenia, CKD    Follow up in 3-4 months    ______________________________________________________________________    HPI:  45-year-old woman who presents to discuss recent ER visit for colitis  Co-morbidities: DM, allergies, HTN, OA, osteopenia, CKD    Telephone visit   [facility]  Had ER visit early December 2020 for diarrhea with blood ongoing 4 days  CT A/P showed acute colitis of descending colon possibly due to infectious vs ischemic colitis  Discharged from ER with antibiotics  Reports some abd cramps at home after coming home  Diarrhea for about 1-2 weeks, reports melena  Does report using peptobismol  No steroids  No diarrhea, hematochezia or melena now  Recently stool firmer 25-50%  Occ has cramps  Reports alternating bowel habits (constipation) prior to bloody diarrhea  Does not cramps       FH: no colon cancer  SH: no tobacco, alcohol abut 1x per week (stopped 2 mos ago), no illicits  Prior endoscopies:  Colonoscopy 8-10 years ago per pt- no polyps    REVIEW OF SYSTEMS:    CONSTITUTIONAL: Denies any fever, chills, rigors, and weight loss  HEENT: No earache or tinnitus  Denies hearing loss or visual disturbances  CARDIOVASCULAR: No chest pain or palpitations  RESPIRATORY: Denies any cough, hemoptysis, shortness of breath or dyspnea on exertion  GASTROINTESTINAL: As noted in the History of Present Illness  GENITOURINARY: No problems with urination  Denies any hematuria or dysuria  NEUROLOGIC: No dizziness or vertigo, denies headaches  MUSCULOSKELETAL: Denies any muscle or joint pain  SKIN: Denies skin rashes or itching  ENDOCRINE: Denies excessive thirst  Denies intolerance to heat or cold  PSYCHOSOCIAL: Denies depression or anxiety  Denies any recent memory loss  Historical Information   Past Medical History:   Diagnosis Date    Abnormal mammogram     Acute sinusitis     Allergic reaction     Subsequent encounter, Resolved - 1/9/18    Allergic rhinitis 01/16/2012    Anaphylactic reaction     Other, Last assessed - 6/16/16    Anemia 01/16/2012    Ataxia     Cellulitis of great toe of right foot     Chronic allergic rhinitis     Unspec seasonality, unspec trigger;  Last assessed - 11/13/17    Contact dermatitis     Last assessed - 6/3/14    Dermatitis     Diabetes mellitus ([facility] )     Disorder of tympanic membrane     Last assessed - 10/15/12    Dry skin     Last assessed - 4/15/14    Dysfunction of eustachian tube     Unspec laterality, Last assessed - 10/19/12    Ganglion cyst     Glaucoma     Hip pain, acute, unspecified laterality     Hyperlipidemia     Hypertension     Labyrinthitis 11/17/2011    Leg mass, left     Limb pain     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    Long term use of drug     Last assessed - 4/6/14    Mammogram abnormal     Last assessed - 4/11/14    Mass of thigh, left     Pain in ankle joint     Pes planus     Unspec laterality, Last assessed - 4/9/13    Shoulder pain, right     Vertigo 01/16/2012     Past Surgical History: Procedure Laterality Date    OTHER SURGICAL HISTORY      Surgery unk     TONSILLECTOMY       Social History   Social History     Substance and Sexual Activity   Alcohol Use Yes    Alcohol/week: 1 0 - 2 0 standard drinks    Types: 1 - 2 Glasses of wine per week    Frequency: 2-3 times a week    Comment: Social      Social History     Substance and Sexual Activity   Drug Use No     Social History     Tobacco Use   Smoking Status Former Smoker    Quit date:     Years since quittin 0   Smokeless Tobacco Never Used     Family History   Problem Relation Age of Onset    Heart attack Mother         Ac MI    Lung cancer Father     Other Father         Cerebral Embolism    Anemia Sister     Glaucoma Sister     Hypertension Sister     Thyroid disease Sister     Diabetes Brother     Hypertension Brother     Other Brother         Cardiac Disorder    No Known Problems Daughter     No Known Problems Sister     No Known Problems Daughter     No Known Problems Maternal Aunt     No Known Problems Paternal Aunt        Meds/Allergies       Current Outpatient Medications:     amLODIPine (NORVASC) 5 mg tablet    aspirin 81 MG tablet    atorvastatin (LIPITOR) 10 mg tablet    Calcium Carbonate (CALTRATE 600) 1500 (600 Ca) MG TABS    carvedilol (COREG) 25 mg tablet    Cholecalciferol (VITAMIN D3) 2000 units capsule    EPINEPHrine (EPIPEN) 0 3 mg/0 3 mL SOAJ    fluticasone (FLONASE) 50 mcg/act nasal spray    glimepiride (AMARYL) 4 mg tablet    glucosamine 500 MG CAPS capsule    hydrochlorothiazide (HYDRODIURIL) 25 mg tablet    lisinopril (ZESTRIL) 40 mg tablet    metFORMIN (GLUCOPHAGE-XR) 500 mg 24 hr tablet    Multiple Vitamins-Minerals (WOMENS ONE DAILY) TABS    Omega-3 Fatty Acids (OMEGA-3 FISH OIL) 1000 MG CAPS    pseudoephedrine-codeine-guaifenesin (MYTUSSIN DAC) 30- MG/5ML solution    saccharomyces boulardii (FLORASTOR) 250 mg capsule    spironolactone (ALDACTONE) 25 mg tablet   VIRTUSSIN A/C 100-10 MG/5ML liquid    Allergies   Allergen Reactions    Cortisone Hyperactivity    Bee Pollen Edema     Category: Allergy;            Objective     not currently breastfeeding  There is no height or weight on file to calculate BMI  PHYSICAL EXAM:      General Appearance:   Alert, cooperative, no distress   HEENT:   Normocephalic, atraumatic, anicteric  Neck:  Supple, symmetrical, trachea midline   Lungs:   Clear to auscultation bilaterally; no rales, rhonchi or wheezing; respirations unlabored    Heart[de-identified]   Regular rate and rhythm; no murmur, rub, or gallop  Abdomen:   Soft, non-tender, non-distended; normal bowel sounds; no masses, no organomegaly    Rectal:   Deferred   Neuro:   Alert, oriented, no gross deficits, normal strength and tone   Extremities:  No cyanosis, clubbing or edema    Psych:  Normal mood and affect    Skin:  No jaundice, rashes, or lesions    Lymph nodes:  No palpable cervical lymphadenopathy        Lab Results:   No visits with results within 1 Day(s) from this visit     Latest known visit with results is:   Admission on 12/09/2020, Discharged on 12/09/2020   Component Date Value    WBC 12/09/2020 18 89*    RBC 12/09/2020 5 73*    Hemoglobin 12/09/2020 12 8     Hematocrit 12/09/2020 40 8     MCV 12/09/2020 71*    MCH 12/09/2020 22 3*    MCHC 12/09/2020 31 4     RDW 12/09/2020 15 2*    MPV 12/09/2020 10 0     Platelets 91/32/9403 285     nRBC 12/09/2020 0     Neutrophils Relative 12/09/2020 79*    Immat GRANS % 12/09/2020 1     Lymphocytes Relative 12/09/2020 13*    Monocytes Relative 12/09/2020 7     Eosinophils Relative 12/09/2020 0     Basophils Relative 12/09/2020 0     Neutrophils Absolute 12/09/2020 14 80*    Immature Grans Absolute 12/09/2020 0 12     Lymphocytes Absolute 12/09/2020 2 54     Monocytes Absolute 12/09/2020 1 35*    Eosinophils Absolute 12/09/2020 0 04     Basophils Absolute 12/09/2020 0 04     Sodium 12/09/2020 134*    Potassium 12/09/2020 4 2     Chloride 12/09/2020 99*    CO2 12/09/2020 27     ANION GAP 12/09/2020 8     BUN 12/09/2020 29*    Creatinine 12/09/2020 1 19     Glucose 12/09/2020 152*    Calcium 12/09/2020 10 3*    AST 12/09/2020 7     ALT 12/09/2020 17     Alkaline Phosphatase 12/09/2020 79     Total Protein 12/09/2020 7 7     Albumin 12/09/2020 3 5     Total Bilirubin 12/09/2020 0 53     eGFR 12/09/2020 44     Lipase 12/09/2020 158     ABO Grouping 12/09/2020 B     Rh Factor 12/09/2020 Positive     Antibody Screen 12/09/2020 Negative     Specimen Expiration Date 12/09/2020 [id number]     SARS-CoV-2 12/09/2020 Negative     INFLUENZA A PCR 12/09/2020 Negative     INFLUENZA B PCR 12/09/2020 Negative     RSV PCR 12/09/2020 Negative     ABO Grouping 12/09/2020 B     Rh Factor 12/09/2020 Positive     LACTIC ACID 12/09/2020 1 4     POC Glucose 12/09/2020 123          Radiology Results:   No results found

## 2021-01-20 ENCOUNTER — TELEPHONE (OUTPATIENT)
Dept: GERIATRICS | Facility: CLINIC | Age: 81
End: 2021-01-20

## 2021-01-20 DIAGNOSIS — M25.552 HIP PAIN, ACUTE, LEFT: ICD-10-CM

## 2021-01-20 DIAGNOSIS — M25.552 HIP PAIN, CHRONIC, LEFT: Primary | ICD-10-CM

## 2021-01-20 DIAGNOSIS — G89.29 HIP PAIN, CHRONIC, LEFT: Primary | ICD-10-CM

## 2021-01-20 NOTE — TELEPHONE ENCOUNTER
Returned patient call  Patient states her physical therapist at [facility] recommended she see Pain Management  She was given info for [facility] (fax: [phone])  She has been receiving physical therapy for left hip pain however the therapist believes the pain may be coming from the lumbar region

## 2021-01-20 NOTE — TELEPHONE ENCOUNTER
Patient left voice mail message at [facility] office requesting a referral to pain management

## 2021-02-03 DIAGNOSIS — I10 ESSENTIAL HYPERTENSION: ICD-10-CM

## 2021-02-03 RX ORDER — AMLODIPINE BESYLATE 5 MG/1
5 TABLET ORAL DAILY
Qty: 90 TABLET | Refills: 3 | Status: SHIPPED | OUTPATIENT
Start: 2021-02-03 | End: 2022-02-21 | Stop reason: SDUPTHER

## 2021-02-09 ENCOUNTER — IMMUNIZATIONS (OUTPATIENT)
Dept: FAMILY MEDICINE CLINIC | Facility: HOSPITAL | Age: 81
End: 2021-02-09

## 2021-02-09 DIAGNOSIS — Z23 ENCOUNTER FOR IMMUNIZATION: Primary | ICD-10-CM

## 2021-02-09 PROCEDURE — 0002A SARS-COV-2 / COVID-19 MRNA VACCINE (PFIZER-BIONTECH) 30 MCG: CPT

## 2021-02-09 PROCEDURE — 91300 SARS-COV-2 / COVID-19 MRNA VACCINE (PFIZER-BIONTECH) 30 MCG: CPT

## 2021-02-24 ENCOUNTER — TELEPHONE (OUTPATIENT)
Dept: GERIATRICS | Age: 81
End: 2021-02-24

## 2021-02-24 NOTE — TELEPHONE ENCOUNTER
Patient called to reschedule 2/1/21 appointment which was cancelled due to weather  She stated she didn't know if she was supposed to call our office to reschedule or if our office was going to call her  Patient would also like to know if she needs bloodwork prior to her appointment  I explained I would check with Dr Bhupinder Garcia and call her back  Patient would also like her labs from 1/14/21 released by physician so she can view them in [facility]  Informed patient I would forward her concerns to my practice administrator

## 2021-03-03 ENCOUNTER — TELEPHONE (OUTPATIENT)
Dept: GASTROENTEROLOGY | Facility: CLINIC | Age: 81
End: 2021-03-03

## 2021-03-03 NOTE — TELEPHONE ENCOUNTER
Dr Saul Boogie wants patient to have a Colon/EGD at [facility] as per virtual apt from January     Prep to be used miralax/dulcolax

## 2021-03-04 ENCOUNTER — TELEPHONE (OUTPATIENT)
Dept: GASTROENTEROLOGY | Facility: AMBULARY SURGERY CENTER | Age: 81
End: 2021-03-04

## 2021-03-04 NOTE — TELEPHONE ENCOUNTER
Spoke to pt  Pt will call me back to schedule-pt is a resident at [facility] and needs to arrange transportation prior   Pt was given dates -she will call back to schedule per pt

## 2021-03-04 NOTE — TELEPHONE ENCOUNTER
Spoke to pt  She resides at [facility]  She will call me back to schedule due to transportation issues

## 2021-03-05 ENCOUNTER — TELEPHONE (OUTPATIENT)
Dept: GASTROENTEROLOGY | Facility: AMBULARY SURGERY CENTER | Age: 81
End: 2021-03-05

## 2021-03-05 ENCOUNTER — TELEPHONE (OUTPATIENT)
Dept: GERIATRICS | Age: 81
End: 2021-03-05

## 2021-03-05 NOTE — TELEPHONE ENCOUNTER
Spoke to pt   Pt is scheduled for colonoscopy/egd w/ dr Ben Brock at [facility] 4/19/2021/kathe/dulcolax/instructions mailed to pt

## 2021-03-12 NOTE — TELEPHONE ENCOUNTER
Spoke with patient and made aware that provider will decide on blood after she have seen her at her first visit

## 2021-03-30 ENCOUNTER — TELEPHONE (OUTPATIENT)
Dept: GERIATRICS | Age: 81
End: 2021-03-30

## 2021-03-30 NOTE — TELEPHONE ENCOUNTER
Has appointment on 4/5/2021  Says she was told she will need bloodwork before this visit  There is none on file at [facility] or pending  Please place orders for what you want and be sure it is faxed to [facility] for Thursday blood draw   [phone]

## 2021-03-30 NOTE — TELEPHONE ENCOUNTER
See divya SAME messages to Billie, The pt has been told she is a new opt to me multiple times and I will decide when I see her what labs she needs

## 2021-03-30 NOTE — TELEPHONE ENCOUNTER
Called Pauly Mena back and tried in many ways to explain about no labs until the actual examinations  She does not believe she was told this ever before  Shared that she has spoken with us and had appointments rescheduled- that is when the information was shared  She eventually expressed understanding and confirmed the location of our office

## 2021-04-05 ENCOUNTER — OFFICE VISIT (OUTPATIENT)
Dept: GERIATRICS | Age: 81
End: 2021-04-05
Payer: MEDICARE

## 2021-04-05 VITALS
BODY MASS INDEX: 34.32 KG/M2 | SYSTOLIC BLOOD PRESSURE: 128 MMHG | DIASTOLIC BLOOD PRESSURE: 82 MMHG | WEIGHT: 181.8 LBS | HEIGHT: 61 IN | RESPIRATION RATE: 20 BRPM | TEMPERATURE: 97.3 F | HEART RATE: 66 BPM | OXYGEN SATURATION: 98 %

## 2021-04-05 DIAGNOSIS — I10 ESSENTIAL HYPERTENSION: ICD-10-CM

## 2021-04-05 DIAGNOSIS — R25.2 HAND CRAMPS: ICD-10-CM

## 2021-04-05 DIAGNOSIS — M19.042 PRIMARY OSTEOARTHRITIS OF BOTH HANDS: ICD-10-CM

## 2021-04-05 DIAGNOSIS — N18.32 STAGE 3B CHRONIC KIDNEY DISEASE (HCC): Chronic | ICD-10-CM

## 2021-04-05 DIAGNOSIS — E78.5 HYPERLIPIDEMIA, UNSPECIFIED HYPERLIPIDEMIA TYPE: ICD-10-CM

## 2021-04-05 DIAGNOSIS — K52.9 COLITIS, ACUTE: Chronic | ICD-10-CM

## 2021-04-05 DIAGNOSIS — R04.0 EPISTAXIS: ICD-10-CM

## 2021-04-05 DIAGNOSIS — G89.29 CHRONIC BILATERAL LOW BACK PAIN WITHOUT SCIATICA: ICD-10-CM

## 2021-04-05 DIAGNOSIS — M54.50 CHRONIC BILATERAL LOW BACK PAIN WITHOUT SCIATICA: ICD-10-CM

## 2021-04-05 DIAGNOSIS — E11.22 CONTROLLED TYPE 2 DIABETES MELLITUS WITH CHRONIC KIDNEY DISEASE, WITHOUT LONG-TERM CURRENT USE OF INSULIN, UNSPECIFIED CKD STAGE (HCC): Primary | Chronic | ICD-10-CM

## 2021-04-05 DIAGNOSIS — M19.041 PRIMARY OSTEOARTHRITIS OF BOTH HANDS: ICD-10-CM

## 2021-04-05 DIAGNOSIS — J30.9 CHRONIC ALLERGIC RHINITIS: ICD-10-CM

## 2021-04-05 PROBLEM — M81.0 AGE-RELATED OSTEOPOROSIS WITHOUT CURRENT PATHOLOGICAL FRACTURE: Status: ACTIVE | Noted: 2021-04-05

## 2021-04-05 PROCEDURE — G0438 PPPS, INITIAL VISIT: HCPCS | Performed by: STUDENT IN AN ORGANIZED HEALTH CARE EDUCATION/TRAINING PROGRAM

## 2021-04-05 PROCEDURE — 99215 OFFICE O/P EST HI 40 MIN: CPT | Performed by: STUDENT IN AN ORGANIZED HEALTH CARE EDUCATION/TRAINING PROGRAM

## 2021-04-05 NOTE — PROGRESS NOTES
Assessment and Plan:     Problem List Items Addressed This Visit     None           Preventive health issues were discussed with patient, and age appropriate screening tests were ordered as noted in patient's After Visit Summary  Personalized health advice and appropriate referrals for health education or preventive services given if needed, as noted in patient's After Visit Summary  History of Present Illness:     Patient presents for Medicare Annual Wellness visit    Patient Care Team:  Ana Vásquez MD as PCP - General (Geriatric Medicine)  MD Chavez Corley MD Teretha Smalls, DO  [place],      Problem List:     Patient Active Problem List   Diagnosis    Chronic allergic rhinitis    Ganglion cyst    Hyperlipidemia    Hypertension    Microalbuminuric diabetic nephropathy ([address] )    OA (osteoarthritis)    Osteopenia    Thalassemia minor    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, without long-term current use of insulin ([address] )    Vitamin D deficiency    Closed nondisplaced fracture of fifth metatarsal bone of left foot with routine healing    Right rotator cuff tendinitis    CKD (chronic kidney disease) stage 3, GFR 30-59 ml/min    Posterior tibialis tendon insufficiency    Flat foot    H/O abnormal mammogram    Colitis, acute    Physical deconditioning      Past Medical and Surgical History:     Past Medical History:   Diagnosis Date    Abnormal mammogram     Acute sinusitis     Allergic reaction     Subsequent encounter, Resolved - 1/9/18    Allergic rhinitis 01/16/2012    Anaphylactic reaction     Other, Last assessed - 6/16/16    Anemia 01/16/2012    Ataxia     Cellulitis of great toe of right foot     Chronic allergic rhinitis     Unspec seasonality, unspec trigger;  Last assessed - 11/13/17    Contact dermatitis     Last assessed - 6/3/14    Dermatitis     Diabetes mellitus ([address] )     Disorder of tympanic membrane     Last assessed - 10/15/12    Dry skin     Last assessed - 4/15/14    Dysfunction of eustachian tube     Unspec laterality, Last assessed - 10/19/12    Ganglion cyst     Glaucoma     Hip pain, acute, unspecified laterality     Hyperlipidemia     Hypertension     Labyrinthitis 11/17/2011    Leg mass, left     Limb pain     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    Long term use of drug     Last assessed - 4/6/14    Mammogram abnormal     Last assessed - 4/11/14    Mass of thigh, left     Pain in ankle joint     Pes planus     Unspec laterality, Last assessed - 4/9/13    Shoulder pain, right     Vertigo 01/16/2012     Past Surgical History:   Procedure Laterality Date    OTHER SURGICAL HISTORY      Surgery unk     TONSILLECTOMY        Family History:     Family History   Problem Relation Age of Onset    Heart attack Mother         Ac MI   [place] [facility] Lung cancer Father     Other Father         Cerebral Embolism    Anemia Sister     Glaucoma Sister     Hypertension Sister     Thyroid disease Sister     Diabetes Brother     Hypertension Brother     Other Brother         Cardiac Disorder    No Known Problems Daughter     No Known Problems Sister     No Known Problems Daughter     No Known Problems Maternal Aunt     No Known Problems Paternal Aunt       Social History:     E-Cigarette/Vaping    E-Cigarette Use Never User      E-Cigarette/Vaping Substances    Nicotine No     THC No     CBD No     Flavoring No     Other No     Unknown No      Social History     Socioeconomic History    Marital status: Single     Spouse name: Not on file    Number of children: Not on file    Years of education: Not on file    Highest education level: Not on file   Occupational History    Not on file   Social Needs    Financial resource strain: Not on file    Food insecurity     Worry: Not on file     Inability: Not on file    Transportation needs     Medical: Not on file     Non-medical: Not on file Tobacco Use    Smoking status: Former Smoker     Quit date:      Years since quittin 2    Smokeless tobacco: Never Used   Substance and Sexual Activity    Alcohol use:  Yes     Alcohol/week: 1 0 - 2 0 standard drinks     Types: 1 - 2 Glasses of wine per week     Frequency: 2-3 times a week     Comment: Social     Drug use: No    Sexual activity: Not on file   Lifestyle    Physical activity     Days per week: Not on file     Minutes per session: Not on file    Stress: Not on file   Relationships    Social connections     Talks on phone: Not on file     Gets together: Not on file     Attends [employer] service: Not on file     Active member of club or organization: Not on file     Attends meetings of clubs or organizations: Not on file     Relationship status: Not on file    Intimate partner violence     Fear of current or ex partner: Not on file     Emotionally abused: Not on file     Physically abused: Not on file     Forced sexual activity: Not on file   Other Topics Concern    Not on file   Social History Narrative    Advance directive on file    Always uses seat belt    Daily caffeine consumption, 1 serving a day    Drinks coffee - 1 cup per day    Exercises 3x per week           Medications and Allergies:     Current Outpatient Medications   Medication Sig Dispense Refill    amLODIPine (NORVASC) 5 mg tablet Take 1 tablet (5 mg total) by mouth daily 90 tablet 3    aspirin 81 MG tablet Take 1 tablet by mouth daily      atorvastatin (LIPITOR) 10 mg tablet TAKE 1 TABLET (10 MG TOTAL) BY MOUTH DAILY 90 tablet 1    Calcium Carbonate (CALTRATE 600) 1500 (600 Ca) MG TABS Take by mouth      carvedilol (COREG) 25 mg tablet Take 1 tablet (25 mg total) by mouth 2 (two) times a day 180 tablet 3    Cholecalciferol (VITAMIN D3) 2000 units capsule Take 1 tablet by mouth daily      EPINEPHrine (EPIPEN) 0 3 mg/0 3 mL SOAJ Inject 1 Syringe into a muscle      fluticasone (FLONASE) 50 mcg/act nasal spray USE 1 SPRAY IN EACH NOSTRIL EVERY DAY 48 mL 2    glimepiride (AMARYL) 4 mg tablet Take 1 tablet by mouth daily with breakfast      glucosamine 500 MG CAPS capsule Take 1 capsule by mouth daily       hydrochlorothiazide (HYDRODIURIL) 25 mg tablet TAKE 1 TABLET (25 MG TOTAL) BY MOUTH DAILY 90 tablet 1    lisinopril (ZESTRIL) 40 mg tablet Take 1 tablet (40 mg total) by mouth 2 (two) times a day 180 tablet 3    metFORMIN (GLUCOPHAGE-XR) 500 mg 24 hr tablet TAKE 1 TABLET TWICE DAILY      Multiple Vitamins-Minerals (WOMENS ONE DAILY) TABS Take 1 tablet by mouth daily      Omega-3 Fatty Acids (OMEGA-3 FISH OIL) 1000 MG CAPS Take 1 capsule by mouth daily       spironolactone (ALDACTONE) 25 mg tablet Take 1 tablet (25 mg total) by mouth daily 90 tablet 3    pseudoephedrine-codeine-guaifenesin (MYTUSSIN DAC) 30- MG/5ML solution Take 10 mL by mouth 4 (four) times a day as needed for allergies (Patient not taking: Reported on 4/5/2021) 120 mL 0    saccharomyces boulardii (FLORASTOR) 250 mg capsule Take 1 capsule (250 mg total) by mouth 2 (two) times a day (Patient not taking: Reported on 4/5/2021) 50 capsule 0    VIRTUSSIN A/C 100-10 MG/5ML liquid TK 10ML PO QID PRF ALLERGIES  0     No current facility-administered medications for this visit  Allergies   Allergen Reactions    Cortisone Hyperactivity    Bee Pollen Edema     Category:  Allergy;       Immunizations:     Immunization History   Administered Date(s) Administered    INFLUENZA 11/06/2007, 10/15/2010, 09/09/2011    Influenza Split High Dose Preservative Free IM 10/31/2014, 10/01/2015, 10/17/2016, 09/01/2017, 10/09/2017, 10/01/2018, 10/05/2018    Influenza, seasonal, injectable 11/07/2012    Pneumococcal Conjugate 13-Valent 06/16/2016, 10/05/2018    Pneumococcal Conjugate PCV 7 10/01/2018    Pneumococcal Polysaccharide PPV23 09/01/2007, 10/12/2019    SARS-CoV-2 / COVID-19 mRNA IM (Pfizer-BioNTech) 01/19/2021, 02/09/2021    Td (adult), adsorbed 06/01/1998, 12/08/2009    Zoster 10/31/2014, 08/05/2019, 11/01/2019    Zoster Vaccine Recombinant 08/05/2019, 11/01/2019    influenza, trivalent, adjuvanted 10/12/2019      Health Maintenance: There are no preventive care reminders to display for this patient  Topic Date Due    DTaP,Tdap,and Td Vaccines (1 - Tdap) 09/13/1961      Medicare Health Risk Assessment:     /82 (BP Location: Left arm, Patient Position: Sitting, Cuff Size: Adult)   Pulse 66   Temp (!) 97 3 °F (36 3 °C) (Temporal)   Resp 20   Ht 5' 1" (1 549 m) Comment: with shoes  Wt 82 5 kg (181 lb 12 8 oz) Comment: with shoes  LMP  (LMP Unknown)   SpO2 98%   BMI 34 35 kg/m²      Carlitos Darnell is here for her Initial Wellness visit  Health Risk Assessment:   Patient rates overall health as good  Patient feels that their physical health rating is same  Patient is satisfied with their life  Eyesight was rated as same  Hearing was rated as same  Patient feels that their emotional and mental health rating is same  Patients states they are sometimes angry  Patient states they are sometimes unusually tired/fatigued  Pain experienced in the last 7 days has been none  Patient states that she has experienced weight loss or gain in last 6 months  Depression Screening:   PHQ-2 Score: 0      Fall Risk Screening: In the past year, patient has experienced: no history of falling in past year      Urinary Incontinence Screening:   Patient has not leaked urine accidently in the last six months  Home Safety:  Patient does not have trouble with stairs inside or outside of their home  Patient has working smoke alarms and has working carbon monoxide detector  Home safety hazards include: uneven floors and poor household lighting  Poor lightening in the hallway at [facility]    Nutrition:   Current diet is No Added Salt   Concerns about eating fresh fruit and vegetables because of colitis but it is good for diabetes  Medications:   Patient is currently taking over-the-counter supplements  OTC medications include: see medication list  Patient is able to manage medications  Activities of Daily Living (ADLs)/Instrumental Activities of Daily Living (IADLs):   Walk and transfer into and out of bed and chair?: Yes  Dress and groom yourself?: Yes    Bathe or shower yourself?: Yes    Feed yourself? Yes  Do your laundry/housekeeping?: Yes  Manage your money, pay your bills and track your expenses?: Yes  Make your own meals?: Yes    Do your own shopping?: Yes    PREVENTIVE SCREENINGS      Cardiovascular Screening:    General: Screening Not Indicated and History Lipid Disorder      Diabetes Screening:     General: Screening Not Indicated and History Diabetes      Breast Cancer Screening:     General: Screening Current      Cervical Cancer Screening:    General: Screening Not Indicated      Lung Cancer Screening:     General: Screening Not Indicated    Screening, Brief Intervention, and Referral to Treatment (SBIRT)    Screening  Typical number of drinks in a day: 0  Typical number of drinks in a week: 1  Interpretation: Low risk drinking behavior      Single Item Drug Screening:  How often have you used an illegal drug (including marijuana) or a prescription medication for non-medical reasons in the past year? never    Single Item Drug Screen Score: 0  Interpretation: Negative screen for possible drug use disorder      Katie Cummings MD

## 2021-04-05 NOTE — PROGRESS NOTES
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, without long-term current use of insulin ([address] ) - Primary (Chronic)       Lab Results   Component Value Date    HGBA1C 6 8 (H) 01/14/2021     No episodes of hypoglycemia   Patient continues on metformin, glimepiride   Recommend adherence to a diabetic, heart healthy diet            Respiratory    Chronic allergic rhinitis      Currently on Flonase once daily with symptom relief               Cardiovascular and Mediastinum    Hypertension      /82   Continue aspirin 81 mg daily   Continue carvedilol 25 mg b i d    Continue spironolactone 25 mg daily  Continue hydrochlorothiazide 25 mg daily   Continue lisinopril 40 mg daily   Continue amlodipine 5 mg daily   Recommend adherence to a diabetic, heart healthy diet   Continue routine follow-up with Nephrology as scheduled         Relevant Orders    TSH + Free T4       Musculoskeletal and Integument    OA (osteoarthritis)      Patient with progressively worsening deformities and decreased function of bilateral hands   Has since completed a course of occupational hand therapy  Will refer to rheumatology for further workup and management         Relevant Orders    Ambulatory referral to Occupational Therapy    Ambulatory referral to Rheumatology       Genitourinary    CKD (chronic kidney disease) stage 3, GFR 30-59 ml/min (Chronic)     Lab Results   Component Value Date    EGFR 44 12/09/2020 EGFR 45 07/24/2019    EGFR 47 02/14/2019    CREATININE 1 19 12/09/2020    CREATININE 1 18 07/24/2019    CREATININE 1 13 02/14/2019     Stable   Patient continues to follow with Nephrology   Avoid nephrotoxic agents  Medications to be renally dosed   Will continue to monitor periodically            Other    Hyperlipidemia     Prior lipid panel done 7/20 showing hypertriglyceridemia  Will repeat lipid panel   Continue atorvastatin 10 mg daily  Recommend adherence to a diabetic, heart healthy diet         Relevant Orders    Lipid panel    Chronic bilateral low back pain without sciatica     No symptoms of cauda equina   Should symptoms worsen, will consider imaging including x-ray and possible MRI of the back   Consider Tylenol as needed, topical analgesics such as Aspercreme, BenGay   Patient has used lidocaine patches in the past for hip pain which she felt was ineffective   Maintain [place] precautions  Patient also felt that prior referral to physical therapy for hip pain was futile  and declines  Will continue to monitor         Epistaxis      Patient with frequent episodes of epistaxis in the past few weeks      Episodes decreased since decreasing Flonase frequency to once daily  Discussed  Humidified air at home   Advised if symptoms recur to call the office and will refer to ENT for cauterization and management  Consider Vaseline  or nasal saline gel as mosturizers to nostrils         Hand cramps      Will order BMP and magnesium level to assess electrolyte levels  Patient to be referred to rheumatology for chronic hand pain secondary to arthritis   Previously completed hand occupational therapy         Relevant Orders    Magnesium    Basic metabolic panel    TSH + Free T4              - Counseling Documentation: patient was counseled regarding: diagnostic results, instructions for management, risk factor reductions, prognosis, patient and family education, impressions, risks and benefits of treatment options and importance of compliance with treatment    Chief Complaint     Patient presents for routine follow-up of acute and chronic conditions as will as annual wellness visit    History of Present Illness     HPI    This is a 53-year-old female with type 2 diabetes, hyperlipidemia, HTN, thalassemia minor, vitamin-D deficiency, osteoarthritis and multiple other medical conditions who presents for routine follow-up of acute and chronic conditions as well as for her annual wellness visit  The patient currently resides at [facility] and previously followed with Dr Meka Morrison  Today the patient complains of a 3 year history of head and jaw tremors  She does have a positive family history of essential tremors in her sister with similar symptoms  She feels tremors have remained stable and not worsened  I did relate that should symptoms progress, will consider referral to Neurology movement specialist    The patient also declined any treatment at this time   The patient also complained of chronic low back pain radiating bilaterally to her groin  She explains that pain developed in December 2020 after her diagnosis of colitis  Pain is intermittent and worse with standing  Pain is graded at 3/10 in intensity, intermittent, resolves spontaneously, nonradiating, not associated with any neuropathy type symptoms and does not worsen with ambulation  No symptoms of cauda equina  The patient has not had any imaging of her lower spine and declines at this time  The patient also has a history of chronic left hip pain for which she was previously referred to physical therapy  Patient explains that the pain has almost resolved and felt that physical therapy did not improve pain  She was also recommended lidocaine patches which were not helpful and has not been using any therapies to manage her pain at this time      The patient does have a history of chronic 'goose bumps' for which she was seen by Dermatology with recommendations to use unscented lotions such as cerave  No pruritus, tenderness, open wounds or erythema  Recommended body washes/soap such as Cetaphil and avoid fragranted cleanses or lotions  She does have a history of moles over her entire back  She was advised to continue following with dermatology for annual skin checks  She does have a history of being out in the sun as she worked as a  in the past   She currently follows with Ophthalmology for a history of dry eyes and has been using over-the-counter moisturizing drops with no significant improvement  She does have occasional tearing of her eyes and rhinorrhea and felt that topical drops have never helped  She is scheduled to see Ophthalmology in June  The patient does have a history of chronic arthritis in her hands which she feels has been getting worse as she is now unable to make a fist   She has completed a course of occupational therapy for hand therapy with no overt improvement  Will plan to refer to rheumatology as patient is concerned about progression of symptoms  The patient also explains that she did have frequent intermittent episodes of epistaxis  She had previously been using Flonase twice daily which she has decreased to once daily with improvement in symptoms  She gives one example of having 1/3 cup of blood due to epistaxis  Advised should symptoms recur to call the office and will refer to ENT for cauterization and assessment  The patient also complains of cramping in her hands  She did have a history of cramping in her lower legs which have since resolved  She continues to maintain routine functions and has no neurovascular compromise on physical exam     The patient continues on glimepiride and metformin for a history of diabetes which remains relatively controlled given patient's age  She has been compliant with atorvastatin for a history of hyperlipidemia    She also continues on amlodipine, lisinopril and carvedilol for a history of hypertension with blood pressure remaining stable  The following portions of the patient's history were reviewed and updated as appropriate: allergies, current medications, past family history, past medical history, past social history, past surgical history and problem list     Review of Systems     Review of Systems   Constitutional: Negative for chills and fever  HENT: Positive for nosebleeds and rhinorrhea  Negative for congestion, ear pain, sore throat and trouble swallowing  Eyes: Negative for discharge  Dry eyes   Respiratory: Negative for cough, chest tightness, shortness of breath, wheezing and stridor  Cardiovascular: Negative for chest pain, palpitations and leg swelling  Gastrointestinal: Negative for abdominal pain, constipation, diarrhea, nausea and vomiting  Genitourinary: Negative for decreased urine volume, dysuria and hematuria  Musculoskeletal: Positive for arthralgias (hand, hip pain) and back pain  Negative for gait problem  Skin: Positive for rash  Negative for color change, pallor and wound  Neurological: Negative for dizziness, weakness and light-headedness  Psychiatric/Behavioral: Negative for confusion, decreased concentration and sleep disturbance         Active Problem List     Patient Active Problem List   Diagnosis    Chronic allergic rhinitis    Ganglion cyst    Hyperlipidemia    Hypertension    Microalbuminuric diabetic nephropathy ([address] )    OA (osteoarthritis)    Osteopenia    Thalassemia minor    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, without long-term current use of insulin ([facility])    Vitamin D deficiency    Closed nondisplaced fracture of fifth metatarsal bone of left foot with routine healing    Right rotator cuff tendinitis    CKD (chronic kidney disease) stage 3, GFR 30-59 ml/min    Posterior tibialis tendon insufficiency    Flat foot    H/O abnormal mammogram    Colitis, acute    Physical deconditioning    Chronic bilateral low back pain without sciatica    Age-related osteoporosis without current pathological fracture    Epistaxis    Hand cramps       Objective     /82 (BP Location: Left arm, Patient Position: Sitting, Cuff Size: Adult)   Pulse 66   Temp (!) 97 3 °F (36 3 °C) (Temporal)   Resp 20   Ht 5' 1" (1 549 m) Comment: with shoes  Wt 82 5 kg (181 lb 12 8 oz) Comment: with shoes  LMP  (LMP Unknown)   SpO2 98%   BMI 34 35 kg/m²     Physical Exam  Vitals signs reviewed  Constitutional:       General: She is not in acute distress  Appearance: Normal appearance  She is well-developed  She is not ill-appearing or diaphoretic  Comments: Elderly female sitting comfortably in chair in no obvious cardiorespiratory or painful distress   HENT:      Head: Normocephalic and atraumatic  Right Ear: Tympanic membrane, ear canal and external ear normal       Left Ear: Tympanic membrane, ear canal and external ear normal       Nose: Nose normal       Mouth/Throat:      Mouth: Mucous membranes are moist       Pharynx: Oropharynx is clear  No oropharyngeal exudate  Eyes:      General: No scleral icterus  Right eye: No discharge  Left eye: No discharge  Conjunctiva/sclera: Conjunctivae normal    Neck:      Musculoskeletal: Normal range of motion and neck supple  Vascular: No JVD  Cardiovascular:      Rate and Rhythm: Normal rate and regular rhythm  Heart sounds: Normal heart sounds  No murmur  No friction rub  No gallop  Pulmonary:      Effort: Pulmonary effort is normal  No respiratory distress  Breath sounds: Normal breath sounds  No wheezing or rales  Abdominal:      General: Bowel sounds are normal  There is no distension  Palpations: Abdomen is soft  Tenderness: There is no abdominal tenderness  There is no guarding  Hernia: A hernia (ventral, reducible) is present  Musculoskeletal: Normal range of motion  General: Deformity (Arthritic changes in hands) present  No tenderness  Right lower leg: No edema  Left lower leg: No edema  Skin:     General: Skin is warm and dry  Coloration: Skin is not pale  Findings: No erythema or rash  Comments: Multiple moles to upper and lower back   Neurological:      General: No focal deficit present  Mental Status: She is alert and oriented to person, place, and time  Mental status is at baseline  Cranial Nerves: No cranial nerve deficit  Motor: No weakness  Gait: Gait normal       Deep Tendon Reflexes: Reflexes are normal and symmetric     Psychiatric:         Mood and Affect: Mood normal          Behavior: Behavior normal          Pertinent Laboratory/Diagnostic Studies:  Reviewed prior blood work: HbA1c 6 8   Will order TSH with reflex T4, lipid panel, BMP, magnesium    Current Medications     Current Outpatient Medications:     amLODIPine (NORVASC) 5 mg tablet, Take 1 tablet (5 mg total) by mouth daily, Disp: 90 tablet, Rfl: 3    aspirin 81 MG tablet, Take 1 tablet by mouth daily, Disp: , Rfl:     atorvastatin (LIPITOR) 10 mg tablet, TAKE 1 TABLET (10 MG TOTAL) BY MOUTH DAILY, Disp: 90 tablet, Rfl: 1    Calcium Carbonate (CALTRATE 600) 1500 (600 Ca) MG TABS, Take by mouth, Disp: , Rfl:     carvedilol (COREG) 25 mg tablet, Take 1 tablet (25 mg total) by mouth 2 (two) times a day, Disp: 180 tablet, Rfl: 3    Cholecalciferol (VITAMIN D3) 2000 units capsule, Take 1 tablet by mouth daily, Disp: , Rfl:     EPINEPHrine (EPIPEN) 0 3 mg/0 3 mL SOAJ, Inject 1 Syringe into a muscle, Disp: , Rfl:     fluticasone (FLONASE) 50 mcg/act nasal spray, USE 1 SPRAY IN EACH NOSTRIL EVERY DAY, Disp: 48 mL, Rfl: 2    glimepiride (AMARYL) 4 mg tablet, Take 2 tablets by mouth daily with breakfast , Disp: , Rfl:     glucosamine 500 MG CAPS capsule, Take 1 capsule by mouth daily , Disp: , Rfl:     hydrochlorothiazide (HYDRODIURIL) 25 mg tablet, TAKE 1 TABLET (25 MG TOTAL) BY MOUTH DAILY, Disp: 90 tablet, Rfl: 1    lisinopril (ZESTRIL) 40 mg tablet, Take 1 tablet (40 mg total) by mouth 2 (two) times a day, Disp: 180 tablet, Rfl: 3    metFORMIN (GLUCOPHAGE-XR) 500 mg 24 hr tablet, TAKE 1 TABLET TWICE DAILY, Disp: , Rfl:     Multiple Vitamins-Minerals (WOMENS ONE DAILY) TABS, Take 1 tablet by mouth daily, Disp: , Rfl:     Omega-3 Fatty Acids (OMEGA-3 FISH OIL) 1000 MG CAPS, Take 1 capsule by mouth daily , Disp: , Rfl:     spironolactone (ALDACTONE) 25 mg tablet, Take 1 tablet (25 mg total) by mouth daily, Disp: 90 tablet, Rfl: 3    saccharomyces boulardii (FLORASTOR) 250 mg capsule, Take 1 capsule (250 mg total) by mouth 2 (two) times a day (Patient not taking: Reported on 4/5/2021), Disp: 50 capsule, Rfl: 0    Health Maintenance     Health Maintenance   Topic Date Due    Medicare Annual Wellness Visit (AWV)  Never done    SLP PLAN OF CARE  Never done    BMI: Followup Plan  Never done    DTaP,Tdap,and Td Vaccines (1 - Tdap) 09/13/1961    DM Eye Exam  06/21/2019    Diabetic Foot Exam  06/04/2021    HEMOGLOBIN A1C  07/14/2021    Fall Risk  04/05/2022    Depression Screening PHQ  04/05/2022    BMI: Adult  04/05/2022    Pneumococcal Vaccine: 65+ Years  Completed    Influenza Vaccine  Completed    COVID-19 Vaccine  Completed    HIB Vaccine  Aged Out    Hepatitis B Vaccine  Aged Out    IPV Vaccine  Aged Out    Hepatitis A Vaccine  Aged Out    Meningococcal ACWY Vaccine  Aged Out    HPV Vaccine  Aged Out     Immunization History   Administered Date(s) Administered    INFLUENZA 11/06/2007, 10/15/2010, 09/09/2011    Influenza Split High Dose Preservative Free IM 10/31/2014, 10/01/2015, 10/17/2016, 09/01/2017, 10/09/2017, 10/01/2018, 10/05/2018    Influenza, seasonal, injectable 11/07/2012    Pneumococcal Conjugate 13-Valent 06/16/2016, 10/05/2018    Pneumococcal Conjugate PCV 7 10/01/2018    Pneumococcal Polysaccharide PPV23 09/01/2007, 10/12/2019    SARS-CoV-2 / COVID-19 mRNA IM [facility] 01/19/2021, 02/09/2021    Td (adult), adsorbed 06/01/1998, 12/08/2009    Zoster 10/31/2014, 08/05/2019, 11/01/2019    Zoster Vaccine Recombinant 08/05/2019, 11/01/2019    influenza, trivalent, adjuvanted 10/12/2019       Krysta Valenzuela MD  Geriatrics   4/7/2021 5:00 AM

## 2021-04-05 NOTE — PATIENT INSTRUCTIONS
Medicare Preventive Visit Patient Instructions  Thank you for completing your Welcome to Medicare Visit or Medicare Annual Wellness Visit today  Your next wellness visit will be due in one year (4/6/2022)  The screening/preventive services that you may require over the next 5-10 years are detailed below  Some tests may not apply to you based off risk factors and/or age  Screening tests ordered at today's visit but not completed yet may show as past due  Also, please note that scanned in results may not display below  Preventive Screenings:  Service Recommendations Previous Testing/Comments   Colorectal Cancer Screening  * Colonoscopy    * Fecal Occult Blood Test (FOBT)/Fecal Immunochemical Test (FIT)  * Fecal DNA/Cologuard Test  * Flexible Sigmoidoscopy Age: 54-65 years old   Colonoscopy: every 10 years (may be performed more frequently if at higher risk)  OR  FOBT/FIT: every 1 year  OR  Cologuard: every 3 years  OR  Sigmoidoscopy: every 5 years  Screening may be recommended earlier than age 48 if at higher risk for colorectal cancer  Also, an individualized decision between you and your healthcare provider will decide whether screening between the ages of 74-80 would be appropriate  Colonoscopy: Not on file  FOBT/FIT: Not on file  Cologuard: Not on file  Sigmoidoscopy: Not on file          Breast Cancer Screening Age: 36 years old  Frequency: every 1-2 years  Not required if history of left and right mastectomy Mammogram: 01/29/2020    Screening Current   Cervical Cancer Screening Between the ages of 21-29, pap smear recommended once every 3 years  Between the ages of 33-67, can perform pap smear with HPV co-testing every 5 years     Recommendations may differ for women with a history of total hysterectomy, cervical cancer, or abnormal pap smears in past  Pap Smear: Not on file    Screening Not Indicated   Hepatitis C Screening Once for adults born between 1945 and 1965  More frequently in patients at high risk for Hepatitis C Hep C Antibody: Not on file        Diabetes Screening 1-2 times per year if you're at risk for diabetes or have pre-diabetes Fasting glucose: 143 mg/dL   A1C: 6 8 %    Screening Not Indicated  History Diabetes   Cholesterol Screening Once every 5 years if you don't have a lipid disorder  May order more often based on risk factors  Lipid panel: 07/16/2020    Screening Not Indicated  History Lipid Disorder     Other Preventive Screenings Covered by Medicare:  1  Abdominal Aortic Aneurysm (AAA) Screening: covered once if your at risk  You're considered to be at risk if you have a family history of AAA  2  Lung Cancer Screening: covers low dose CT scan once per year if you meet all of the following conditions: (1) Age 50-69; (2) No signs or symptoms of lung cancer; (3) Current smoker or have quit smoking within the last 15 years; (4) You have a tobacco smoking history of at least 30 pack years (packs per day multiplied by number of years you smoked); (5) You get a written order from a healthcare provider  3  Glaucoma Screening: covered annually if you're considered high risk: (1) You have diabetes OR (2) Family history of glaucoma OR (3)  aged 48 and older OR (3)  American aged 72 and older  3  Osteoporosis Screening: covered every 2 years if you meet one of the following conditions: (1) You're estrogen deficient and at risk for osteoporosis based off medical history and other findings; (2) Have a vertebral abnormality; (3) On glucocorticoid therapy for more than 3 months; (4) Have primary hyperparathyroidism; (5) On osteoporosis medications and need to assess response to drug therapy  · Last bone density test (DXA Scan): Not on file  5  HIV Screening: covered annually if you're between the age of 12-76  Also covered annually if you are younger than 13 and older than 72 with risk factors for HIV infection   For pregnant patients, it is covered up to 3 times per pregnancy  Immunizations:  Immunization Recommendations   Influenza Vaccine Annual influenza vaccination during flu season is recommended for all persons aged >= 6 months who do not have contraindications   Pneumococcal Vaccine (Prevnar and Pneumovax)  * Prevnar = PCV13  * Pneumovax = PPSV23   Adults 25-60 years old: 1-3 doses may be recommended based on certain risk factors  Adults 72 years old: Prevnar (PCV13) vaccine recommended followed by Pneumovax (PPSV23) vaccine  If already received PPSV23 since turning 65, then PCV13 recommended at least one year after PPSV23 dose  Hepatitis B Vaccine 3 dose series if at intermediate or high risk (ex: diabetes, end stage renal disease, liver disease)   Tetanus (Td) Vaccine - COST NOT COVERED BY MEDICARE PART B Following completion of primary series, a booster dose should be given every 10 years to maintain immunity against tetanus  Td may also be given as tetanus wound prophylaxis  Tdap Vaccine - COST NOT COVERED BY MEDICARE PART B Recommended at least once for all adults  For pregnant patients, recommended with each pregnancy  Shingles Vaccine (Shingrix) - COST NOT COVERED BY MEDICARE PART B  2 shot series recommended in those aged 48 and above     Health Maintenance Due:  There are no preventive care reminders to display for this patient  Immunizations Due:      Topic Date Due    DTaP,Tdap,and Td Vaccines (1 - Tdap) 09/13/1961     Advance Directives   What are advance directives? Advance directives are legal documents that state your wishes and plans for medical care  These plans are made ahead of time in case you lose your ability to make decisions for yourself  Advance directives can apply to any medical decision, such as the treatments you want, and if you want to donate organs  What are the types of advance directives? There are many types of advance directives, and each state has rules about how to use them   You may choose a combination of any of the following:  · Living will: This is a written record of the treatment you want  You can also choose which treatments you do not want, which to limit, and which to stop at a certain time  This includes surgery, medicine, IV fluid, and tube feedings  · Durable power of  for healthcare [facility]): This is a written record that states who you want to make healthcare choices for you when you are unable to make them for yourself  This person, called a proxy, is usually a family member or a friend  You may choose more than 1 proxy  · Do not resuscitate (DNR) order:  A DNR order is used in case your heart stops beating or you stop breathing  It is a request not to have certain forms of treatment, such as CPR  A DNR order may be included in other types of advance directives  · Medical directive: This covers the care that you want if you are in a coma, near death, or unable to make decisions for yourself  You can list the treatments you want for each condition  Treatment may include pain medicine, surgery, blood transfusions, dialysis, IV or tube feedings, and a ventilator (breathing machine)  · Values history: This document has questions about your views, beliefs, and how you feel and think about life  This information can help others choose the care that you would choose  Why are advance directives important? An advance directive helps you control your care  Although spoken wishes may be used, it is better to have your wishes written down  Spoken wishes can be misunderstood, or not followed  Treatments may be given even if you do not want them  An advance directive may make it easier for your family to make difficult choices about your care  Weight Management   Why it is important to manage your weight:  Being overweight increases your risk of health conditions such as heart disease, high blood pressure, type 2 diabetes, and certain types of cancer   It can also increase your risk for osteoarthritis, sleep apnea, and other respiratory problems  Aim for a slow, steady weight loss  Even a small amount of weight loss can lower your risk of health problems  How to lose weight safely:  A safe and healthy way to lose weight is to eat fewer calories and get regular exercise  You can lose up about 1 pound a week by decreasing the number of calories you eat by 500 calories each day  Healthy meal plan for weight management:  A healthy meal plan includes a variety of foods, contains fewer calories, and helps you stay healthy  A healthy meal plan includes the following:  · Eat whole-grain foods more often  A healthy meal plan should contain fiber  Fiber is the part of grains, fruits, and vegetables that is not broken down by your body  Whole-grain foods are healthy and provide extra fiber in your diet  Some examples of whole-grain foods are whole-wheat breads and pastas, oatmeal, brown rice, and bulgur  · Eat a variety of vegetables every day  Include dark, leafy greens such as spinach, kale, nancy greens, and mustard greens  Eat yellow and orange vegetables such as carrots, sweet potatoes, and winter squash  · Eat a variety of fruits every day  Choose fresh or canned fruit (canned in its own juice or light syrup) instead of juice  Fruit juice has very little or no fiber  · Eat low-fat dairy foods  Drink fat-free (skim) milk or 1% milk  Eat fat-free yogurt and low-fat cottage cheese  Try low-fat cheeses such as mozzarella and other reduced-fat cheeses  · Choose meat and other protein foods that are low in fat  Choose beans or other legumes such as split peas or lentils  Choose fish, skinless poultry (chicken or turkey), or lean cuts of red meat (beef or pork)  Before you cook meat or poultry, cut off any visible fat  · Use less fat and oil  Try baking foods instead of frying them  Add less fat, such as margarine, sour cream, regular salad dressing and mayonnaise to foods  Eat fewer high-fat foods   Some examples of high-fat foods include french fries, doughnuts, ice cream, and cakes  · Eat fewer sweets  Limit foods and drinks that are high in sugar  This includes candy, cookies, regular soda, and sweetened drinks  Exercise:  Exercise at least 30 minutes per day on most days of the week  Some examples of exercise include walking, biking, dancing, and swimming  You can also fit in more physical activity by taking the stairs instead of the elevator or parking farther away from stores  Ask your healthcare provider about the best exercise plan for you  © Copyright [address] 2018 Information is for End User's use only and may not be sold, redistributed or otherwise used for commercial purposes   All illustrations and images included in CareNotes® are the copyrighted property of A D [employer] M [employer]  or [address] [place] [address]

## 2021-04-06 ENCOUNTER — TELEPHONE (OUTPATIENT)
Dept: GERIATRICS | Age: 81
End: 2021-04-06

## 2021-04-06 NOTE — ASSESSMENT & PLAN NOTE
Lab Results   Component Value Date    HGBA1C 6 8 (H) 01/14/2021     No episodes of hypoglycemia   Patient continues on metformin, glimepiride   Recommend adherence to a diabetic, heart healthy diet

## 2021-04-06 NOTE — ASSESSMENT & PLAN NOTE
Will order BMP and magnesium level to assess electrolyte levels  Patient to be referred to rheumatology for chronic hand pain secondary to arthritis   Previously completed hand occupational therapy

## 2021-04-06 NOTE — ASSESSMENT & PLAN NOTE
Patient with progressively worsening deformities and decreased function of bilateral hands   Has since completed a course of occupational hand therapy  Will refer to rheumatology for further workup and management

## 2021-04-06 NOTE — ASSESSMENT & PLAN NOTE
/82   Continue aspirin 81 mg daily   Continue carvedilol 25 mg b i d    Continue spironolactone 25 mg daily  Continue hydrochlorothiazide 25 mg daily   Continue lisinopril 40 mg daily   Continue amlodipine 5 mg daily   Recommend adherence to a diabetic, heart healthy diet   Continue routine follow-up with Nephrology as scheduled

## 2021-04-06 NOTE — ASSESSMENT & PLAN NOTE
Prior lipid panel done 7/20 showing hypertriglyceridemia  Will repeat lipid panel   Continue atorvastatin 10 mg daily  Recommend adherence to a diabetic, heart healthy diet

## 2021-04-06 NOTE — ASSESSMENT & PLAN NOTE
Lab Results   Component Value Date    EGFR 44 12/09/2020    EGFR 45 07/24/2019    EGFR 47 02/14/2019    CREATININE 1 19 12/09/2020    CREATININE 1 18 07/24/2019    CREATININE 1 13 02/14/2019     Stable   Patient continues to follow with Nephrology   Avoid nephrotoxic agents  Medications to be renally dosed   Will continue to monitor periodically

## 2021-04-06 NOTE — ASSESSMENT & PLAN NOTE
No symptoms of cauda equina   Should symptoms worsen, will consider imaging including x-ray and possible MRI of the back   Consider Tylenol as needed, topical analgesics such as Aspercreme, BenGay   Patient has used lidocaine patches in the past for hip pain which she felt was ineffective   Maintain [place] precautions  Patient also felt that prior referral to physical therapy for hip pain was futile  and declines  Will continue to monitor

## 2021-04-06 NOTE — ASSESSMENT & PLAN NOTE
Patient with frequent episodes of epistaxis in the past few weeks      Episodes decreased since decreasing Flonase frequency to once daily  Discussed  Humidified air at home   Advised if symptoms recur to call the office and will refer to ENT for cauterization and management  Consider Vaseline  or nasal saline gel as mosturizers to nostrils

## 2021-04-06 NOTE — ASSESSMENT & PLAN NOTE
Asymptomatic  Patient is scheduled to follow-up with GI  Etiology likely infectious versus ischemic  Patient is scheduled for colonoscopy and EGD

## 2021-04-06 NOTE — TELEPHONE ENCOUNTER
[facility] lab relayed the TSH 3rd generation lab is something they do not draw  Would you like just a TSH? Or do you want TSH, Free T4; or TSH, Total T4  Please complete new order

## 2021-04-07 ENCOUNTER — TELEPHONE (OUTPATIENT)
Dept: GERIATRICS | Facility: CLINIC | Age: 81
End: 2021-04-07

## 2021-04-07 DIAGNOSIS — R79.89 ELEVATED TSH: Primary | ICD-10-CM

## 2021-04-07 NOTE — TELEPHONE ENCOUNTER
Pt notified that TSH was slightly elevated will repeat in 2mths   Triglycerides and BS elevated  Instructed to adhere to diabetic and low fat diet and other labs stable  Copy results left in an envelope for patient file

## 2021-04-18 ENCOUNTER — ANESTHESIA EVENT (OUTPATIENT)
Dept: GASTROENTEROLOGY | Facility: HOSPITAL | Age: 81
End: 2021-04-18

## 2021-04-19 ENCOUNTER — ANESTHESIA (OUTPATIENT)
Dept: GASTROENTEROLOGY | Facility: HOSPITAL | Age: 81
End: 2021-04-19

## 2021-04-19 ENCOUNTER — HOSPITAL ENCOUNTER (OUTPATIENT)
Dept: GASTROENTEROLOGY | Facility: HOSPITAL | Age: 81
Setting detail: OUTPATIENT SURGERY
Discharge: HOME | End: 2021-04-19
Attending: INTERNAL MEDICINE | Admitting: INTERNAL MEDICINE
Payer: MEDICARE

## 2021-04-19 VITALS
DIASTOLIC BLOOD PRESSURE: 54 MMHG | TEMPERATURE: 97.4 F | SYSTOLIC BLOOD PRESSURE: 116 MMHG | HEART RATE: 62 BPM | OXYGEN SATURATION: 96 % | RESPIRATION RATE: 18 BRPM

## 2021-04-19 DIAGNOSIS — K92.1 HEMATOCHEZIA: ICD-10-CM

## 2021-04-19 DIAGNOSIS — K52.9 COLITIS: ICD-10-CM

## 2021-04-19 DIAGNOSIS — R19.8 ALTERNATING CONSTIPATION AND DIARRHEA: ICD-10-CM

## 2021-04-19 DIAGNOSIS — K92.1 MELENA: ICD-10-CM

## 2021-04-19 LAB — GLUCOSE SERPL-MCNC: 149 MG/DL (ref 65–140)

## 2021-04-19 PROCEDURE — 45380 COLONOSCOPY AND BIOPSY: CPT | Performed by: INTERNAL MEDICINE

## 2021-04-19 PROCEDURE — 82948 REAGENT STRIP/BLOOD GLUCOSE: CPT

## 2021-04-19 PROCEDURE — 88305 TISSUE EXAM BY PATHOLOGIST: CPT | Performed by: PATHOLOGY

## 2021-04-19 PROCEDURE — 43239 EGD BIOPSY SINGLE/MULTIPLE: CPT | Performed by: INTERNAL MEDICINE

## 2021-04-19 RX ORDER — PROPOFOL 10 MG/ML
INJECTION, EMULSION INTRAVENOUS AS NEEDED
Status: DISCONTINUED | OUTPATIENT
Start: 2021-04-19 | End: 2021-04-19

## 2021-04-19 RX ORDER — EPHEDRINE SULFATE 50 MG/ML
INJECTION INTRAVENOUS AS NEEDED
Status: DISCONTINUED | OUTPATIENT
Start: 2021-04-19 | End: 2021-04-19

## 2021-04-19 RX ORDER — LIDOCAINE HYDROCHLORIDE 10 MG/ML
INJECTION, SOLUTION EPIDURAL; INFILTRATION; INTRACAUDAL; PERINEURAL AS NEEDED
Status: DISCONTINUED | OUTPATIENT
Start: 2021-04-19 | End: 2021-04-19

## 2021-04-19 RX ORDER — PROPOFOL 10 MG/ML
INJECTION, EMULSION INTRAVENOUS CONTINUOUS PRN
Status: DISCONTINUED | OUTPATIENT
Start: 2021-04-19 | End: 2021-04-19

## 2021-04-19 RX ORDER — GLYCOPYRROLATE 0.2 MG/ML
INJECTION INTRAMUSCULAR; INTRAVENOUS AS NEEDED
Status: DISCONTINUED | OUTPATIENT
Start: 2021-04-19 | End: 2021-04-19

## 2021-04-19 RX ORDER — SODIUM CHLORIDE, SODIUM LACTATE, POTASSIUM CHLORIDE, CALCIUM CHLORIDE 600; 310; 30; 20 MG/100ML; MG/100ML; MG/100ML; MG/100ML
125 INJECTION, SOLUTION INTRAVENOUS CONTINUOUS
Status: DISCONTINUED | OUTPATIENT
Start: 2021-04-19 | End: 2021-04-23 | Stop reason: HOSPADM

## 2021-04-19 RX ADMIN — LIDOCAINE HYDROCHLORIDE 50 MG: 10 INJECTION, SOLUTION EPIDURAL; INFILTRATION; INTRACAUDAL at 09:41

## 2021-04-19 RX ADMIN — PROPOFOL 100 MCG/KG/MIN: 10 INJECTION, EMULSION INTRAVENOUS at 09:41

## 2021-04-19 RX ADMIN — SODIUM CHLORIDE, SODIUM LACTATE, POTASSIUM CHLORIDE, AND CALCIUM CHLORIDE: .6; .31; .03; .02 INJECTION, SOLUTION INTRAVENOUS at 09:35

## 2021-04-19 RX ADMIN — EPHEDRINE SULFATE 10 MG: 50 INJECTION, SOLUTION INTRAVENOUS at 10:00

## 2021-04-19 RX ADMIN — PHENYLEPHRINE HYDROCHLORIDE 100 MCG: 10 INJECTION INTRAVENOUS at 10:08

## 2021-04-19 RX ADMIN — GLYCOPYRROLATE 0.1 MG: 0.2 INJECTION, SOLUTION INTRAMUSCULAR; INTRAVENOUS at 10:00

## 2021-04-19 RX ADMIN — PROPOFOL 80 MG: 10 INJECTION, EMULSION INTRAVENOUS at 09:41

## 2021-04-19 NOTE — H&P
History and Physical -  [facility]  Renetta Menendez 68 y o  female MRN: [id number]                  HPI: Renetta Menendez is a 68y o  year old female who presents for EGD and colonoscopy for melena, hematochezia and colitis  REVIEW OF SYSTEMS: Per the HPI, and otherwise unremarkable  Historical Information   Past Medical History:   Diagnosis Date    Abnormal mammogram     Acute sinusitis     Allergic reaction     Subsequent encounter, Resolved - 1/9/18    Allergic rhinitis 01/16/2012    Anaphylactic reaction     Other, Last assessed - 6/16/16    Anemia 01/16/2012    Ataxia     Cellulitis of great toe of right foot     Chronic allergic rhinitis     Unspec seasonality, unspec trigger;  Last assessed - 11/13/17    Contact dermatitis     Last assessed - 6/3/14    Dermatitis     Diabetes mellitus ([facility] [address] )     Disorder of tympanic membrane     Last assessed - 10/15/12    Dry skin     Last assessed - 4/15/14    Dysfunction of eustachian tube     Unspec laterality, Last assessed - 10/19/12    Ganglion cyst     Glaucoma     Hip pain, acute, unspecified laterality     Hyperlipidemia     Hypertension     Labyrinthitis 11/17/2011    Leg mass, left     Limb pain     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    Long term use of drug     Last assessed - 4/6/14    Mammogram abnormal     Last assessed - 4/11/14    Mass of thigh, left     Pain in ankle joint     Pes planus     Unspec laterality, Last assessed - 4/9/13    Shoulder pain, right     Vertigo 01/16/2012     Past Surgical History:   Procedure Laterality Date    OTHER SURGICAL HISTORY      Surgery unk     TONSILLECTOMY       Social History   Social History     Substance and Sexual Activity   Alcohol Use Yes    Alcohol/week: 1 0 - 2 0 standard drinks    Types: 1 - 2 Glasses of wine per week    Frequency: 2-3 times a week    Comment: Social      Social History     Substance and Sexual Activity   Drug Use No     Social History     Tobacco Use   Smoking Status Former Smoker    Quit date: 12    Years since quittin 3   Smokeless Tobacco Never Used     Family History   Problem Relation Age of Onset    Heart attack Mother         Ac MI    Lung cancer Father     Other Father         Cerebral Embolism    Anemia Sister     Glaucoma Sister     Hypertension Sister     Thyroid disease Sister     Diabetes Brother     Hypertension Brother     Other Brother         Cardiac Disorder    No Known Problems Daughter     No Known Problems Sister     No Known Problems Daughter     No Known Problems Maternal Aunt     No Known Problems Paternal Aunt        Meds/Allergies     (Not in a hospital admission)      Allergies   Allergen Reactions    Cortisone Hyperactivity    Bee Venom Edema    Virtussin A-C [Guaifenesin-Codeine] Throat Swelling    Bee Pollen Edema     Category: Allergy;        Objective     /61   Pulse 59   Temp (!) 97 °F (36 1 °C) (Temporal)   Resp 18   LMP  (LMP Unknown)   SpO2 98%       PHYSICAL EXAM    Gen: NAD  CV: RRR  CHEST: Clear  ABD: soft, NT/ND  EXT: no edema      ASSESSMENT/PLAN:  This is a 68y o  year old female here for EGD and colonoscopy for melena, hematochezia and colitis, and she is stable and optimized for her procedure        Mary Santiago MD

## 2021-04-19 NOTE — ANESTHESIA PREPROCEDURE EVALUATION
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, without long-term current use of insulin (HCC)      /RENAL   (+) CKD (chronic kidney disease) stage 3, GFR 30-59 ml/min   (+) Microalbuminuric diabetic nephropathy (HCC)      HEMATOLOGY   (+) Thalassemia minor      MUSCULOSKELETAL   (+) Chronic bilateral low back pain without sciatica   (+) OA (osteoarthritis)      Diabetes mellitus ([address] )    Hyperlipidemia    Hypertension    Ataxia    Cellulitis of great toe of right foot    Dermatitis    Dysfunction of eustachian tube Unspec laterality, Last assessed - 10/19/12   Glaucoma    Hip pain, acute, unspecified laterality    Acute sinusitis    Anemia    Allergic rhinitis    Ganglion cyst    Labyrinthitis    Vertigo    Leg mass, left    Limb pain    Abnormal mammogram    Shoulder pain, right    Pes planus Unspec laterality, Last assessed - 4/9/13   Mass of thigh, left    Pain in ankle joint    Allergic reaction Subsequent encounter, Resolved - 1/9/18   Chronic allergic rhinitis Unspec seasonality, unspec trigger; Last assessed - 11/13/17   Contact dermatitis Last assessed - 6/3/14   Disorder of tympanic membrane Last assessed - 10/15/12   Dry skin Last assessed - 4/15/14   Localized osteoarthritis of hand    Long term use of drug Last assessed - 4/6/14   Mammogram abnormal Last assessed - 4/11/14   Anaphylactic reaction Other, Last assessed - 6/16/16       Physical Exam    Airway    Mallampati score: II  TM Distance: >3 FB  Neck ROM: full     Dental       Cardiovascular  Cardiovascular exam normal    Pulmonary  Pulmonary exam normal     Other Findings        Anesthesia Plan  ASA Score- 3     Anesthesia Type- IV sedation with anesthesia with ASA Monitors  Additional Monitors:   Airway Plan:           Plan Factors-Exercise tolerance (METS): >4 METS  Chart reviewed  EKG reviewed   Imaging results reviewed  Existing labs reviewed  Patient summary reviewed  Induction- intravenous  Postoperative Plan-     Informed Consent- Anesthetic plan and risks discussed with patient  I personally reviewed this patient with the CRNA  Discussed and agreed on the Anesthesia Plan with the CRNA  Anaya Ponce

## 2021-04-21 ENCOUNTER — TELEPHONE (OUTPATIENT)
Dept: GASTROENTEROLOGY | Facility: AMBULARY SURGERY CENTER | Age: 81
End: 2021-04-21

## 2021-04-21 NOTE — TELEPHONE ENCOUNTER
Patients GI provider:  Dr Will Lipscomb    Number to return call: ([phone]    Reason for call: Pt calling because she is currently having abd pain 3/10 as well as hasnt had a BM since her procedure;     Scheduled procedure/appointment date if applicable: 5/84/01 jmjmjPHYSICAL THERAPY - DAILY TREATMENT NOTE    Patient Name: Matilde Flores        Date: 2021  : 1965   yes Patient  Verified  Visit #:     Insurance: Payor: Riesa Shone / Plan: [address] [place] [address] / Product Type: Managed Care Medicaid /      In time: 727 Out time: [id number]   Total Treatment Time: 61     Medicare/BCBS Time Tracking (below)   Total Timed Codes (min):   51 1:1 Treatment Time:   46    94TREATMENT AREA =  Right shoulder pain [M25.511]    SUBJECTIVE  Pain Level (on 0 to 10 scale): 4-5 / 10 Right SH   Medication Changes/New allergies or changes in medical history, any new surgeries or procedures?    no  If yes, update Summary List   Subjective Functional Status/Changes:  []  No changes reported     \"I been hurting more over the weekend, I did not get to rest as much as I wanted\"     OBJECTIVE  Modalities Rationale:     decrease pain to improve patient's ability to carry/lift/reach/perform ADLs without pain or difficulty      min [] Estim, type/location:                                      []  att     []  unatt     []  w/US     []  w/ice    []  w/heat    min []  Mechanical Traction: type/lbs                   []  pro   []  sup   []  int   []  cont    []  before manual    []  after manual    min []  Ultrasound, settings/location:      min []  Iontophoresis w/ dexamethasone, location:                                               []  take home patch       []  in clinic   10 min []  Ice     [x]  Heat    location/position: seated     min []  Vasopneumatic Device, press/temp:     min []  Other:    [x] Skin assessment post-treatment (if applicable):    [x]  intact    []  redness- no adverse reaction     []redness  adverse reaction:        21 min Therapeutic Exercise:  [x]  See flow sheet   Rationale:      increase ROM and increase strength to improve the patients ability to carry/lift/reach/perform ADLs without pain or difficulty       15 min Therapeutic Activity: [x]  See flow sheet  tband rows/ext with TCs for scap facilitation  wall push ups  wall slides   pulleys IR   Rationale:    increase ROM and increase strength to improve the patients ability to carry/lift/reach/perform ADLs without pain or difficulty     15 min Neuromuscular Re-ed: [x]  See flow sheet  postural re-ed  rep sh ext  rep c/s ext   sleepers str   Rationale:    increase ROM and increase strength to improve the patients ability to  perform c/s AROM for ADLs/driving, lifting, reaching, and to perform prolong sitting without pain      Billed With/As:   [x] TE   [] TA   [] Neuro   [] Self Care Patient Education: [x] Review HEP    [] Progressed/Changed HEP based on:   [x] positioning   [x] body mechanics   [x] transfers   [] heat/ice application    [x] other: Pt ed on importance and benefits of compliance with HEP, core strength/stability and proper posture; pt verbalized understanding       Other Objective/Functional Measures:  VCs + demo to perform proper technique for TE  rep c/s ret with ext- B/B decrease sxs in R SH, increased Left hand tingling   held TE per Flow Sheet due to pt's request secondary to pain  SH AAROM wall slides WNL without increase pain    Post Treatment Pain Level (on 0 to 10) scale:  3/ 10     ASSESSMENT  Assessment/Changes in Function:   limited today by left hand sxs and pt with increase mm guarding      []  See Progress Note/Recertification   Patient will continue to benefit from skilled PT services to modify and progress therapeutic interventions, address functional mobility deficits, address ROM deficits, address strength deficits, analyze and address soft tissue restrictions, analyze and cue movement patterns, analyze and modify body mechanics/ergonomics, assess and modify postural abnormalities and instruct in home and community integration to attain remaining goals.    Progress toward goals / Updated goals:  · Goals for this certification period include and are to be achieved in   3-4  weeks from 1/20/21:  1. Patient will be independent with an updated HEP to improve carryover upon discharge.    2. Patient will demonstrate IR HBB to T6 to improve efficiency with dressing ADL's.progressing, T7 with pain  3. Patient will demonstrate at least 4/5 shoulder flexion MMT to improve stability with overhead ADL's.  MET- 4/5      PLAN  [x]  Upgrade activities as tolerated yes Continue plan of care   []  Discharge due to :    []  Other:      Therapist: Fred Paul PTA    Date: 2/22/2021 Time: 9:54 AM     Future Appointments   Date Time Provider Laurie Darden   2/25/2021  8:00 AM Stephan Douglas [facility]   2/25/2021  3:30 PM Dede Peoples OTR/L [facility]   3/9/2021  9:30 AM MITRA العلي AMB   3/16/2021 12:30 PM JEY Morton AMB

## 2021-04-21 NOTE — TELEPHONE ENCOUNTER
Patient of Dr Gavino Dale, last seen 4/19 for EGD/colonoscopy    History of colitis, hematochezia, alternating bowel habits    Called and spoke with patient  She has not had a bowel movement since her colonoscopy on 4/19  She just wanted to call and make us aware  She ate a pear yesterday- she states normally eating a piece of raw fruit would give her diarrhea, but she still has not gone  I advised she can try miralax prn and of she is still unable to go by Friday then to call us back  She verbalized understanding  She also complains of "right groin pain"  She states this groin pain has been going on since long before her colonoscopy, but she wanted to make sure it was nothing she needed to worry about  She stressed that it is not her RLQ, but moreso her groin  Pain is only 3/10  I advised she follow up with PCP regarding this as I do not think it's GI related   Patient verbalized understanding

## 2021-04-26 DIAGNOSIS — K22.70 BARRETT'S ESOPHAGUS WITHOUT DYSPLASIA: Primary | ICD-10-CM

## 2021-04-26 RX ORDER — PANTOPRAZOLE SODIUM 20 MG/1
20 TABLET, DELAYED RELEASE ORAL DAILY
Qty: 30 TABLET | Refills: 5 | Status: SHIPPED | OUTPATIENT
Start: 2021-04-26 | End: 2021-11-29 | Stop reason: SDUPTHER

## 2021-05-05 ENCOUNTER — OFFICE VISIT (OUTPATIENT)
Dept: GERIATRICS | Facility: CLINIC | Age: 81
End: 2021-05-05
Payer: MEDICARE

## 2021-05-05 VITALS
HEART RATE: 58 BPM | RESPIRATION RATE: 18 BRPM | DIASTOLIC BLOOD PRESSURE: 70 MMHG | SYSTOLIC BLOOD PRESSURE: 124 MMHG | BODY MASS INDEX: 34.45 KG/M2 | OXYGEN SATURATION: 98 % | WEIGHT: 182.3 LBS | TEMPERATURE: 97 F

## 2021-05-05 DIAGNOSIS — E11.22 CONTROLLED TYPE 2 DIABETES MELLITUS WITH CHRONIC KIDNEY DISEASE, WITHOUT LONG-TERM CURRENT USE OF INSULIN, UNSPECIFIED CKD STAGE (HCC): Chronic | ICD-10-CM

## 2021-05-05 DIAGNOSIS — R53.82 CHRONIC FATIGUE: ICD-10-CM

## 2021-05-05 DIAGNOSIS — R10.30 INGUINAL PAIN, UNSPECIFIED LATERALITY: ICD-10-CM

## 2021-05-05 DIAGNOSIS — I10 ESSENTIAL HYPERTENSION: ICD-10-CM

## 2021-05-05 DIAGNOSIS — N18.32 STAGE 3B CHRONIC KIDNEY DISEASE (HCC): Chronic | ICD-10-CM

## 2021-05-05 DIAGNOSIS — E78.2 MIXED HYPERLIPIDEMIA: ICD-10-CM

## 2021-05-05 DIAGNOSIS — R10.84 GENERALIZED ABDOMINAL PAIN: Primary | ICD-10-CM

## 2021-05-05 DIAGNOSIS — E55.9 VITAMIN D DEFICIENCY: ICD-10-CM

## 2021-05-05 DIAGNOSIS — R07.9 CHEST PAIN, UNSPECIFIED TYPE: ICD-10-CM

## 2021-05-05 PROBLEM — R53.83 FATIGUE: Status: ACTIVE | Noted: 2021-05-05

## 2021-05-05 PROCEDURE — 99215 OFFICE O/P EST HI 40 MIN: CPT | Performed by: STUDENT IN AN ORGANIZED HEALTH CARE EDUCATION/TRAINING PROGRAM

## 2021-05-05 NOTE — PROGRESS NOTES
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, without long-term current use of insulin (HCC) (Chronic)       Lab Results   Component Value Date    HGBA1C 6 8 (H) 01/14/2021     Patient continues on metformin and glimepiride  Will repeat HbA1c   Recommend adherence to a diabetic, heart healthy diet         Relevant Orders    Hemoglobin A1C       Cardiovascular and Mediastinum    Hypertension      /70   Patient continues on aspirin, carvedilol, spironolactone, hydrochlorothiazide, amlodipine , lisinopril  Recommend adherence to a diabetic, heart healthy diet  Follow-up with Nephrology as scheduled         Relevant Orders    Comprehensive metabolic panel       Genitourinary    CKD (chronic kidney disease) stage 3, GFR 30-59 ml/min ([facility]) (Chronic)      Recent GFR 56 ( 4/21)  Will repeat CMP   Avoid nephrotoxic agents  Medications to be renally dosed  Follow-up with Nephrology as scheduled            Other    Hyperlipidemia      Continue atorvastatin 10 mg daily   Recommend adherence to a diabetic, heart healthy diet   Will repeat lipid panel, CMP         Relevant Orders    Lipid panel    Vitamin D deficiency      Patient continues on calcium/vitamin-D supplementation   Will order vitamin-D level         Relevant Orders    Vitamin D 25 hydroxy    CBC and differential    Fatigue      Will order CBC, CMP, TSH with reflex T4, HbA1c   Patient may benefit from a sleep study in the future         Relevant Orders    TSH + Free T4    Groin pain      Patient with a history of progressively worsening lateral chest wall pain, abdominal pain and groin pain  Will order CBC, CMP, TSH with reflex T4 Will order CT chest/abdomen / pelvis   Should symptoms worsen, patient to go to the ER  Continue routine follow-up with GI         Relevant Orders    CT chest abdomen pelvis wo contrast    Generalized abdominal pain - Primary      Patient with a history of progressively worsening lateral chest wall pain, abdominal pain and groin pain  Will order CBC, CMP, TSH with reflex T4   Will order CT chest/abdomen / pelvis   Should symptoms worsen, patient to go to the ER  Continue routine follow-up with GI         Relevant Orders    CT chest abdomen pelvis wo contrast    Urine culture    Urinalysis with microscopic    Chest pain    Relevant Orders    CT chest abdomen pelvis wo contrast            - Counseling Documentation: patient was counseled regarding: diagnostic results, instructions for management, risk factor reductions, prognosis, patient and family education, impressions, risks and benefits of treatment options and importance of compliance with treatment    Chief Complaint     Patient presents for follow-up of acute and chronic conditions    History of Present Illness     HPI    Patient presents for follow-up of acute and chronic conditions  Today the patient complains of significant abdominal pain radiating to her chest and groin  She describes pain as starting below her bilateral shoulder blades and radiating around her chest, extending to her lateral abdomen  The patient also explains she has had chronic groin pain for which she did discuss with GI who attributed cause of groin pain to a non gastroenterologic etiology  The patient explains that symptoms are progressively worsening which has affected her overall quality of life  She also admits that she feels more bloated  Of note the patient was seen in the emergency room in December of 2020  for abdominal pain with lower GI bleeding where CTA imaging showed acute colitis involving the descending colon    Today the patient denies any fever, chills, nausea, vomiting, recent change in bowel movements, passage of blood per rectum  She relates that she was a former smoker for 20 years however quit smoking over several  (>30)years ago  The patient denies any cardiorespiratory distress, lethargy, URI or urinary symptoms  She continues to tolerate oral intake, has been sleeping as per her baseline and having regular bowel movements  The patient also complains of persisting fatigue  She does have a history of snoring however states that she does not feel she has ANYI  She explains that she was told in the past she has not had any apneic spells by persons who have observed her sleeping pattern  She also complains today of persisting hair loss  The patient was seen by GI for a history of Fernández's esophagus and recommended PPI therapy  Today the patient explains that she did read the side effects of PPI therapy and declines using it  I did discuss risks versus benefits and my recommendation to start PPI therapy given Fernández's esophagus and its association with pre cancerous disease  However patient continued to decline this option  Of note the patient is on multiple agents for hypertension  She currently follows with Nephrology for a history of chronic kidney disease stage 3 and has been on lisinopril 40 mg b i d   Patient stated that she did confirm this dosing with her nephrologist who recommended continued therapy with this   She continues on metformin and glimepiride for a history of diabetes  The patient has been compliant with Flonase for a history of allergic rhinitis  She also continues on  Atorvastatin for a history of hyperlipidemia          The following portions of the patient's history were reviewed and updated as appropriate: allergies, current medications, past family history, past medical history, past social history, past surgical history and problem list     Review of Systems     Review of Systems   Constitutional: Positive for activity change and fatigue  Negative for chills and fever  HENT: Negative for congestion, ear pain, hearing loss, rhinorrhea and sore throat  Eyes: Negative for discharge and visual disturbance  Respiratory: Negative for cough, chest tightness, shortness of breath, wheezing and stridor  Cardiovascular: Positive for chest pain (lateral chest wall pain)  Negative for palpitations and leg swelling  Gastrointestinal: Positive for abdominal pain  Negative for constipation, diarrhea, nausea and vomiting  Genitourinary: Negative for decreased urine volume, dysuria and hematuria  Musculoskeletal: Positive for myalgias  Negative for gait problem  Skin: Negative for color change, pallor, rash and wound  Neurological: Negative for dizziness, syncope, speech difficulty and light-headedness  Psychiatric/Behavioral: Negative for agitation, behavioral problems, confusion and sleep disturbance         Active Problem List     Patient Active Problem List   Diagnosis    Chronic allergic rhinitis    Ganglion cyst    Hyperlipidemia    Hypertension    Microalbuminuric diabetic nephropathy ([facility] [address] )    OA (osteoarthritis)    Osteopenia    Thalassemia minor    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, without long-term current use of insulin ([facility])    Vitamin D deficiency    Closed nondisplaced fracture of fifth metatarsal bone of left foot with routine healing    Right rotator cuff tendinitis    CKD (chronic kidney disease) stage 3, GFR 30-59 ml/min ([facility])    Posterior tibialis tendon insufficiency    Flat foot    H/O abnormal mammogram    Colitis, acute    Physical deconditioning    Chronic bilateral low back pain without sciatica    Age-related osteoporosis without current pathological fracture    Epistaxis    Hand cramps    Fatigue    Groin pain    Generalized abdominal pain    Chest pain       Objective     /70 (BP Location: Right arm, Patient Position: Sitting, Cuff Size: Large)   Pulse 58   Temp (!) 97 °F (36 1 °C) (Temporal)   Resp 18   Wt 82 7 kg (182 lb 4 8 oz)   LMP  (LMP Unknown)   SpO2 98%   BMI 34 45 kg/m²     Physical Exam  Constitutional:       General: She is not in acute distress  Appearance: Normal appearance  She is well-developed  She is obese  She is not ill-appearing or diaphoretic  HENT:      Head: Normocephalic and atraumatic  Right Ear: Tympanic membrane, ear canal and external ear normal       Left Ear: Tympanic membrane, ear canal and external ear normal       Nose: Nose normal       Mouth/Throat:      Mouth: Mucous membranes are moist       Pharynx: Oropharynx is clear  No oropharyngeal exudate  Eyes:      General: No scleral icterus  Right eye: No discharge  Left eye: No discharge  Conjunctiva/sclera: Conjunctivae normal    Neck:      Musculoskeletal: Normal range of motion and neck supple  Vascular: No JVD  Cardiovascular:      Rate and Rhythm: Normal rate and regular rhythm  Heart sounds: Normal heart sounds  No murmur  No friction rub  No gallop  Pulmonary:      Effort: Pulmonary effort is normal  No respiratory distress  Breath sounds: Normal breath sounds  No stridor  No wheezing or rales  Chest:      Chest wall: No tenderness  Abdominal:      General: Bowel sounds are normal  There is no distension  Palpations: Abdomen is soft  There is no mass  Tenderness: There is no abdominal tenderness  There is no guarding or rebound  Hernia: A hernia (reducible, ventral) is present  Musculoskeletal: Normal range of motion  General: No tenderness or deformity  Skin:     General: Skin is warm  Coloration: Skin is not pale  Findings: No erythema or rash  Neurological:      General: No focal deficit present  Mental Status: She is alert and oriented to person, place, and time  Mental status is at baseline  Cranial Nerves: No cranial nerve deficit        Deep Tendon Reflexes: Reflexes are normal and symmetric     Psychiatric:         Mood and Affect: Mood normal          Behavior: Behavior normal          Pertinent Laboratory/Diagnostic Studies:  Reviewed prior blood work   Will order CBC, CMP, HbA1c, lipid panel, TSH T4, vitamin-D, urinalysis, urine culture  Will also order CT chest/ abdomen /pelvis    Current Medications     Current Outpatient Medications:     amLODIPine (NORVASC) 5 mg tablet, Take 1 tablet (5 mg total) by mouth daily, Disp: 90 tablet, Rfl: 3    aspirin 81 MG tablet, Take 1 tablet by mouth daily, Disp: , Rfl:     atorvastatin (LIPITOR) 10 mg tablet, TAKE 1 TABLET (10 MG TOTAL) BY MOUTH DAILY, Disp: 90 tablet, Rfl: 1    Calcium Carbonate (CALTRATE 600) 1500 (600 Ca) MG TABS, Take by mouth, Disp: , Rfl:     carvedilol (COREG) 25 mg tablet, Take 1 tablet (25 mg total) by mouth 2 (two) times a day, Disp: 180 tablet, Rfl: 3    Cholecalciferol (VITAMIN D3) 2000 units capsule, Take 1 tablet by mouth daily, Disp: , Rfl:     EPINEPHrine (EPIPEN) 0 3 mg/0 3 mL SOAJ, Inject 1 Syringe into a muscle, Disp: , Rfl:     fluticasone (FLONASE) 50 mcg/act nasal spray, USE 1 SPRAY IN EACH NOSTRIL EVERY DAY, Disp: 48 mL, Rfl: 2    glimepiride (AMARYL) 4 mg tablet, Take 2 tablets by mouth daily with breakfast , Disp: , Rfl:     glucosamine 500 MG CAPS capsule, Take 1 capsule by mouth daily , Disp: , Rfl:     hydrochlorothiazide (HYDRODIURIL) 25 mg tablet, TAKE 1 TABLET (25 MG TOTAL) BY MOUTH DAILY, Disp: 90 tablet, Rfl: 1    lisinopril (ZESTRIL) 40 mg tablet, Take 1 tablet (40 mg total) by mouth 2 (two) times a day, Disp: 180 tablet, Rfl: 3    metFORMIN (GLUCOPHAGE-XR) 500 mg 24 hr tablet, TAKE 1 TABLET TWICE DAILY, Disp: , Rfl:     Multiple Vitamins-Minerals (WOMENS ONE DAILY) TABS, Take 1 tablet by mouth daily, Disp: , Rfl:     Omega-3 Fatty Acids (OMEGA-3 FISH OIL) 1000 MG CAPS, Take 1 capsule by mouth daily , Disp: , Rfl:     pantoprazole (PROTONIX) 20 mg tablet, Take 1 tablet (20 mg total) by mouth daily, Disp: 30 tablet, Rfl: 5    saccharomyces boulardii (FLORASTOR) 250 mg capsule, Take 1 capsule (250 mg total) by mouth 2 (two) times a day, Disp: 50 capsule, Rfl: 0    spironolactone (ALDACTONE) 25 mg tablet, Take 1 tablet (25 mg total) by mouth daily, Disp: 90 tablet, Rfl: 3    Health Maintenance     Health Maintenance   Topic Date Due    SLP PLAN OF CARE  Never done    BMI: Followup Plan  Never done    DTaP,Tdap,and Td Vaccines (1 - Tdap) 09/13/1963    DM Eye Exam  06/21/2019    Diabetic Foot Exam  06/04/2021    HEMOGLOBIN A1C  07/14/2021    Fall Risk  04/05/2022    Depression Screening PHQ  04/05/2022    Medicare Annual Wellness Visit (AWV)  04/05/2022    BMI: Adult  05/05/2022    Pneumococcal Vaccine: 65+ Years  Completed    Influenza Vaccine  Completed    COVID-19 Vaccine  Completed    HIB Vaccine  Aged Out    Hepatitis B Vaccine  Aged Out    IPV Vaccine  Aged Out    Hepatitis A Vaccine  Aged Out    Meningococcal ACWY Vaccine  Aged Out    HPV Vaccine  Aged Out     Immunization History   Administered Date(s) Administered    INFLUENZA 11/06/2007, 10/15/2010, 09/09/2011    Influenza Split High Dose Preservative Free IM 10/31/2014, 10/01/2015, 10/17/2016, 09/01/2017, 10/09/2017, 10/01/2018, 10/05/2018    Influenza, seasonal, injectable 11/07/2012    Pneumococcal Conjugate 13-Valent 06/16/2016, 10/05/2018    Pneumococcal Conjugate PCV 7 10/01/2018    Pneumococcal Polysaccharide PPV23 09/01/2007, 10/12/2019    SARS-CoV-2 / COVID-19 mRNA IM (Pfizer-BioNTech) 01/19/2021, 02/09/2021    Td (adult), adsorbed 06/01/1998, 12/08/2009    Zoster 10/31/2014, 08/05/2019, 11/01/2019    Zoster Vaccine Recombinant 08/05/2019, 11/01/2019    influenza, trivalent, adjuvanted 10/12/2019       Harsh Monaco MD  Geriatrics   5/6/2021 6:11 AM

## 2021-05-06 LAB — HBA1C MFR BLD HPLC: 7.2 %

## 2021-05-06 NOTE — ASSESSMENT & PLAN NOTE
Lab Results   Component Value Date    HGBA1C 6 8 (H) 01/14/2021     Patient continues on metformin and glimepiride  Will repeat HbA1c   Recommend adherence to a diabetic, heart healthy diet

## 2021-05-06 NOTE — ASSESSMENT & PLAN NOTE
Continue atorvastatin 10 mg daily   Recommend adherence to a diabetic, heart healthy diet   Will repeat lipid panel, CMP

## 2021-05-06 NOTE — ASSESSMENT & PLAN NOTE
/70   Patient continues on aspirin, carvedilol, spironolactone, hydrochlorothiazide, amlodipine , lisinopril  Recommend adherence to a diabetic, heart healthy diet  Follow-up with Nephrology as scheduled

## 2021-05-06 NOTE — ASSESSMENT & PLAN NOTE
Patient with a history of progressively worsening lateral chest wall pain, abdominal pain and groin pain  Will order CBC, CMP, TSH with reflex T4   Will order CT chest/abdomen / pelvis   Should symptoms worsen, patient to go to the ER  Continue routine follow-up with GI

## 2021-05-06 NOTE — ASSESSMENT & PLAN NOTE
Will order CBC, CMP, TSH with reflex T4, HbA1c   Patient may benefit from a sleep study in the future

## 2021-05-06 NOTE — ASSESSMENT & PLAN NOTE
Recent GFR 56 ( 4/21)  Will repeat CMP   Avoid nephrotoxic agents  Medications to be renally dosed  Follow-up with Nephrology as scheduled

## 2021-05-07 ENCOUNTER — HOSPITAL ENCOUNTER (OUTPATIENT)
Dept: RADIOLOGY | Age: 81
Discharge: HOME | End: 2021-05-07
Payer: MEDICARE

## 2021-05-07 DIAGNOSIS — R10.30 INGUINAL PAIN, UNSPECIFIED LATERALITY: ICD-10-CM

## 2021-05-07 DIAGNOSIS — R10.84 GENERALIZED ABDOMINAL PAIN: ICD-10-CM

## 2021-05-07 DIAGNOSIS — R07.9 CHEST PAIN, UNSPECIFIED TYPE: ICD-10-CM

## 2021-05-07 PROCEDURE — 71250 CT THORAX DX C-: CPT

## 2021-05-07 PROCEDURE — 74176 CT ABD & PELVIS W/O CONTRAST: CPT

## 2021-05-10 ENCOUNTER — TELEPHONE (OUTPATIENT)
Dept: GERIATRICS | Facility: CLINIC | Age: 81
End: 2021-05-10

## 2021-05-10 ENCOUNTER — TELEPHONE (OUTPATIENT)
Dept: GERIATRICS | Age: 81
End: 2021-05-10

## 2021-05-10 NOTE — TELEPHONE ENCOUNTER
Able to schedule patient with Thao Jean Baptiste for review of test on 5/11 at 0930  Please cancel appointment for 5/19/2021 at check in on this day  " Please call the patient and schedule a follow-up appointment with Thao Jean Baptiste on Tues  to r/v labs and hopefully CT report then   I wont have room on my schedule this Wed as I  have a meetig at [employer] and the 11am has a TCM to be scheduled "

## 2021-05-10 NOTE — TELEPHONE ENCOUNTER
----- Message from Rosemarie De Santiago MD sent at 5/9/2021  7:03 AM EDT -----   Please call the patient and schedule a follow-up appointment with Jen Hardy on Tues  to r/v labs and hopefully CT report then   I wont have room on my schedule this Wed as I  have a meetig at 4pm and the 11am has a TCM to be scheduled

## 2021-05-10 NOTE — TELEPHONE ENCOUNTER
Per request from [address] [place] [address],[address] she is trying to consolidate patients into fewer days  Left VM asking Rehan Bansal to reschedule from 5/11 to 5/14  Asked her to call [facility] office or [address]

## 2021-05-14 ENCOUNTER — TELEMEDICINE (OUTPATIENT)
Dept: GERIATRICS | Facility: CLINIC | Age: 81
End: 2021-05-14
Payer: MEDICARE

## 2021-05-14 DIAGNOSIS — E11.22 CONTROLLED TYPE 2 DIABETES MELLITUS WITH CHRONIC KIDNEY DISEASE, WITHOUT LONG-TERM CURRENT USE OF INSULIN, UNSPECIFIED CKD STAGE (HCC): Chronic | ICD-10-CM

## 2021-05-14 DIAGNOSIS — R93.89 ABNORMAL CT OF THE CHEST: Primary | ICD-10-CM

## 2021-05-14 PROCEDURE — 99442 PR PHYS/QHP TELEPHONE EVALUATION 11-20 MIN: CPT | Performed by: NURSE PRACTITIONER

## 2021-05-14 NOTE — PROGRESS NOTES
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 because she is long term resident (vs short term rehab pt)  · Will rx nutrition eval for SL nutrition for diabetic diet ed         Relevant Orders    Ambulatory referral to Nutrition Services       Other    Abnormal CT of the chest - Primary     · CT scan shows the following  · Gall stones - non blocking, no pain reported - will confer with Dr Jose Jane for further management strategies  · Cont lifestyle and dietary interventions notify office/WC if acute pain  · Parapelvic cyst right kidney - will confer with Dr Jose Jane for further management strategies  · Lung nodule - will place rx for ct f/u in 6 months per recs  · Thyroid nodule - will place order for US thyroid per recs         Relevant Orders    US thyroid    CT chest without contrast                Reason for visit is   Chief Complaint   Patient presents with    Virtual Brief Visit        Encounter provider Soumya Cantor, [facility]    Provider located at [address] [place] [address] Dr Gibson 3    Recent Visits  Date Type Provider Dept   05/14/21 [address] [facility], [address] [place] [address]   05/10/21 Telephone Eduin Moises Milligan recent visits within past 7 days and meeting all other requirements     Future Appointments  No visits were found meeting these conditions     Showing future appointments within next 150 days and meeting all other requirements        After connecting through telephone, the patient was identified by name and date of birth  Celio Segura was informed that this is a telemedicine visit and that the visit is being conducted through telephone  My office door was closed  No one else was in the room  She acknowledged consent and understanding of privacy and security of the platform  The patient has agreed to participate and understands she can discontinue the visit at any time  Patient is aware this is a billable service  Subjective    Celio Segura is a 68 y o  female   Phone call per pt request to review labs/recent CT scan  Pt states she is doing fairly well without complaint  Had CT done d/t abd pain, thought she might have gall bladder disease - did show stone  Denies abd pain at present  Is concerned with increase in A1C  Has not been able to eat fresh fruits or veggies since colitis episode  Does have a chronic cough, no sputum  Pt denies recent falls  Denies pain  Denies cp/sob  Denies gi/gu distress  Appetite stable  Past Medical History:   Diagnosis Date    Abnormal mammogram     Acute sinusitis     Allergic reaction     Subsequent encounter, Resolved - 1/9/18    Allergic rhinitis 01/16/2012    Anaphylactic reaction     Other, Last assessed - 6/16/16    Anemia 01/16/2012    Ataxia     Cellulitis of great toe of right foot     Chronic allergic rhinitis     Unspec seasonality, unspec trigger;  Last assessed - 11/13/17    Contact dermatitis     Last assessed - 6/3/14    Dermatitis     Diabetes mellitus ([address] )     Disorder of tympanic membrane     Last assessed - 10/15/12    Dry skin     Last assessed - 4/15/14    Dysfunction of eustachian tube     Unspec laterality, Last assessed - 10/19/12    Ganglion cyst     Glaucoma     Hip pain, acute, unspecified laterality     Hyperlipidemia     Hypertension     Labyrinthitis 11/17/2011    Leg mass, left     Limb pain     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    Long term use of drug     Last assessed - 4/6/14    Mammogram abnormal     Last assessed - 4/11/14    Mass of thigh, left     Pain in ankle joint     Pes planus     Unspec laterality, Last assessed - 4/9/13    Shoulder pain, right     Vertigo 01/16/2012       Past Surgical History:   Procedure Laterality Date    OTHER SURGICAL HISTORY      Surgery unk     TONSILLECTOMY         Current Outpatient Medications   Medication Sig Dispense Refill    amLODIPine (NORVASC) 5 mg tablet Take 1 tablet (5 mg total) by mouth daily 90 tablet 3    aspirin 81 MG tablet Take 1 tablet by mouth daily      atorvastatin (LIPITOR) 10 mg tablet TAKE 1 TABLET (10 MG TOTAL) BY MOUTH DAILY 90 tablet 1    Calcium Carbonate (CALTRATE 600) 1500 (600 Ca) MG TABS Take by mouth      carvedilol (COREG) 25 mg tablet Take 1 tablet (25 mg total) by mouth 2 (two) times a day 180 tablet 3    Cholecalciferol (VITAMIN D3) 2000 units capsule Take 1 tablet by mouth daily      EPINEPHrine (EPIPEN) 0 3 mg/0 3 mL SOAJ Inject 1 Syringe into a muscle      fluticasone (FLONASE) 50 mcg/act nasal spray USE 1 SPRAY IN EACH NOSTRIL EVERY DAY 48 mL 2    glimepiride (AMARYL) 4 mg tablet Take 2 tablets by mouth daily with breakfast       glucosamine 500 MG CAPS capsule Take 1 capsule by mouth daily       hydrochlorothiazide (HYDRODIURIL) 25 mg tablet TAKE 1 TABLET (25 MG TOTAL) BY MOUTH DAILY 90 tablet 1    lisinopril (ZESTRIL) 40 mg tablet Take 1 tablet (40 mg total) by mouth 2 (two) times a day 180 tablet 3    metFORMIN (GLUCOPHAGE-XR) 500 mg 24 hr tablet TAKE 1 TABLET TWICE DAILY      Multiple Vitamins-Minerals (WOMENS ONE DAILY) TABS Take 1 tablet by mouth daily      Omega-3 Fatty Acids (OMEGA-3 FISH OIL) 1000 MG CAPS Take 1 capsule by mouth daily       pantoprazole (PROTONIX) 20 mg tablet Take 1 tablet (20 mg total) by mouth daily 30 tablet 5    saccharomyces boulardii (FLORASTOR) 250 mg capsule Take 1 capsule (250 mg total) by mouth 2 (two) times a day 50 capsule 0    spironolactone (ALDACTONE) 25 mg tablet Take 1 tablet (25 mg total) by mouth daily 90 tablet 3     No current facility-administered medications for this visit  Allergies   Allergen Reactions    Cortisone Hyperactivity    Bee Venom Edema    Virtussin A-C [Guaifenesin-Codeine] Throat Swelling    Bee Pollen Edema     Category: Allergy; Review of Systems   Constitutional: Positive for appetite change (unable to lexy fresh fruit/veggies)  Negative for activity change, chills and fatigue  HENT: Negative for congestion  Respiratory: Positive for cough  Negative for shortness of breath  Cardiovascular: Negative for chest pain  Gastrointestinal: Positive for abdominal pain (improved)  Negative for constipation, diarrhea, nausea and vomiting  Endocrine:        Hair falling out   Genitourinary: Negative for difficulty urinating  Musculoskeletal: Negative for arthralgias and gait problem  Neurological: Negative for dizziness and light-headedness  Psychiatric/Behavioral: Negative for decreased concentration  There were no vitals filed for this visit  I spent 15 minutes directly with the patient during this visit    [facility] acknowledges that she has consented to an online visit or consultation  She understands that the online visit is based solely on information provided by her, and that, in the absence of a face-to-face physical evaluation by the physician, the diagnosis she receives is both limited and provisional in terms of accuracy and completeness  This is not intended to replace a full medical face-to-face evaluation by the physician  Ronald Yee understands and accepts these terms

## 2021-05-14 NOTE — PATIENT INSTRUCTIONS
· US thyroid, as soon as able  · CT chest, in 6 months for follow up  · Nutrition consult for diabetic diet counseling    · Will follow up with other topics discussed during phone call and reach out on Monday

## 2021-05-16 PROBLEM — R93.89 ABNORMAL CT OF THE CHEST: Status: ACTIVE | Noted: 2021-05-16

## 2021-05-16 NOTE — ASSESSMENT & PLAN NOTE
Lab Results   Component Value Date    HGBA1C 7 2 (H) 05/06/2021   · Pt concerned with increased A1C    She is compliant with medications, but has difficulty with diet  · Unable to see nutritionist at [facility] and [facility] because she is long term resident (vs short term rehab pt)  · Will rx nutrition eval for  nutrition for diabetic diet ed

## 2021-05-16 NOTE — ASSESSMENT & PLAN NOTE
· CT scan shows the following  · Gall stones - non blocking, no pain reported - will confer with Dr Marti Plata for further management strategies  · Cont lifestyle and dietary interventions notify office/WC if acute pain  · Parapelvic cyst right kidney - will confer with Dr Marti Plata for further management strategies  · Lung nodule - will place rx for ct f/u in 6 months per recs  · Thyroid nodule - will place order for US thyroid per recs

## 2021-05-19 ENCOUNTER — OFFICE VISIT (OUTPATIENT)
Dept: GERIATRICS | Facility: CLINIC | Age: 81
End: 2021-05-19
Payer: MEDICARE

## 2021-05-19 VITALS
BODY MASS INDEX: 34.62 KG/M2 | OXYGEN SATURATION: 99 % | WEIGHT: 183.2 LBS | TEMPERATURE: 98.1 F | HEART RATE: 63 BPM | DIASTOLIC BLOOD PRESSURE: 60 MMHG | SYSTOLIC BLOOD PRESSURE: 150 MMHG

## 2021-05-19 DIAGNOSIS — E11.22 CONTROLLED TYPE 2 DIABETES MELLITUS WITH CHRONIC KIDNEY DISEASE, WITHOUT LONG-TERM CURRENT USE OF INSULIN, UNSPECIFIED CKD STAGE (HCC): Primary | Chronic | ICD-10-CM

## 2021-05-19 DIAGNOSIS — E78.2 MIXED HYPERLIPIDEMIA: ICD-10-CM

## 2021-05-19 DIAGNOSIS — N18.32 STAGE 3B CHRONIC KIDNEY DISEASE (HCC): Chronic | ICD-10-CM

## 2021-05-19 DIAGNOSIS — M54.50 ACUTE BILATERAL LOW BACK PAIN WITHOUT SCIATICA: ICD-10-CM

## 2021-05-19 DIAGNOSIS — E55.9 VITAMIN D DEFICIENCY: ICD-10-CM

## 2021-05-19 DIAGNOSIS — E03.9 HYPOTHYROIDISM, UNSPECIFIED TYPE: ICD-10-CM

## 2021-05-19 DIAGNOSIS — R91.1 PULMONARY NODULE: ICD-10-CM

## 2021-05-19 DIAGNOSIS — D64.9 ANEMIA, UNSPECIFIED TYPE: ICD-10-CM

## 2021-05-19 DIAGNOSIS — K80.20 GALL STONES: ICD-10-CM

## 2021-05-19 DIAGNOSIS — E04.1 THYROID NODULE: ICD-10-CM

## 2021-05-19 DIAGNOSIS — I10 ESSENTIAL HYPERTENSION: ICD-10-CM

## 2021-05-19 PROCEDURE — 99214 OFFICE O/P EST MOD 30 MIN: CPT | Performed by: STUDENT IN AN ORGANIZED HEALTH CARE EDUCATION/TRAINING PROGRAM

## 2021-05-19 RX ORDER — LEVOTHYROXINE SODIUM 0.03 MG/1
25 TABLET ORAL DAILY
Qty: 30 TABLET | Refills: 2 | Status: SHIPPED | OUTPATIENT
Start: 2021-05-19 | End: 2021-08-16 | Stop reason: SDUPTHER

## 2021-05-19 NOTE — PROGRESS NOTES
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, without long-term current use of insulin ([facility] ) - Primary (Chronic)       Lab Results   Component Value Date    HGBA1C 7 2 (H) 05/06/2021   Patient continues on metformin 500 mg daily, continue glimepiride   Recommend adherence to a diabetic, heart healthy diet  If HbA1c continues to increase, will augment therapy  Goal HbA1c for this patient is 7 or less per AGS guidelines           Hypothyroidism     TSH  Elevated, T4 normal   Subclinical hypothyroidism although patient symptomatic given history of hair loss, fatigue, dry skin, bowel movements  Will trial with levothyroxine 25 mcg daily  (Spoke with vitamins pharmacy -no interaction with cough medicine allergy )   Will plan to repeat TSH with reflex T4 in 6-8 weeks         Relevant Medications    levothyroxine 25 mcg tablet    Other Relevant Orders    TSH, 3rd generation with T4 reflex    Thyroid nodule      CT chest revealing 1 7 cm right thyroid nodule, this meets the size threshold criteria for further evaluation with ultrasound  Ultrasound of thyroid placed  Elevated TSH common local T4    Symptomatic of sub clinical hypothyroidism with hair loss, dry skin, fatigue  Will trial with low-dose levothyroxine and repeat TSH/reflex T4 in 6-8 weeks  Will continue to monitor         Relevant Medications levothyroxine 25 mcg tablet       Cardiovascular and Mediastinum    Hypertension      /60 today   blood pressures previously controlled  Will continue to monitor  Continue aspirin 81 mg daily   Continue amlodipine 5 mg daily   Continue lisinopril 40 mg daily   Continue hydrochlorothiazide 25 mg daily   Continue carvedilol 25 mg b i d    Patient also on spironolactone 25 mg daily  Will plan to eventually wean patient off either HCTZ or spironolactone  as she has been on 2 diuretics chronically apparently              Genitourinary    CKD (chronic kidney disease) stage 3, GFR 30-59 ml/min ([facility]) (Chronic)      Avoid nephrotoxic agents  Medications to be renally dosed  Will continue to monitor periodically            Other    Hyperlipidemia      Continue atorvastatin 10 mg daily   Recommend adherence to a diabetic, heart healthy diet         Vitamin D deficiency      Continue vitamin-D supplementation            Acute bilateral low back pain without sciatica      Maintain Falls precautions   No symptoms of cauda equina  Will refer to physical therapy for strengthening exercises  Consider Tylenol/ topical analgesics/heating pad           Relevant Orders    Ambulatory referral to Physical Therapy    Pulmonary nodule     CT chest showing Incidentally detected focal area of groundglass density right lung base, measuring about 8 mm  Will plan to repeat CT chest in 9-12 months         Anemia      Recent blood work indicative of mild microcytic anemia  Prior history of anemia for which she was previously on  Iron therapy  Will order iron panel  Patient denies any acute blood loss  Patient does have fatigue         Relevant Orders    Iron Panel (Includes Ferritin, Iron Sat%, Iron, and TIBC)          Orders Placed This Encounter   Procedures    TSH, 3rd generation with T4 reflex    Ambulatory referral to Physical Therapy       - Counseling Documentation: patient was counseled regarding: diagnostic results, instructions for management, risk factor reductions, prognosis, patient and family education, impressions, risks and benefits of treatment options and importance of compliance with treatment    Chief Complaint     Patient presents for follow-up of recent blood work and CT findings    History of Present Illness     HPI    Patient presents for follow-up of recent blood work as well as to review CT findings  We did review the patient's HbA1c which remains at 7 2  she has been compliant with the current medication regimen of metformin and glimepiride  Goal HbA1c for this patient is 7 per American geriatric society guidelines  Vitamin-D remained stable and she also remains at a baseline CKD 3  Of note the patient's TSH remains elevated but T4 is within normal limits  She does have subclinical hypothyroidism however she is symptomatic as she has had hair loss, fatigue and very dry skin  We discussed a trial with treatment of levothyroxine given patient's symptoms and will plan to repeat TSH with reflex T4 in 6-8 weeks  We also discussed findings of a microcytic anemia on patient's CBC  Of note the patient had been on iron therapy in the past   Will plan to order an iron panel  The patient denies any acute blood loss or passage of blood in stool  We also reviewed findings noted on her CT which included gallstones  She  currently denies any symptoms of   Acute cholecystitis however was advised should she develop such symptoms she will likely require potential surgical intervention  The patient declines any elective cholecystectomy at this time  She does have a ventral hernia on physical exam with CT findings confirming a small reducible umbilical hernia  The patient was  noted to have a pulmonary nodule measuring 8 mm for which we will monitor annually with a CT chest   A thyroid nodule was also noted for which a thyroid ultrasound was ordered      Today the patient explains that she did aggravate her low back pain whilst gardening  We did discuss a stepwise approach to her back pain with patient in agreement for physical therapy for strengthening exercises  She was advised to continue Tylenol/ topical analgesics/ heat application as needed  She declined any referral to pain management at this time  No symptoms of cauda equina  She continues to be noncompliant with PPI therapy despite recommendations by GI given EGD findings of changes in epithelium 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  The patient continues on metformin and glimepiride for a history of type 2 diabetes  She has been compliant with vitamin-D supplementation for a history of vitamin-D deficiency  The following portions of the patient's history were reviewed and updated as appropriate: allergies, current medications, past family history, past medical history, past social history, past surgical history and problem list     Review of Systems     Review of Systems   Constitutional: Positive for fatigue  Negative for activity change, chills and fever  HENT: Negative for congestion, ear pain, rhinorrhea, sore throat and trouble swallowing  Eyes: Negative for discharge  Respiratory: Negative for cough, chest tightness, shortness of breath, wheezing and stridor  Cardiovascular: Negative for chest pain, palpitations and leg swelling  Gastrointestinal: Negative for abdominal pain, blood in stool, nausea and vomiting  Genitourinary: Negative for decreased urine volume, dysuria and menstrual problem  Musculoskeletal: Positive for back pain  Negative for gait problem  Skin: Negative for color change, pallor, rash and wound  Dry skin   Neurological: Negative for dizziness and weakness  Psychiatric/Behavioral: Negative for decreased concentration  The patient is not nervous/anxious          Active Problem List     Patient Active Problem List   Diagnosis    Chronic allergic rhinitis    Ganglion cyst    Hyperlipidemia    Hypertension    Microalbuminuric diabetic nephropathy (HCC)    OA (osteoarthritis)    Osteopenia    Thalassemia minor    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, without long-term current use of insulin ([facility])    Vitamin D deficiency    Closed nondisplaced fracture of fifth metatarsal bone of left foot with routine healing    Right rotator cuff tendinitis    CKD (chronic kidney disease) stage 3, GFR 30-59 ml/min (HCC)    Posterior tibialis tendon insufficiency    Flat foot    H/O abnormal mammogram    Colitis, acute    Physical deconditioning    Chronic bilateral low back pain without sciatica    Age-related osteoporosis without current pathological fracture    Epistaxis    Hand cramps    Fatigue    Groin pain    Generalized abdominal pain    Chest pain    Abnormal CT of the chest    Hypothyroidism    Acute bilateral low back pain without sciatica    Gall stones    Pulmonary nodule    Thyroid nodule    Anemia       Objective     /60 (BP Location: Right leg, Patient Position: Sitting, Cuff Size: Adult)   Pulse 63   Temp 98 1 °F (36 7 °C) (Temporal)   Wt 83 1 kg (183 lb 3 2 oz)   LMP  (LMP Unknown)   SpO2 99%   BMI 34 62 kg/m²     Physical Exam  Vitals signs reviewed  Constitutional:       General: She is not in acute distress  Appearance: Normal appearance  She is well-developed  She is not ill-appearing or diaphoretic  HENT:      Head: Normocephalic and atraumatic  Right Ear: External ear normal       Left Ear: External ear normal       Nose: Nose normal  No congestion  Mouth/Throat:      Mouth: Mucous membranes are moist       Pharynx: Oropharynx is clear  No oropharyngeal exudate  Eyes:      General: No scleral icterus  Right eye: No discharge  Left eye: No discharge  Conjunctiva/sclera: Conjunctivae normal    Neck:      Musculoskeletal: Normal range of motion and neck supple  Vascular: No JVD     Cardiovascular:      Rate and Rhythm: Normal rate and regular rhythm  Heart sounds: Normal heart sounds  No murmur  No friction rub  No gallop  Pulmonary:      Effort: Pulmonary effort is normal  No respiratory distress  Breath sounds: Normal breath sounds  No stridor  No wheezing or rales  Chest:      Chest wall: No tenderness  Abdominal:      General: Bowel sounds are normal  There is no distension  Palpations: Abdomen is soft  There is no mass  Tenderness: There is no abdominal tenderness  There is no guarding or rebound  Hernia: A hernia is present  Musculoskeletal: Normal range of motion  General: No tenderness or deformity  Skin:     General: Skin is warm  Coloration: Skin is not pale  Findings: No erythema or rash  Neurological:      General: No focal deficit present  Mental Status: She is alert and oriented to person, place, and time  Mental status is at baseline  Cranial Nerves: No cranial nerve deficit  Motor: No weakness  Gait: Gait normal       Deep Tendon Reflexes: Reflexes are normal and symmetric     Psychiatric:         Mood and Affect: Mood normal          Behavior: Behavior normal          Pertinent Laboratory/Diagnostic Studies:  Reviewed prior blood work  CBC revealing microcytic anemia, HbA1c 7 2, vitamin-D stable, TSH elevated, T4 normal    Current Medications     Current Outpatient Medications:     amLODIPine (NORVASC) 5 mg tablet, Take 1 tablet (5 mg total) by mouth daily, Disp: 90 tablet, Rfl: 3    aspirin 81 MG tablet, Take 1 tablet by mouth daily, Disp: , Rfl:     atorvastatin (LIPITOR) 10 mg tablet, TAKE 1 TABLET (10 MG TOTAL) BY MOUTH DAILY, Disp: 90 tablet, Rfl: 1    Calcium Carbonate (CALTRATE 600) 1500 (600 Ca) MG TABS, Take by mouth, Disp: , Rfl:     carvedilol (COREG) 25 mg tablet, Take 1 tablet (25 mg total) by mouth 2 (two) times a day, Disp: 180 tablet, Rfl: 3    EPINEPHrine (EPIPEN) 0 3 mg/0 3 mL SOAJ, Inject 1 Syringe into a muscle, Disp: , Rfl:     fluticasone (FLONASE) 50 mcg/act nasal spray, USE 1 SPRAY IN EACH NOSTRIL EVERY DAY, Disp: 48 mL, Rfl: 2    glimepiride (AMARYL) 4 mg tablet, Take 2 tablets by mouth daily with breakfast , Disp: , Rfl:     glucosamine 500 MG CAPS capsule, Take 1 capsule by mouth daily , Disp: , Rfl:     hydrochlorothiazide (HYDRODIURIL) 25 mg tablet, TAKE 1 TABLET (25 MG TOTAL) BY MOUTH DAILY, Disp: 90 tablet, Rfl: 1    lisinopril (ZESTRIL) 40 mg tablet, Take 1 tablet (40 mg total) by mouth 2 (two) times a day, Disp: 180 tablet, Rfl: 3    metFORMIN (GLUCOPHAGE-XR) 500 mg 24 hr tablet, TAKE 1 TABLET TWICE DAILY, Disp: , Rfl:     Multiple Vitamins-Minerals (WOMENS ONE DAILY) TABS, Take 1 tablet by mouth daily, Disp: , Rfl:     Omega-3 Fatty Acids (OMEGA-3 FISH OIL) 1000 MG CAPS, Take 1 capsule by mouth daily , Disp: , Rfl:     Cholecalciferol (VITAMIN D3) 2000 units capsule, Take 1 tablet by mouth daily, Disp: , Rfl:     levothyroxine 25 mcg tablet, Take 1 tablet (25 mcg total) by mouth daily, Disp: 30 tablet, Rfl: 2    pantoprazole (PROTONIX) 20 mg tablet, Take 1 tablet (20 mg total) by mouth daily, Disp: 30 tablet, Rfl: 5    saccharomyces boulardii (FLORASTOR) 250 mg capsule, Take 1 capsule (250 mg total) by mouth 2 (two) times a day, Disp: 50 capsule, Rfl: 0    spironolactone (ALDACTONE) 25 mg tablet, Take 1 tablet (25 mg total) by mouth daily, Disp: 90 tablet, Rfl: 3    Health Maintenance     Health Maintenance   Topic Date Due    SLP PLAN OF CARE  Never done    BMI: Followup Plan  Never done    DTaP,Tdap,and Td Vaccines (1 - Tdap) 09[birth year]/1963    DM Eye Exam  06/21/2019    Diabetic Foot Exam  06/04/2021    HEMOGLOBIN A1C  11/06/2021    Fall Risk  04/05/2022    Depression Screening PHQ  04/05/2022    Medicare Annual Wellness Visit (AWV)  04/05/2022    BMI: Adult  05/19/2022    Pneumococcal Vaccine: 65+ Years  Completed    Influenza Vaccine  Completed    COVID-19 Vaccine  Completed  HIB Vaccine  Aged Out    Hepatitis B Vaccine  Aged Out    IPV Vaccine  Aged Out    Hepatitis A Vaccine  Aged Out    Meningococcal ACWY Vaccine  Aged Out    HPV Vaccine  Aged Out     Immunization History   Administered Date(s) Administered    INFLUENZA 11/06/2007, 10/15/2010, 09/09/2011    Influenza Split High Dose Preservative Free IM 10/31/2014, 10/01/2015, 10/17/2016, 09/01/2017, 10/09/2017, 10/01/2018, 10/05/2018    Influenza, seasonal, injectable 11/07/2012    Pneumococcal Conjugate 13-Valent 06/16/2016, 10/05/2018    Pneumococcal Conjugate PCV 7 10/01/2018    Pneumococcal Polysaccharide PPV23 09/01/2007, 10/12/2019    SARS-CoV-2 / COVID-19 mRNA IM (Pfizer-BioNTech) 01/19/2021, 02/09/2021    Td (adult), adsorbed 06/01/1998, 12/08/2009    Zoster 10/31/2014, 08/05/2019, 11/01/2019    Zoster Vaccine Recombinant 08/05/2019, 11/01/2019    influenza, trivalent, adjuvanted 10/12/2019       Demarco Bonilla MD  Geriatrics   5/20/2021 5:53 AM

## 2021-05-19 NOTE — PATIENT INSTRUCTIONS
1)Please complete iron panel and thyroid function in 6-8 weeks  2)Start levothyroxine 25mcg daily  3)Will follow up in 10 weeks

## 2021-05-20 ENCOUNTER — TELEPHONE (OUTPATIENT)
Dept: GERIATRICS | Age: 81
End: 2021-05-20

## 2021-05-20 PROBLEM — E04.1 THYROID NODULE: Status: ACTIVE | Noted: 2021-05-20

## 2021-05-20 PROBLEM — R91.1 PULMONARY NODULE: Status: ACTIVE | Noted: 2021-05-20

## 2021-05-20 PROBLEM — D64.9 ANEMIA: Status: ACTIVE | Noted: 2021-05-20

## 2021-05-20 PROBLEM — K80.20 GALL STONES: Status: ACTIVE | Noted: 2021-05-20

## 2021-05-20 NOTE — ASSESSMENT & PLAN NOTE
CT chest showing Incidentally detected focal area of groundglass density right lung base, measuring about 8 mm  Will plan to repeat CT chest in 9-12 months

## 2021-05-20 NOTE — ASSESSMENT & PLAN NOTE
/60 today   blood pressures previously controlled  Will continue to monitor  Continue aspirin 81 mg daily   Continue amlodipine 5 mg daily   Continue lisinopril 40 mg daily   Continue hydrochlorothiazide 25 mg daily   Continue carvedilol 25 mg b i d    Patient also on spironolactone 25 mg daily  Will plan to eventually wean patient off either HCTZ or spironolactone  as she has been on 2 diuretics chronically apparently

## 2021-05-20 NOTE — ASSESSMENT & PLAN NOTE
TSH  Elevated, T4 normal   Subclinical hypothyroidism although patient symptomatic given history of hair loss, fatigue, dry skin, bowel movements  Will trial with levothyroxine 25 mcg daily  (Spoke with vitamins pharmacy -no interaction with cough medicine allergy )   Will plan to repeat TSH with reflex T4 in 6-8 weeks

## 2021-05-20 NOTE — ASSESSMENT & PLAN NOTE
Maintain [place] precautions   No symptoms of cauda equina  Will refer to physical therapy for strengthening exercises  Consider Tylenol/ topical analgesics/heating pad

## 2021-05-20 NOTE — ASSESSMENT & PLAN NOTE
CT chest revealing 1 7 cm right thyroid nodule, this meets the size threshold criteria for further evaluation with ultrasound  Ultrasound of thyroid placed  Elevated TSH common local T4    Symptomatic of sub clinical hypothyroidism with hair loss, dry skin, fatigue  Will trial with low-dose levothyroxine and repeat TSH/reflex T4 in 6-8 weeks  Will continue to monitor

## 2021-05-20 NOTE — ASSESSMENT & PLAN NOTE
Recent blood work indicative of mild microcytic anemia  Prior history of anemia for which she was previously on  Iron therapy  Will order iron panel  Patient denies any acute blood loss  Patient does have fatigue

## 2021-05-20 NOTE — TELEPHONE ENCOUNTER
----- Message from Irineo Valero MD sent at 5/19/2021  3:09 PM EDT -----  Please call the pt and let her know that pharmacy cleared the levothyroxine as there is no relation to her supposed allergy to guaifenesen

## 2021-05-20 NOTE — ASSESSMENT & PLAN NOTE
Patient with gallstones noted on CT abdomen   Patient advised should she develop any symptoms of acute cholecystitis, she will likely need surgical intervention at that time   Patient declines any elective options for cholecystectomy

## 2021-05-20 NOTE — ASSESSMENT & PLAN NOTE
Lab Results   Component Value Date    HGBA1C 7 2 (H) 05/06/2021   Patient continues on metformin 500 mg daily, continue glimepiride   Recommend adherence to a diabetic, heart healthy diet  If HbA1c continues to increase, will augment therapy  Goal HbA1c for this patient is 7 or less per AGS guidelines

## 2021-05-21 ENCOUNTER — HOSPITAL ENCOUNTER (OUTPATIENT)
Dept: RADIOLOGY | Age: 81
Discharge: HOME | End: 2021-05-21
Payer: MEDICARE

## 2021-05-21 DIAGNOSIS — R93.89 ABNORMAL CT OF THE CHEST: ICD-10-CM

## 2021-05-21 PROCEDURE — 76536 US EXAM OF HEAD AND NECK: CPT

## 2021-06-07 DIAGNOSIS — I10 HYPERTENSION, UNSPECIFIED TYPE: ICD-10-CM

## 2021-06-07 RX ORDER — SPIRONOLACTONE 25 MG/1
25 TABLET ORAL DAILY
Qty: 90 TABLET | Refills: 3 | Status: SHIPPED | OUTPATIENT
Start: 2021-06-07

## 2021-06-09 ENCOUNTER — TELEPHONE (OUTPATIENT)
Dept: GERIATRICS | Facility: CLINIC | Age: 81
End: 2021-06-09

## 2021-06-09 NOTE — TELEPHONE ENCOUNTER
Informed pt that a repeat them in 2-3 months      Spoke with pt relayed thyroid function and renal function and Cholesterol to her as being stable  Pt is concerned that results are not accurate  She believes that these results reflect her normal levels  Prior to her starting her new meds  Feels that level maybe not be the same since she started the new meds  ----- Message from Boris Aquino MD sent at 6/9/2021  6:11 AM EDT -----    Please call the patient and let her know her thyroid function and renal function remains stable  Cholesterol overall stable as well    Labs were ordered by Nephrology

## 2021-07-02 ENCOUNTER — TELEPHONE (OUTPATIENT)
Dept: GASTROENTEROLOGY | Facility: CLINIC | Age: 81
End: 2021-07-02

## 2021-07-02 NOTE — TELEPHONE ENCOUNTER
----- Message from Lourdes Rosen sent at 7/2/2021 11:52 AM EDT -----  Regarding: Visit Follow-Up Question  Contact: [phone]  my appointment for june 1 was changed   i couldn't get through to the office to speak with them about it   waited and waited on the phone  several tries  also the sound of the recording kept cutting out  one number for all your offices?  egad   anyway,  why the change and how do i get a new appointment when i can't get through to the offices    opal portillo     9/13

## 2021-07-02 NOTE — TELEPHONE ENCOUNTER
Spoke with patient tried to make an apt she wanted to go to [place] I stated if she that if she chooses to go to that location she would then follow up her care with their providers since she can only see our doctors in [facility] or [facility]   She began laughing saying that if it's all under [facility] what's the issue     I then again explained that yes they're a [facility] [place] but not with our location so she can continue her care with those providers; she laughed again I did explain Dr Neli Allen is no longer with the practice she laughed again I did try to schedule an apt with a PA she refused and wanted only a doctor and preferred a female provider; I did explain that Dr Elizabeth Pagan is on maternity leave she laughed again and stated then she will be willing to see a PA in [place] I asked to put patient on hold so I could find an apt and after a few minutes she hug up I did try calling patient back and left a message and didn't receive a call back

## 2021-07-21 ENCOUNTER — TELEPHONE (OUTPATIENT)
Dept: GERIATRICS | Facility: CLINIC | Age: 81
End: 2021-07-21

## 2021-07-21 NOTE — TELEPHONE ENCOUNTER
Spoke with pt informed her that Dr Gerhardt Smaller has received her blood-work  and we will discuss the results next week at her delfina      ----- Message from Suzie Gloria MD sent at 7/21/2021  2:00 PM EDT -----  Pls let the pt know that I received her bloodwork and we will discuss the results next week at her appt  Thanks

## 2021-07-28 ENCOUNTER — OFFICE VISIT (OUTPATIENT)
Dept: GERIATRICS | Facility: CLINIC | Age: 81
End: 2021-07-28
Payer: MEDICARE

## 2021-07-28 VITALS
SYSTOLIC BLOOD PRESSURE: 122 MMHG | WEIGHT: 183.1 LBS | OXYGEN SATURATION: 98 % | BODY MASS INDEX: 33.69 KG/M2 | TEMPERATURE: 97.6 F | DIASTOLIC BLOOD PRESSURE: 72 MMHG | HEIGHT: 62 IN | HEART RATE: 61 BPM

## 2021-07-28 DIAGNOSIS — E03.9 HYPOTHYROIDISM, UNSPECIFIED TYPE: ICD-10-CM

## 2021-07-28 DIAGNOSIS — E55.9 VITAMIN D DEFICIENCY: ICD-10-CM

## 2021-07-28 DIAGNOSIS — E11.22 CONTROLLED TYPE 2 DIABETES MELLITUS WITH CHRONIC KIDNEY DISEASE, WITHOUT LONG-TERM CURRENT USE OF INSULIN, UNSPECIFIED CKD STAGE (HCC): Primary | Chronic | ICD-10-CM

## 2021-07-28 DIAGNOSIS — D56.3 THALASSEMIA MINOR: ICD-10-CM

## 2021-07-28 DIAGNOSIS — N18.32 STAGE 3B CHRONIC KIDNEY DISEASE (HCC): Chronic | ICD-10-CM

## 2021-07-28 DIAGNOSIS — I10 ESSENTIAL HYPERTENSION: ICD-10-CM

## 2021-07-28 DIAGNOSIS — M19.90 OSTEOARTHRITIS, UNSPECIFIED OSTEOARTHRITIS TYPE, UNSPECIFIED SITE: ICD-10-CM

## 2021-07-28 DIAGNOSIS — M85.80 OSTEOPENIA, UNSPECIFIED LOCATION: ICD-10-CM

## 2021-07-28 DIAGNOSIS — E04.1 THYROID NODULE: ICD-10-CM

## 2021-07-28 DIAGNOSIS — R91.1 PULMONARY NODULE: ICD-10-CM

## 2021-07-28 DIAGNOSIS — E78.2 MIXED HYPERLIPIDEMIA: ICD-10-CM

## 2021-07-28 DIAGNOSIS — R53.82 CHRONIC FATIGUE: ICD-10-CM

## 2021-07-28 PROCEDURE — 99215 OFFICE O/P EST HI 40 MIN: CPT | Performed by: STUDENT IN AN ORGANIZED HEALTH CARE EDUCATION/TRAINING PROGRAM

## 2021-07-28 NOTE — PROGRESS NOTES
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, without long-term current use of insulin (HCC) - Primary (Chronic)       Lab Results   Component Value Date    HGBA1C 7 2 (H) 05/06/2021      will plan to repeat HbA1c prior to next visit   Continue glimepiride, metformin   Patient with occasional episodes of hypoglycemia  Discussed this may likely be secondary to glimepiride   Encouraged patient to avoid skipping meals  If symptoms persist, advised to will discontinue glimepiride which can cause hypoglycemia in older patients and switch to an alternative agent or short acting sulfonylurea         Relevant Orders    Hemoglobin A1C    Hypothyroidism     Improvement in symptoms since starting levothyroxine, now with decreased hair loss   Patient with fatigue, muscle aches, overall decreased energy for the past 1 week   Will plan to repeat TSH/reflex T4 at next blood draw   Patient advised to call the office in 2 weeks in the event that current symptoms of viral  Once acute symptoms resolve, if fatigue persists, will increase levothyroxine to 37 5 mcg daily given persisting abnormal TSH         Relevant Orders    TSH, 3rd generation with T4 reflex    Thyroid nodule      Recent thyroid ultrasound showing  Multinodular thyroid   Per radiology report, none of the nodules warrant tissue sampling at this time  1 9 cm nodule in right mid to lower pole will require repeat ultrasound in 1 year, 3 and 5 years            Cardiovascular and Mediastinum    Hypertension      /72   Continue aspirin 81 mg daily   Continue carvedilol 25 mg b i d    Continue hydrochlorothiazide 25 mg daily  Continue lisinopril 40 mg b i d  ( dose per Nephrology)  Continue amlodipine 5 mg daily  Recommend adherence to a diabetic, heart healthy diet   Continue routine follow-up with Nephrology         Relevant Orders    CBC and differential    Comprehensive metabolic panel       Musculoskeletal and Integument    OA (osteoarthritis)      Recommend Tylenol as needed, topical analgesics to augment therapy  Currently in physical therapy for lower extremity pain   Maintain [place] precautions         Osteopenia      Patient will require repeat DEXA scan in the future   Continue calcium / vitamin-D supplementation  Maintain [place] precautions            Genitourinary    CKD (chronic kidney disease) stage 3, GFR 30-59 ml/min (HCC) (Chronic)      Stable   Continues to follow with Nephrology   Avoid nephrotoxic agents  Medications to be renally dosed            Other    Hyperlipidemia      New atorvastatin 10 mg daily   Recommend adherence to a diabetic, heart healthy diet  Physical activity as able as part of an exercise routine         Relevant Orders    Lipid panel    Thalassemia minor      Or patient has not previously been seen by Hematology  Will repeat CBC, iron panel at next visit         Relevant Orders    Iron Panel (Includes Ferritin, Iron Sat%, Iron, and TIBC)    Vitamin D deficiency       Stable   Continue vitamin-D supplementation         Relevant Orders    Vitamin D 25 hydroxy    Fatigue    Pulmonary nodule      Prior CT chest showing incidentally detected focal area of ground-glass density in right lung base measuring 8 mm   Will plan to repeat CT chest in May of 2022                 - Counseling Documentation: patient was counseled regarding: diagnostic results, instructions for management, risk factor reductions, prognosis, patient and family education, impressions, risks and benefits of treatment options and importance of compliance with treatment    Chief Complaint     Patient presents for routine follow-up of acute and chronic conditions    History of Present Illness     HPI    This is a pleasant 68-year-old female with hypothyroidism, thyroid nodules, pulmonary nodule, HLD, type 2 diabetes, CKD 3 and osteoarthritis who presents for routine follow up of acute and chronic conditions  Today the patient explains that for the past 1 week, she has felt more fatigued and achy with joint pains  She has also been napping during the daytime which is unusual   She denies any fever, chills, nasal congestion, rhinorrhea, sore throat or cough  The patient did receive her COVID-19 vaccination and has not been in contact with any sick persons  We did review the patient's blood sugars and she is scheduled for repeat blood work prior to her next visit  The patient does relate that she intermittently has episodes of hypoglycemia requiring use of orange juice or candy  Accu-Chek was in the 80s on one occasion  I did review that if such symptoms continue, will consider switching glimepiride (which can certainly cause hypoglycemia in older patients) to an alternative agent or shorter acting sulfonylurea  The patient also states that she typically will not eat breakfast and was advised to ensure that she does eat regular meals  She currently has enrolled in physical therapy for chronic leg pains  No reports of any acute blood loss in stool or otherwise  We did review recent blood work in detail with plans to repeat labs prior to next visit  No cardiorespiratory distress, fever, chills, urinary symptoms or recent falls  The patient does admit to having chronic difficulty with sleep and she continues to maintain a very active lifestyle and serves on multiple committees at [employer]  The patient continues on atorvastatin for a history of hyperlipidemia  She has been compliant with levothyroxine for a history of hypothyroidism    She also continues on vitamin-D supplementation for a history of vitamin-D deficiency  Patient was advised if she continues to have body aches, develops any fever, chills, worsening fatigue or lethargy to call the office for further workup  The following portions of the patient's history were reviewed and updated as appropriate: allergies, current medications, past family history, past medical history, past social history, past surgical history and problem list     Review of Systems     Review of Systems   Constitutional: Positive for fatigue  Negative for activity change, chills and fever  HENT: Negative for congestion, ear pain, hearing loss, rhinorrhea and sore throat  Eyes: Negative for discharge  Respiratory: Negative for cough, chest tightness, shortness of breath, wheezing and stridor  Cardiovascular: Negative for chest pain, palpitations and leg swelling  Gastrointestinal: Negative for abdominal pain, constipation, diarrhea, nausea and vomiting  Genitourinary: Negative for decreased urine volume, dysuria and hematuria  Musculoskeletal: Positive for arthralgias  Negative for gait problem  Skin: Negative for color change, pallor, rash and wound  Neurological: Negative for dizziness, speech difficulty and light-headedness  Psychiatric/Behavioral: Positive for sleep disturbance (insomnia)  Negative for agitation, behavioral problems, confusion, decreased concentration and dysphoric mood  The patient is not nervous/anxious          Active Problem List     Patient Active Problem List   Diagnosis    Chronic allergic rhinitis    Ganglion cyst    Hyperlipidemia    Hypertension    Microalbuminuric diabetic nephropathy ([facility] )    OA (osteoarthritis)    Osteopenia    Thalassemia minor    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, without long-term current use of insulin ([facility])    Vitamin D deficiency    Closed nondisplaced fracture of fifth metatarsal bone of left foot with routine healing    Right rotator cuff tendinitis    CKD (chronic kidney disease) stage 3, GFR 30-59 ml/min ([facility])    Posterior tibialis tendon insufficiency    Flat foot    H/O abnormal mammogram    Colitis, acute    Physical deconditioning    Chronic bilateral low back pain without sciatica    Epistaxis    Hand cramps    Fatigue    Groin pain    Generalized abdominal pain    Chest pain    Abnormal CT of the chest    Hypothyroidism    Acute bilateral low back pain without sciatica    Gall stones    Pulmonary nodule    Thyroid nodule    Anemia       Objective     /72 (BP Location: Left arm, Patient Position: Sitting, Cuff Size: Adult)   Pulse 61   Temp 97 6 °F (36 4 °C) (Temporal)   Ht 5' 2" (1 575 m)   Wt 83 1 kg (183 lb 1 6 oz)   LMP  (LMP Unknown)   SpO2 98%   BMI 33 49 kg/m²     Physical Exam  Vitals reviewed  Constitutional:       General: She is not in acute distress  Appearance: Normal appearance  She is well-developed  She is not ill-appearing or diaphoretic  Comments: Female patient sitting comfortably in chair in no obvious cardiorespiratory or painful distress   HENT:      Head: Normocephalic and atraumatic  Right Ear: Tympanic membrane, ear canal and external ear normal       Left Ear: Tympanic membrane, ear canal and external ear normal       Nose: Nose normal       Mouth/Throat:      Mouth: Mucous membranes are moist       Pharynx: Oropharynx is clear  No oropharyngeal exudate  Eyes:      General: No scleral icterus  Right eye: No discharge  Left eye: No discharge  Conjunctiva/sclera: Conjunctivae normal    Neck:      Vascular: No JVD  Cardiovascular:      Rate and Rhythm: Normal rate and regular rhythm  Heart sounds: Murmur heard  No friction rub  No gallop  Pulmonary:      Effort: Pulmonary effort is normal  No respiratory distress  Breath sounds: Normal breath sounds  No wheezing or rales  Abdominal:      General: Bowel sounds are normal  There is no distension  Palpations: Abdomen is soft  Tenderness: There is no abdominal tenderness  There is no guarding  Musculoskeletal:         General: No tenderness or deformity  Normal range of motion  Cervical back: Normal range of motion and neck supple  Right lower leg: No edema  Left lower leg: No edema  Skin:     General: Skin is warm  Coloration: Skin is not pale  Findings: No erythema or rash  Neurological:      General: No focal deficit present  Mental Status: She is alert and oriented to person, place, and time  Mental status is at baseline  Cranial Nerves: No cranial nerve deficit  Motor: No weakness  Gait: Gait normal       Deep Tendon Reflexes: Reflexes are normal and symmetric     Psychiatric:         Mood and Affect: Mood normal          Behavior: Behavior normal          Pertinent Laboratory/Diagnostic Studies:  Reviewed recent blood work:   Transferrin: normal  %sat: lowTIBC:elevated  GFR 53, BUN 26  , HBA1C: 7 2 in may 2021  TSH 4 16, T4 normal    Current Medications     Current Outpatient Medications:     amLODIPine (NORVASC) 5 mg tablet, Take 1 tablet (5 mg total) by mouth daily, Disp: 90 tablet, Rfl: 3    aspirin 81 MG tablet, Take 1 tablet by mouth daily, Disp: , Rfl:     atorvastatin (LIPITOR) 10 mg tablet, TAKE 1 TABLET (10 MG TOTAL) BY MOUTH DAILY, Disp: 90 tablet, Rfl: 1    Calcium Carbonate (CALTRATE 600) 1500 (600 Ca) MG TABS, Take by mouth, Disp: , Rfl:     carvedilol (COREG) 25 mg tablet, Take 1 tablet (25 mg total) by mouth 2 (two) times a day, Disp: 180 tablet, Rfl: 3    Cholecalciferol (VITAMIN D3) 2000 units capsule, Take 1 tablet by mouth daily, Disp: , Rfl:     EPINEPHrine (EPIPEN) 0 3 mg/0 3 mL SOAJ, Inject 1 Syringe into a muscle, Disp: , Rfl:     fluticasone (FLONASE) 50 mcg/act nasal spray, USE 1 SPRAY IN EACH NOSTRIL EVERY DAY, Disp: 48 mL, Rfl: 2    glimepiride (AMARYL) 4 mg tablet, Take 2 tablets by mouth daily with breakfast , Disp: , Rfl:     glucosamine 500 MG CAPS capsule, Take 1 capsule by mouth daily , Disp: , Rfl:     hydrochlorothiazide (HYDRODIURIL) 25 mg tablet, TAKE 1 TABLET (25 MG TOTAL) BY MOUTH DAILY, Disp: 90 tablet, Rfl: 1    levothyroxine 25 mcg tablet, Take 1 tablet (25 mcg total) by mouth daily, Disp: 30 tablet, Rfl: 2    lisinopril (ZESTRIL) 40 mg tablet, Take 1 tablet (40 mg total) by mouth 2 (two) times a day, Disp: 180 tablet, Rfl: 3    metFORMIN (GLUCOPHAGE-XR) 500 mg 24 hr tablet, TAKE 1 TABLET TWICE DAILY, Disp: , Rfl:     Multiple Vitamins-Minerals (WOMENS ONE DAILY) TABS, Take 1 tablet by mouth daily, Disp: , Rfl:     Omega-3 Fatty Acids (OMEGA-3 FISH OIL) 1000 MG CAPS, Take 1 capsule by mouth daily , Disp: , Rfl:     pantoprazole (PROTONIX) 20 mg tablet, Take 1 tablet (20 mg total) by mouth daily, Disp: 30 tablet, Rfl: 5    saccharomyces boulardii (FLORASTOR) 250 mg capsule, Take 1 capsule (250 mg total) by mouth 2 (two) times a day, Disp: 50 capsule, Rfl: 0    spironolactone (ALDACTONE) 25 mg tablet, Take 1 tablet (25 mg total) by mouth daily, Disp: 90 tablet, Rfl: 3    Health Maintenance     Health Maintenance   Topic Date Due    Hepatitis C Screening  Never done    SLP PLAN OF CARE  Never done    BMI: Followup Plan  Never done    DTaP,Tdap,and Td Vaccines (1 - Tdap) 09/13/1961    DM Eye Exam  06/21/2019    Breast Cancer Screening: Mammogram  01/29/2021    Diabetic Foot Exam  06/04/2021    Influenza Vaccine (1) 09/01/2021    HEMOGLOBIN A1C  11/06/2021    Fall Risk  04/05/2022    Depression Screening PHQ  04/05/2022    Medicare Annual Wellness Visit (AWV)  04/05/2022    BMI: Adult  07/28/2022    Pneumococcal Vaccine: 65+ Years  Completed    COVID-19 Vaccine  Completed    HIB Vaccine  Aged Out    Hepatitis B Vaccine  Aged Out    IPV Vaccine  Aged Out    Hepatitis A Vaccine  Aged Out    Meningococcal ACWY Vaccine  Aged Out    HPV Vaccine  Aged [employer] History   Administered Date(s) Administered    INFLUENZA 11/06/2007, 10/15/2010, 09/09/2011    Influenza Split High Dose Preservative Free IM 10/31/2014, 10/01/2015, 10/17/2016, 09/01/2017, 10/09/2017, 10/01/2018, 10/05/2018    Influenza, seasonal, injectable 11/07/2012    Pneumococcal Conjugate 13-Valent 06/16/2016, 10/05/2018    Pneumococcal Conjugate PCV 7 10/01/2018    Pneumococcal Polysaccharide PPV23 09/01/2007, 10/12/2019    SARS-CoV-2 / COVID-19 mRNA IM (Pfizer-BioNTech) 01/19/2021, 02/09/2021    Td (adult), adsorbed 06/01/1998, 12/08/2009    Zoster 10/31/2014, 08/05/2019, 11/01/2019    Zoster Vaccine Recombinant 08/05/2019, 11/01/2019    influenza, trivalent, adjuvanted 10/12/2019       Louie Vides MD  Geriatrics   7/29/2021 6:17 AM

## 2021-07-28 NOTE — PATIENT INSTRUCTIONS
Chriss jeff in October  Call the office in 2 weeks if fatigue persists  Please complete bloodwork 1 week prior to October appt

## 2021-07-29 NOTE — ASSESSMENT & PLAN NOTE
Lab Results   Component Value Date    HGBA1C 7 2 (H) 05/06/2021      will plan to repeat HbA1c prior to next visit   Continue glimepiride, metformin   Patient with occasional episodes of hypoglycemia      Discussed this may likely be secondary to glimepiride   Encouraged patient to avoid skipping meals  If symptoms persist, advised to will discontinue glimepiride which can cause hypoglycemia in older patients and switch to an alternative agent or short acting sulfonylurea

## 2021-07-29 NOTE — ASSESSMENT & PLAN NOTE
Recommend Tylenol as needed, topical analgesics to augment therapy  Currently in physical therapy for lower extremity pain   Maintain [place] precautions

## 2021-07-29 NOTE — ASSESSMENT & PLAN NOTE
/72   Continue aspirin 81 mg daily   Continue carvedilol 25 mg b i d    Continue hydrochlorothiazide 25 mg daily  Continue lisinopril 40 mg b i d  ( dose per Nephrology)  Continue amlodipine 5 mg daily  Recommend adherence to a diabetic, heart healthy diet   Continue routine follow-up with Nephrology

## 2021-07-29 NOTE — ASSESSMENT & PLAN NOTE
Improvement in symptoms since starting levothyroxine, now with decreased hair loss   Patient with fatigue, muscle aches, overall decreased energy for the past 1 week   Will plan to repeat TSH/reflex T4 at next blood draw   Patient advised to call the office in 2 weeks in the event that current symptoms of viral  Once acute symptoms resolve, if fatigue persists, will increase levothyroxine to 37 5 mcg daily given persisting abnormal TSH

## 2021-07-29 NOTE — ASSESSMENT & PLAN NOTE
Prior CT chest showing incidentally detected focal area of ground-glass density in right lung base measuring 8 mm   Will plan to repeat CT chest in May of 2022

## 2021-07-29 NOTE — ASSESSMENT & PLAN NOTE
Stable   Continues to follow with Nephrology   Avoid nephrotoxic agents  Medications to be renally dosed

## 2021-07-29 NOTE — ASSESSMENT & PLAN NOTE
New atorvastatin 10 mg daily   Recommend adherence to a diabetic, heart healthy diet  Physical activity as able as part of an exercise routine

## 2021-07-29 NOTE — ASSESSMENT & PLAN NOTE
Recent thyroid ultrasound showing  Multinodular thyroid   Per radiology report, none of the nodules warrant tissue sampling at this time      1 9 cm nodule in right mid to lower pole will require repeat ultrasound in 1 year, 3 and 5 years

## 2021-07-29 NOTE — ASSESSMENT & PLAN NOTE
Patient will require repeat DEXA scan in the future   Continue calcium / vitamin-D supplementation  Maintain [place] precautions

## 2021-08-16 ENCOUNTER — OFFICE VISIT (OUTPATIENT)
Dept: GASTROENTEROLOGY | Facility: CLINIC | Age: 81
End: 2021-08-16
Payer: MEDICARE

## 2021-08-16 VITALS
WEIGHT: 187.2 LBS | HEART RATE: 68 BPM | BODY MASS INDEX: 34.45 KG/M2 | DIASTOLIC BLOOD PRESSURE: 67 MMHG | SYSTOLIC BLOOD PRESSURE: 132 MMHG | HEIGHT: 62 IN

## 2021-08-16 DIAGNOSIS — D56.3 THALASSEMIA MINOR: ICD-10-CM

## 2021-08-16 DIAGNOSIS — K22.70 BARRETT'S ESOPHAGUS WITHOUT DYSPLASIA: Primary | ICD-10-CM

## 2021-08-16 DIAGNOSIS — K44.9 HIATAL HERNIA: ICD-10-CM

## 2021-08-16 DIAGNOSIS — K80.20 GALL STONES: ICD-10-CM

## 2021-08-16 DIAGNOSIS — E03.9 HYPOTHYROIDISM, UNSPECIFIED TYPE: ICD-10-CM

## 2021-08-16 DIAGNOSIS — Z86.010 HX OF ADENOMATOUS COLONIC POLYPS: ICD-10-CM

## 2021-08-16 PROBLEM — Z86.0101 HX OF ADENOMATOUS COLONIC POLYPS: Status: ACTIVE | Noted: 2021-08-16

## 2021-08-16 PROCEDURE — 99204 OFFICE O/P NEW MOD 45 MIN: CPT | Performed by: INTERNAL MEDICINE

## 2021-08-16 PROCEDURE — 1123F ACP DISCUSS/DSCN MKR DOCD: CPT | Performed by: INTERNAL MEDICINE

## 2021-08-16 NOTE — PROGRESS NOTES
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    Question 7 cm  She is now asymptomatic  Repeat EGD in April of 2022    2  Hiatal hernia   moderate sized    3  Thalassemia minor   by history and with family members mildly notable anemia with microcytosis    4  Hx of adenomatous colonic polyps   repeat colonoscopy April 2026    5  Gall stones   asymptomatic  She otherwise follow-up in 6 months     ______________________________________________________________________    SUBJECTIVE:    Very pleasant and active 60-year-old lady recently had EGD and colonoscopy for what appeared to be acute self-limited colitis some rectal bleeding as well as mild anemia  She does have thalassemia minor  She has microcytosis  On colonoscopy in April no colitis was seen  She did have a tubular adenoma we discussed this at length  Additionally had a moderate-size hiatal hernia and several cm of Fernández's esophagus we discussed this at length  She is on a PPI now  She likewise has asymptomatic gallstones  She avoids milk products  REVIEW OF SYSTEMS IS OTHERWISE NEGATIVE  Historical Information   Past Medical History:   Diagnosis Date    Abnormal mammogram     Acute sinusitis     Allergic reaction     Subsequent encounter, Resolved - 1/9/18    Allergic rhinitis 01/16/2012    Anaphylactic reaction     Other, Last assessed - 6/16/16    Anemia 01/16/2012    Ataxia     Cellulitis of great toe of right foot     Chronic allergic rhinitis     Unspec seasonality, unspec trigger;  Last assessed - 11/13/17    Contact dermatitis     Last assessed - 6/3/14    Dermatitis     Diabetes mellitus ([facility] [address] )     Disorder of tympanic membrane     Last assessed - 10/15/12    Dry skin     Last assessed - 4/15/14    Dysfunction of eustachian tube     Unspec laterality, Last assessed - 10/19/12    Ganglion cyst     Glaucoma     Hip pain, acute, unspecified laterality     Hyperlipidemia     Hypertension     Labyrinthitis 2011    Leg mass, left     Limb pain     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    Long term use of drug     Last assessed - 14    Mammogram abnormal     Last assessed - 14    Mass of thigh, left     Pain in ankle joint     Pes planus     Unspec laterality, Last assessed - 13    Shoulder pain, right     Vertigo 2012     Past Surgical History:   Procedure Laterality Date    OTHER SURGICAL HISTORY      Surgery unk     TONSILLECTOMY       Social History   Social History     Substance and Sexual Activity   Alcohol Use Yes    Alcohol/week: 1 0 - 2 0 standard drinks    Types: 1 - 2 Glasses of wine per week    Comment: Social      Social History     Substance and Sexual Activity   Drug Use No     Social History     Tobacco Use   Smoking Status Former Smoker    Quit date:     Years since quittin 6   Smokeless Tobacco Never Used     Family History   Problem Relation Age of Onset    Heart attack Mother         Ac MI   Malvinamandeep Rodriguez Lung cancer Father     Other Father         Cerebral Embolism    Anemia Sister     Glaucoma Sister     Hypertension Sister     Thyroid disease Sister     Diabetes Brother     Hypertension Brother     Other Brother         Cardiac Disorder    No Known Problems Daughter     No Known Problems Sister     No Known Problems Daughter     No Known Problems Maternal Aunt     No Known Problems Paternal Aunt        Meds/Allergies       Current Outpatient Medications:     amLODIPine (NORVASC) 5 mg tablet    aspirin 81 MG tablet    atorvastatin (LIPITOR) 10 mg tablet    Calcium Carbonate (CALTRATE 600) 1500 (600 Ca) MG TABS    carvedilol (COREG) 25 mg tablet    Cholecalciferol (VITAMIN D3) 2000 units capsule    EPINEPHrine (EPIPEN) 0 3 mg/0 3 mL SOAJ    fluticasone (FLONASE) 50 mcg/act nasal spray    glimepiride (AMARYL) 4 mg tablet    glucosamine 500 MG CAPS capsule    hydrochlorothiazide (HYDRODIURIL) 25 mg tablet    levothyroxine 25 mcg tablet    lisinopril (ZESTRIL) 40 mg tablet    metFORMIN (GLUCOPHAGE-XR) 500 mg 24 hr tablet    Multiple Vitamins-Minerals (WOMENS ONE DAILY) TABS    Omega-3 Fatty Acids (OMEGA-3 FISH OIL) 1000 MG CAPS    pantoprazole (PROTONIX) 20 mg tablet    spironolactone (ALDACTONE) 25 mg tablet    saccharomyces boulardii (FLORASTOR) 250 mg capsule    Allergies   Allergen Reactions    Cortisone Hyperactivity    Bee Venom Edema    Virtussin A-C [Guaifenesin-Codeine] Throat Swelling    Bee Pollen Edema     Category: Allergy;            Objective     Blood pressure 132/67, pulse 68, height 5' 2" (1 575 m), weight 84 9 kg (187 lb 3 2 oz), not currently breastfeeding  Body mass index is 34 24 kg/m²  PHYSICAL EXAM:      General Appearance:   Alert, cooperative, no distress   HEENT:   Normocephalic, atraumatic, anicteric      Neck:  Supple, symmetrical, trachea midline   Lungs:   Clear to auscultation bilaterally; no rales, rhonchi or wheezing; respirations unlabored    Heart[de-identified]   Regular rate and rhythm; no murmur, rub, or gallop  Abdomen:   Soft, non-tender, non-distended; normal bowel sounds; no masses, no organomegaly    Genitalia:   Deferred    Rectal:   Deferred    Extremities:  No cyanosis, clubbing or edema    Pulses:  2+ and symmetric    Skin:  No jaundice, rashes, or lesions    Lymph nodes:  No palpable cervical lymphadenopathy        Lab Results:   No visits with results within 1 Day(s) from this visit  Latest known visit with results is:   Orders Only on 05/06/2021   Component Date Value    Hemoglobin A1C 05/06/2021 7 2          Radiology Results:   No results found

## 2021-08-17 DIAGNOSIS — E08.00 DIABETES MELLITUS DUE TO UNDERLYING CONDITION WITH HYPEROSMOLARITY WITHOUT COMA, WITHOUT LONG-TERM CURRENT USE OF INSULIN (HCC): Primary | ICD-10-CM

## 2021-08-17 DIAGNOSIS — I10 HYPERTENSION, UNSPECIFIED TYPE: ICD-10-CM

## 2021-08-17 RX ORDER — HYDROCHLOROTHIAZIDE 25 MG/1
25 TABLET ORAL DAILY
Qty: 90 TABLET | Refills: 1 | Status: SHIPPED | OUTPATIENT
Start: 2021-08-17 | End: 2022-02-23 | Stop reason: SDUPTHER

## 2021-08-17 RX ORDER — GLIMEPIRIDE 4 MG/1
8 TABLET ORAL
Qty: 180 TABLET | Refills: 1 | Status: SHIPPED | OUTPATIENT
Start: 2021-08-17 | End: 2022-04-21

## 2021-08-17 RX ORDER — LEVOTHYROXINE SODIUM 0.03 MG/1
25 TABLET ORAL DAILY
Qty: 30 TABLET | Refills: 2 | Status: SHIPPED | OUTPATIENT
Start: 2021-08-17 | End: 2021-11-22 | Stop reason: SDUPTHER

## 2021-08-17 RX ORDER — METFORMIN HYDROCHLORIDE 500 MG/1
500 TABLET, EXTENDED RELEASE ORAL 2 TIMES DAILY
Qty: 180 TABLET | Refills: 1 | Status: SHIPPED | OUTPATIENT
Start: 2021-08-17

## 2021-08-17 NOTE — TELEPHONE ENCOUNTER
Kacy Marquez Nurse contacted office 8/16 to request medication refills  [address] could not relay the names of medications to this MR as patient did not have them with her  This MR noted Levothyroxine may need refill and attached order for Provider review and signature  This MR relayed to [address] Nurse the necessity of relaying listing of medication refills and will refill upon call back to this office with such

## 2021-10-06 ENCOUNTER — OFFICE VISIT (OUTPATIENT)
Dept: GERIATRICS | Facility: CLINIC | Age: 81
End: 2021-10-06
Payer: MEDICARE

## 2021-10-06 VITALS
OXYGEN SATURATION: 99 % | TEMPERATURE: 97.5 F | BODY MASS INDEX: 33.62 KG/M2 | HEIGHT: 62 IN | WEIGHT: 182.7 LBS | DIASTOLIC BLOOD PRESSURE: 72 MMHG | SYSTOLIC BLOOD PRESSURE: 138 MMHG | HEART RATE: 62 BPM

## 2021-10-06 DIAGNOSIS — E11.22 CONTROLLED TYPE 2 DIABETES MELLITUS WITH CHRONIC KIDNEY DISEASE, WITHOUT LONG-TERM CURRENT USE OF INSULIN, UNSPECIFIED CKD STAGE (HCC): Chronic | ICD-10-CM

## 2021-10-06 DIAGNOSIS — K22.70 BARRETT'S ESOPHAGUS WITHOUT DYSPLASIA: ICD-10-CM

## 2021-10-06 DIAGNOSIS — K44.9 HIATAL HERNIA: ICD-10-CM

## 2021-10-06 DIAGNOSIS — Z86.010 HX OF ADENOMATOUS COLONIC POLYPS: ICD-10-CM

## 2021-10-06 DIAGNOSIS — N18.31 STAGE 3A CHRONIC KIDNEY DISEASE (HCC): Chronic | ICD-10-CM

## 2021-10-06 DIAGNOSIS — E78.2 MIXED HYPERLIPIDEMIA: ICD-10-CM

## 2021-10-06 DIAGNOSIS — M54.2 NECK PAIN: Primary | ICD-10-CM

## 2021-10-06 DIAGNOSIS — E03.9 HYPOTHYROIDISM, UNSPECIFIED TYPE: ICD-10-CM

## 2021-10-06 DIAGNOSIS — E55.9 VITAMIN D DEFICIENCY: ICD-10-CM

## 2021-10-06 DIAGNOSIS — I10 PRIMARY HYPERTENSION: ICD-10-CM

## 2021-10-06 PROCEDURE — 99215 OFFICE O/P EST HI 40 MIN: CPT | Performed by: STUDENT IN AN ORGANIZED HEALTH CARE EDUCATION/TRAINING PROGRAM

## 2021-10-07 ENCOUNTER — IMMUNIZATIONS (OUTPATIENT)
Dept: FAMILY MEDICINE CLINIC | Facility: HOSPITAL | Age: 81
End: 2021-10-07

## 2021-10-07 ENCOUNTER — TELEPHONE (OUTPATIENT)
Dept: GERIATRICS | Facility: CLINIC | Age: 81
End: 2021-10-07

## 2021-10-07 DIAGNOSIS — Z23 ENCOUNTER FOR IMMUNIZATION: Primary | ICD-10-CM

## 2021-10-07 PROCEDURE — 0001A SARS-COV-2 / COVID-19 MRNA VACCINE (PFIZER-BIONTECH) 30 MCG: CPT

## 2021-10-07 PROCEDURE — 91300 SARS-COV-2 / COVID-19 MRNA VACCINE (PFIZER-BIONTECH) 30 MCG: CPT

## 2021-10-11 ENCOUNTER — HOSPITAL ENCOUNTER (OUTPATIENT)
Dept: RADIOLOGY | Facility: HOSPITAL | Age: 81
Discharge: HOME | End: 2021-10-11
Payer: MEDICARE

## 2021-10-11 ENCOUNTER — OFFICE VISIT (OUTPATIENT)
Dept: OBGYN CLINIC | Facility: HOSPITAL | Age: 81
End: 2021-10-11
Payer: MEDICARE

## 2021-10-11 VITALS
WEIGHT: 186.6 LBS | BODY MASS INDEX: 34.34 KG/M2 | HEART RATE: 60 BPM | DIASTOLIC BLOOD PRESSURE: 84 MMHG | SYSTOLIC BLOOD PRESSURE: 154 MMHG | HEIGHT: 62 IN

## 2021-10-11 DIAGNOSIS — M54.2 NECK PAIN: ICD-10-CM

## 2021-10-11 DIAGNOSIS — M50.30 DDD (DEGENERATIVE DISC DISEASE), CERVICAL: Primary | ICD-10-CM

## 2021-10-11 PROCEDURE — 72050 X-RAY EXAM NECK SPINE 4/5VWS: CPT

## 2021-10-11 PROCEDURE — 99213 OFFICE O/P EST LOW 20 MIN: CPT | Performed by: PHYSICIAN ASSISTANT

## 2021-11-15 ENCOUNTER — HOSPITAL ENCOUNTER (OUTPATIENT)
Dept: RADIOLOGY | Age: 81
Discharge: HOME | End: 2021-11-15
Payer: MEDICARE

## 2021-11-15 DIAGNOSIS — R93.89 ABNORMAL CT OF THE CHEST: ICD-10-CM

## 2021-11-15 PROCEDURE — G1004 CDSM NDSC: HCPCS

## 2021-11-15 PROCEDURE — 71250 CT THORAX DX C-: CPT

## 2021-11-16 ENCOUNTER — TELEPHONE (OUTPATIENT)
Dept: GERIATRICS | Facility: CLINIC | Age: 81
End: 2021-11-16

## 2021-11-19 ENCOUNTER — TELEPHONE (OUTPATIENT)
Dept: GERIATRICS | Facility: CLINIC | Age: 81
End: 2021-11-19

## 2021-11-22 DIAGNOSIS — E03.9 HYPOTHYROIDISM, UNSPECIFIED TYPE: ICD-10-CM

## 2021-11-22 RX ORDER — LEVOTHYROXINE SODIUM 0.03 MG/1
25 TABLET ORAL DAILY
Qty: 90 TABLET | Refills: 2 | Status: SHIPPED | OUTPATIENT
Start: 2021-11-22 | End: 2022-08-09

## 2021-11-29 DIAGNOSIS — K22.70 BARRETT'S ESOPHAGUS WITHOUT DYSPLASIA: Primary | ICD-10-CM

## 2021-11-29 RX ORDER — PANTOPRAZOLE SODIUM 20 MG/1
20 TABLET, DELAYED RELEASE ORAL DAILY
Qty: 30 TABLET | Refills: 5 | Status: SHIPPED | OUTPATIENT
Start: 2021-11-29 | End: 2022-06-04

## 2021-12-01 ENCOUNTER — TELEPHONE (OUTPATIENT)
Dept: GERIATRICS | Facility: CLINIC | Age: 81
End: 2021-12-01

## 2021-12-16 ENCOUNTER — OFFICE VISIT (OUTPATIENT)
Dept: GERIATRICS | Facility: CLINIC | Age: 81
End: 2021-12-16
Payer: MEDICARE

## 2021-12-16 VITALS
RESPIRATION RATE: 20 BRPM | SYSTOLIC BLOOD PRESSURE: 120 MMHG | DIASTOLIC BLOOD PRESSURE: 64 MMHG | TEMPERATURE: 97.4 F | WEIGHT: 183.1 LBS | HEART RATE: 58 BPM | OXYGEN SATURATION: 98 % | BODY MASS INDEX: 34.04 KG/M2

## 2021-12-16 DIAGNOSIS — N18.31 STAGE 3A CHRONIC KIDNEY DISEASE (HCC): ICD-10-CM

## 2021-12-16 DIAGNOSIS — E55.9 VITAMIN D DEFICIENCY: ICD-10-CM

## 2021-12-16 DIAGNOSIS — E03.9 HYPOTHYROIDISM, UNSPECIFIED TYPE: ICD-10-CM

## 2021-12-16 DIAGNOSIS — R93.89 ABNORMAL CT OF THE CHEST: Primary | ICD-10-CM

## 2021-12-16 DIAGNOSIS — E11.22 CONTROLLED TYPE 2 DIABETES MELLITUS WITH CHRONIC KIDNEY DISEASE, WITHOUT LONG-TERM CURRENT USE OF INSULIN, UNSPECIFIED CKD STAGE (HCC): ICD-10-CM

## 2021-12-16 DIAGNOSIS — E78.2 MIXED HYPERLIPIDEMIA: ICD-10-CM

## 2021-12-16 PROCEDURE — 99214 OFFICE O/P EST MOD 30 MIN: CPT | Performed by: NURSE PRACTITIONER

## 2022-01-17 ENCOUNTER — VBI (OUTPATIENT)
Dept: ADMINISTRATIVE | Facility: OTHER | Age: 82
End: 2022-01-17

## 2022-01-25 ENCOUNTER — TELEPHONE (OUTPATIENT)
Dept: GERIATRICS | Age: 82
End: 2022-01-25

## 2022-01-25 NOTE — TELEPHONE ENCOUNTER
Patient called to ask if she could start B complex? She feels low in energy  She doesn't want any interaction with her other medications    Please advise

## 2022-01-25 NOTE — TELEPHONE ENCOUNTER
Pt called stating there is no way of knowing how many mcg are in her B complex  The Lable only reads, B complex  does not give a dosage

## 2022-01-25 NOTE — TELEPHONE ENCOUNTER
Left detailed message for the patient yes she can take B complex but nothing over 250mcg  Patient to call with any questions

## 2022-02-01 ENCOUNTER — VBI (OUTPATIENT)
Dept: ADMINISTRATIVE | Facility: OTHER | Age: 82
End: 2022-02-01

## 2022-02-02 ENCOUNTER — VBI (OUTPATIENT)
Dept: ADMINISTRATIVE | Facility: OTHER | Age: 82
End: 2022-02-02

## 2022-02-09 ENCOUNTER — VBI (OUTPATIENT)
Dept: ADMINISTRATIVE | Facility: OTHER | Age: 82
End: 2022-02-09

## 2022-02-11 ENCOUNTER — VBI (OUTPATIENT)
Dept: ADMINISTRATIVE | Facility: OTHER | Age: 82
End: 2022-02-11

## 2022-02-19 DIAGNOSIS — I10 ESSENTIAL HYPERTENSION: ICD-10-CM

## 2022-02-21 DIAGNOSIS — I10 HYPERTENSION, UNSPECIFIED TYPE: ICD-10-CM

## 2022-02-21 RX ORDER — CARVEDILOL 25 MG/1
25 TABLET ORAL 2 TIMES DAILY
Qty: 180 TABLET | Refills: 3 | Status: SHIPPED | OUTPATIENT
Start: 2022-02-21

## 2022-02-21 RX ORDER — AMLODIPINE BESYLATE 5 MG/1
5 TABLET ORAL DAILY
Qty: 90 TABLET | Refills: 3 | Status: SHIPPED | OUTPATIENT
Start: 2022-02-21

## 2022-02-21 RX ORDER — AMLODIPINE BESYLATE 5 MG/1
TABLET ORAL
Qty: 90 TABLET | Refills: 3 | OUTPATIENT
Start: 2022-02-21

## 2022-02-23 DIAGNOSIS — I10 HYPERTENSION, UNSPECIFIED TYPE: ICD-10-CM

## 2022-02-23 RX ORDER — HYDROCHLOROTHIAZIDE 25 MG/1
25 TABLET ORAL DAILY
Qty: 90 TABLET | Refills: 1 | Status: SHIPPED | OUTPATIENT
Start: 2022-02-23

## 2022-02-23 NOTE — TELEPHONE ENCOUNTER
Patient, Panteracedric Bahena ([phone]) called to request a refill of hydrochlorothiazide 25 mg   Please fill with Max Sims

## 2022-03-02 ENCOUNTER — OFFICE VISIT (OUTPATIENT)
Dept: GERIATRICS | Age: 82
End: 2022-03-02
Payer: MEDICARE

## 2022-03-02 VITALS
WEIGHT: 185.6 LBS | HEIGHT: 61 IN | RESPIRATION RATE: 17 BRPM | HEART RATE: 66 BPM | DIASTOLIC BLOOD PRESSURE: 70 MMHG | TEMPERATURE: 97.1 F | OXYGEN SATURATION: 97 % | BODY MASS INDEX: 35.04 KG/M2 | SYSTOLIC BLOOD PRESSURE: 128 MMHG

## 2022-03-02 DIAGNOSIS — N18.30 STAGE 3 CHRONIC KIDNEY DISEASE, UNSPECIFIED WHETHER STAGE 3A OR 3B CKD (HCC): ICD-10-CM

## 2022-03-02 DIAGNOSIS — K50.919 CROHN'S DISEASE WITH COMPLICATION, UNSPECIFIED GASTROINTESTINAL TRACT LOCATION (HCC): ICD-10-CM

## 2022-03-02 DIAGNOSIS — M25.559 HIP PAIN: Primary | ICD-10-CM

## 2022-03-02 DIAGNOSIS — E11.22 CONTROLLED TYPE 2 DIABETES MELLITUS WITH CHRONIC KIDNEY DISEASE, WITHOUT LONG-TERM CURRENT USE OF INSULIN, UNSPECIFIED CKD STAGE (HCC): ICD-10-CM

## 2022-03-02 DIAGNOSIS — E03.9 HYPOTHYROIDISM, UNSPECIFIED TYPE: ICD-10-CM

## 2022-03-02 DIAGNOSIS — M54.50 ACUTE BILATERAL LOW BACK PAIN WITHOUT SCIATICA: ICD-10-CM

## 2022-03-02 DIAGNOSIS — R25.2 HAND CRAMPS: ICD-10-CM

## 2022-03-02 DIAGNOSIS — M21.41 PES PLANUS OF BOTH FEET: ICD-10-CM

## 2022-03-02 DIAGNOSIS — M50.30 DDD (DEGENERATIVE DISC DISEASE), CERVICAL: ICD-10-CM

## 2022-03-02 DIAGNOSIS — R53.81 PHYSICAL DECONDITIONING: ICD-10-CM

## 2022-03-02 DIAGNOSIS — N18.31 STAGE 3A CHRONIC KIDNEY DISEASE (HCC): Chronic | ICD-10-CM

## 2022-03-02 DIAGNOSIS — M19.90 OSTEOARTHRITIS, UNSPECIFIED OSTEOARTHRITIS TYPE, UNSPECIFIED SITE: ICD-10-CM

## 2022-03-02 DIAGNOSIS — M84.68XA PATHOLOGICAL FRACTURE IN OTHER DISEASE, OTHER SITE, INITIAL ENCOUNTER FOR FRACTURE: ICD-10-CM

## 2022-03-02 DIAGNOSIS — Z13.820 SCREENING FOR OSTEOPOROSIS: ICD-10-CM

## 2022-03-02 DIAGNOSIS — M21.42 PES PLANUS OF BOTH FEET: ICD-10-CM

## 2022-03-02 PROCEDURE — 99214 OFFICE O/P EST MOD 30 MIN: CPT | Performed by: NURSE PRACTITIONER

## 2022-03-02 NOTE — ASSESSMENT & PLAN NOTE
· Reports she saw rheumatology in the 1980s  · Agreeble to see rheumatology again   · Neck, back, and B/L finger/hand pain

## 2022-03-02 NOTE — ASSESSMENT & PLAN NOTE
· Lab Results   Component Value Date    EGFR 44 12/09/2020    EGFR 45 07/24/2019    EGFR 47 02/14/2019    CREATININE 1 19 12/09/2020    CREATININE 1 18 07/24/2019    CREATININE 1 13 02/14/2019   · Check CMP  · Check Protein/Creat ratio  · Avoid nephrotoxins and hypotension

## 2022-03-02 NOTE — ASSESSMENT & PLAN NOTE
· Check CMP  · Check Protein/Creat ratio  Lab Results   Component Value Date    HGBA1C 7 9 (H) 11/23/2021

## 2022-03-02 NOTE — ASSESSMENT & PLAN NOTE
· Saw ortho in Oct 2021  · Referred to PT in the past  · Continue calcium and vitamin D supplementation

## 2022-03-02 NOTE — PROGRESS NOTES
ASSESSMENT AND PLAN:  1  Hip pain  Assessment & Plan:  · Previously saw Ortho and completed PT without much improvement  · Will order repeat left hip and pelvis x-ray   · Recommend lidocaine patch or cream   · Recommend Tylenol ES TID   · Will continue to monitor     Orders:  -     XR hip/pelv 4+ vw left if performed; Future; Expected date: 03/09/2022    2  Screening for osteoporosis  -     DXA bone density spine hip and pelvis; Future; Expected date: 04/02/2022    3  Controlled type 2 diabetes mellitus with chronic kidney disease, without long-term current use of insulin, unspecified CKD stage [facility])  Assessment & Plan:    Lab Results   Component Value Date    HGBA1C 7 9 (H) 11/23/2021   · Repeat A1C ordered  · Continue current medication regime  · Continue lifestyle management    Orders:  -     Hemoglobin A1C ([facility]); Future    4  Pes planus of both feet  Assessment & Plan:  · Follow-up with Podiatry      5  Physical deconditioning  Assessment & Plan:  · Multifactorial  · Will monitor routine lab work  · Adequate PO intake encouraged  · Monitor skin integrity  · Maintain fall precautions    Orders:  -     Vitamin D 25 hydroxy; Future    6  Hand cramps  Assessment & Plan:  · Previously saw rheumatology in the past  · Previously completed PT/OT  Orders:  -     Vitamin B12; Future    7  Acute bilateral low back pain without sciatica  Assessment & Plan:  · X-ray ordered  · Continue pain management  Recommend topical analgesics, scheduled Tylenol  · PT services if x-ray is negative       8  DDD (degenerative disc disease), cervical  Assessment & Plan:  · Saw ortho in Oct 2021  · Referred to PT in the past  · Continue calcium and vitamin D supplementation    Orders:  -     Sedimentation rate, automated; Future; Expected date: 03/09/2022    9   Osteoarthritis, unspecified osteoarthritis type, unspecified site  Assessment & Plan:  · Reports she saw rheumatology in the 1980s  · Agreeble to see rheumatology again   · Neck, back, and B/L finger/hand pain    Orders:  -     Sedimentation rate, automated; Future; Expected date: 03/09/2022    10  Stage 3a chronic kidney disease [facility])  Assessment & Plan:  · Lab Results   Component Value Date    EGFR 44 12/09/2020    EGFR 45 07/24/2019    EGFR 47 02/14/2019    CREATININE 1 19 12/09/2020    CREATININE 1 18 07/24/2019    CREATININE 1 13 02/14/2019   · Check CMP  · Check Protein/Creat ratio  · Avoid nephrotoxins and hypotension    Orders:  -     Comprehensive metabolic panel; Future; Expected date: 03/09/2022  -     CBC and differential; Future; Expected date: 03/09/2022  -     Protein / creatinine ratio, urine  -     Phosphorus; Future    11  Pathological fracture in other disease, other site, initial encounter for fracture   -     DXA bone density spine hip and pelvis; Future; Expected date: 04/02/2022    12  Crohn's disease with complication, unspecified gastrointestinal tract [facility]   -     Vitamin B12; Future    13  Stage 3 chronic kidney disease, unspecified whether stage 3a or 3b CKD [facility])   Assessment & Plan:  · Lab Results   Component Value Date    EGFR 44 12/09/2020    EGFR 45 07/24/2019    EGFR 47 02/14/2019    CREATININE 1 19 12/09/2020    CREATININE 1 18 07/24/2019    CREATININE 1 13 02/14/2019   · Check CMP  · Check Protein/Creat ratio  · Avoid nephrotoxins and hypotension    Orders:  -     Vitamin D 25 hydroxy; Future    14  Hypothyroidism, unspecified type  Assessment & Plan:  · Continue levothyroxine 25 mcg daily   · TSH ordered to monitor level    Orders:  -     TSH, 3rd generation with T4 reflex; Future          HPI:    Jonathon Hernandez is a 80year old patient with multiple co-morbidities including but not limited to type 2 DM, hypothyroidism, osteoarthritis seen and examined today for complaint of left hip pain  Patient states that left hip pain is improving but still is not back to baseline  The hip pain started two weeks ago and is intermittent  Has fluctuation in mobility ability, uses cane intermittently  Sometimes she is able to ambulate without issue and sometimes she can "barely bare weight" and needs to use her cane  She denies using Tylenol or any additional pain interventions  She reports that a prior NSAID used to help until her doctor recommended against it  Has tried PT in the past without much improvement and reports she has had hip imagining in the past   Was told she had one leg longer than the other when growing up  She would be agreeable to try "pain patch" or lidocaine cream as she has used them in the past      Also reports she has flat feet, has tried orthotics in the past but currently prefers to wear [employer] shoes  Hand cramping, she is not able to "shake it out"  Has seen rheumatology in the past without formal diagnosis of arthritis  Reports colitis x1 year, reports she is getting better with what foods she can eat  Ringing in right ear, just started, "loud", fluctuates with intensity  ROS: Review of Systems   Constitutional: Positive for fatigue  Negative for chills, diaphoresis and fever  HENT: Positive for tinnitus  Negative for congestion  Tinnitus or right ear   Respiratory: Negative for cough, chest tightness, shortness of breath and wheezing  Cardiovascular: Negative for chest pain, palpitations and leg swelling  Gastrointestinal: Negative for abdominal distention, abdominal pain, nausea and vomiting  Musculoskeletal: Positive for arthralgias, back pain, gait problem, joint swelling, myalgias and neck stiffness  Negative for neck pain  Hip pain- tried PT and lidocaine patches in the past      Uses cane for ambulation     Previously saw rheumatology for possible arthritis  Neurological: Negative for dizziness, weakness, numbness and headaches  Psychiatric/Behavioral: Positive for sleep disturbance  Negative for agitation and confusion  The patient is not nervous/anxious  Difficulty sleeping through night, interrupted sleep  Allergies   Allergen Reactions    Cortisone Hyperactivity    Bee Venom Edema    Virtussin A-C [Guaifenesin-Codeine] Throat Swelling    Bee Pollen Edema     Category: Allergy;         Medications:    Current Outpatient Medications on File Prior to Visit   Medication Sig Dispense Refill    amLODIPine (NORVASC) 5 mg tablet Take 1 tablet (5 mg total) by mouth daily 90 tablet 3    aspirin 81 MG tablet Take 1 tablet by mouth daily      atorvastatin (LIPITOR) 10 mg tablet TAKE 1 TABLET (10 MG TOTAL) BY MOUTH DAILY 90 tablet 1    Calcium Carbonate (CALTRATE 600) 1500 (600 Ca) MG TABS Take by mouth      carvedilol (COREG) 25 mg tablet Take 1 tablet (25 mg total) by mouth 2 (two) times a day 180 tablet 3    Cholecalciferol (VITAMIN D3) 2000 units capsule Take 1 tablet by mouth daily      EPINEPHrine (EPIPEN) 0 3 mg/0 3 mL SOAJ Inject 1 Syringe into a muscle      fluticasone (FLONASE) 50 mcg/act nasal spray USE 1 SPRAY IN EACH NOSTRIL EVERY DAY 48 mL 2    glimepiride (AMARYL) 4 mg tablet Take 2 tablets (8 mg total) by mouth daily with breakfast 180 tablet 1    glucosamine 500 MG CAPS capsule Take 1 capsule by mouth daily       hydrochlorothiazide (HYDRODIURIL) 25 mg tablet Take 1 tablet (25 mg total) by mouth daily 90 tablet 1    levothyroxine 25 mcg tablet Take 1 tablet (25 mcg total) by mouth daily 90 tablet 2    lisinopril (ZESTRIL) 40 mg tablet Take 1 tablet (40 mg total) by mouth 2 (two) times a day 180 tablet 3    metFORMIN (GLUCOPHAGE-XR) 500 mg 24 hr tablet Take 1 tablet (500 mg total) by mouth 2 (two) times a day 180 tablet 1    Multiple Vitamins-Minerals (WOMENS ONE DAILY) TABS Take 1 tablet by mouth daily      Omega-3 Fatty Acids (OMEGA-3 FISH OIL) 1000 MG CAPS Take 1 capsule by mouth daily       pantoprazole (PROTONIX) 20 mg tablet Take 1 tablet (20 mg total) by mouth daily 30 tablet 5    spironolactone (ALDACTONE) 25 mg tablet Take 1 tablet (25 mg total) by mouth daily 90 tablet 3    saccharomyces boulardii (FLORASTOR) 250 mg capsule Take 1 capsule (250 mg total) by mouth 2 (two) times a day (Patient not taking: Reported on 3/2/2022 ) 50 capsule 0     No current facility-administered medications on file prior to visit  History:  Past Medical History:   Diagnosis Date    Abnormal mammogram     Acute sinusitis     Allergic reaction     Subsequent encounter, Resolved - 1/9/18    Allergic rhinitis 01/16/2012    Anaphylactic reaction     Other, Last assessed - 6/16/16    Anemia 01/16/2012    Ataxia     Cellulitis of great toe of right foot     Chronic allergic rhinitis     Unspec seasonality, unspec trigger;  Last assessed - 11/13/17    Contact dermatitis     Last assessed - 6/3/14    Dermatitis     Diabetes mellitus ([facility] [address] )     Disorder of tympanic membrane     Last assessed - 10/15/12    Dry skin     Last assessed - 4/15/14    Dysfunction of eustachian tube     Unspec laterality, Last assessed - 10/19/12    Ganglion cyst     Glaucoma     Hip pain, acute, unspecified laterality     Hyperlipidemia     Hypertension     Labyrinthitis 11/17/2011    Leg mass, left     Limb pain     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    Long term use of drug     Last assessed - 4/6/14    Mammogram abnormal     Last assessed - 4/11/14    Mass of thigh, left     Pain in ankle joint     Pes planus     Unspec laterality, Last assessed - 4/9/13    Shoulder pain, right     Vertigo 01/16/2012     Past Surgical History:   Procedure Laterality Date    OTHER SURGICAL HISTORY      Surgery unk     TONSILLECTOMY       Family History   Problem Relation Age of Onset    Heart attack Mother         Ac MI   Ritika Leisure Lung cancer Father     Other Father         Cerebral Embolism    Anemia Sister     Glaucoma Sister     Hypertension Sister     Thyroid disease Sister     Diabetes Brother     Hypertension Brother     Other Brother Cardiac Disorder    No Known Problems Daughter     No Known Problems Sister     No Known Problems Daughter     No Known Problems Maternal Aunt     No Known Problems Paternal Aunt      Social History     Socioeconomic History    Marital status: Single     Spouse name: Not on file    Number of children: Not on file    Years of education: Not on file    Highest education level: Not on file   Occupational History    Not on file   Tobacco Use    Smoking status: Former Smoker     Quit date:      Years since quittin 4    Smokeless tobacco: Never Used   Vaping Use    Vaping Use: Never used   Substance and Sexual Activity    Alcohol use: Yes     Alcohol/week: 1 0 - 2 0 standard drink     Types: 1 - 2 Glasses of wine per week     Comment: Social     Drug use: No    Sexual activity: Not on file   Other Topics Concern    Not on file   Social History Narrative    Advance directive on file    Always uses seat belt    Daily caffeine consumption, 1 serving a day    Drinks coffee - 1 cup per day    Exercises 3x per week          Social Determinants of Health     Financial Resource Strain: Not on file   Food Insecurity: Not on file   Transportation Needs: Not on file   Physical Activity: Not on file   Stress: Not on file   Social Connections: Not on file   Intimate Partner Violence: Not on file   Housing Stability: Not on file     Past Surgical History:   Procedure Laterality Date    OTHER SURGICAL HISTORY      Surgery unk     TONSILLECTOMY         OBJECTIVE:  Vitals:    22 1355   BP: 128/70   BP Location: Left arm   Patient Position: Sitting   Cuff Size: Standard   Pulse: 66   Resp: 17   Temp: (!) 97 1 °F (36 2 °C)   TempSrc: Temporal   SpO2: 97%   Weight: 84 2 kg (185 lb 9 6 oz)   Height: 5' 1 42" (1 56 m)     Body mass index is 34 59 kg/m²  Physical Exam  Constitutional:       General: She is not in acute distress  Appearance: Normal appearance   She is not ill-appearing, toxic-appearing or diaphoretic  HENT:      Head: Normocephalic and atraumatic  Right Ear: External ear normal       Left Ear: External ear normal       Nose: Nose normal    Cardiovascular:      Rate and Rhythm: Normal rate and regular rhythm  Pulses: Normal pulses  Heart sounds: Normal heart sounds  No murmur heard  No friction rub  No gallop  Pulmonary:      Effort: Pulmonary effort is normal  No respiratory distress  Breath sounds: Normal breath sounds  No wheezing, rhonchi or rales  Abdominal:      General: Bowel sounds are normal  There is no distension  Palpations: There is no mass  Tenderness: There is no abdominal tenderness  There is no guarding  Musculoskeletal:         General: Tenderness present  Normal range of motion  Right lower leg: No edema  Left lower leg: No edema  Comments: Left hip/lower back tenderness upon palpation   Skin:     General: Skin is warm and dry  Coloration: Skin is not pale  Findings: No bruising, erythema or lesion  Neurological:      General: No focal deficit present  Mental Status: She is alert  Mental status is at baseline  Cranial Nerves: No cranial nerve deficit  Sensory: No sensory deficit  Motor: No weakness  Psychiatric:         Mood and Affect: Mood normal          Behavior: Behavior normal          Thought Content: Thought content normal          Judgment: Judgment normal          Labs & Imaging:  Reviewed in EMR

## 2022-03-02 NOTE — ASSESSMENT & PLAN NOTE
Lab Results   Component Value Date    HGBA1C 7 9 (H) 11/23/2021   · Repeat A1C ordered  · Continue current medication regime  · Continue lifestyle management

## 2022-03-04 PROBLEM — Z12.31 ENCOUNTER FOR SCREENING MAMMOGRAM FOR BREAST CANCER: Status: ACTIVE | Noted: 2022-03-04

## 2022-03-04 PROBLEM — Z23 ENCOUNTER FOR IMMUNIZATION: Status: ACTIVE | Noted: 2022-03-04

## 2022-03-04 NOTE — ASSESSMENT & PLAN NOTE
· Previously saw Ortho and completed PT without much improvement  · Will order repeat left hip and pelvis x-ray   · Recommend lidocaine patch or cream   · Recommend Tylenol ES TID   · Will continue to monitor

## 2022-03-04 NOTE — ASSESSMENT & PLAN NOTE
· Multifactorial  · Will monitor routine lab work  · Adequate PO intake encouraged  · Monitor skin integrity  · Maintain fall precautions

## 2022-03-04 NOTE — ASSESSMENT & PLAN NOTE
· X-ray ordered  · Continue pain management  Recommend topical analgesics, scheduled Tylenol    · PT services if x-ray is negative

## 2022-03-16 ENCOUNTER — TELEPHONE (OUTPATIENT)
Dept: GERIATRICS | Age: 82
End: 2022-03-16

## 2022-03-16 DIAGNOSIS — E11.22 CONTROLLED TYPE 2 DIABETES MELLITUS WITH CHRONIC KIDNEY DISEASE, WITHOUT LONG-TERM CURRENT USE OF INSULIN, UNSPECIFIED CKD STAGE (HCC): Primary | Chronic | ICD-10-CM

## 2022-03-16 DIAGNOSIS — G60.9 HEREDITARY AND IDIOPATHIC NEUROPATHY, UNSPECIFIED: ICD-10-CM

## 2022-03-23 ENCOUNTER — APPOINTMENT (OUTPATIENT)
Dept: LAB | Age: 82
End: 2022-03-23
Payer: MEDICARE

## 2022-03-23 ENCOUNTER — APPOINTMENT (OUTPATIENT)
Dept: RADIOLOGY | Age: 82
End: 2022-03-23
Payer: MEDICARE

## 2022-03-23 ENCOUNTER — HOSPITAL ENCOUNTER (OUTPATIENT)
Dept: RADIOLOGY | Age: 82
Discharge: HOME | End: 2022-03-23
Payer: MEDICARE

## 2022-03-23 DIAGNOSIS — Z13.820 SCREENING FOR OSTEOPOROSIS: ICD-10-CM

## 2022-03-23 DIAGNOSIS — E11.22 CONTROLLED TYPE 2 DIABETES MELLITUS WITH CHRONIC KIDNEY DISEASE, WITHOUT LONG-TERM CURRENT USE OF INSULIN, UNSPECIFIED CKD STAGE (HCC): Chronic | ICD-10-CM

## 2022-03-23 DIAGNOSIS — M84.68XA PATHOLOGICAL FRACTURE IN OTHER DISEASE, OTHER SITE, INITIAL ENCOUNTER FOR FRACTURE: ICD-10-CM

## 2022-03-23 DIAGNOSIS — G60.9 HEREDITARY AND IDIOPATHIC NEUROPATHY, UNSPECIFIED: ICD-10-CM

## 2022-03-23 DIAGNOSIS — M25.559 HIP PAIN: ICD-10-CM

## 2022-03-23 LAB — MAGNESIUM SERPL-MCNC: 2 MG/DL (ref 1.6–2.6)

## 2022-03-23 PROCEDURE — 84425 ASSAY OF VITAMIN B-1: CPT

## 2022-03-23 PROCEDURE — 73502 X-RAY EXAM HIP UNI 2-3 VIEWS: CPT

## 2022-03-23 PROCEDURE — 84591 ASSAY OF NOS VITAMIN: CPT

## 2022-03-23 PROCEDURE — 36415 COLL VENOUS BLD VENIPUNCTURE: CPT

## 2022-03-23 PROCEDURE — 84207 ASSAY OF VITAMIN B-6: CPT

## 2022-03-23 PROCEDURE — 84252 ASSAY OF VITAMIN B-2: CPT

## 2022-03-23 PROCEDURE — 83735 ASSAY OF MAGNESIUM: CPT

## 2022-03-23 NOTE — TELEPHONE ENCOUNTER
Pt called requesting her labs that was done at [facility] through [facility] labs to be released to her Mychart  I explained to her that the labs was through a 3rd party and with that being said it will not pop up on mychart  Pt seemed confused and agitated that it can't be on her mychart  I explained that if her blood work was done through a [facility] facility it would be shown on her Mychart, but since it wasn't it won generate on there  She asked if there was a way it can and I explained there is not a way since it was done through [facility] labs  Pt expressed that she was not told that when she spoke to someone (could not find out who) last week regarding her labs  I relayed that I can fax the blood work results to the [facility] and they can be given to her  Pt still seemed confuse and Zeeshan Barry who called and left a message is not in office  Will fax over labs to [facility]

## 2022-03-28 ENCOUNTER — TELEPHONE (OUTPATIENT)
Dept: GERIATRICS | Age: 82
End: 2022-03-28

## 2022-03-28 LAB — VIT B2 BLD-MCNC: 247 UG/L (ref 137–370)

## 2022-03-28 NOTE — TELEPHONE ENCOUNTER
Left message for patient to call the office to reschedule her 4/5/22 appointment as the Provider will be out of the office  Late entry:  Spoke with patient on Friday, 3/25/22 afternoon  [place] [address] and I asked patient to call back when she got home to reschedule appointment

## 2022-03-28 NOTE — TELEPHONE ENCOUNTER
Patient returned call  Cancelled appointment and plans to changed PCP to [facility]

## 2022-03-30 LAB — MISCELLANEOUS LAB TEST RESULT: NORMAL

## 2022-04-01 LAB — MISCELLANEOUS LAB TEST RESULT: NORMAL

## 2022-04-04 LAB
VIT B1 BLD-SCNC: 123.8 NMOL/L (ref 66.5–200)
VIT B6 SERPL-MCNC: 15.6 UG/L (ref 2–32.8)

## 2022-04-09 LAB
NIACIN SERPL-MCNC: <5 NG/ML (ref 0–5)
NICOTINAMIDE SERPL-MCNC: 13 NG/ML (ref 5.2–72.1)

## 2022-04-21 ENCOUNTER — CONSULT (OUTPATIENT)
Dept: ENDOCRINOLOGY | Facility: CLINIC | Age: 82
End: 2022-04-21
Payer: MEDICARE

## 2022-04-21 VITALS
HEIGHT: 61 IN | SYSTOLIC BLOOD PRESSURE: 130 MMHG | DIASTOLIC BLOOD PRESSURE: 78 MMHG | BODY MASS INDEX: 34.93 KG/M2 | HEART RATE: 57 BPM | WEIGHT: 185 LBS

## 2022-04-21 DIAGNOSIS — K22.70 BARRETT'S ESOPHAGUS WITHOUT DYSPLASIA: ICD-10-CM

## 2022-04-21 DIAGNOSIS — E04.1 THYROID NODULE: ICD-10-CM

## 2022-04-21 DIAGNOSIS — E08.00 DIABETES MELLITUS DUE TO UNDERLYING CONDITION WITH HYPEROSMOLARITY WITHOUT COMA, WITHOUT LONG-TERM CURRENT USE OF INSULIN (HCC): ICD-10-CM

## 2022-04-21 DIAGNOSIS — N18.31 STAGE 3A CHRONIC KIDNEY DISEASE (HCC): ICD-10-CM

## 2022-04-21 DIAGNOSIS — E11.22 CONTROLLED TYPE 2 DIABETES MELLITUS WITH CHRONIC KIDNEY DISEASE, WITHOUT LONG-TERM CURRENT USE OF INSULIN, UNSPECIFIED CKD STAGE (HCC): ICD-10-CM

## 2022-04-21 DIAGNOSIS — E03.9 HYPOTHYROIDISM, UNSPECIFIED TYPE: ICD-10-CM

## 2022-04-21 DIAGNOSIS — E11.21 MICROALBUMINURIC DIABETIC NEPHROPATHY (HCC): ICD-10-CM

## 2022-04-21 DIAGNOSIS — E11.65 TYPE 2 DIABETES MELLITUS WITH HYPERGLYCEMIA, WITHOUT LONG-TERM CURRENT USE OF INSULIN (HCC): Primary | ICD-10-CM

## 2022-04-21 DIAGNOSIS — K52.9 CHRONIC DIARRHEA: ICD-10-CM

## 2022-04-21 PROCEDURE — 99204 OFFICE O/P NEW MOD 45 MIN: CPT | Performed by: INTERNAL MEDICINE

## 2022-04-21 RX ORDER — GLIMEPIRIDE 4 MG/1
4 TABLET ORAL
Qty: 180 TABLET | Refills: 0 | Status: SHIPPED | OUTPATIENT
Start: 2022-04-21

## 2022-04-21 NOTE — PROGRESS NOTES
New consult Note      CC:diabetes, hypothyroidism    History of Present Illness:   81 yr female with type 2 diabetes for 10 yrs, CKD3a eGFR 47, HTN, HLD, obesity, hypothyroidism, multinodular thyroid,  microalbuminuria, Fernández's esophagus, thalasemia minor and vit d deficiency  She presents as a referral from nephrologist  She reports generally good glycemic control but is unable to use fresh fruit, vegetable and daily due to abdominal cramps and diarrhea  This is ongoing for a few years and patient has changes to more starch based diet  She reports recent weight gain  Cannot recall gluten sensitivity  She denies any polyuria, polydipsia or weight loss  Home blood glucose monitoring: does not check regularly  Hypoglycemia: no    Current meds:  Metformin XR 500mg po bid (she had severe diarrhea on higher dose)  Glimepiride 8mg po daily    Opthamology: no  Podiatry: no  vaccination:yes   Dental:  Pancreatitis: No    Ace/ARB: lisinopril 40mg qdaily  Statin:lipitor 10  Thyroid issues:hypothyroidism  On levothyroxine 25mcg qdaily      Patient Active Problem List   Diagnosis    Chronic allergic rhinitis    Ganglion cyst    Hyperlipidemia    Hypertension    Microalbuminuric diabetic nephropathy ([address] )    OA (osteoarthritis)    Osteopenia    Thalassemia minor    Type 2 diabetes mellitus with hyperglycemia, without long-term current use of insulin ([facility])    Vitamin D deficiency    Closed nondisplaced fracture of fifth metatarsal bone of left foot with routine healing    Right rotator cuff tendinitis    CKD (chronic kidney disease) stage 3, GFR 30-59 ml/min ([facility])    Posterior tibialis tendon insufficiency    Flat foot    H/O abnormal mammogram    Colitis, acute    Physical deconditioning    Chronic bilateral low back pain without sciatica    Epistaxis    Hand cramps    Fatigue    Groin pain    Generalized abdominal pain    Chest pain    Abnormal CT of the chest    Hypothyroidism    Acute bilateral low back pain without sciatica    Gall stones    Pulmonary nodule    Thyroid nodule    Anemia    Fernández's esophagus without dysplasia    Hiatal hernia    Hx of adenomatous colonic polyps    Neck pain    DDD (degenerative disc disease), cervical    Hip pain    Encounter for immunization     Past Medical History:   Diagnosis Date    Abnormal mammogram     Acute sinusitis     Allergic reaction     Subsequent encounter, Resolved - 1/9/18    Allergic rhinitis 01/16/2012    Anaphylactic reaction     Other, Last assessed - 6/16/16    Anemia 01/16/2012    Ataxia     Cellulitis of great toe of right foot     Chronic allergic rhinitis     Unspec seasonality, unspec trigger;  Last assessed - 11/13/17    Contact dermatitis     Last assessed - 6/3/14    Dermatitis     Diabetes mellitus ([facility] [address] )     Disorder of tympanic membrane     Last assessed - 10/15/12    Dry skin     Last assessed - 4/15/14    Dysfunction of eustachian tube     Unspec laterality, Last assessed - 10/19/12    Ganglion cyst     Glaucoma     Hip pain, acute, unspecified laterality     Hyperlipidemia     Hypertension     Labyrinthitis 11/17/2011    Leg mass, left     Limb pain     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    Long term use of drug     Last assessed - 4/6/14    Mammogram abnormal     Last assessed - 4/11/14    Mass of thigh, left     Pain in ankle joint     Pes planus     Unspec laterality, Last assessed - 4/9/13    Shoulder pain, right     Vertigo 01/16/2012      Past Surgical History:   Procedure Laterality Date    OTHER SURGICAL HISTORY      Surgery unk     TONSILLECTOMY        Family History   Problem Relation Age of Onset    Heart attack Mother         Ac MI   [de-identified] Lung cancer Father     Other Father         Cerebral Embolism    Anemia Sister     Glaucoma Sister     Hypertension Sister     Thyroid disease Sister     Diabetes Brother     Hypertension Brother     Other Brother Cardiac Disorder    No Known Problems Daughter     No Known Problems Sister     No Known Problems Daughter     No Known Problems Maternal Aunt     No Known Problems Paternal Aunt      Social History     Tobacco Use    Smoking status: Former Smoker     Quit date:      Years since quittin 3    Smokeless tobacco: Never Used   Substance Use Topics    Alcohol use: Yes     Alcohol/week: 1 0 - 2 0 standard drink     Types: 1 - 2 Glasses of wine per week     Comment: Social      Allergies   Allergen Reactions    Cortisone Hyperactivity    Bee Venom Edema    Virtussin A-C [Guaifenesin-Codeine] Throat Swelling    Bee Pollen Edema     Category:  Allergy;          Current Outpatient Medications:     amLODIPine (NORVASC) 5 mg tablet, Take 1 tablet (5 mg total) by mouth daily, Disp: 90 tablet, Rfl: 3    aspirin 81 MG tablet, Take 1 tablet by mouth daily, Disp: , Rfl:     atorvastatin (LIPITOR) 10 mg tablet, TAKE 1 TABLET (10 MG TOTAL) BY MOUTH DAILY, Disp: 90 tablet, Rfl: 1    Calcium Carbonate (CALTRATE 600) 1500 (600 Ca) MG TABS, Take by mouth, Disp: , Rfl:     carvedilol (COREG) 25 mg tablet, Take 1 tablet (25 mg total) by mouth 2 (two) times a day, Disp: 180 tablet, Rfl: 3    Cholecalciferol (VITAMIN D3) 2000 units capsule, Take 1 tablet by mouth daily, Disp: , Rfl:     EPINEPHrine (EPIPEN) 0 3 mg/0 3 mL SOAJ, Inject 1 Syringe into a muscle, Disp: , Rfl:     fluticasone (FLONASE) 50 mcg/act nasal spray, USE 1 SPRAY IN EACH NOSTRIL EVERY DAY, Disp: 48 mL, Rfl: 2    glimepiride (AMARYL) 4 mg tablet, Take 2 tablets (8 mg total) by mouth daily with breakfast, Disp: 180 tablet, Rfl: 1    glucosamine 500 MG CAPS capsule, Take 1 capsule by mouth daily , Disp: , Rfl:     hydrochlorothiazide (HYDRODIURIL) 25 mg tablet, Take 1 tablet (25 mg total) by mouth daily, Disp: 90 tablet, Rfl: 1    levothyroxine 25 mcg tablet, Take 1 tablet (25 mcg total) by mouth daily, Disp: 90 tablet, Rfl: 2    lisinopril (ZESTRIL) 40 mg tablet, Take 1 tablet (40 mg total) by mouth 2 (two) times a day, Disp: 180 tablet, Rfl: 3    metFORMIN (GLUCOPHAGE-XR) 500 mg 24 hr tablet, Take 1 tablet (500 mg total) by mouth 2 (two) times a day, Disp: 180 tablet, Rfl: 1    Multiple Vitamins-Minerals (WOMENS ONE DAILY) TABS, Take 1 tablet by mouth daily, Disp: , Rfl:     Omega-3 Fatty Acids (OMEGA-3 FISH OIL) 1000 MG CAPS, Take 1 capsule by mouth daily , Disp: , Rfl:     pantoprazole (PROTONIX) 20 mg tablet, Take 1 tablet (20 mg total) by mouth daily, Disp: 30 tablet, Rfl: 5    spironolactone (ALDACTONE) 25 mg tablet, Take 1 tablet (25 mg total) by mouth daily, Disp: 90 tablet, Rfl: 3    saccharomyces boulardii (FLORASTOR) 250 mg capsule, Take 1 capsule (250 mg total) by mouth 2 (two) times a day (Patient not taking: Reported on 3/2/2022 ), Disp: 50 capsule, Rfl: 0    Review of Systems   Constitutional: Positive for fatigue  HENT: Negative  Eyes: Negative  Respiratory: Negative  Cardiovascular: Negative  Gastrointestinal: Negative  Endocrine: Negative  Musculoskeletal: Negative  Skin: Negative  Allergic/Immunologic: Negative  Neurological: Negative  Hematological: Negative  Psychiatric/Behavioral: Negative  Physical Exam:  Body mass index is 34 48 kg/m²  /78   Pulse 57   Ht 5' 1 42" (1 56 m)   Wt 83 9 kg (185 lb)   LMP  (LMP Unknown)   BMI 34 48 kg/m²    Vitals:    04/21/22 1048   Weight: 83 9 kg (185 lb)        Physical Exam  Constitutional:       Appearance: She is well-developed  HENT:      Head: Normocephalic  Eyes:      Pupils: Pupils are equal, round, and reactive to light  Neck:      Thyroid: No thyromegaly  Cardiovascular:      Rate and Rhythm: Normal rate  Heart sounds: Normal heart sounds  Pulmonary:      Effort: Pulmonary effort is normal       Breath sounds: Normal breath sounds     Abdominal:      General: Bowel sounds are normal       Palpations: Abdomen is soft    Musculoskeletal:         General: No deformity  Cervical back: Normal range of motion  Skin:     Capillary Refill: Capillary refill takes less than 2 seconds  Coloration: Skin is not pale  Findings: No rash  Neurological:      Mental Status: She is alert and oriented to person, place, and time  Labs:   Lab Results   Component Value Date    HGBA1C 7 8 (H) 03/08/2022       Lab Results   Component Value Date    JON5TYOGLTPB 3 570 05/23/2017       Lab Results   Component Value Date    CREATININE 1 19 12/09/2020    CREATININE 1 18 07/24/2019    CREATININE 1 13 02/14/2019    BUN 29 (H) 12/09/2020     03/26/2015    K 4 2 12/09/2020    CL 99 (L) 12/09/2020    CO2 27 12/09/2020     eGFR   Date Value Ref Range Status   12/09/2020 44 ml/min/1 73sq m Final       Lab Results   Component Value Date    ALT 17 12/09/2020    AST 7 12/09/2020    ALKPHOS 79 12/09/2020    BILITOT 0 43 03/26/2015       Lab Results   Component Value Date    CHOLESTEROL 166 07/24/2019    CHOLESTEROL 213 (H) 01/11/2019    CHOLESTEROL 190 11/29/2018     Lab Results   Component Value Date    HDL 38 (L) 07/24/2019    HDL 41 01/11/2019    HDL 40 11/29/2018     Lab Results   Component Value Date    TRIG 284 (H) 07/24/2019    TRIG 378 (H) 01/11/2019    TRIG 314 (H) 11/29/2018     Lab Results   Component Value Date    NONHDLC 128 07/24/2019    [place] 172 01/11/2019    [place] 150 11/29/2018         Impression:  1  Thyroid nodule    2  Controlled type 2 diabetes mellitus with chronic kidney disease, without long-term current use of insulin, unspecified CKD stage ([facility])    3  Stage 3a chronic kidney disease ([address] )    4  Fernández's esophagus without dysplasia    5  Hypothyroidism, unspecified type    6  Type 2 diabetes mellitus with hyperglycemia, without long-term current use of insulin ([facility])    7   Microalbuminuric diabetic nephropathy ([facility] [address] )         Plan:    Diagnoses and all orders for this visit:    Type 2 diabetes mellitus with hyperglycemia, without long-term current use of insulin ([facility] [address] )  She is uncontrolled with A1c 7 9%  Goal is 7 5%  She has complication with CKD3  Today we discussed all aspects of diabetes including pathophysiology, risk factors, complications, SAGM, CGM, diet, lifestyle modifications, medical fitness training, diabetes education, goals of therapy, follow up needs and medications including insulin, metformin, Jardiance, sufonylurea and GLP1 agonists  Advised to maintain goal blood sugars 70-180mg/dL  Given renal impairment, will aim to STOP glimepiride eventually but reduce dose to 4mg qdaily today  Given chronic diarrhea and abdominal cramps, will stop metformin as a trial  If cramps persist without metformin use, then consider re-initiation if eGFR acceptable  She will benefit from a GLP1 agonist and SGLT2i as these agents help diabetes, weight loss and do not cause hypoglycemia  eGFR is acceptable to use SGLT2i  No active/recent UTI's reported  If above meds are not covered or tolerated, basal insulin should be added with linagliptin  Follow up in 6 weeks  -     Ambulatory referral to Diabetic Education; Future  -     Dulaglutide 0 75 MG/0 5ML SOPN; Inject 0 5 mL (0 75 mg total) under the skin once a week  -     Empagliflozin 25 MG TABS; Take 1 tablet (25 mg total) by mouth every morning  -     Hemoglobin A1C; Future  -     glimepiride (AMARYL) 4 mg tablet; Take 1 tablet (4 mg total) by mouth daily with breakfast    Stage 3a chronic kidney disease ([facility] [address] )  [address] nephrology  -     Ambulatory referral to Endocrinology    Thyroid nodule  She has a multinodular thyroid with largest 1 9cm rt midpole solid isoechoic TR3 lesion on US 5/21/21  Will review with patient next visit and repeat US next visit  Fernández's esophagus without dysplasia   Follows Dr Tabatha Sánchez  May also follow with him for chronic diarrhea evaluation  Hypothyroidism, unspecified type   She seems clincially and biochemically euthyroid  Continue levothyroxine 37 5mcg qdaily  Chronic diarrhea  -     Ambulatory Referral to Gastroenterology; Future        I have spent 60 minutes with patient today in which greater than 50% of this time was spent in counseling/coordination of care  Discussed with the patient and all questioned fully answered  She will call me if any problems arise  Educated/ Counseled patient on diagnostic test results, prognosis, risk vs benefit of treatment options, importance of treatment compliance, healthy life and lifestyle choices        [address]

## 2022-04-23 DIAGNOSIS — E11.65 TYPE 2 DIABETES MELLITUS WITH HYPERGLYCEMIA, WITHOUT LONG-TERM CURRENT USE OF INSULIN (HCC): Primary | ICD-10-CM

## 2022-04-25 ENCOUNTER — TELEPHONE (OUTPATIENT)
Dept: ENDOCRINOLOGY | Facility: CLINIC | Age: 82
End: 2022-04-25

## 2022-04-28 ENCOUNTER — TELEPHONE (OUTPATIENT)
Dept: OTHER | Facility: HOSPITAL | Age: 82
End: 2022-04-28

## 2022-04-28 NOTE — TELEPHONE ENCOUNTER
Message was review with Pt, Pt would like to talk to you about the new insulin that you will prescribe  since she has a lots of questions     Pt would like to know if she should schedule an appointment to discuss new treatment

## 2022-05-03 ENCOUNTER — OFFICE VISIT (OUTPATIENT)
Dept: DERMATOLOGY | Facility: CLINIC | Age: 82
End: 2022-05-03
Payer: MEDICARE

## 2022-05-03 ENCOUNTER — OFFICE VISIT (OUTPATIENT)
Dept: ENDOCRINOLOGY | Facility: CLINIC | Age: 82
End: 2022-05-03
Payer: MEDICARE

## 2022-05-03 VITALS — HEIGHT: 61 IN | WEIGHT: 187 LBS | TEMPERATURE: 97.4 F | BODY MASS INDEX: 35.3 KG/M2

## 2022-05-03 VITALS
DIASTOLIC BLOOD PRESSURE: 70 MMHG | WEIGHT: 187 LBS | BODY MASS INDEX: 35.3 KG/M2 | HEIGHT: 61 IN | SYSTOLIC BLOOD PRESSURE: 130 MMHG | HEART RATE: 63 BPM

## 2022-05-03 DIAGNOSIS — E11.65 TYPE 2 DIABETES MELLITUS WITH HYPERGLYCEMIA, WITHOUT LONG-TERM CURRENT USE OF INSULIN (HCC): Primary | ICD-10-CM

## 2022-05-03 DIAGNOSIS — E04.1 THYROID NODULE: ICD-10-CM

## 2022-05-03 DIAGNOSIS — E03.9 HYPOTHYROIDISM, UNSPECIFIED TYPE: ICD-10-CM

## 2022-05-03 DIAGNOSIS — L82.1 SEBORRHEIC KERATOSIS: Primary | ICD-10-CM

## 2022-05-03 PROCEDURE — 99212 OFFICE O/P EST SF 10 MIN: CPT | Performed by: DERMATOLOGY

## 2022-05-03 PROCEDURE — 99214 OFFICE O/P EST MOD 30 MIN: CPT | Performed by: INTERNAL MEDICINE

## 2022-05-03 RX ORDER — INSULIN GLARGINE 100 [IU]/ML
15 INJECTION, SOLUTION SUBCUTANEOUS
Qty: 15 ML | Refills: 0 | Status: SHIPPED | OUTPATIENT
Start: 2022-05-03 | End: 2022-05-31 | Stop reason: CLARIF

## 2022-05-03 RX ORDER — SEMAGLUTIDE 1.34 MG/ML
INJECTION, SOLUTION SUBCUTANEOUS
Qty: 3 ML | Refills: 0 | Status: SHIPPED | OUTPATIENT
Start: 2022-05-03

## 2022-05-03 NOTE — PROGRESS NOTES
Follow up Note      CC: diabetes    History of Present Illness:   81 yr female with type 2 diabetes for 10 yrs, CKD3a eGFR 47, HTN, HLD, obesity, hypothyroidism, multinodular thyroid,  microalbuminuria, Fernández's esophagus, thalasemia minor and vit d deficiency      She cannot afford jardiance or trulicity  She denies any polyuria, polydipsia or weight loss      Home blood glucose monitoring: does not check regularly  Hypoglycemia: no     Current meds:  Metformin XR 500mg po bid (she had severe diarrhea on higher dose)  Glimepiride 8mg po daily     Opthamology: no  Podiatry: no  vaccination:yes   Dental:  Pancreatitis: No     Ace/ARB: lisinopril 40mg qdaily  Statin:lipitor 10  Thyroid issues:hypothyroidism   On levothyroxine 25mcg qdaily        Patient Active Problem List   Diagnosis    Chronic allergic rhinitis    Ganglion cyst    Hyperlipidemia    Hypertension    Microalbuminuric diabetic nephropathy ([facility] )    OA (osteoarthritis)    Osteopenia    Thalassemia minor    Type 2 diabetes mellitus with hyperglycemia, without long-term current use of insulin ([facility] [address] )    Vitamin D deficiency    Closed nondisplaced fracture of fifth metatarsal bone of left foot with routine healing    Right rotator cuff tendinitis    CKD (chronic kidney disease) stage 3, GFR 30-59 ml/min ([facility])    Posterior tibialis tendon insufficiency    Flat foot    H/O abnormal mammogram    Colitis, acute    Physical deconditioning    Chronic bilateral low back pain without sciatica    Epistaxis    Hand cramps    Fatigue    Groin pain    Generalized abdominal pain    Chest pain    Abnormal CT of the chest    Hypothyroidism    Acute bilateral low back pain without sciatica    Gall stones    Pulmonary nodule    Thyroid nodule    Anemia    Fernández's esophagus without dysplasia    Hiatal hernia    Hx of adenomatous colonic polyps    Neck pain    DDD (degenerative disc disease), cervical    Hip pain    Encounter for immunization     Past Medical History:   Diagnosis Date    Abnormal mammogram     Acute sinusitis     Allergic reaction     Subsequent encounter, Resolved - 1/9/18    Allergic rhinitis 01/16/2012    Anaphylactic reaction     Other, Last assessed - 6/16/16    Anemia 01/16/2012    Ataxia     Cellulitis of great toe of right foot     Chronic allergic rhinitis     Unspec seasonality, unspec trigger;  Last assessed - 11/13/17    Contact dermatitis     Last assessed - 6/3/14    Dermatitis     Diabetes mellitus ([facility] [address] )     Disorder of tympanic membrane     Last assessed - 10/15/12    Dry skin     Last assessed - 4/15/14    Dysfunction of eustachian tube     Unspec laterality, Last assessed - 10/19/12    Ganglion cyst     Glaucoma     Hip pain, acute, unspecified laterality     Hyperlipidemia     Hypertension     Labyrinthitis 11/17/2011    Leg mass, left     Limb pain     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    Long term use of drug     Last assessed - 4/6/14    Mammogram abnormal     Last assessed - 4/11/14    Mass of thigh, left     Pain in ankle joint     Pes planus     Unspec laterality, Last assessed - 4/9/13    Shoulder pain, right     Vertigo 01/16/2012      Past Surgical History:   Procedure Laterality Date    OTHER SURGICAL HISTORY      Surgery unk     TONSILLECTOMY        Family History   Problem Relation Age of Onset    Heart attack Mother         Ac MI   [de-identified] Lung cancer Father     Other Father         Cerebral Embolism    Anemia Sister     Glaucoma Sister     Hypertension Sister     Thyroid disease Sister     Diabetes Brother     Hypertension Brother     Other Brother         Cardiac Disorder    No Known Problems Daughter     No Known Problems Sister     No Known Problems Daughter     No Known Problems Maternal Aunt     No Known Problems Paternal Aunt      Social History     Tobacco Use    Smoking status: Former Smoker     Quit date: 1976     Years since quittin 3    Smokeless tobacco: Never Used   Substance Use Topics    Alcohol use: Yes     Alcohol/week: 1 0 - 2 0 standard drink     Types: 1 - 2 Glasses of wine per week     Comment: Social      Allergies   Allergen Reactions    Cortisone Hyperactivity    Bee Venom Edema    Virtussin A-C [Guaifenesin-Codeine] Throat Swelling    Bee Pollen Edema     Category:  Allergy;          Current Outpatient Medications:     amLODIPine (NORVASC) 5 mg tablet, Take 1 tablet (5 mg total) by mouth daily, Disp: 90 tablet, Rfl: 3    aspirin 81 MG tablet, Take 1 tablet by mouth daily, Disp: , Rfl:     atorvastatin (LIPITOR) 10 mg tablet, TAKE 1 TABLET (10 MG TOTAL) BY MOUTH DAILY, Disp: 90 tablet, Rfl: 1    Calcium Carbonate (CALTRATE 600) 1500 (600 Ca) MG TABS, Take by mouth, Disp: , Rfl:     carvedilol (COREG) 25 mg tablet, Take 1 tablet (25 mg total) by mouth 2 (two) times a day, Disp: 180 tablet, Rfl: 3    Cholecalciferol (VITAMIN D3) 2000 units capsule, Take 1 tablet by mouth daily, Disp: , Rfl:     EPINEPHrine (EPIPEN) 0 3 mg/0 3 mL SOAJ, Inject 1 Syringe into a muscle, Disp: , Rfl:     fluticasone (FLONASE) 50 mcg/act nasal spray, USE 1 SPRAY IN EACH NOSTRIL EVERY DAY, Disp: 48 mL, Rfl: 2    glimepiride (AMARYL) 4 mg tablet, Take 1 tablet (4 mg total) by mouth daily with breakfast, Disp: 180 tablet, Rfl: 0    glucosamine 500 MG CAPS capsule, Take 1 capsule by mouth daily , Disp: , Rfl:     hydrochlorothiazide (HYDRODIURIL) 25 mg tablet, Take 1 tablet (25 mg total) by mouth daily, Disp: 90 tablet, Rfl: 1    levothyroxine 25 mcg tablet, Take 1 tablet (25 mcg total) by mouth daily, Disp: 90 tablet, Rfl: 2    lisinopril (ZESTRIL) 40 mg tablet, Take 1 tablet (40 mg total) by mouth 2 (two) times a day, Disp: 180 tablet, Rfl: 3    metFORMIN (GLUCOPHAGE-XR) 500 mg 24 hr tablet, Take 1 tablet (500 mg total) by mouth 2 (two) times a day, Disp: 180 tablet, Rfl: 1    Multiple Vitamins-Minerals (WOMENS ONE DAILY) TABS, Take 1 tablet by mouth daily, Disp: , Rfl:     Omega-3 Fatty Acids (OMEGA-3 FISH OIL) 1000 MG CAPS, Take 1 capsule by mouth daily , Disp: , Rfl:     pantoprazole (PROTONIX) 20 mg tablet, Take 1 tablet (20 mg total) by mouth daily, Disp: 30 tablet, Rfl: 5    spironolactone (ALDACTONE) 25 mg tablet, Take 1 tablet (25 mg total) by mouth daily, Disp: 90 tablet, Rfl: 3    Dulaglutide 0 75 MG/0 5ML SOPN, Inject 0 5 mL (0 75 mg total) under the skin once a week (Patient not taking: Reported on 5/3/2022 ), Disp: 6 mL, Rfl: 0    Empagliflozin 25 MG TABS, Take 1 tablet (25 mg total) by mouth every morning (Patient not taking: Reported on 5/3/2022 ), Disp: 30 tablet, Rfl: 0    saccharomyces boulardii (FLORASTOR) 250 mg capsule, Take 1 capsule (250 mg total) by mouth 2 (two) times a day (Patient not taking: Reported on 3/2/2022 ), Disp: 50 capsule, Rfl: 0    Review of Systems   Constitutional: Positive for fatigue  HENT: Negative  Eyes: Negative  Respiratory: Negative  Cardiovascular: Negative  Gastrointestinal: Negative  Endocrine: Negative  Musculoskeletal: Negative  Skin: Negative  Allergic/Immunologic: Negative  Neurological: Negative  Hematological: Negative  Psychiatric/Behavioral: Negative  Physical Exam:  Body mass index is 34 85 kg/m²  /70   Pulse 63   Ht 5' 1 42" (1 56 m)   Wt 84 8 kg (187 lb)   LMP  (LMP Unknown)   BMI 34 85 kg/m²    Vitals:    05/03/22 0942   Weight: 84 8 kg (187 lb)        Physical Exam  Constitutional:       Appearance: She is well-developed  HENT:      Head: Normocephalic  Eyes:      Pupils: Pupils are equal, round, and reactive to light  Neck:      Thyroid: No thyromegaly  Cardiovascular:      Rate and Rhythm: Normal rate  Heart sounds: Normal heart sounds  Pulmonary:      Effort: Pulmonary effort is normal       Breath sounds: Normal breath sounds     Abdominal:      General: Bowel sounds are normal  Palpations: Abdomen is soft  Musculoskeletal:         General: No deformity  Cervical back: Normal range of motion  Skin:     Capillary Refill: Capillary refill takes less than 2 seconds  Coloration: Skin is not pale  Findings: No rash  Neurological:      Mental Status: She is alert and oriented to person, place, and time  Labs:   Lab Results   Component Value Date    HGBA1C 7 8 (H) 03/08/2022       Lab Results   Component Value Date    [id number] 3 570 05/23/2017       Lab Results   Component Value Date    CREATININE 1 19 12/09/2020    CREATININE 1 18 07/24/2019    CREATININE 1 13 02/14/2019    BUN 29 (H) 12/09/2020     03/26/2015    K 4 2 12/09/2020    CL 99 (L) 12/09/2020    CO2 27 12/09/2020     eGFR   Date Value Ref Range Status   12/09/2020 44 ml/min/1 73sq m Final       Lab Results   Component Value Date    ALT 17 12/09/2020    AST 7 12/09/2020    ALKPHOS 79 12/09/2020    BILITOT 0 43 03/26/2015       Lab Results   Component Value Date    CHOLESTEROL 166 07/24/2019    CHOLESTEROL 213 (H) 01/11/2019    CHOLESTEROL 190 11/29/2018     Lab Results   Component Value Date    HDL 38 (L) 07/24/2019    HDL 41 01/11/2019    HDL 40 11/29/2018     Lab Results   Component Value Date    TRIG 284 (H) 07/24/2019    TRIG 378 (H) 01/11/2019    TRIG 314 (H) 11/29/2018     Lab Results   Component Value Date    NONHDLC 128 07/24/2019    [place] 172 01/11/2019    [place] 150 11/29/2018         Impression:  1  Type 2 diabetes mellitus with hyperglycemia, without long-term current use of insulin ([address] )    2  Thyroid nodule    3  Hypothyroidism, unspecified type         Plan:    Type 2 diabetes mellitus with hyperglycemia, without long-term current use of insulin ([address] )  She is uncontrolled with A1c 7 9%  Goal is 7 5%  She has complication with CKD3 eGFR 47      She will not be able to afford medications due to lack of appropriate medicare part D coverage     Today we discussed options and next step is to use a basal insulin rather than using Glimepiride  Will see if Peng Acuna has better coverage but my review of formulary shows most brand antidiabetes  Medications are Tier 3 coverage that pt cannot afford  For now advised metformin 500mg po bid, glimepiride 4mg qdaily breakfast and lantus 15u QHS       Follow up in 3 months  -     Ambulatory referral to Diabetic Education; Future - appt in 2 days   - Advised to send glucose logs in a few weeks  - Will consider professional cgm if no adequate logs in future  - If she needs mealtime insulin, she may qualify for a personal CGM via medicare criteria     Stage 3a chronic kidney disease ([facility] [address] )  [address] neph[facility]  -     Ambulatory referral to Endocrinology     Thyroid nodule  She has a multinodular thyroid with largest 1 9cm rt midpole solid isoechoic TR3 lesion on US 5/21/21  Will review with patient next visit and repeat US next visit      Fernández's esophagus without dysplasia   Follows Dr Aster Granados  May also follow with him for chronic diarrhea evaluation      Hypothyroidism, unspecified type  She seems clincially and biochemically euthyroid  Continue levothyroxine 37 5mcg qdaily      Chronic diarrhea  -     Ambulatory Referral to Gastroenterology; Future    I have spent 30 minutes with patient today in which greater than 50% of this time was spent in counseling/coordination of care  Discussed with the patient and all questioned fully answered  She will call me if any problems arise  Educated/ Counseled patient on diagnostic test results, prognosis, risk vs benefit of treatment options, importance of treatment compliance, healthy life and lifestyle choices        [address]

## 2022-05-03 NOTE — PROGRESS NOTES
[facility] Follow Up Note    Patient Name: Yuri Christianson  Encounter Date: 05/03/2022    Today's Chief Concerns:  [place]mir Monroy Concern #1:  Spot check      Current Medications:    Current Outpatient Medications:     amLODIPine (NORVASC) 5 mg tablet, Take 1 tablet (5 mg total) by mouth daily, Disp: 90 tablet, Rfl: 3    aspirin 81 MG tablet, Take 1 tablet by mouth daily, Disp: , Rfl:     atorvastatin (LIPITOR) 10 mg tablet, TAKE 1 TABLET (10 MG TOTAL) BY MOUTH DAILY, Disp: 90 tablet, Rfl: 1    Calcium Carbonate (CALTRATE 600) 1500 (600 Ca) MG TABS, Take by mouth, Disp: , Rfl:     carvedilol (COREG) 25 mg tablet, Take 1 tablet (25 mg total) by mouth 2 (two) times a day, Disp: 180 tablet, Rfl: 3    Cholecalciferol (VITAMIN D3) 2000 units capsule, Take 1 tablet by mouth daily, Disp: , Rfl:     Dapagliflozin Propanediol (Farxiga) 10 MG TABS, Take 1 tablet (10 mg total) by mouth daily, Disp: 30 tablet, Rfl: 0    Dulaglutide 0 75 MG/0 5ML SOPN, Inject 0 5 mL (0 75 mg total) under the skin once a week, Disp: 6 mL, Rfl: 0    Empagliflozin 25 MG TABS, Take 1 tablet (25 mg total) by mouth every morning, Disp: 30 tablet, Rfl: 0    EPINEPHrine (EPIPEN) 0 3 mg/0 3 mL SOAJ, Inject 1 Syringe into a muscle, Disp: , Rfl:     fluticasone (FLONASE) 50 mcg/act nasal spray, USE 1 SPRAY IN EACH NOSTRIL EVERY DAY, Disp: 48 mL, Rfl: 2    glimepiride (AMARYL) 4 mg tablet, Take 1 tablet (4 mg total) by mouth daily with breakfast, Disp: 180 tablet, Rfl: 0    glucosamine 500 MG CAPS capsule, Take 1 capsule by mouth daily , Disp: , Rfl:     hydrochlorothiazide (HYDRODIURIL) 25 mg tablet, Take 1 tablet (25 mg total) by mouth daily, Disp: 90 tablet, Rfl: 1    insulin glargine (Lantus SoloStar) 100 units/mL injection pen, Inject 15 Units under the skin daily at bedtime, Disp: 15 mL, Rfl: 0    levothyroxine 25 mcg tablet, Take 1 tablet (25 mcg total) by mouth daily, Disp: 90 tablet, Rfl: 2    lisinopril (ZESTRIL) 40 mg tablet, Take 1 tablet (40 mg total) by mouth 2 (two) times a day, Disp: 180 tablet, Rfl: 3    metFORMIN (GLUCOPHAGE-XR) 500 mg 24 hr tablet, Take 1 tablet (500 mg total) by mouth 2 (two) times a day, Disp: 180 tablet, Rfl: 1    Multiple Vitamins-Minerals (WOMENS ONE DAILY) TABS, Take 1 tablet by mouth daily, Disp: , Rfl:     Omega-3 Fatty Acids (OMEGA-3 FISH OIL) 1000 MG CAPS, Take 1 capsule by mouth daily , Disp: , Rfl:     pantoprazole (PROTONIX) 20 mg tablet, Take 1 tablet (20 mg total) by mouth daily, Disp: 30 tablet, Rfl: 5    saccharomyces boulardii (FLORASTOR) 250 mg capsule, Take 1 capsule (250 mg total) by mouth 2 (two) times a day, Disp: 50 capsule, Rfl: 0    Semaglutide,0 25 or 0 5MG/DOS, (Ozempic, 0 25 or 0 5 MG/DOSE,) 2 MG/1 5ML SOPN, Use 0 25mg weekly for 4 weeks and then 0 5mg weekly, Disp: 3 mL, Rfl: 0    spironolactone (ALDACTONE) 25 mg tablet, Take 1 tablet (25 mg total) by mouth daily, Disp: 90 tablet, Rfl: 3    CONSTITUTIONAL:   Vitals:    05/03/22 1451   Temp: (!) 97 4 °F (36 3 °C)   Weight: 84 8 kg (187 lb)   Height: 5' 1 42" (1 56 m)             Specific Alerts:    Have you been seen by a [facility]atologist in the last 3 years? YES    Are you pregnant or planning to become pregnant? No    Are you currently or planning to be nursing or breast feeding? No    Allergies   Allergen Reactions    Cortisone Hyperactivity    Bee Venom Edema    Virtussin A-C [Guaifenesin-Codeine] Throat Swelling    Bee Pollen Edema     Category: Allergy; May we call your Preferred Phone number to discuss your specific medical information? YES    May we leave a detailed message that includes your specific medical information? No    Have you traveled outside of the [facility] in the past 3 months? No      Review of Systems:  Have you recently had or currently have any of the following?     · Fever or chills: No  · Night Sweats: No  · Headaches: No  · Weight Gain: No  · Weight Loss: No  · Blurry Vision: No  · Nausea: No  · Vomiting: No  · Diarrhea: No  · Blood in Stool: No  · Abdominal Pain: No  · Itchy Skin: No  · Painful Joints: No  · Swollen Joints: No  · Muscle Pain: No  · Irregular Mole: No  · Sun Burn: No  · Dry Skin: No  · Skin Color Changes: No  · Scar or Keloid: No  · Cold Sores/Fever Blisters: No  · Bacterial Infections/MRSA: No  · Anxiety: No  · Depression: No  · Suicidal or Homicidal Thoughts: No      PSYCH: Normal mood and affect  EYES: Normal conjunctiva  ENT: Normal lips and oral mucosa  CARDIOVASCULAR: No edema  RESPIRATORY: Normal respirations  HEME/LYMPH/IMMUNO:  No regional lymphadenopathy except as noted below in ASSESSMENT AND PLAN BY DIAGNOSIS    FULL ORGAN SYSTEM SKIN EXAM (SKIN)  Hair, Scalp, Ears, Face Normal except as noted below in Assessment   Neck, Cervical Chain Nodes Normal except as noted below in Assessment   Right Arm/Hand/Fingers Normal except as noted below in Assessment   Left Arm/Hand/Fingers Normal except as noted below in Assessment   Chest/Breasts/Axillae Viewed areas Normal except as noted below in Assessment   Abdomen, Umbilicus Normal except as noted below in Assessment   Back/Spine Normal except as noted below in Assessment   Groin/Genitalia/Buttocks Viewed areas Normal except as noted below in Assessment   Right Leg, Foot, Toes Normal except as noted below in Assessment   Left Leg, Foot, Toes Normal except as noted below in Assessment       1  SEBORRHEIC KERATOSIS; NON-INFLAMED    Physical Exam:   Anatomic Location Affected:  Trunk,mid chest    Morphological Description:  Flat and raised, waxy, smooth to warty textured, yellow to brownish-grey to dark brown to blackish, discrete, "stuck-on" appearing papules   Irritated-mid chest    Pertinent Positives:   Pertinent Negatives: Additional History of Present Condition:  Patient reports new bumps on the skin  Denies itch, burn, pain, bleeding or ulceration    Present constantly; nothing seems to make it worse or better  No prior treatment  Assessment and Plan:  Based on a thorough discussion of this condition and the management approach to it (including a comprehensive discussion of the known risks, side effects and potential benefits of treatment), the patient (family) agrees to implement the following specific plan:   Monitor for changes   Apply Vaseline on chest     Seborrheic Keratosis  A seborrheic keratosis is a harmless warty spot that appears during adult life as a common sign of skin aging  Seborrheic keratoses can arise on any area of skin, covered or uncovered, with the usual exception of the palms and soles  They do not arise from mucous membranes  Seborrheic keratoses can have highly variable appearance  Seborrheic keratoses are extremely common  It has been estimated that over 90% of adults over the age of 61 years have one or more of them  They occur in males and females of all races, typically beginning to erupt in the 35s or 45s  They are uncommon under the age of 21 years  The precise cause of seborrhoeic keratoses is not known  Seborrhoeic keratoses are considered degenerative in nature  As time goes by, seborrheic keratoses tend to become more numerous  Some people inherit a tendency to develop a very large number of them; some people may have hundreds of them  The name "seborrheic keratosis" is misleading, because these lesions are not limited to a seborrhoeic distribution (scalp, mid-face, chest, upper back), nor are they formed from sebaceous glands, nor are they associated with sebum -- which is greasy    Seborrheic keratosis may also be called "SK," "Seb K," "basal cell papilloma," "senile wart," or "barnacle "      Researchers have noted:   Eruptive seborrhoeic keratoses can follow sunburn or dermatitis   Skin friction may be the reason they appear in body folds   Viral cause (e g , human papillomavirus) seems unlikely   Stable and clonal mutations or activation of FRFR3, PIK3CA, CRISTINA, AKT1 and EGFR genes are found in seborrhoeic keratoses   Seborrhoeic keratosis can arise from solar lentigo   FRFR3 mutations also arise in solar lentigines  These mutations are associated with increased age and location on the head and neck, suggesting a role of ultraviolet radiation in these lesions   Seborrheic keratoses do not harbour tumour suppressor gene mutations   Epidermal growth factor receptor inhibitors, which are used to treat some cancers, often result in an increase in verrucal (warty) keratoses  There is no easy way to remove multiple lesions on a single occasion  Unless a specific lesion is "inflamed" and is causing pain or stinging/burning or is bleeding, most insurance companies do not authorize treatment      Scribe Attestation    I,:  Joshua Bonner am acting as a scribe while in the presence of the attending physician :       I,:  Devika Walls MD personally performed the services described in this documentation    as scribed in my presence :

## 2022-05-19 ENCOUNTER — OFFICE VISIT (OUTPATIENT)
Dept: DIABETES SERVICES | Facility: HOSPITAL | Age: 82
End: 2022-05-19
Attending: INTERNAL MEDICINE
Payer: MEDICARE

## 2022-05-19 VITALS — WEIGHT: 182 LBS | HEIGHT: 60 IN | BODY MASS INDEX: 35.73 KG/M2

## 2022-05-19 DIAGNOSIS — E11.65 TYPE 2 DIABETES MELLITUS WITH HYPERGLYCEMIA, WITHOUT LONG-TERM CURRENT USE OF INSULIN (HCC): Primary | ICD-10-CM

## 2022-05-19 PROCEDURE — G0108 DIAB MANAGE TRN  PER INDIV: HCPCS | Performed by: DIETITIAN, REGISTERED

## 2022-05-19 RX ORDER — BLOOD-GLUCOSE METER
KIT MISCELLANEOUS
COMMUNITY

## 2022-05-19 NOTE — Clinical Note
Diabetes education started- for your review  Pt needs a prescription for once a day testing supplies, Verio Test strips and Delica lancets sent to her [facility] on file  She also has a bunch of meds prescribed, in order for them to check insurance coverage for her  She was under the impression that she needs to start 2 new diabetes medications, but with an A1C of 7 8% I wasn't sure that was right? She could use some clarifcation   Thanks! -Oliva Sierra RD CDE

## 2022-05-19 NOTE — PROGRESS NOTES
Diabetes DSME    HPI: Met with Alfa Orta for DSME Initial visit  States she has had diabetes for a long time, no previous Diabetes Education  States she has never checked her blood sugar all these years because her blood work was always good, now her A1c is showing up close to 8% for the last few tests  States she will also be starting on a new medication, she states insulin  There are a lot of medications for diabetes on her medication list, believe this is because it was needed in order for the pharmacy to be able to check price on all medications  Diabetes self-management Education completed today, to cover basics of diabetes  Gave and instructed her on a One Touch Verio meter in office, blood sugars 261 hours after Montemayor's breakfast   This was higher than we anticipated  I will request a prescription for once a day testing supplies [facility]  she will check her blood sugars as pair testing, before a meal and 2 hours after a meal, rotating between meals, 3 days a week  This will give her a better indicator as to how food is affecting her blood sugar  She also believes she will be going on an injectable for her diabetes  I instructed her on the use of Ozempic, Trulicity, and insulin as all 3 of those things are on her medication list checking for insurance coverage  She has a good understanding of how these are used, she is leaning towards Ozempic as she believes that was covered at a decent price and 1 of her friends takes it and would be available to help and with questions  Discussed there were also some additional oral medications ordered as well  She is under the understanding that she needs to start to take 2 different new diabetes medications, I encouraged her to discuss that with the endocrinology office, but with an A1c of 7 8 likely just starting with 1 medication would be fine    She is concerned with starting to medications because of the increased cost of 2 verses 1  We reviewed goal blood sugars as well  Plan is for her to start checking her blood sugars, decide on what diabetes medication she will pursue and begin that, and she will return to me in a few weeks for medical nutrition therapy and blood sugar log review  She will call our office to schedule that  Ht Readings from Last 1 Encounters:   05/19/22 5' (1 524 m)     Wt Readings from Last 3 Encounters:   05/19/22 82 6 kg (182 lb)   05/03/22 84 8 kg (187 lb)   05/03/22 84 8 kg (187 lb)        Body mass index is 35 54 kg/m²  Lab Results   Component Value Date    HGBA1C 7 8 (H) 03/08/2022    HGBA1C 7 8 03/08/2022    HGBA1C 7 9 (H) 11/23/2021       Lab Results   Component Value Date    CHOL 154 03/26/2015    CHOL 169 04/07/2014     Lab Results   Component Value Date    HDL 38 (L) 07/24/2019    HDL 41 01/11/2019    HDL 40 11/29/2018     Lab Results   Component Value Date    LDLCALC 71 07/24/2019    LDLCALC 96 01/11/2019    LDLCALC 87 11/29/2018     Lab Results   Component Value Date    TRIG 284 (H) 07/24/2019    TRIG 378 (H) 01/11/2019    TRIG 314 (H) 11/29/2018     No results found for: CHOLHDL  No results found for: Rachna Tierney    Diabetes Education Record  Soraya Mina received the following handouts: none today      Patient response to instruction    Comprehensiongood  Motivationgood  Expected Compliancegood    Thank you for referring your patient to [facility], it was a pleasure working with them today  Please feel free to call with any questions or concerns      Cher Meraz, [address]  [address] [place] [address] 20  31 [facility] [id number]

## 2022-05-27 ENCOUNTER — TELEPHONE (OUTPATIENT)
Dept: DIABETES SERVICES | Facility: CLINIC | Age: 82
End: 2022-05-27

## 2022-05-27 NOTE — TELEPHONE ENCOUNTER
Unfortunately, as I mentioned over our teams conversation I was with a patient from 3:15 on and literally just finished with him at 4:35 pm  As I mentioned I have an appointment myself that I cannot be late for after work today  Given your mention that you were unable to help her over the phone by walking her through the instruction manual I am doubtful that I will be able to help her in a matter of a couple minutes and it is currently past my end of day time which is 4:30 pm  This is a non-urgent issue and will have to wait until Tuesday

## 2022-05-27 NOTE — TELEPHONE ENCOUNTER
Pt saw Ángela Soni on 5/19/22  She is just starting to use her glucometer now  She does not know how to set the date or time  It is a one-touch verio  Could you please reach out to patient to help her

## 2022-05-31 ENCOUNTER — TELEPHONE (OUTPATIENT)
Dept: ENDOCRINOLOGY | Facility: CLINIC | Age: 82
End: 2022-05-31

## 2022-05-31 DIAGNOSIS — E11.65 TYPE 2 DIABETES MELLITUS WITH HYPERGLYCEMIA, WITHOUT LONG-TERM CURRENT USE OF INSULIN (HCC): Primary | ICD-10-CM

## 2022-05-31 NOTE — TELEPHONE ENCOUNTER
I called twice, left a message  I stated that it would be a challenge to try to be able to fix that over the phone, but I am happy to try  She is welcome to make an appointment with myself or 1 of the other educators at our other location to be able to pop in to help fix that  Otherwise, she should be just writing her blood sugars down for review so the time in the meter is not overly relevant, she can just leave it alone  Whenever she prefers

## 2022-05-31 NOTE — TELEPHONE ENCOUNTER
Pt walked into the office stating her meter doesn't work and needs strips ordered  Med list not correct  Pt notes she is not taking insulin, has ozempic but hasn't started  Was educated but afraid to start with only 10 test strips  Re-educated that she can start the ozempic  Given a new meter in the office  Observed her testing  Results 185   Aware I would send Rx to her pharmacy for lancets and test strips to test once a day

## 2022-06-01 DIAGNOSIS — E11.65 TYPE 2 DIABETES MELLITUS WITH HYPERGLYCEMIA, WITHOUT LONG-TERM CURRENT USE OF INSULIN (HCC): Primary | ICD-10-CM

## 2022-06-01 RX ORDER — LANCETS
EACH MISCELLANEOUS DAILY
Qty: 100 EACH | Refills: 1 | Status: CANCELLED | OUTPATIENT
Start: 2022-06-01

## 2022-06-01 RX ORDER — BLOOD SUGAR DIAGNOSTIC
1 STRIP MISCELLANEOUS DAILY
Qty: 100 EACH | Refills: 1 | Status: CANCELLED | OUTPATIENT
Start: 2022-06-01

## 2022-06-01 NOTE — TELEPHONE ENCOUNTER
Given a new meter in the office  Observed her testing  Results 185   Aware I would send Rx to her pharmacy for lancets and test strips to test once a day

## 2022-06-03 RX ORDER — BLOOD SUGAR DIAGNOSTIC
STRIP MISCELLANEOUS
Qty: 100 STRIP | Refills: 1 | Status: SHIPPED | OUTPATIENT
Start: 2022-06-03

## 2022-06-03 RX ORDER — LANCETS 30 GAUGE
EACH MISCELLANEOUS
Qty: 100 EACH | Refills: 1 | Status: SHIPPED | OUTPATIENT
Start: 2022-06-03

## 2022-06-04 DIAGNOSIS — K22.70 BARRETT'S ESOPHAGUS WITHOUT DYSPLASIA: ICD-10-CM

## 2022-06-04 RX ORDER — PANTOPRAZOLE SODIUM 20 MG/1
TABLET, DELAYED RELEASE ORAL
Qty: 30 TABLET | Refills: 5 | Status: SHIPPED | OUTPATIENT
Start: 2022-06-04

## 2022-06-19 ENCOUNTER — HOSPITAL ENCOUNTER (EMERGENCY)
Facility: HOSPITAL | Age: 82
Discharge: HOME | End: 2022-06-19
Attending: EMERGENCY MEDICINE
Payer: MEDICARE

## 2022-06-19 ENCOUNTER — APPOINTMENT (EMERGENCY)
Dept: CT IMAGING | Facility: HOSPITAL | Age: 82
End: 2022-06-19
Payer: MEDICARE

## 2022-06-19 VITALS
HEIGHT: 60 IN | OXYGEN SATURATION: 96 % | WEIGHT: 180 LBS | TEMPERATURE: 98 F | HEART RATE: 78 BPM | DIASTOLIC BLOOD PRESSURE: 77 MMHG | SYSTOLIC BLOOD PRESSURE: 171 MMHG | RESPIRATION RATE: 16 BRPM | BODY MASS INDEX: 35.34 KG/M2

## 2022-06-19 DIAGNOSIS — K44.9 HIATAL HERNIA: ICD-10-CM

## 2022-06-19 DIAGNOSIS — N23 RENAL COLIC ON RIGHT SIDE: Primary | ICD-10-CM

## 2022-06-19 DIAGNOSIS — N20.0 RENAL STONES: ICD-10-CM

## 2022-06-19 LAB
ALBUMIN SERPL BCP-MCNC: 4.5 G/DL (ref 3.5–5)
ALP SERPL-CCNC: 65 U/L (ref 34–104)
ALT SERPL W P-5'-P-CCNC: 12 U/L (ref 7–52)
ANION GAP SERPL CALCULATED.3IONS-SCNC: 10 MMOL/L (ref 4–13)
AST SERPL W P-5'-P-CCNC: 18 U/L (ref 13–39)
BACTERIA UR QL AUTO: ABNORMAL /HPF
BASOPHILS # BLD AUTO: 0.06 THOUSANDS/ΜL (ref 0–0.1)
BASOPHILS NFR BLD AUTO: 0 % (ref 0–1)
BILIRUB SERPL-MCNC: 0.5 MG/DL (ref 0.2–1)
BILIRUB UR QL STRIP: NEGATIVE
BUN SERPL-MCNC: 24 MG/DL (ref 5–25)
CALCIUM SERPL-MCNC: 10 MG/DL (ref 8.4–10.2)
CHLORIDE SERPL-SCNC: 99 MMOL/L (ref 96–108)
CLARITY UR: CLEAR
CO2 SERPL-SCNC: 24 MMOL/L (ref 21–32)
COLOR UR: YELLOW
CREAT SERPL-MCNC: 1.29 MG/DL (ref 0.6–1.3)
EOSINOPHIL # BLD AUTO: 0.01 THOUSAND/ΜL (ref 0–0.61)
EOSINOPHIL NFR BLD AUTO: 0 % (ref 0–6)
ERYTHROCYTE [DISTWIDTH] IN BLOOD BY AUTOMATED COUNT: 14.5 % (ref 11.6–15.1)
GFR SERPL CREATININE-BSD FRML MDRD: 39 ML/MIN/1.73SQ M
GLUCOSE SERPL-MCNC: 197 MG/DL (ref 65–140)
GLUCOSE UR STRIP-MCNC: ABNORMAL MG/DL
HCT VFR BLD AUTO: 41.4 % (ref 34.8–46.1)
HGB BLD-MCNC: 12.9 G/DL (ref 11.5–15.4)
HGB UR QL STRIP.AUTO: ABNORMAL
IMM GRANULOCYTES # BLD AUTO: 0.18 THOUSAND/UL (ref 0–0.2)
IMM GRANULOCYTES NFR BLD AUTO: 1 % (ref 0–2)
KETONES UR STRIP-MCNC: NEGATIVE MG/DL
LEUKOCYTE ESTERASE UR QL STRIP: ABNORMAL
LIPASE SERPL-CCNC: 49 U/L (ref 11–82)
LYMPHOCYTES # BLD AUTO: 1.7 THOUSANDS/ΜL (ref 0.6–4.47)
LYMPHOCYTES NFR BLD AUTO: 12 % (ref 14–44)
MCH RBC QN AUTO: 22.6 PG (ref 26.8–34.3)
MCHC RBC AUTO-ENTMCNC: 31.2 G/DL (ref 31.4–37.4)
MCV RBC AUTO: 73 FL (ref 82–98)
MONOCYTES # BLD AUTO: 0.38 THOUSAND/ΜL (ref 0.17–1.22)
MONOCYTES NFR BLD AUTO: 3 % (ref 4–12)
NEUTROPHILS # BLD AUTO: 12.17 THOUSANDS/ΜL (ref 1.85–7.62)
NEUTS SEG NFR BLD AUTO: 84 % (ref 43–75)
NITRITE UR QL STRIP: NEGATIVE
NON-SQ EPI CELLS URNS QL MICRO: ABNORMAL /HPF
NRBC BLD AUTO-RTO: 0 /100 WBCS
PH UR STRIP.AUTO: 5.5 [PH] (ref 4.5–8)
PLATELET # BLD AUTO: 291 THOUSANDS/UL (ref 149–390)
PMV BLD AUTO: 9.7 FL (ref 8.9–12.7)
POTASSIUM SERPL-SCNC: 4.5 MMOL/L (ref 3.5–5.3)
PROT SERPL-MCNC: 8.3 G/DL (ref 6.4–8.4)
PROT UR STRIP-MCNC: ABNORMAL MG/DL
RBC # BLD AUTO: 5.7 MILLION/UL (ref 3.81–5.12)
RBC #/AREA URNS AUTO: ABNORMAL /HPF
SODIUM SERPL-SCNC: 133 MMOL/L (ref 135–147)
SP GR UR STRIP.AUTO: 1.02 (ref 1–1.03)
URATE CRY URNS QL MICRO: ABNORMAL /HPF
UROBILINOGEN UR QL STRIP.AUTO: 0.2 E.U./DL
WBC # BLD AUTO: 14.5 THOUSAND/UL (ref 4.31–10.16)
WBC #/AREA URNS AUTO: ABNORMAL /HPF

## 2022-06-19 PROCEDURE — 83690 ASSAY OF LIPASE: CPT | Performed by: EMERGENCY MEDICINE

## 2022-06-19 PROCEDURE — 80053 COMPREHEN METABOLIC PANEL: CPT | Performed by: EMERGENCY MEDICINE

## 2022-06-19 PROCEDURE — 81001 URINALYSIS AUTO W/SCOPE: CPT

## 2022-06-19 PROCEDURE — 99284 EMERGENCY DEPT VISIT MOD MDM: CPT | Performed by: EMERGENCY MEDICINE

## 2022-06-19 PROCEDURE — G1004 CDSM NDSC: HCPCS

## 2022-06-19 PROCEDURE — 85025 COMPLETE CBC W/AUTO DIFF WBC: CPT | Performed by: EMERGENCY MEDICINE

## 2022-06-19 PROCEDURE — 99284 EMERGENCY DEPT VISIT MOD MDM: CPT

## 2022-06-19 PROCEDURE — 96374 THER/PROPH/DIAG INJ IV PUSH: CPT

## 2022-06-19 PROCEDURE — 36415 COLL VENOUS BLD VENIPUNCTURE: CPT | Performed by: EMERGENCY MEDICINE

## 2022-06-19 PROCEDURE — 74176 CT ABD & PELVIS W/O CONTRAST: CPT

## 2022-06-19 RX ORDER — ONDANSETRON 4 MG/1
4 TABLET, ORALLY DISINTEGRATING ORAL EVERY 8 HOURS PRN
Qty: 12 TABLET | Refills: 0 | Status: SHIPPED | OUTPATIENT
Start: 2022-06-19 | End: 2022-06-23

## 2022-06-19 RX ORDER — ONDANSETRON 2 MG/ML
4 INJECTION INTRAMUSCULAR; INTRAVENOUS ONCE
Status: COMPLETED | OUTPATIENT
Start: 2022-06-19 | End: 2022-06-19

## 2022-06-19 RX ORDER — OXYCODONE HYDROCHLORIDE 5 MG/1
2.5 TABLET ORAL ONCE
Status: COMPLETED | OUTPATIENT
Start: 2022-06-19 | End: 2022-06-19

## 2022-06-19 RX ORDER — ACETAMINOPHEN 325 MG/1
975 TABLET ORAL ONCE
Status: COMPLETED | OUTPATIENT
Start: 2022-06-19 | End: 2022-06-19

## 2022-06-19 RX ORDER — OXYCODONE HYDROCHLORIDE 5 MG/1
2.5 TABLET ORAL EVERY 6 HOURS PRN
Qty: 10 TABLET | Refills: 0 | Status: SHIPPED | OUTPATIENT
Start: 2022-06-19 | End: 2022-06-24

## 2022-06-19 RX ADMIN — OXYCODONE HYDROCHLORIDE 2.5 MG: 5 TABLET ORAL at 13:25

## 2022-06-19 RX ADMIN — ACETAMINOPHEN 975 MG: 325 TABLET ORAL at 12:47

## 2022-06-19 RX ADMIN — ONDANSETRON 4 MG: 2 INJECTION INTRAMUSCULAR; INTRAVENOUS at 11:21

## 2022-06-19 NOTE — DISCHARGE INSTRUCTIONS
Please make sure to follow-up with urology, family doctor and GI  Return to ER if having severe pain, vomiting, fevers or feeling worse overall  Use Tylenol for pain as needed  If pain is severe you can take oxycodone

## 2022-06-19 NOTE — ED PROVIDER NOTES
History  Chief Complaint   Patient presents with    Flank Pain     Right  Pt reports starting this morning  Pt also reports low grade fevers  +N/V       History provided by:  Patient   used: No    Flank Pain  Associated symptoms: nausea and vomiting    Associated symptoms: no chest pain, no chills, no cough, no diarrhea, no dysuria, no fever, no shortness of breath and no sore throat      Patient is a 62-year-old female presenting to emergency department with right lower flank and abdominal pain, started today morning  Patient reports low-grade fevers  No diarrhea  Urine complaints  No chest pain or shortness of breath  No trauma  No history of the same  Patient does not want anything for pain at this time  MDM will check abdominal labs, urine, CT, Zofran, re-evaluate after    Patient's pain well controlled  Nausea controlled  Tolerating p o   Would like to go home  Prior to Admission Medications   Prescriptions Last Dose Informant Patient Reported? Taking?    Blood Glucose Monitoring Suppl (OneTouch Verio Reflect) w/Device KIT   Yes No   Sig: Use   Calcium Carbonate (CALTRATE 600) 1500 (600 Ca) MG TABS  Self Yes No   Sig: Take by mouth   Cholecalciferol (VITAMIN D3) 2000 units capsule  Self Yes No   Sig: Take 1 tablet by mouth daily   Dapagliflozin Propanediol (Farxiga) 10 MG TABS   No No   Sig: Take 1 tablet (10 mg total) by mouth daily   Dulaglutide 0 75 MG/0 5ML SOPN   No No   Sig: Inject 0 5 mL (0 75 mg total) under the skin once a week   EPINEPHrine (EPIPEN) 0 3 mg/0 3 mL SOAJ  Self Yes No   Sig: Inject 1 Syringe into a muscle   Empagliflozin 25 MG TABS   No No   Sig: Take 1 tablet (25 mg total) by mouth every morning   Lancets (OneTouch Delica Plus [id number]) MISC   No No   Sig: Use to test blood sugar once daily   Multiple Vitamins-Minerals (WOMENS ONE DAILY) TABS  Self Yes No   Sig: Take 1 tablet by mouth daily   Omega-3 Fatty Acids (OMEGA-3 FISH OIL) 1000 MG CAPS  Self Yes No   Sig: Take 1 capsule by mouth daily    Semaglutide,0 25 or 0 5MG/DOS, (Ozempic, 0 25 or 0 5 MG/DOSE,) 2 MG/1 5ML SOPN   No No   Sig: Use 0 25mg weekly for 4 weeks and then 0 5mg weekly   amLODIPine (NORVASC) 5 mg tablet   No No   Sig: Take 1 tablet (5 mg total) by mouth daily   aspirin 81 MG tablet  Self Yes No   Sig: Take 1 tablet by mouth daily   atorvastatin (LIPITOR) 10 mg tablet  Self No No   Sig: TAKE 1 TABLET (10 MG TOTAL) BY MOUTH DAILY   carvedilol (COREG) 25 mg tablet   No No   Sig: Take 1 tablet (25 mg total) by mouth 2 (two) times a day   fluticasone (FLONASE) 50 mcg/act nasal spray  Self No No   Sig: USE 1 SPRAY IN EACH NOSTRIL EVERY DAY   glimepiride (AMARYL) 4 mg tablet   No No   Sig: Take 1 tablet (4 mg total) by mouth daily with breakfast   glucosamine 500 MG CAPS capsule  Self Yes No   Sig: Take 1 capsule by mouth daily    glucose blood (OneTouch Verio) test strip   No No   Sig: Use to test blood glucose once daily   hydrochlorothiazide (HYDRODIURIL) 25 mg tablet   No No   Sig: Take 1 tablet (25 mg total) by mouth daily   levothyroxine 25 mcg tablet   No No   Sig: Take 1 tablet (25 mcg total) by mouth daily   lisinopril (ZESTRIL) 40 mg tablet  Self No No   Sig: Take 1 tablet (40 mg total) by mouth 2 (two) times a day   metFORMIN (GLUCOPHAGE-XR) 500 mg 24 hr tablet   No No   Sig: Take 1 tablet (500 mg total) by mouth 2 (two) times a day   pantoprazole (PROTONIX) 20 mg tablet   No No   Sig: TAKE 1 TABLET BY MOUTH EVERY DAY   saccharomyces boulardii (FLORASTOR) 250 mg capsule  Self No No   Sig: Take 1 capsule (250 mg total) by mouth 2 (two) times a day   spironolactone (ALDACTONE) 25 mg tablet  Self No No   Sig: Take 1 tablet (25 mg total) by mouth daily      Facility-Administered Medications: None       Past Medical History:   Diagnosis Date    Abnormal mammogram     Acute sinusitis     Allergic reaction     Subsequent encounter, Resolved - 1/9/18    Allergic rhinitis 01/16/2012    Anaphylactic reaction     Other, Last assessed - 6/16/16    Anemia 01/16/2012    Ataxia     Cellulitis of great toe of right foot     Chronic allergic rhinitis     Unspec seasonality, unspec trigger; Last assessed - 11/13/17    Contact dermatitis     Last assessed - 6/3/14    Dermatitis     Diabetes mellitus ([facility] [address] )     Disorder of tympanic membrane     Last assessed - 10/15/12    Dry skin     Last assessed - 4/15/14    Dysfunction of eustachian tube     Unspec laterality, Last assessed - 10/19/12    Ganglion cyst     Glaucoma     Hip pain, acute, unspecified laterality     Hyperlipidemia     Hypertension     Labyrinthitis 11/17/2011    Leg mass, left     Limb pain     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    Long term use of drug     Last assessed - 4/6/14    Mammogram abnormal     Last assessed - 4/11/14    Mass of thigh, left     Pain in ankle joint     Pes planus     Unspec laterality, Last assessed - 4/9/13    Shoulder pain, right     Vertigo 01/16/2012       Past Surgical History:   Procedure Laterality Date    OTHER SURGICAL HISTORY      Surgery unk     TONSILLECTOMY         Family History   Problem Relation Age of Onset    Heart attack Mother         Ac MI   Romarcella Kugel Lung cancer Father     Other Father         Cerebral Embolism    Anemia Sister     Glaucoma Sister     Hypertension Sister     Thyroid disease Sister     Diabetes Brother     Hypertension Brother     Other Brother         Cardiac Disorder    No Known Problems Daughter     No Known Problems Sister     No Known Problems Daughter     No Known Problems Maternal Aunt     No Known Problems Paternal Aunt      I have reviewed and agree with the history as documented      E-Cigarette/Vaping    E-Cigarette Use Never User      E-Cigarette/Vaping Substances    Nicotine No     THC No     CBD No     Flavoring No     Other No     Unknown No      Social History     Tobacco Use    Smoking status: Former Smoker Quit date: 12     Years since quittin 2    Smokeless tobacco: Never Used   Vaping Use    Vaping Use: Never used   Substance Use Topics    Alcohol use: Yes     Alcohol/week: 1 0 - 2 0 standard drink     Types: 1 - 2 Glasses of wine per week     Comment: Social     Drug use: No       Review of Systems   Constitutional: Negative for chills, diaphoresis and fever  HENT: Negative for congestion and sore throat  Respiratory: Negative for cough, shortness of breath, wheezing and stridor  Cardiovascular: Negative for chest pain, palpitations and leg swelling  Gastrointestinal: Positive for abdominal pain, nausea and vomiting  Negative for blood in stool and diarrhea  Genitourinary: Positive for flank pain  Negative for dysuria, frequency and urgency  Musculoskeletal: Negative for neck pain and neck stiffness  Skin: Negative for pallor and rash  Neurological: Negative for dizziness, syncope, weakness, light-headedness and headaches  All other systems reviewed and are negative  Physical Exam  Physical Exam  Vitals reviewed  Constitutional:       Appearance: Normal appearance  She is well-developed  HENT:      Head: Normocephalic and atraumatic  Eyes:      Extraocular Movements: Extraocular movements intact  Pupils: Pupils are equal, round, and reactive to light  Cardiovascular:      Rate and Rhythm: Normal rate and regular rhythm  Heart sounds: Normal heart sounds  Pulmonary:      Effort: Pulmonary effort is normal  No respiratory distress  Breath sounds: Normal breath sounds  Abdominal:      General: Bowel sounds are normal       Palpations: Abdomen is soft  Tenderness: There is abdominal tenderness  Comments: Right lower quadrant tenderness   Musculoskeletal:         General: No swelling or tenderness  Normal range of motion  Cervical back: Normal range of motion and neck supple  Skin:     General: Skin is warm and dry        Capillary Refill: Capillary refill takes less than 2 seconds  Neurological:      General: No focal deficit present  Mental Status: She is alert and oriented to person, place, and time           Vital Signs  ED Triage Vitals [06/19/22 1053]   Temperature Pulse Respirations Blood Pressure SpO2   98 °F (36 7 °C) 72 16 167/81 97 %      Temp Source Heart Rate Source Patient Position - Orthostatic VS BP Location FiO2 (%)   Oral Monitor Sitting Left arm --      Pain Score       --           Vitals:    06/19/22 1053 06/19/22 1248   BP: 167/81 (!) 171/77   Pulse: 72 78   Patient Position - Orthostatic VS: Sitting Sitting         Visual Acuity      ED Medications  Medications   ondansetron (ZOFRAN) injection 4 mg (4 mg Intravenous Given 6/19/22 1121)   acetaminophen (TYLENOL) tablet 975 mg (975 mg Oral Given 6/19/22 1247)   oxyCODONE (ROXICODONE) IR tablet 2 5 mg (2 5 mg Oral Given 6/19/22 1325)       Diagnostic Studies  Results Reviewed     Procedure Component Value Units Date/Time    Comprehensive metabolic panel [[id number]]  (Abnormal) Collected: 06/19/22 1121    Lab Status: Final result Specimen: Blood from Arm, Right Updated: 06/19/22 1152     Sodium 133 mmol/L      Potassium 4 5 mmol/L      Chloride 99 mmol/L      CO2 24 mmol/L      ANION GAP 10 mmol/L      BUN 24 mg/dL      Creatinine 1 29 mg/dL      Glucose 197 mg/dL      Calcium 10 0 mg/dL      AST 18 U/L      ALT 12 U/L      Alkaline Phosphatase 65 U/L      Total Protein 8 3 g/dL      Albumin 4 5 g/dL      Total Bilirubin 0 50 mg/dL      eGFR 39 ml/min/1 73sq m     Narrative:      Seema guidelines for Chronic Kidney Disease (CKD):     Stage 1 with normal or high GFR (GFR > 90 mL/min/1 73 square meters)    Stage 2 Mild CKD (GFR = 60-89 mL/min/1 73 square meters)    Stage 3A Moderate CKD (GFR = 45-59 mL/min/1 73 square meters)    Stage 3B Moderate CKD (GFR = 30-44 mL/min/1 73 square meters)    Stage 4 Severe CKD (GFR = 15-29 mL/min/1 73 square meters)    Stage 5 End Stage CKD (GFR <15 mL/min/1 73 square meters)  Note: GFR calculation is accurate only with a steady state creatinine    Lipase [[id number]]  (Normal) Collected: 06/19/22 1121    Lab Status: Final result Specimen: Blood from Arm, Right Updated: 06/19/22 1152     Lipase 49 u/L     Urine Microscopic [[id number]]  (Abnormal) Collected: 06/19/22 1116    Lab Status: Final result Specimen: Urine, Clean Catch Updated: 06/19/22 1146     RBC, UA 10-20 /hpf      WBC, UA 0-1 /hpf      Epithelial Cells Occasional /hpf      Bacteria, UA Occasional /hpf      Uric Acid Ariela, UA Moderate /hpf     CBC and differential [[id number]]  (Abnormal) Collected: 06/19/22 1121    Lab Status: Final result Specimen: Blood from Arm, Right Updated: 06/19/22 1128     WBC 14 50 Thousand/uL      RBC 5 70 Million/uL      Hemoglobin 12 9 g/dL      Hematocrit 41 4 %      MCV 73 fL      MCH 22 6 pg      MCHC 31 2 g/dL      RDW 14 5 %      MPV 9 7 fL      Platelets 126 Thousands/uL      nRBC 0 /100 WBCs      Neutrophils Relative 84 %      Immat GRANS % 1 %      Lymphocytes Relative 12 %      Monocytes Relative 3 %      Eosinophils Relative 0 %      Basophils Relative 0 %      Neutrophils Absolute 12 17 Thousands/µL      Immature Grans Absolute 0 18 Thousand/uL      Lymphocytes Absolute 1 70 Thousands/µL      Monocytes Absolute 0 38 Thousand/µL      Eosinophils Absolute 0 01 Thousand/µL      Basophils Absolute 0 06 Thousands/µL     Urine Macroscopic, POC [[id number]]  (Abnormal) Collected: 06/19/22 1116    Lab Status: Final result Specimen: Urine Updated: 06/19/22 1118     Color, UA Yellow     Clarity, UA Clear     pH, UA 5 5     Leukocytes, UA Trace     Nitrite, UA Negative     Protein, UA 30 (1+) mg/dl      Glucose,  (1/10%) mg/dl      Ketones, UA Negative mg/dl      Urobilinogen, UA 0 2 E U /dl      Bilirubin, UA Negative     Blood, UA Large     Specific [place], UA 1 020    Narrative:      CLINITEK RESULT                 CT abdomen pelvis wo contrast   Final Result by Fabrizio Schilling DO (06/19 1233)   1  Moderately obstructing 3 mm distal right ureteric calculus  Follow-up urology consultation recommended  2   Multiple right renal calyceal calculi as described above  Follow-up urology consultation recommended  3   Distended stomach debris-filled stomach with large hiatal hernia and reflux into the distal esophagus  Mild thickening of the distal esophagus, likely reflux esophagitis  Recommend follow-up GI consultation and/or upper endoscopy  4   Colonic diverticulosis  The study was marked in [facility] for immediate notification  Workstation performed: [id number]                    Procedures  Procedures         ED Course                                             MDM    Disposition  Final diagnoses:   Renal colic on right side   Renal stones   Hiatal hernia     Time reflects when diagnosis was documented in both MDM as applicable and the Disposition within this note     Time User Action Codes Description Comment    6/19/2022  1:16 PM Amilcar Brandi Add [Y80] Renal colic on right side     6/19/2022  1:16 PM Damianevoswillian Brandi Add [N20 0] Renal stones     6/19/2022  1:16 PM Brandi Fitzgerald Add [K44 9] Hiatal hernia       ED Disposition     ED Disposition   Discharge    Condition   Stable    Date/Time   Sun Jun 19, 2022  1:16 PM    Comment   Ruth Gaxiola discharge to home/self care                 Follow-up Information     Follow up With Specialties Details Why Contact Info Additional Information    EMILIA Dumont Nurse Practitioner In 3 days [facility]  [address] Emergency Department Emergency Medicine  As needed, If symptoms worsen [address] Emergency Department, [address], [place], [place], [address] [facility] [place] [facility] Schedule an appointment as soon as possible for a visit  [facility]  [phone] [facility] [place], [address], [address], [place], [place], [address]    [facility] [place] [facility] Schedule an appointment as soon as possible for a visit  [facility]idney [facility] [address]  [address] [phone]  707  [facility] [place], [address] [place], [place], [address] [facility]          Discharge Medication List as of 6/19/2022  1:44 PM      START taking these medications    Details   ondansetron (ZOFRAN-ODT) 4 mg disintegrating tablet Take 1 tablet (4 mg total) by mouth every 8 (eight) hours as needed for nausea or vomiting for up to 4 days, Starting Sun 6/19/2022, Until Thu 6/23/2022 at 2359, Normal      oxyCODONE (Roxicodone) 5 immediate release tablet Take 0 5 tablets (2 5 mg total) by mouth every 6 (six) hours as needed for moderate pain for up to 5 days Max Daily Amount: 10 mg, Starting Sun 6/19/2022, Until Fri 6/24/2022 at 2359, Normal         CONTINUE these medications which have NOT CHANGED    Details   amLODIPine (NORVASC) 5 mg tablet Take 1 tablet (5 mg total) by mouth daily, Starting Mon 2/21/2022, Normal      aspirin 81 MG tablet Take 1 tablet by mouth daily, Historical Med      atorvastatin (LIPITOR) 10 mg tablet TAKE 1 TABLET (10 MG TOTAL) BY MOUTH DAILY, Starting Tue 9/24/2019, Normal      Blood Glucose Monitoring Suppl (OneTouch Verio Reflect) w/Device KIT Use, Historical Med      Calcium Carbonate (CALTRATE 600) 1500 (600 Ca) MG TABS Take by mouth, Starting Tue 10/23/2012, Historical Med      carvedilol (COREG) 25 mg tablet Take 1 tablet (25 mg total) by mouth 2 (two) times a day, Starting Mon 2/21/2022, Normal      Cholecalciferol (VITAMIN D3) 2000 units capsule Take 1 tablet by mouth daily, Starting Tue 4/15/2014, Historical Med      Dapagliflozin Propanediol (Farxiga) 10 MG TABS Take 1 tablet (10 mg total) by mouth daily, Starting Tue 5/3/2022, Normal      Dulaglutide 0 75 MG/0 5ML SOPN Inject 0 5 mL (0 75 mg total) under the skin once a week, Starting Thu 4/21/2022, Normal      Empagliflozin 25 MG TABS Take 1 tablet (25 mg total) by mouth every morning, Starting Thu 4/21/2022, Normal      EPINEPHrine (EPIPEN) 0 3 mg/0 3 mL SOAJ Inject 1 Syringe into a muscle, Historical Med      fluticasone (FLONASE) 50 mcg/act nasal spray USE 1 SPRAY IN EACH NOSTRIL EVERY DAY, Normal      glimepiride (AMARYL) 4 mg tablet Take 1 tablet (4 mg total) by mouth daily with breakfast, Starting Thu 4/21/2022, Normal      glucosamine 500 MG CAPS capsule Take 1 capsule by mouth daily , Historical Med      glucose blood (OneTouch Verio) test strip Use to test blood glucose once daily, Normal      hydrochlorothiazide (HYDRODIURIL) 25 mg tablet Take 1 tablet (25 mg total) by mouth daily, Starting Wed 2/23/2022, Normal      Lancets (OneTouch Delica Plus [id number]) MISC Use to test blood sugar once daily, Normal      levothyroxine 25 mcg tablet Take 1 tablet (25 mcg total) by mouth daily, Starting Mon 11/22/2021, Normal      lisinopril (ZESTRIL) 40 mg tablet Take 1 tablet (40 mg total) by mouth 2 (two) times a day, Starting Tue 5/19/2020, Normal      metFORMIN (GLUCOPHAGE-XR) 500 mg 24 hr tablet Take 1 tablet (500 mg total) by mouth 2 (two) times a day, Starting Tue 8/17/2021, Normal      Multiple Vitamins-Minerals (WOMENS ONE DAILY) TABS Take 1 tablet by mouth daily, Historical Med      Omega-3 Fatty Acids (OMEGA-3 FISH OIL) 1000 MG CAPS Take 1 capsule by mouth daily , Historical Med      pantoprazole (PROTONIX) 20 mg tablet TAKE 1 TABLET BY MOUTH EVERY DAY, Normal      saccharomyces boulardii (FLORASTOR) 250 mg capsule Take 1 capsule (250 mg total) by mouth 2 (two) times a day, Starting Mon 12/14/2020, Normal      Semaglutide,0 25 or 0 5MG/DOS, (Ozempic, 0 25 or 0 5 MG/DOSE,) 2 MG/1 5ML SOPN Use 0 25mg weekly for 4 weeks and then 0 5mg weekly, Normal      spironolactone (ALDACTONE) 25 mg tablet Take 1 tablet (25 mg total) by mouth daily, Starting Mon 6/7/2021, Normal             No discharge procedures on file      PDMP Review     None          ED Provider  Electronically Signed by           Beverly Ibarra MD  06/19/22 9188

## 2022-07-05 ENCOUNTER — TELEPHONE (OUTPATIENT)
Dept: UROLOGY | Facility: MEDICAL CENTER | Age: 82
End: 2022-07-05

## 2022-07-05 NOTE — TELEPHONE ENCOUNTER
Patient has an appt on 7/21/2022 with Dr Veronica Mondragon at [facility]  Patient passed kidney stone on 6/20/2022    Patient calling in to see if she could get in sooner than 7/21/2022  She is willing to travel to [address] [facility], or [place] as well  I could not find any NP spots sooner then her current one  Patient starting urinating blood today 7/5/2022  It is constant in the fact that it is visible every time she urinates  She described the blood as a rust color  The color on the toilet paper appears to be an orange color  She said it is not much, but it is there constantly  Patient is not having any difficultly urinating, no fever  No other symptoms besides the blood in her urine  She does have a pain in her lower back on her right side, she is unsure if it is from gardening though  Please triage and advise,   Patient did state leaving a detailed voice message is okay       Her best call back number is [phone]

## 2022-07-05 NOTE — TELEPHONE ENCOUNTER
Recent CT scan identified a 3 mm right distal ureteral calculus with hydronephrosis  Additional nonobstructing calculi seen  This is likely the cause of her symptoms  If patient is not already taking tamsulosin, would recommend she contact her PCP for prescription to assist with medical expulsive therapy  Patient should increase her water intake and strain all urine  She should follow-up in the office in the next 1-2 weeks to establish care

## 2022-07-07 ENCOUNTER — NURSE TRIAGE (OUTPATIENT)
Dept: OTHER | Facility: OTHER | Age: 82
End: 2022-07-07

## 2022-07-07 NOTE — TELEPHONE ENCOUNTER
Reason for Disposition   Nursing judgment    Answer Assessment - Initial Assessment Questions  1  REASON FOR CALL or QUESTION: "What is your reason for calling today?" or "How can I best help you?" or "What question do you have that I can help answer?"      Patient collected her kidney stones and now needs to know what she should do with them  In her AVS from her recent hospital visit it does state that she should collect them and take them to her next office visit but her next appointment is not until 7/21  Please follow up and let patient know if she needs to bring her stones to the office      Protocols used: INFORMATION ONLY CALL - NO TRIAGE-ADULT-[place]

## 2022-07-07 NOTE — TELEPHONE ENCOUNTER
Regarding: kidney stones medical advice   ----- Message from Padmini Asif sent at 7/7/2022  2:01 PM EDT -----  "i'm on my third day of passing kidney stones, this time i did have a little bit of time to collect the specimens  this time it was a cluster and theres some fiber in there too  i have an appt 7/21, i dont know what to do with these specimens   do i bring it the doctor?"

## 2022-07-07 NOTE — TELEPHONE ENCOUNTER
Spoke with Dr Payne regarding holding onto the stones until her visit on 7/21 and she said that is ok to hold onto them  I spoke with pt and advised of this and to bring them to her appt  Pt was very concerned with holding onto them for that long and again advised her that I spoke with a provider and it is ok

## 2022-07-11 NOTE — TELEPHONE ENCOUNTER
Returned call to patient  Patient state she has passed several stones  They are coming frequently  Symptoms as verbalized by patient  Patient states she has had symptoms for the last two weeks  Office seen: New Patient   Seen in Ed on 6/19/22  Pain: intermittent pain   Nausea/Vomiting: + nausea slight/ no vomiting  Fever/Chills: none   Bowel Movements:loose stools   Urine color: clear yellow water   Frequency/Urgencey: constant for 2 weeks  Stream: urine stream is okay once they pass  Trickles to stream   Medication:     Encouraged hydration with water 40-60oz of water daily  Avoiding Bladder irritants such as coffee,tea,soda,carbonated beverages  Limiting your alcohol intake  Avoiding constipation  Continue taking medications as prescribed  Reducing cigarette smoking  Advised patient monitor/contact our office with fever above 101 0, chills, decreased urination or unable to urinate, blood or clots in the urine  Health Call after hours protocol reviewed  Ed precautions reviewed   Patient verbalized understanding/ Agreement           Appt given in our [facility] location at 7:30am for 7/12/22 t 7:30 am

## 2022-07-11 NOTE — TELEPHONE ENCOUNTER
Patient called stating she is passing a lot of stones today  She wants to know if she can be seen sooner  She is having some pain and she is not sure how to proceed  She wants a call from the Clinical team to see if she needs to go to ER   Patient does have an appointment for 07/21/22 as New Patient       She can be reached at [phone]

## 2022-07-12 ENCOUNTER — OFFICE VISIT (OUTPATIENT)
Dept: UROLOGY | Facility: CLINIC | Age: 82
End: 2022-07-12
Payer: MEDICARE

## 2022-07-12 VITALS
HEIGHT: 60 IN | RESPIRATION RATE: 18 BRPM | WEIGHT: 179 LBS | DIASTOLIC BLOOD PRESSURE: 68 MMHG | BODY MASS INDEX: 35.14 KG/M2 | SYSTOLIC BLOOD PRESSURE: 118 MMHG

## 2022-07-12 DIAGNOSIS — N20.0 KIDNEY STONES: Primary | ICD-10-CM

## 2022-07-12 PROCEDURE — 99203 OFFICE O/P NEW LOW 30 MIN: CPT | Performed by: PHYSICIAN ASSISTANT

## 2022-07-12 PROCEDURE — 82360 CALCULUS ASSAY QUANT: CPT | Performed by: PHYSICIAN ASSISTANT

## 2022-07-12 RX ORDER — TAMSULOSIN HYDROCHLORIDE 0.4 MG/1
0.4 CAPSULE ORAL DAILY
COMMUNITY
Start: 2022-07-05

## 2022-07-12 NOTE — PROGRESS NOTES
UROLOGY CONSULTATION NOTE       Patient Identifiers: Celeste Hallman (MRN: [id number])  Service Requesting Consultation: Referral Self  Service Providing Consultation:  Urology, Celestino Mittal PA-C  Consults  Date of Service: 7/12/2022    Reason for Consultation:  Kidney stone evaluation    History of Present Illness:     Celeste Hallman is a 80 y o  female with no prior urologic history had a CT scan June 19th due to flank pain  She had a distal ureteral stone and stones right kidney  Left kidney was spared  She does not get urinary tract infections  She never saw urologist in the  She does have a daughter with kidney stones  Since that CT scan she has passed multiple stones which she brought in for evaluation  She is currently pain-free  She has chronic kidney disease and sees Dr Blanquita Mary at [facility]  Past Medical, Past Surgical History:     Past Medical History:   Diagnosis Date    Abnormal mammogram     Acute sinusitis     Allergic reaction     Subsequent encounter, Resolved - 1/9/18    Allergic rhinitis 01/16/2012    Anaphylactic reaction     Other, Last assessed - 6/16/16    Anemia 01/16/2012    Ataxia     Cellulitis of great toe of right foot     Chronic allergic rhinitis     Unspec seasonality, unspec trigger;  Last assessed - 11/13/17    Contact dermatitis     Last assessed - 6/3/14    Dermatitis     Diabetes mellitus ([facility] [address] )     Disorder of tympanic membrane     Last assessed - 10/15/12    Dry skin     Last assessed - 4/15/14    Dysfunction of eustachian tube     Unspec laterality, Last assessed - 10/19/12    Ganglion cyst     Glaucoma     Hip pain, acute, unspecified laterality     Hyperlipidemia     Hypertension     Labyrinthitis 11/17/2011    Leg mass, left     Limb pain     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    Long term use of drug     Last assessed - 4/6/14    Mammogram abnormal     Last assessed - 4/11/14    Mass of thigh, left     Pain in ankle joint     Pes planus     Unspec laterality, Last assessed - 4/9/13    Shoulder pain, right     Vertigo 01/16/2012   :    Past Surgical History:   Procedure Laterality Date    OTHER SURGICAL HISTORY      Surgery unk     TONSILLECTOMY     :    Medications, Allergies:     Current Outpatient Medications:     amLODIPine (NORVASC) 5 mg tablet, Take 1 tablet (5 mg total) by mouth daily, Disp: 90 tablet, Rfl: 3    aspirin 81 MG tablet, Take 1 tablet by mouth daily, Disp: , Rfl:     atorvastatin (LIPITOR) 10 mg tablet, TAKE 1 TABLET (10 MG TOTAL) BY MOUTH DAILY, Disp: 90 tablet, Rfl: 1    Blood Glucose Monitoring Suppl (OneTouch Verio Reflect) w/Device KIT, Use, Disp: , Rfl:     Calcium Carbonate 1500 (600 Ca) MG TABS, Take by mouth, Disp: , Rfl:     carvedilol (COREG) 25 mg tablet, Take 1 tablet (25 mg total) by mouth 2 (two) times a day, Disp: 180 tablet, Rfl: 3    Cholecalciferol (VITAMIN D3) 2000 units capsule, Take 1 tablet by mouth daily, Disp: , Rfl:     Dapagliflozin Propanediol (Farxiga) 10 MG TABS, Take 1 tablet (10 mg total) by mouth daily, Disp: 30 tablet, Rfl: 0    Dulaglutide 0 75 MG/0 5ML SOPN, Inject 0 5 mL (0 75 mg total) under the skin once a week, Disp: 6 mL, Rfl: 0    Empagliflozin 25 MG TABS, Take 1 tablet (25 mg total) by mouth every morning, Disp: 30 tablet, Rfl: 0    EPINEPHrine (EPIPEN) 0 3 mg/0 3 mL SOAJ, Inject 1 Syringe into a muscle, Disp: , Rfl:     fluticasone (FLONASE) 50 mcg/act nasal spray, USE 1 SPRAY IN EACH NOSTRIL EVERY DAY, Disp: 48 mL, Rfl: 2    glimepiride (AMARYL) 4 mg tablet, Take 1 tablet (4 mg total) by mouth daily with breakfast, Disp: 180 tablet, Rfl: 0    glucosamine 500 MG CAPS capsule, Take 1 capsule by mouth daily , Disp: , Rfl:     glucose blood (OneTouch Verio) test strip, Use to test blood glucose once daily, Disp: 100 strip, Rfl: 1    hydrochlorothiazide (HYDRODIURIL) 25 mg tablet, Take 1 tablet (25 mg total) by mouth daily, Disp: 90 tablet, Rfl: 1    Lancets (OneTouch Delica Plus [id number]) MISC, Use to test blood sugar once daily, Disp: 100 each, Rfl: 1    levothyroxine 25 mcg tablet, Take 1 tablet (25 mcg total) by mouth daily, Disp: 90 tablet, Rfl: 2    lisinopril (ZESTRIL) 40 mg tablet, Take 1 tablet (40 mg total) by mouth 2 (two) times a day, Disp: 180 tablet, Rfl: 3    metFORMIN (GLUCOPHAGE-XR) 500 mg 24 hr tablet, Take 1 tablet (500 mg total) by mouth 2 (two) times a day, Disp: 180 tablet, Rfl: 1    Multiple Vitamins-Minerals (WOMENS ONE DAILY) TABS, Take 1 tablet by mouth daily, Disp: , Rfl:     Omega-3 Fatty Acids (OMEGA-3 FISH OIL) 1000 MG CAPS, Take 1 capsule by mouth daily , Disp: , Rfl:     pantoprazole (PROTONIX) 20 mg tablet, TAKE 1 TABLET BY MOUTH EVERY DAY, Disp: 30 tablet, Rfl: 5    saccharomyces boulardii (FLORASTOR) 250 mg capsule, Take 1 capsule (250 mg total) by mouth 2 (two) times a day, Disp: 50 capsule, Rfl: 0    Semaglutide,0 25 or 0 5MG/DOS, (Ozempic, 0 25 or 0 5 MG/DOSE,) 2 MG/1 5ML SOPN, Use 0 25mg weekly for 4 weeks and then 0 5mg weekly, Disp: 3 mL, Rfl: 0    spironolactone (ALDACTONE) 25 mg tablet, Take 1 tablet (25 mg total) by mouth daily, Disp: 90 tablet, Rfl: 3    ondansetron (ZOFRAN-ODT) 4 mg disintegrating tablet, Take 1 tablet (4 mg total) by mouth every 8 (eight) hours as needed for nausea or vomiting for up to 4 days (Patient not taking: Reported on 2022), Disp: 12 tablet, Rfl: 0    tamsulosin (FLOMAX) 0 4 mg, Take 0 4 mg by mouth daily (Patient not taking: Reported on 2022), Disp: , Rfl:     Allergies: Allergies   Allergen Reactions    Cortisone Hyperactivity    Bee Venom Edema    Virtussin A-C [Guaifenesin-Codeine] Throat Swelling    Bee Pollen Edema     Category:  Allergy;    :    Social and Family History:   Social History:   Social History     Tobacco Use    Smoking status: Former Smoker     Quit date:      Years since quittin 11    Smokeless tobacco: Never Used   Vaping Use  Vaping Use: Never used   Substance Use Topics    Alcohol use: Yes     Alcohol/week: 1 0 - 2 0 standard drink     Types: 1 - 2 Glasses of wine per week     Comment: Social     Drug use: No        Social History     Tobacco Use   Smoking Status Former Smoker    Quit date:     Years since quittin 5   Smokeless Tobacco Never Used       Family History:  Family History   Problem Relation Age of Onset    Heart attack Mother         Ac MI    Lung cancer Father     Other Father         Cerebral Embolism    Anemia Sister     Glaucoma Sister     Hypertension Sister     Thyroid disease Sister     Diabetes Brother     Hypertension Brother     Other Brother         Cardiac Disorder    No Known Problems Daughter     No Known Problems Sister     No Known Problems Daughter     No Known Problems Maternal Aunt     No Known Problems Paternal Aunt    :     Review of Systems:     General: Fever, chills, or night sweats: negative  Cardiac: Negative for chest pain  Pulmonary: Negative for shortness of breath  Gastrointestinal: Abdominal pain negative  Nausea, vomiting, or diarrhea negative,  Genitourinary: See HPI above  Patient does not have hematuria  All other systems queried were negative  Physical Exam:   General: Patient is pleasant and in NAD  Awake and alert  /68   Resp 18   Ht 5' (1 524 m)   Wt 81 2 kg (179 lb)   LMP  (LMP Unknown)   BMI 34 96 kg/m²   HEENT:  Conjunctiva are clear  Constitutional:  pleasant and cooperative     no apparent distress  Cardiac: Peripheral edema: negative  Pulmonary: Non-labored breathing  Abdomen: Soft, non-tender, non-distended  No surgical scars  No masses, tenderness, hernias noted  Genitourinary: Negative CVA tenderness, negative suprapubic tenderness  Extremities:  Moves all extremities  Neurological:CNII-XII intact  No numbness or tingling   Essentially non focal neurologic exam  Psychiatric:mood affect and behavior normal       Labs: Lab Results   Component Value Date    HGB 12 9 06/19/2022    HCT 41 4 06/19/2022    WBC 14 50 (H) 06/19/2022     06/19/2022   ]    Lab Results   Component Value Date     03/26/2015    K 4 5 06/19/2022    CL 99 06/19/2022    CO2 24 06/19/2022    BUN 24 06/19/2022    CREATININE 1 29 06/19/2022    CALCIUM 10 0 06/19/2022    GLUCOSE 144 (H) 03/26/2015   ]    Imaging:   I personally reviewed the images and report of the following studies, and reviewed them with the patient:  CT ABDOMEN AND PELVIS WITHOUT IV CONTRAST   IMPRESSION:  1  Moderately obstructing 3 mm distal right ureteric calculus  Follow-up urology consultation recommended  2   Multiple right renal calyceal calculi as described above  Follow-up urology consultation recommended  3   Distended stomach debris-filled stomach with large hiatal hernia and reflux into the distal esophagus  Mild thickening of the distal esophagus, likely reflux esophagitis  Recommend follow-up GI consultation and/or upper endoscopy  4   Colonic diverticulosis        ASSESSMENT:     1  Nephrolithiasis    PLAN:   -appears she has passed most of her stones  -will follow-up in about 10 weeks with ultrasound and KUB prior to visit  -will review her stone analysis  -her nephrologist may consider doing metabolic testing and 24 hour urine at his discretion    Thank you for allowing me to participate in this patients care  Please do not hesitate to call with any additional questions    Elma Rosenberg PA-C

## 2022-07-14 ENCOUNTER — HOSPITAL ENCOUNTER (OUTPATIENT)
Dept: RADIOLOGY | Facility: HOSPITAL | Age: 82
Discharge: HOME | End: 2022-07-14
Payer: MEDICARE

## 2022-07-14 ENCOUNTER — HOSPITAL ENCOUNTER (OUTPATIENT)
Dept: ULTRASOUND IMAGING | Facility: HOSPITAL | Age: 82
Discharge: HOME | End: 2022-07-14
Payer: MEDICARE

## 2022-07-14 DIAGNOSIS — N20.0 KIDNEY STONES: ICD-10-CM

## 2022-07-14 PROCEDURE — 74018 RADEX ABDOMEN 1 VIEW: CPT

## 2022-07-16 ENCOUNTER — TELEPHONE (OUTPATIENT)
Dept: GASTROENTEROLOGY | Facility: CLINIC | Age: 82
End: 2022-07-16

## 2022-07-16 LAB
COLOR STONE: NORMAL
COMMENT-STONE3: NORMAL
COMPOSITION: NORMAL
LABORATORY COMMENT REPORT: NORMAL
PHOTO: NORMAL
SIZE STONE: NORMAL MM
SPEC SOURCE SUBJ: NORMAL
STONE ANALYSIS-IMP: NORMAL
URATE MFR STONE: 100 %
WT STONE: 221 MG

## 2022-07-16 NOTE — TELEPHONE ENCOUNTER
Called to schedule appt with pt but did not speak with her and I also couldn't leave a message       K52 9 (ICD-10-CM) - Chronic diarrhea

## 2022-08-03 ENCOUNTER — OFFICE VISIT (OUTPATIENT)
Dept: ENDOCRINOLOGY | Facility: CLINIC | Age: 82
End: 2022-08-03
Payer: MEDICARE

## 2022-08-03 VITALS
HEART RATE: 79 BPM | DIASTOLIC BLOOD PRESSURE: 78 MMHG | HEIGHT: 60 IN | BODY MASS INDEX: 35.38 KG/M2 | SYSTOLIC BLOOD PRESSURE: 120 MMHG | WEIGHT: 180.2 LBS

## 2022-08-03 DIAGNOSIS — E03.9 HYPOTHYROIDISM, UNSPECIFIED TYPE: ICD-10-CM

## 2022-08-03 DIAGNOSIS — E04.1 THYROID NODULE: ICD-10-CM

## 2022-08-03 DIAGNOSIS — E66.01 OBESITY, MORBID (HCC): ICD-10-CM

## 2022-08-03 DIAGNOSIS — M85.80 OSTEOPENIA, UNSPECIFIED LOCATION: ICD-10-CM

## 2022-08-03 DIAGNOSIS — E11.65 TYPE 2 DIABETES MELLITUS WITH HYPERGLYCEMIA, WITHOUT LONG-TERM CURRENT USE OF INSULIN (HCC): Primary | ICD-10-CM

## 2022-08-03 LAB — SL AMB POCT HEMOGLOBIN AIC: 6.8 (ref ?–6.5)

## 2022-08-03 PROCEDURE — 83036 HEMOGLOBIN GLYCOSYLATED A1C: CPT | Performed by: INTERNAL MEDICINE

## 2022-08-03 PROCEDURE — 99214 OFFICE O/P EST MOD 30 MIN: CPT | Performed by: INTERNAL MEDICINE

## 2022-08-03 RX ORDER — SEMAGLUTIDE 1.34 MG/ML
1 INJECTION, SOLUTION SUBCUTANEOUS WEEKLY
Qty: 6 ML | Refills: 1 | Status: SHIPPED | OUTPATIENT
Start: 2022-08-03 | End: 2022-09-13 | Stop reason: SDUPTHER

## 2022-08-09 LAB — HBA1C MFR BLD HPLC: 7.3 %

## 2022-08-09 RX ORDER — LEVOTHYROXINE SODIUM 0.03 MG/1
37.5 TABLET ORAL DAILY
Qty: 90 TABLET | Refills: 1 | Status: SHIPPED | OUTPATIENT
Start: 2022-08-09

## 2022-08-09 NOTE — PROGRESS NOTES
Follow-up Patient Progress Note      CC: diabetes     History of Present Illness:   81 yr female with type 2 diabetes for 10 yrs, CKD3a eGFR 47, HTN, HLD, obesity, hypothyroidism, multinodular thyroid,  microalbuminuria, Fernández's esophagus, thalasemia minor and vit d deficiency  Last visit was 5/3/22      Since last visit, she lost 7 lbs  She cannot afford jardiance or trulicity  She denies any polyuria, polydipsia or weight loss      Home blood glucose monitoring: does not check regularly  Hypoglycemia: no     Current meds:  Metformin XR 500mg po bid (she had severe diarrhea on higher dose)  Glimepiride 8mg po daily     Opthamology: no  Podiatry: no  vaccination:yes   Dental:  Pancreatitis: No     Ace/ARB: lisinopril 40mg qdaily  Statin:lipitor 10  Thyroid issues:hypothyroidism  On levothyroxine 25mcg qdaily    Thyroid nodule: multinodular thyroid with largest 1 9cm rt midpole solid isoechoic TR3 lesion on US 5/21/21    Patient Active Problem List   Diagnosis    Chronic allergic rhinitis    Ganglion cyst    Hyperlipidemia    Hypertension    Microalbuminuric diabetic nephropathy ([address] )    OA (osteoarthritis)    Osteopenia    Thalassemia minor    Type 2 diabetes mellitus with hyperglycemia, without long-term current use of insulin ([facility]    Vitamin D deficiency    Closed nondisplaced fracture of fifth metatarsal bone of left foot with routine healing    Right rotator cuff tendinitis    CKD (chronic kidney disease) stage 3, GFR 30-59 ml/min ([facility])    Posterior tibialis tendon insufficiency    Flat foot    H/O abnormal mammogram    Colitis, acute    Physical deconditioning    Chronic bilateral low back pain without sciatica    Epistaxis    Hand cramps    Fatigue    Groin pain    Generalized abdominal pain    Chest pain    Abnormal CT of the chest    Hypothyroidism    Acute bilateral low back pain without sciatica    Gall stones    Pulmonary nodule    Thyroid nodule    Anemia    Fernández's esophagus without dysplasia    Hiatal hernia    Hx of adenomatous colonic polyps    Neck pain    DDD (degenerative disc disease), cervical    Hip pain    Encounter for immunization    Obesity, morbid ([facility] )     Past Medical History:   Diagnosis Date    Abnormal mammogram     Acute sinusitis     Allergic reaction     Subsequent encounter, Resolved - 1/9/18    Allergic rhinitis 01/16/2012    Anaphylactic reaction     Other, Last assessed - 6/16/16    Anemia 01/16/2012    Ataxia     Cellulitis of great toe of right foot     Chronic allergic rhinitis     Unspec seasonality, unspec trigger;  Last assessed - 11/13/17    Contact dermatitis     Last assessed - 6/3/14    Dermatitis     Diabetes mellitus ([facility] )     Disorder of tympanic membrane     Last assessed - 10/15/12    Dry skin     Last assessed - 4/15/14    Dysfunction of eustachian tube     Unspec laterality, Last assessed - 10/19/12    Ganglion cyst     Glaucoma     Hip pain, acute, unspecified laterality     Hyperlipidemia     Hypertension     Labyrinthitis 11/17/2011    Leg mass, left     Limb pain     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    Long term use of drug     Last assessed - 4/6/14    Mammogram abnormal     Last assessed - 4/11/14    Mass of thigh, left     Pain in ankle joint     Pes planus     Unspec laterality, Last assessed - 4/9/13    Shoulder pain, right     Vertigo 01/16/2012      Past Surgical History:   Procedure Laterality Date    OTHER SURGICAL HISTORY      Surgery unk     TONSILLECTOMY        Family History   Problem Relation Age of Onset    Heart attack Mother         Ac MI   [place] Lung cancer Father     Other Father         Cerebral Embolism    Anemia Sister     Glaucoma Sister     Hypertension Sister     Thyroid disease Sister     Diabetes Brother     Hypertension Brother     Other Brother         Cardiac Disorder    No Known Problems Daughter     No Known Problems Sister     No Known Problems Daughter     No Known Problems Maternal Aunt     No Known Problems Paternal Aunt      Social History     Tobacco Use    Smoking status: Former Smoker     Quit date:      Years since quittin 6    Smokeless tobacco: Never Used   Substance Use Topics    Alcohol use: Yes     Alcohol/week: 1 0 - 2 0 standard drink     Types: 1 - 2 Glasses of wine per week     Comment: Social      Allergies   Allergen Reactions    Cortisone Hyperactivity    Bee Venom Edema    Virtussin A-C [Guaifenesin-Codeine] Throat Swelling    Bee Pollen Edema     Category:  Allergy;          Current Outpatient Medications:     amLODIPine (NORVASC) 5 mg tablet, Take 1 tablet (5 mg total) by mouth daily, Disp: 90 tablet, Rfl: 3    aspirin 81 MG tablet, Take 1 tablet by mouth daily, Disp: , Rfl:     atorvastatin (LIPITOR) 10 mg tablet, TAKE 1 TABLET (10 MG TOTAL) BY MOUTH DAILY, Disp: 90 tablet, Rfl: 1    Blood Glucose Monitoring Suppl (OneTouch Verio Reflect) w/Device KIT, Use, Disp: , Rfl:     Calcium Carbonate 1500 (600 Ca) MG TABS, Take by mouth, Disp: , Rfl:     carvedilol (COREG) 25 mg tablet, Take 1 tablet (25 mg total) by mouth 2 (two) times a day, Disp: 180 tablet, Rfl: 3    Cholecalciferol (VITAMIN D3) 2000 units capsule, Take 1 tablet by mouth daily, Disp: , Rfl:     EPINEPHrine (EPIPEN) 0 3 mg/0 3 mL SOAJ, Inject 1 Syringe into a muscle, Disp: , Rfl:     fluticasone (FLONASE) 50 mcg/act nasal spray, USE 1 SPRAY IN EACH NOSTRIL EVERY DAY (Patient taking differently: as needed), Disp: 48 mL, Rfl: 2    glimepiride (AMARYL) 4 mg tablet, Take 1 tablet (4 mg total) by mouth daily with breakfast, Disp: 180 tablet, Rfl: 0    glucosamine 500 MG CAPS capsule, Take 1 capsule by mouth daily , Disp: , Rfl:     glucose blood (OneTouch Verio) test strip, Use to test blood glucose once daily, Disp: 100 strip, Rfl: 1    hydrochlorothiazide (HYDRODIURIL) 25 mg tablet, Take 1 tablet (25 mg total) by mouth daily, Disp: 90 tablet, Rfl: 1    Lancets (OneTouch Delica Plus [id number]) MISC, Use to test blood sugar once daily, Disp: 100 each, Rfl: 1    levothyroxine 25 mcg tablet, Take 1 tablet (25 mcg total) by mouth daily, Disp: 90 tablet, Rfl: 2    lisinopril (ZESTRIL) 40 mg tablet, Take 1 tablet (40 mg total) by mouth 2 (two) times a day, Disp: 180 tablet, Rfl: 3    metFORMIN (GLUCOPHAGE-XR) 500 mg 24 hr tablet, Take 1 tablet (500 mg total) by mouth 2 (two) times a day, Disp: 180 tablet, Rfl: 1    Multiple Vitamins-Minerals (WOMENS ONE DAILY) TABS, Take 1 tablet by mouth daily, Disp: , Rfl:     Omega-3 Fatty Acids (OMEGA-3 FISH OIL) 1000 MG CAPS, Take 1 capsule by mouth daily , Disp: , Rfl:     pantoprazole (PROTONIX) 20 mg tablet, TAKE 1 TABLET BY MOUTH EVERY DAY, Disp: 30 tablet, Rfl: 5    Semaglutide, 1 MG/DOSE, (Ozempic, 1 MG/DOSE,) 2 MG/1 5ML SOPN, Inject 1 mg under the skin once a week, Disp: 6 mL, Rfl: 1    Semaglutide,0 25 or 0 5MG/DOS, (Ozempic, 0 25 or 0 5 MG/DOSE,) 2 MG/1 5ML SOPN, Use 0 25mg weekly for 4 weeks and then 0 5mg weekly, Disp: 3 mL, Rfl: 0    spironolactone (ALDACTONE) 25 mg tablet, Take 1 tablet (25 mg total) by mouth daily, Disp: 90 tablet, Rfl: 3    ondansetron (ZOFRAN-ODT) 4 mg disintegrating tablet, Take 1 tablet (4 mg total) by mouth every 8 (eight) hours as needed for nausea or vomiting for up to 4 days (Patient not taking: Reported on 7/12/2022), Disp: 12 tablet, Rfl: 0    saccharomyces boulardii (FLORASTOR) 250 mg capsule, Take 1 capsule (250 mg total) by mouth 2 (two) times a day (Patient not taking: Reported on 8/3/2022), Disp: 50 capsule, Rfl: 0    tamsulosin (FLOMAX) 0 4 mg, Take 0 4 mg by mouth daily (Patient not taking: No sig reported), Disp: , Rfl:     Review of Systems   Constitutional: Positive for fatigue  HENT: Negative  Eyes: Negative  Respiratory: Negative  Cardiovascular: Negative  Gastrointestinal: Negative  Endocrine: Negative      Musculoskeletal: Negative  Skin: Negative  Allergic/Immunologic: Negative  Neurological: Negative  Hematological: Negative  Psychiatric/Behavioral: Negative  Physical Exam:  Body mass index is 35 19 kg/m²  /78   Pulse 79   Ht 5' (1 524 m)   Wt 81 7 kg (180 lb 3 2 oz)   LMP  (LMP Unknown)   BMI 35 19 kg/m²    Vitals:    08/03/22 1320   Weight: 81 7 kg (180 lb 3 2 oz)        Physical Exam  Constitutional:       Appearance: She is well-developed  HENT:      Head: Normocephalic  Eyes:      Pupils: Pupils are equal, round, and reactive to light  Neck:      Thyroid: No thyromegaly  Cardiovascular:      Rate and Rhythm: Normal rate  Heart sounds: Normal heart sounds  Pulmonary:      Effort: Pulmonary effort is normal       Breath sounds: Normal breath sounds  Abdominal:      General: Bowel sounds are normal       Palpations: Abdomen is soft  Musculoskeletal:         General: No deformity  Cervical back: Normal range of motion  Skin:     Capillary Refill: Capillary refill takes less than 2 seconds  Coloration: Skin is not pale  Findings: No rash  Neurological:      Mental Status: She is alert and oriented to person, place, and time           Labs:   Lab Results   Component Value Date    HGBA1C 6 8 (A) 08/03/2022       Lab Results   Component Value Date    [id number] 3 570 05/23/2017       Lab Results   Component Value Date    CREATININE 1 29 06/19/2022    CREATININE 1 19 12/09/2020    CREATININE 1 18 07/24/2019    BUN 24 06/19/2022     03/26/2015    K 4 5 06/19/2022    CL 99 06/19/2022    CO2 24 06/19/2022     eGFR   Date Value Ref Range Status   06/19/2022 39 ml/min/1 73sq m Final       Lab Results   Component Value Date    ALT 12 06/19/2022    AST 18 06/19/2022    ALKPHOS 65 06/19/2022    BILITOT 0 43 03/26/2015       Lab Results   Component Value Date    CHOLESTEROL 166 07/24/2019    CHOLESTEROL 213 (H) 01/11/2019    CHOLESTEROL 190 11/29/2018     Lab Results Component Value Date    HDL 38 (L) 07/24/2019    HDL 41 01/11/2019    HDL 40 11/29/2018     Lab Results   Component Value Date    TRIG 284 (H) 07/24/2019    TRIG 378 (H) 01/11/2019    TRIG 314 (H) 11/29/2018     Lab Results   Component Value Date    [place] 128 07/24/2019    [place] 172 01/11/2019    [place] 150 11/29/2018         Impression:  1  Type 2 diabetes mellitus with hyperglycemia, without long-term current use of insulin ([facility] )    2  Thyroid nodule    3  Hypothyroidism, unspecified type    4  Osteopenia, unspecified location    5  Obesity, morbid ([facility] )         Plan:    Diagnoses and all orders for this visit:    Type 2 diabetes mellitus with hyperglycemia, without long-term current use of insulin ([facility] )  Her A1c is acceptable at 6 8% but I am concerned about hypoglycemia with glimepiride  Advised to continue Metformin 500mg po BID and glimepiride 4mg daily with breakfast   Will Rx semaglutide  If covered, this can replace glimepiride  Follow up in 3 months   -     POCT hemoglobin A1c  -     Semaglutide, 1 MG/DOSE, (Ozempic, 1 MG/DOSE,) 2 MG/1 5ML SOPN; Inject 1 mg under the skin once a week  -     Hemoglobin A1C; Future  -     Microalbumin / creatinine urine ratio; Future    Thyroid nodule  Repeat US to monitor   -     US thyroid; Future    Hypothyroidism, unspecified type  She seems clinically and biochemically euthyroid  Advised to continue levothyroxine 37 5mcg qdaily  Osteopenia, unspecified location    Obesity, morbid ([facility] )        I have spent 35 minutes with patient today in which greater than 50% of this time was spent in counseling/coordination of care  Discussed with the patient and all questioned fully answered  She will call me if any problems arise  Educated/ Counseled patient on diagnostic test results, prognosis, risk vs benefit of treatment options, importance of treatment compliance, healthy life and lifestyle choices        Vikash Thorne

## 2022-08-11 ENCOUNTER — HOSPITAL ENCOUNTER (OUTPATIENT)
Dept: ULTRASOUND IMAGING | Facility: HOSPITAL | Age: 82
Discharge: HOME | End: 2022-08-11
Attending: INTERNAL MEDICINE
Payer: MEDICARE

## 2022-08-11 DIAGNOSIS — E04.1 THYROID NODULE: ICD-10-CM

## 2022-08-11 PROCEDURE — 76536 US EXAM OF HEAD AND NECK: CPT

## 2022-08-15 ENCOUNTER — TELEPHONE (OUTPATIENT)
Dept: GASTROENTEROLOGY | Facility: CLINIC | Age: 82
End: 2022-08-15

## 2022-08-15 LAB
LEFT EYE DIABETIC RETINOPATHY: NORMAL
RIGHT EYE DIABETIC RETINOPATHY: NORMAL

## 2022-09-01 ENCOUNTER — OFFICE VISIT (OUTPATIENT)
Dept: CARDIOLOGY CLINIC | Facility: CLINIC | Age: 82
End: 2022-09-01
Payer: MEDICARE

## 2022-09-01 VITALS
SYSTOLIC BLOOD PRESSURE: 90 MMHG | HEART RATE: 70 BPM | BODY MASS INDEX: 34.47 KG/M2 | DIASTOLIC BLOOD PRESSURE: 52 MMHG | WEIGHT: 175.6 LBS | OXYGEN SATURATION: 98 % | HEIGHT: 60 IN

## 2022-09-01 DIAGNOSIS — I42.9 CARDIOMYOPATHY, UNSPECIFIED TYPE (HCC): Primary | ICD-10-CM

## 2022-09-01 PROCEDURE — 99204 OFFICE O/P NEW MOD 45 MIN: CPT | Performed by: INTERNAL MEDICINE

## 2022-09-01 PROCEDURE — 93000 ELECTROCARDIOGRAM COMPLETE: CPT | Performed by: INTERNAL MEDICINE

## 2022-09-01 NOTE — PROGRESS NOTES
Consultation - Cardiology   Char Lennox 80 y o  female MRN: [id number]  Unit/Bed#:  Encounter: [id number]      Assessment and Plan: Active Problems:    * No active hospital problems  *     82-year-old woman with multiple risk factors of elderly age, hypertension, diabetes, remote history of smoking presents for imaging finding of cardiomegaly and coronary calcium on CT chest   She has no symptoms of angina or congestive heart failure  All her risk factors are reasonably controlled  Plan is to obtain an echocardiogram and continue antihypertensives and atorvastatin  We discuss recent evidence on preventive aspirin in elderly and that is not generally recommended in her age group but she will think about discontinuing it  #  Hypertension   she is on amlodipine, carvedilol, Aldactone, lisinopril and hydrochlorothiazide  Her blood tests show appropriate potassium and creatinine  Blood pressure at goal   Being managed by nephrologist     #  Diabetes  # former smoker  # obesity    History of Present Illness   Physician Requesting Consult: No att  providers found  Reason for Consult / Principal Problem: Imaging findings  HPI: Char Lennox is a 80y o  year old female  With above-mentioned history referred to cardiologist for the finding of cardiomegaly and coronary calcium on her CT chest   [facility] at [facility]  She is active and does all her activities of daily living  She denies any complaints of chest pain or shortness of breath during any of these activities  She has no leg swellings  She has been on baby aspirin for 40 years  She is also taking low-dose Lipitor  She is hypertensive on 5 different antihypertensives and her blood pressure has been controlled  Her nephrology follows her blood pressure  She is also diabetic which is in reasonable control  She used to be a smoker but quit 40 years ago after a pack a day of 10 years of smoking      Consults    Review of Systems:  Review of Systems  All negative except as mentioned above    Historical Information   Past Medical History:   Diagnosis Date    Abnormal mammogram     Acute sinusitis     Allergic reaction     Subsequent encounter, Resolved - 18    Allergic rhinitis 2012    Anaphylactic reaction     Other, Last assessed - 16    Anemia 2012    Ataxia     Cellulitis of great toe of right foot     Chronic allergic rhinitis     Unspec seasonality, unspec trigger;  Last assessed - 17    Contact dermatitis     Last assessed - 6/3/14    Dermatitis     Diabetes mellitus [facility]     Disorder of tympanic membrane     Last assessed - 10/15/12    Dry skin     Last assessed - 4/15/14    Dysfunction of eustachian tube     Unspec laterality, Last assessed - 10/19/12    Ganglion cyst     Glaucoma     Hip pain, acute, unspecified laterality     Hyperlipidemia     Hypertension     Labyrinthitis 2011    Leg mass, left     Limb pain     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    Long term use of drug     Last assessed - 14    Mammogram abnormal     Last assessed - 14    Mass of thigh, left     Pain in ankle joint     Pes planus     Unspec laterality, Last assessed - 13    Shoulder pain, right     Vertigo 2012     Past Surgical History:   Procedure Laterality Date    OTHER SURGICAL HISTORY      Surgery unk     TONSILLECTOMY       Social History     Substance and Sexual Activity   Alcohol Use Yes    Alcohol/week: 1 0 - 2 0 standard drink    Types: 1 - 2 Glasses of wine per week    Comment: Social      Social History     Substance and Sexual Activity   Drug Use No     Social History     Tobacco Use   Smoking Status Former Smoker    Quit date:     Years since quittin 6   Smokeless Tobacco Never Used     Family History: non-contributory    Meds/Allergies   all current active meds have been reviewed  Allergies   Allergen Reactions    Cortisone Hyperactivity    Bee Venom Edema    Virtussin A-C [Guaifenesin-Codeine] Throat Swelling    Bee Pollen Edema     Category: Allergy;        Objective   Vitals: Blood pressure 90/52, pulse 70, height 5' (1 524 m), weight 79 7 kg (175 lb 9 6 oz), SpO2 98 %, not currently breastfeeding , Body mass index is 34 29 kg/m²  ,       Physical Exam:  Physical Exam  General: alert, oriented X 3 , comfortable  Neck: No JVD  Cardiac: normal S1, S2, no m/r/g  Respiratory: normal breath sounds, no wheezes or crackles  Abdomen: soft and non-tender  Extremities: warm, no cyanosis, no lower extremity edema      Lab Results:     No results found for: CKTOTAL, CKMB, CKMBINDEX, TROPONINI    Lab Results   Component Value Date    GLUCOSE 144 (H) 03/26/2015    CALCIUM 10 0 06/19/2022     03/26/2015    K 4 5 06/19/2022    CO2 24 06/19/2022    CL 99 06/19/2022    BUN 24 06/19/2022    CREATININE 1 29 06/19/2022       Lab Results   Component Value Date    WBC 14 50 (H) 06/19/2022    HGB 12 9 06/19/2022    HCT 41 4 06/19/2022    MCV 73 (L) 06/19/2022     06/19/2022       Lab Results   Component Value Date    CHOL 154 03/26/2015    CHOL 169 04/07/2014     Lab Results   Component Value Date    HDL 38 (L) 07/24/2019    HDL 41 01/11/2019    HDL 40 11/29/2018     Lab Results   Component Value Date    LDLCALC 71 07/24/2019    LDLCALC 96 01/11/2019    LDLCALC 87 11/29/2018     Lab Results   Component Value Date    TRIG 284 (H) 07/24/2019    TRIG 378 (H) 01/11/2019    TRIG 314 (H) 11/29/2018       Lab Results   Component Value Date    ALT 12 06/19/2022    AST 18 06/19/2022               Imaging: I have personally reviewed pertinent reports

## 2022-09-12 ENCOUNTER — OFFICE VISIT (OUTPATIENT)
Dept: GASTROENTEROLOGY | Facility: CLINIC | Age: 82
End: 2022-09-12
Payer: MEDICARE

## 2022-09-12 VITALS
DIASTOLIC BLOOD PRESSURE: 74 MMHG | WEIGHT: 172 LBS | BODY MASS INDEX: 33.77 KG/M2 | HEART RATE: 88 BPM | SYSTOLIC BLOOD PRESSURE: 110 MMHG | TEMPERATURE: 98.1 F | HEIGHT: 60 IN

## 2022-09-12 DIAGNOSIS — K52.9 CHRONIC DIARRHEA: ICD-10-CM

## 2022-09-12 DIAGNOSIS — R19.7 DIARRHEA OF PRESUMED INFECTIOUS ORIGIN: ICD-10-CM

## 2022-09-12 DIAGNOSIS — K22.70 BARRETT'S ESOPHAGUS WITHOUT DYSPLASIA: Primary | ICD-10-CM

## 2022-09-12 PROCEDURE — 99214 OFFICE O/P EST MOD 30 MIN: CPT | Performed by: INTERNAL MEDICINE

## 2022-09-12 NOTE — PROGRESS NOTES
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  Patient with history of Fernández's esophagus  Most recent endoscopy with biopsy in April 2021, biopsies showed no dysplasia  Recent CT of the chest in June showed thickening of the distal esophagus  Will perform EGD for further assessment  Continue PPI        ______________________________________________________________________    SUBJECTIVE:  Patient seen and examined, she is a 80 year female patient with past medical history significant for diabetes and hypertension, previous episodes of suspected ischemic colitis back in 2020, she is currently complaining about ongoing diarrhea, otherwise she denies any recent events, currently is tolerating PO route, denies nausea or vomiting, denies abdominal pain, she has being having some weight loss since she started Ozempic      REVIEW OF SYSTEMS IS OTHERWISE NEGATIVE  Historical Information   Past Medical History:   Diagnosis Date    Abnormal mammogram     Acute sinusitis     Allergic reaction     Subsequent encounter, Resolved - 1/9/18    Allergic rhinitis 01/16/2012    Anaphylactic reaction     Other, Last assessed - 6/16/16    Anemia 01/16/2012    Ataxia     Cellulitis of great toe of right foot     Chronic allergic rhinitis     Unspec seasonality, unspec trigger;  Last assessed - 11/13/17    Contact dermatitis     Last assessed - 6/3/14    Dermatitis     [facility]     Disorder of tympanic membrane     Last assessed - 10/15/12    Dry skin     Last assessed - 4/15/14    Dysfunction of eustachian tube     Unspec laterality, Last assessed - 10/19/12    Ganglion cyst     Glaucoma     Hip pain, acute, unspecified laterality     Hyperlipidemia     Hypertension     Labyrinthitis 2011    Leg mass, left     Limb pain     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    Long term use of drug     Last assessed - 14    Mammogram abnormal     Last assessed - 14    Mass of thigh, left     Pain in ankle joint     Pes planus     Unspec laterality, Last assessed - 13    Shoulder pain, right     Vertigo 2012     Past Surgical History:   Procedure Laterality Date    OTHER SURGICAL HISTORY      Surgery unk     TONSILLECTOMY       Social History   Social History     Substance and Sexual Activity   Alcohol Use Yes    Alcohol/week: 1 0 - 2 0 standard drink    Types: 1 - 2 Glasses of wine per week    Comment: Social      Social History     Substance and Sexual Activity   Drug Use No     Social History     Tobacco Use   Smoking Status Former Smoker    Quit date:     Years since quittin 7   Smokeless Tobacco Never Used     Family History   Problem Relation Age of Onset    Heart attack Mother         Ac MI   [place] Lung cancer Father     Other Father         Cerebral Embolism    Anemia Sister     Glaucoma Sister     Hypertension Sister     Thyroid disease Sister     Diabetes Brother     Hypertension Brother     Other Brother         Cardiac Disorder    No Known Problems Daughter     No Known Problems Sister     No Known Problems Daughter     No Known Problems Maternal Aunt     No Known Problems Paternal Aunt        Meds/Allergies       Current Outpatient Medications:     amLODIPine (NORVASC) 5 mg tablet    aspirin 81 MG tablet    atorvastatin (LIPITOR) 10 mg tablet    Blood Glucose Monitoring Suppl (OneTouch Verio Reflect) w/Device KIT   Calcium Carbonate 1500 (600 Ca) MG TABS    carvedilol (COREG) 25 mg tablet    Cholecalciferol (VITAMIN D3) 2000 units capsule    fluticasone (FLONASE) 50 mcg/act nasal spray    glucosamine 500 MG CAPS capsule    glucose blood (OneTouch Verio) test strip    hydrochlorothiazide (HYDRODIURIL) 25 mg tablet    Lancets (OneTouch Delica Plus [id number]) MISC    levothyroxine 25 mcg tablet    lisinopril (ZESTRIL) 40 mg tablet    metFORMIN (GLUCOPHAGE-XR) 500 mg 24 hr tablet    Multiple Vitamins-Minerals (WOMENS ONE DAILY) TABS    Omega-3 Fatty Acids (OMEGA-3 FISH OIL) 1000 MG CAPS    pantoprazole (PROTONIX) 20 mg tablet    Semaglutide, 1 MG/DOSE, (Ozempic, 1 MG/DOSE,) 2 MG/1 5ML SOPN    spironolactone (ALDACTONE) 25 mg tablet    EPINEPHrine (EPIPEN) 0 3 mg/0 3 mL SOAJ    glimepiride (AMARYL) 4 mg tablet    ondansetron (ZOFRAN-ODT) 4 mg disintegrating tablet    saccharomyces boulardii (FLORASTOR) 250 mg capsule    Semaglutide,0 25 or 0 5MG/DOS, (Ozempic, 0 25 or 0 5 MG/DOSE,) 2 MG/1 5ML SOPN    tamsulosin (FLOMAX) 0 4 mg    Allergies   Allergen Reactions    Cortisone Hyperactivity    Bee Venom Edema    Virtussin A-C [Guaifenesin-Codeine] Throat Swelling    Bee Pollen Edema     Category: Allergy;            Objective     Blood pressure 110/74, pulse 88, temperature 98 1 °F (36 7 °C), temperature source Tympanic, height 5' (1 524 m), weight 78 kg (172 lb), not currently breastfeeding  Body mass index is 33 59 kg/m²  PHYSICAL EXAM:      General Appearance:   Alert, cooperative, no distress   HEENT:   Normocephalic, atraumatic, anicteric      Neck:  Supple, symmetrical, trachea midline   Lungs:   Clear to auscultation bilaterally; no rales, rhonchi or wheezing; respirations unlabored    Heart[de-identified]   Regular rate and rhythm; no murmur, rub, or gallop     Abdomen:   Soft, non-tender, non-distended; normal bowel sounds; no masses, no organomegaly    Genitalia:   Deferred    Rectal:   Deferred  Extremities:  No cyanosis, clubbing or edema    Pulses:  2+ and symmetric    Skin:  No jaundice, rashes, or lesions    Lymph nodes:  No palpable cervical lymphadenopathy        Lab Results:   No visits with results within 1 Day(s) from this visit  Latest known visit with results is:   Orders Only on 08/15/2022   Component Date Value    Right Eye Diabetic Retin* 08/15/2022 None     Left Eye Diabetic Retino* 08/15/2022 None          Radiology Results:   No results found

## 2022-09-13 DIAGNOSIS — E11.65 TYPE 2 DIABETES MELLITUS WITH HYPERGLYCEMIA, WITHOUT LONG-TERM CURRENT USE OF INSULIN (HCC): ICD-10-CM

## 2022-09-13 NOTE — TELEPHONE ENCOUNTER
Pt l/m requesting Rx be sent to [facility] in [place]  They no have Ozempic in stock  Please resend

## 2022-09-14 RX ORDER — SEMAGLUTIDE 1.34 MG/ML
1 INJECTION, SOLUTION SUBCUTANEOUS WEEKLY
Qty: 6 ML | Refills: 1 | Status: SHIPPED | OUTPATIENT
Start: 2022-09-14

## 2022-09-20 ENCOUNTER — TELEPHONE (OUTPATIENT)
Dept: OTHER | Facility: OTHER | Age: 82
End: 2022-09-20

## 2022-09-20 DIAGNOSIS — N20.0 NEPHROLITHIASIS: Primary | ICD-10-CM

## 2022-09-20 NOTE — TELEPHONE ENCOUNTER
I LVM for patient stating she needs to get US done prior to OV  I gave her CS number to schedule US and then call us back to schedule OV with Laney Krueger  I advised her that her appt with Laney Krueger for tomorrow 9/21 is cancelled per JOANIE MERINO note

## 2022-09-20 NOTE — TELEPHONE ENCOUNTER
Plan per check out after last visit in July was f/u in 10 weeks with US and KUB prior  Patient got KUB right after last visit and US not completed  Please r/s patient and advise she needs to get an US and another KUB prior  Orders in place  She can then see John Beth or another AP 7-14 days after US is scheduled

## 2022-09-28 NOTE — TELEPHONE ENCOUNTER
2nd attempt: I LVM for patient stating she needs to get US done prior to OV  I gave her CS number to schedule US and then call us back to schedule OV with Nina Holliday  I advised her that her appt with Nina Holliday for tomorrow 9/21 is cancelled per JOANIE MERINO note

## 2022-09-30 NOTE — TELEPHONE ENCOUNTER
I called and spoke to patient  She will set up the [address] [facility] and get KUB done for an appt with Nina Holliday  She will call us back to schedule OV after she schedules the US

## 2022-10-14 NOTE — TELEPHONE ENCOUNTER
CALLED PT AND SHE STATED THAT SHE HAS OUT OF TOWN VISITORS AND SHE HASN'T GOT AROUND TO [address]  SHE SAID ONCE THEY LEAVE SHE WILL SCHEDULE THE TESTS  AND CALL BACK TO SCHEDULE OV WITH [employer]

## 2022-12-21 DIAGNOSIS — K22.70 BARRETT'S ESOPHAGUS WITHOUT DYSPLASIA: ICD-10-CM

## 2022-12-21 RX ORDER — PANTOPRAZOLE SODIUM 20 MG/1
20 TABLET, DELAYED RELEASE ORAL DAILY
Qty: 90 TABLET | Refills: 3 | Status: SHIPPED | OUTPATIENT
Start: 2022-12-21 | End: 2023-12-16

## 2023-01-28 DIAGNOSIS — E11.65 TYPE 2 DIABETES MELLITUS WITH HYPERGLYCEMIA, WITHOUT LONG-TERM CURRENT USE OF INSULIN (HCC): ICD-10-CM

## 2023-01-30 RX ORDER — SEMAGLUTIDE 1.34 MG/ML
INJECTION, SOLUTION SUBCUTANEOUS
Qty: 6 ML | Refills: 1 | Status: SHIPPED | OUTPATIENT
Start: 2023-01-30

## 2023-02-03 ENCOUNTER — OFFICE VISIT (OUTPATIENT)
Dept: INTERNAL MEDICINE CLINIC | Facility: CLINIC | Age: 83
End: 2023-02-03

## 2023-02-03 VITALS
DIASTOLIC BLOOD PRESSURE: 76 MMHG | HEART RATE: 61 BPM | SYSTOLIC BLOOD PRESSURE: 122 MMHG | WEIGHT: 168 LBS | TEMPERATURE: 97.1 F | OXYGEN SATURATION: 99 % | HEIGHT: 60 IN | BODY MASS INDEX: 32.98 KG/M2

## 2023-02-03 DIAGNOSIS — Z00.00 HEALTH MAINTENANCE EXAMINATION: ICD-10-CM

## 2023-02-03 DIAGNOSIS — I10 PRIMARY HYPERTENSION: ICD-10-CM

## 2023-02-03 DIAGNOSIS — M85.89 OSTEOPENIA OF MULTIPLE SITES: ICD-10-CM

## 2023-02-03 DIAGNOSIS — K52.9 COLITIS, ACUTE: Chronic | ICD-10-CM

## 2023-02-03 DIAGNOSIS — N18.31 STAGE 3A CHRONIC KIDNEY DISEASE (HCC): Chronic | ICD-10-CM

## 2023-02-03 DIAGNOSIS — L85.3 DRY SKIN: Primary | ICD-10-CM

## 2023-02-03 DIAGNOSIS — K22.70 BARRETT'S ESOPHAGUS WITHOUT DYSPLASIA: ICD-10-CM

## 2023-02-03 DIAGNOSIS — R91.1 PULMONARY NODULE: ICD-10-CM

## 2023-02-03 DIAGNOSIS — E11.65 TYPE 2 DIABETES MELLITUS WITH HYPERGLYCEMIA, WITHOUT LONG-TERM CURRENT USE OF INSULIN (HCC): ICD-10-CM

## 2023-02-03 DIAGNOSIS — N20.0 NEPHROLITHIASIS: ICD-10-CM

## 2023-02-03 DIAGNOSIS — E04.1 THYROID NODULE: ICD-10-CM

## 2023-02-03 DIAGNOSIS — E03.9 HYPOTHYROIDISM, UNSPECIFIED TYPE: ICD-10-CM

## 2023-02-03 DIAGNOSIS — E55.9 VITAMIN D DEFICIENCY: ICD-10-CM

## 2023-02-03 DIAGNOSIS — R07.9 CHEST PAIN, UNSPECIFIED TYPE: ICD-10-CM

## 2023-02-03 DIAGNOSIS — E66.01 OBESITY, MORBID (HCC): ICD-10-CM

## 2023-02-03 DIAGNOSIS — Z79.899 ENCOUNTER FOR LONG-TERM CURRENT USE OF MEDICATION: ICD-10-CM

## 2023-02-03 DIAGNOSIS — E78.2 MIXED HYPERLIPIDEMIA: ICD-10-CM

## 2023-02-03 PROBLEM — M25.559 HIP PAIN: Status: RESOLVED | Noted: 2022-03-02 | Resolved: 2023-02-03

## 2023-02-03 PROBLEM — R93.89 ABNORMAL CT OF THE CHEST: Status: RESOLVED | Noted: 2021-05-16 | Resolved: 2023-02-03

## 2023-02-03 PROBLEM — R04.0 EPISTAXIS: Status: RESOLVED | Noted: 2021-04-05 | Resolved: 2023-02-03

## 2023-02-03 PROBLEM — Z23 ENCOUNTER FOR IMMUNIZATION: Status: RESOLVED | Noted: 2022-03-04 | Resolved: 2023-02-03

## 2023-02-03 PROBLEM — M75.81 RIGHT ROTATOR CUFF TENDINITIS: Status: RESOLVED | Noted: 2018-08-03 | Resolved: 2023-02-03

## 2023-02-03 PROBLEM — M54.50 ACUTE BILATERAL LOW BACK PAIN WITHOUT SCIATICA: Status: RESOLVED | Noted: 2021-05-19 | Resolved: 2023-02-03

## 2023-02-03 PROBLEM — M76.829 POSTERIOR TIBIALIS TENDON INSUFFICIENCY: Status: RESOLVED | Noted: 2019-09-16 | Resolved: 2023-02-03

## 2023-02-03 PROBLEM — R53.83 FATIGUE: Status: RESOLVED | Noted: 2021-05-05 | Resolved: 2023-02-03

## 2023-02-03 PROBLEM — R25.2 HAND CRAMPS: Status: RESOLVED | Noted: 2021-04-05 | Resolved: 2023-02-03

## 2023-02-03 PROBLEM — M54.2 NECK PAIN: Status: RESOLVED | Noted: 2021-10-06 | Resolved: 2023-02-03

## 2023-02-03 PROBLEM — S92.355D CLOSED NONDISPLACED FRACTURE OF FIFTH METATARSAL BONE OF LEFT FOOT WITH ROUTINE HEALING: Status: RESOLVED | Noted: 2018-04-26 | Resolved: 2023-02-03

## 2023-02-03 PROBLEM — R10.84 GENERALIZED ABDOMINAL PAIN: Status: RESOLVED | Noted: 2021-05-05 | Resolved: 2023-02-03

## 2023-02-03 PROBLEM — M54.50 CHRONIC BILATERAL LOW BACK PAIN WITHOUT SCIATICA: Status: RESOLVED | Noted: 2021-04-05 | Resolved: 2023-02-03

## 2023-02-03 PROBLEM — R53.81 PHYSICAL DECONDITIONING: Status: RESOLVED | Noted: 2020-12-31 | Resolved: 2023-02-03

## 2023-02-03 PROBLEM — G89.29 CHRONIC BILATERAL LOW BACK PAIN WITHOUT SCIATICA: Status: RESOLVED | Noted: 2021-04-05 | Resolved: 2023-02-03

## 2023-02-03 PROBLEM — R10.30 GROIN PAIN: Status: RESOLVED | Noted: 2021-05-05 | Resolved: 2023-02-03

## 2023-02-03 RX ORDER — SODIUM BICARBONATE 650 MG/1
TABLET ORAL
COMMUNITY
Start: 2022-12-30

## 2023-02-03 NOTE — PROGRESS NOTES
Assessment/Plan:    Hypertension  BP stable on current regimen: amlodipine 5 mg, carvedilol 25 mg bid, HCTZ 25 mg, lisinopril 40 mg bid and spironolactone 25 mg daily  Hyperlipidemia  Lipids due, on atorvastatin  CKD (chronic kidney disease) stage 3, GFR 30-59 ml/min  Lab Results   Component Value Date    EGFR 39 06/19/2022    EGFR 44 12/09/2020    EGFR 45 07/24/2019    CREATININE 1 29 06/19/2022    CREATININE 1 19 12/09/2020    CREATININE 1 18 07/24/2019     Stable  No NSAIDs  Sees nephrology  Type 2 diabetes mellitus with hyperglycemia, without long-term current use of insulin (HCC)    Lab Results   Component Value Date    HGBA1C 7 3 (H) 08/09/2022     A1c due  On metformin,     Hypothyroidism  Stable  Fernández's esophagus without dysplasia  Taking PPI daily  Thyroid nodule  Ultrasound due  Osteopenia  No recent bone density  There are no diagnoses linked to this encounter  Subjective:      Patient ID: Millie Gordon is a [de-identified] y o  female here to establish care  She mainly complains of an itchy right nipple  This has been going on for almost 10 years  She has had a normal mammogram, has seen dermatology  She has lost some weight  She had colitis in the past  She is slowly introducing her raw salads and fruits  Itchy nipple  Bra fitting     Echo    She will be starting balance classes at [employer]   Balance    22 lbs   6 1 A1c    dexa CT chest      The following portions of the patient's history were reviewed and updated as appropriate: allergies, current medications, past family history, past medical history, past social history, past surgical history and problem list     Review of Systems      Objective:      /76   Pulse 61   Temp (!) 97 1 °F (36 2 °C)   Ht 5' (1 524 m)   Wt 76 2 kg (168 lb)   LMP  (LMP Unknown)   SpO2 99%   BMI 32 81 kg/m²          Physical Exam

## 2023-02-03 NOTE — ASSESSMENT & PLAN NOTE
She reports losing 22 lbs since starting Ozempic  Recommend strengthening, core and toning exercises

## 2023-02-03 NOTE — ASSESSMENT & PLAN NOTE
Ultrasound updated  PROCEDURES:  Cholecystectomy, partial 04-Mar-2021 22:16:31  Evelyne Dorsey  Diagnostic laparoscopy 04-Mar-2021 22:15:55  Evelyne Dorsey

## 2023-02-03 NOTE — ASSESSMENT & PLAN NOTE
Monitor BP  May be able to stop amlodipine due to recent weight loss  Current regimen: amlodipine 5 mg, carvedilol 25 mg bid, HCTZ 25 mg, lisinopril 40 mg bid and spironolactone 25 mg daily

## 2023-02-03 NOTE — ASSESSMENT & PLAN NOTE
Lab Results   Component Value Date    EGFR 39 06/19/2022    EGFR 44 12/09/2020    EGFR 45 07/24/2019    CREATININE 1 29 06/19/2022    CREATININE 1 19 12/09/2020    CREATININE 1 18 07/24/2019     Stable  No NSAIDs  Sees nephrology

## 2023-02-03 NOTE — PROGRESS NOTES
Diabetic Foot Exam    Patient's shoes and socks removed  Right Foot/Ankle   Right Foot Inspection  Skin Exam: skin normal and skin intact  No dry skin, no warmth, no callus, no erythema, no maceration, no abnormal color, no pre-ulcer, no ulcer and no callus  Toe Exam: ROM and strength within normal limits  Sensory   Monofilament testing: intact    Vascular  The right DP pulse is 2+  The right PT pulse is 2+  Left Foot/Ankle  Left Foot Inspection  Skin Exam: skin normal and skin intact  No dry skin, no warmth, no erythema, no maceration, normal color, no pre-ulcer, no ulcer and no callus  Toe Exam: ROM and strength within normal limits  Sensory   Monofilament testing: intact    Vascular  The left DP pulse is 2+  The left PT pulse is 2+  Assign Risk Category  No deformity present  No loss of protective sensation  No weak pulses  Risk: 0   Assessment and Plan:         Problem List Items Addressed This Visit        Digestive    RESOLVED: Colitis, acute (Chronic)     Resolved  Fernández's esophagus without dysplasia     Taking PPI daily  Relevant Medications    sodium bicarbonate 650 mg tablet       Endocrine    Hypothyroidism     Stable  Relevant Orders    TSH, 3rd generation with Free T4 reflex    Thyroid nodule     Ultrasound updated  Type 2 diabetes mellitus with hyperglycemia, without long-term current use of insulin ([facility] [address] )     She reports recent A1c was 6 1%  Continue Ozempic  Decrease metformin to daily dose  Relevant Orders    Hemoglobin A1C    Hemoglobin A1C       Cardiovascular and Mediastinum    Hypertension     Monitor BP  May be able to stop amlodipine due to recent weight loss  Current regimen: amlodipine 5 mg, carvedilol 25 mg bid, HCTZ 25 mg, lisinopril 40 mg bid and spironolactone 25 mg daily  Relevant Orders    CBC and differential       Musculoskeletal and Integument    Osteopenia of multiple sites     Bone density due    Continue daily walks and supplements  Relevant Orders    DXA bone density spine hip and pelvis       Genitourinary    CKD (chronic kidney disease) stage 3, GFR 30-59 ml/min (HCC) (Chronic)     Lab Results   Component Value Date    EGFR 39 06/19/2022    EGFR 44 12/09/2020    EGFR 45 07/24/2019    CREATININE 1 29 06/19/2022    CREATININE 1 19 12/09/2020    CREATININE 1 18 07/24/2019     Stable  No NSAIDs  Sees nephrology  Relevant Orders    Comprehensive metabolic panel    Uric acid    Nephrolithiasis     Adequate fluid intake  Relevant Medications    sodium bicarbonate 650 mg tablet       Other    Chest pain     Denies any symptoms  Saw cardiology last fall  Encouraged to do echo  Hyperlipidemia     Lipids due, on atorvastatin  Relevant Orders    Lipid panel    Obesity, morbid ([address] )     She reports losing 22 lbs since starting Ozempic  Recommend strengthening, core and toning exercises  Pulmonary nodule     Repeat CT due  Relevant Orders    CT chest wo contrast    Vitamin D deficiency     Taking D3  Other Visit Diagnoses     Dry skin    -  Primary    Recommend to apply petroleum jelly on nipple  Consider sports bra  Health maintenance examination        Declined further mammograms  Vaccinations updated  Encounter for long-term current use of medication        Relevant Orders    Vitamin B12        BMI Counseling: Body mass index is 32 81 kg/m²  The BMI is above normal  Nutrition recommendations include moderation in carbohydrate intake  Exercise recommendations include exercising 3-5 times per week and strength training exercises  Rationale for BMI follow-up plan is due to patient being overweight or obese  Depression Screening and Follow-up Plan: Patient was screened for depression during today's encounter  They screened negative with a PHQ-2 score of 0        Preventive health issues were discussed with patient, and age appropriate screening tests were ordered as noted in patient's After Visit Summary  Personalized health advice and appropriate referrals for health education or preventive services given if needed, as noted in patient's After Visit Summary  History of Present Illness:     Patient presents for a Medicare Wellness Visit, here to establish care  She mainly complains of an itchy right nipple  This has been going on for almost 10 years  She has had a normal mammogram, has seen dermatology  She applies cream on it occasionally  She reports no nipple bleeding or discharge  She has lost some weight since she started Ozempic a few months ago, 22 lbs  She reports no dizziness  She stays hydrated  She has not taken her BP medications this morning, asking if some can be stopped  She says her last A1c was 6 1%  She had colitis in the past  She is slowly introducing her raw salads and fruits  She will be starting balance classes at [facility]  She remains very active  Patient Care Team:  Celina Davis MD as PCP - General (Internal Medicine)  Gibson Acharya, MD Gallito Alba, DO Halima Jones DO     Review of Systems:     Review of Systems   Constitutional: Negative for appetite change and fatigue  HENT: Negative for congestion, ear pain and postnasal drip  Eyes: Negative for visual disturbance  Respiratory: Negative for cough and shortness of breath  Cardiovascular: Negative for chest pain and leg swelling  Gastrointestinal: Negative for abdominal pain, constipation and diarrhea  Genitourinary: Negative for dysuria, frequency and urgency  Musculoskeletal: Negative for arthralgias and myalgias  Skin: Negative for rash and wound  Neurological: Negative for dizziness, numbness and headaches  Hematological: Does not bruise/bleed easily  Psychiatric/Behavioral: Negative for confusion  The patient is not nervous/anxious           Problem List:     Patient Active Problem List   Diagnosis • Chronic allergic rhinitis   • Ganglion cyst   • Hyperlipidemia   • Hypertension   • Microalbuminuric diabetic nephropathy (HCC)   • OA (osteoarthritis)   • Osteopenia of multiple sites   • Thalassemia minor   • Type 2 diabetes mellitus with hyperglycemia, without long-term current use of insulin (HCC)   • Vitamin D deficiency   • CKD (chronic kidney disease) stage 3, GFR 30-59 ml/min ([facility])   • Flat foot   • H/O abnormal mammogram   • Chest pain   • Hypothyroidism   • Gall stones   • Pulmonary nodule   • Thyroid nodule   • Anemia   • Fernández's esophagus without dysplasia   • Hiatal hernia   • Hx of adenomatous colonic polyps   • DDD (degenerative disc disease), cervical   • Obesity, morbid ([facility] [address] )   • Nephrolithiasis      Past Medical and Surgical History:     Past Medical History:   Diagnosis Date   • Abnormal mammogram    • Acute sinusitis    • Allergic reaction     Subsequent encounter, Resolved - 1/9/18   • Allergic rhinitis 01/16/2012   • Anaphylactic reaction     Other, Last assessed - 6/16/16   • Anemia 01/16/2012   • Ataxia    • Cellulitis of great toe of right foot    • Chronic allergic rhinitis     Unspec seasonality, unspec trigger;  Last assessed - 11/13/17   • Contact dermatitis     Last assessed - 6/3/14   • Dermatitis    • Diabetes mellitus ([facility] [address] )    • Disorder of tympanic membrane     Last assessed - 10/15/12   • Dry skin     Last assessed - 4/15/14   • Dysfunction of eustachian tube     Unspec laterality, Last assessed - 10/19/12   • Ganglion cyst    • Glaucoma    • Hip pain, acute, unspecified laterality    • Hyperlipidemia    • Hypertension    • Labyrinthitis 11/17/2011   • Leg mass, left    • Limb pain    • 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   • Long term use of drug     Last assessed - 4/6/14   • Mammogram abnormal     Last assessed - 4/11/14   • Mass of thigh, left    • Pain in ankle joint    • Pes planus     Unspec laterality, Last assessed - 4/9/13   • Shoulder pain, right    • Vertigo 2012     Past Surgical History:   Procedure Laterality Date   • OTHER SURGICAL HISTORY      Surgery unk    • TONSILLECTOMY        Family History:     Family History   Problem Relation Age of Onset   • Heart attack Mother         Ac MI   • Lung cancer Father    • Other Father         Cerebral Embolism   • Anemia Sister    • Glaucoma Sister    • Hypertension Sister    • Thyroid disease Sister    • Diabetes Brother    • Hypertension Brother    • Other Brother         Cardiac Disorder   • No Known Problems Daughter    • No Known Problems Sister    • No Known Problems Daughter    • No Known Problems Maternal Aunt    • No Known Problems Paternal Aunt       Social History:     Social History     Socioeconomic History   • Marital status: Single     Spouse name: None   • Number of children: None   • Years of education: None   • Highest education level: None   Occupational History   • None   Tobacco Use   • Smoking status: Former     Types: Cigarettes     Quit date:      Years since quittin 1   • Smokeless tobacco: Never   Vaping Use   • Vaping Use: Never used   Substance and Sexual Activity   • Alcohol use: Yes     Alcohol/week: 1 0 - 2 0 standard drink     Types: 1 - 2 Glasses of wine per week     Comment: Social    • Drug use: No   • Sexual activity: None   Other Topics Concern   • None   Social History Narrative    Advance directive on file    Always uses seat belt    Daily caffeine consumption, 1 serving a day    Drinks coffee - 1 cup per day    Exercises 3x per week          Social Determinants of Health     Financial Resource Strain: Low Risk    • Difficulty of Paying Living Expenses: Not very hard   Food Insecurity: Not on file   Transportation Needs: No Transportation Needs   • Lack of Transportation (Medical): No   • Lack of Transportation (Non-Medical):  No   Physical Activity: Not on file   Stress: Not on file   Social Connections: Not on file   Intimate Partner Violence: Not on file Housing Stability: Not on file      Medications and Allergies:     Current Outpatient Medications   Medication Sig Dispense Refill   • amLODIPine (NORVASC) 5 mg tablet Take 1 tablet (5 mg total) by mouth daily 90 tablet 3   • aspirin 81 MG tablet Take 1 tablet by mouth daily     • atorvastatin (LIPITOR) 10 mg tablet TAKE 1 TABLET (10 MG TOTAL) BY MOUTH DAILY 90 tablet 1   • Blood Glucose Monitoring Suppl (OneTouch Verio Reflect) w/Device KIT Use     • Calcium Carbonate 1500 (600 Ca) MG TABS Take by mouth     • carvedilol (COREG) 25 mg tablet Take 1 tablet (25 mg total) by mouth 2 (two) times a day 180 tablet 3   • Cholecalciferol (VITAMIN D3) 2000 units capsule Take 1 tablet by mouth daily     • EPINEPHrine (EPIPEN) 0 3 mg/0 3 mL SOAJ Inject 1 Syringe into a muscle     • fluticasone (FLONASE) 50 mcg/act nasal spray USE 1 SPRAY IN EACH NOSTRIL EVERY DAY (Patient taking differently: as needed) 48 mL 2   • glucosamine 500 MG CAPS capsule Take 1 capsule by mouth daily      • glucose blood (OneTouch Verio) test strip Use to test blood glucose once daily 100 strip 1   • hydrochlorothiazide (HYDRODIURIL) 25 mg tablet Take 1 tablet (25 mg total) by mouth daily 90 tablet 1   • Lancets (OneTouch Delica Plus [id number]) MISC Use to test blood sugar once daily 100 each 1   • levothyroxine 25 mcg tablet Take 1 5 tablets (37 5 mcg total) by mouth daily (Patient taking differently: Take 25 mcg by mouth daily) 90 tablet 1   • lisinopril (ZESTRIL) 40 mg tablet Take 1 tablet (40 mg total) by mouth 2 (two) times a day 180 tablet 3   • metFORMIN (GLUCOPHAGE-XR) 500 mg 24 hr tablet Take 1 tablet (500 mg total) by mouth 2 (two) times a day 180 tablet 1   • Multiple Vitamins-Minerals (WOMENS ONE DAILY) TABS Take 1 tablet by mouth daily     • Omega-3 Fatty Acids (OMEGA-3 FISH OIL) 1000 MG CAPS Take 1 capsule by mouth daily      • Ozempic, 1 MG/DOSE, 4 MG/3ML injection pen INJECT 1 MG SUBCUTANEOUSLY (UNDER THE SKIN) ONCE A WEEK 6 mL 1   • pantoprazole (PROTONIX) 20 mg tablet Take 1 tablet (20 mg total) by mouth daily 90 tablet 3   • spironolactone (ALDACTONE) 25 mg tablet Take 1 tablet (25 mg total) by mouth daily 90 tablet 3   • saccharomyces boulardii (FLORASTOR) 250 mg capsule Take 1 capsule (250 mg total) by mouth 2 (two) times a day (Patient not taking: Reported on 8/3/2022) 50 capsule 0   • sodium bicarbonate 650 mg tablet TAKE 2 TABLETS BY MOUTH EVERY MORNING AND TAKE 2 TABLETS BY MOUTH EVERY EVENING (Patient not taking: Reported on 2/3/2023)       No current facility-administered medications for this visit  Allergies   Allergen Reactions   • Cortisone Hyperactivity   • Bee Venom Edema   • Virtussin A-C [Guaifenesin-Codeine] Throat Swelling   • Bee Pollen Edema     Category: Allergy;       Immunizations:     Immunization History   Administered Date(s) Administered   • COVID-19 PFIZER VACCINE 0 3 ML IM 01/19/2021, 02/09/2021, 10/07/2021   • INFLUENZA 11/06/2007, 10/15/2010, 09/09/2011, 10/15/2020, 11/08/2022   • Influenza Split High Dose Preservative Free IM 10/31/2014, 10/01/2015, 10/17/2016, 09/01/2017, 10/09/2017, 10/01/2018, 10/05/2018   • Influenza, Seasonal Vaccine, Quadrivalent, Adjuvanted,   5e 10/16/2020   • Influenza, seasonal, injectable 11/07/2012, 11/21/2021   • Pneumococcal Conjugate 13-Valent 06/16/2016, 10/05/2018   • Pneumococcal Conjugate PCV 7 10/01/2018   • Pneumococcal Polysaccharide PPV23 09/01/2007, 10/12/2019   • Td (adult), adsorbed 06/01/1998, 12/08/2009   • Zoster 10/31/2014, 08/05/2019, 11/01/2019   • Zoster Vaccine Recombinant 08/05/2019, 11/01/2019   • influenza, trivalent, adjuvanted 10/12/2019      Health Maintenance:         Topic Date Due   • Breast Cancer Screening: Mammogram  01/29/2021         Topic Date Due   • COVID-19 Vaccine (4 - Booster for Pfizer series) 12/02/2021      Medicare Screening Tests and Risk Assessments:     Jose Daniel Cotto is here for her Subsequent Wellness visit   Last Medicare Wellness visit information reviewed, patient interviewed and updates made to the record today  Health Risk Assessment:   Patient rates overall health as good  Patient feels that their physical health rating is same  Patient is satisfied with their life  Eyesight was rated as same  Hearing was rated as same  Patient feels that their emotional and mental health rating is same  Patients states they are never, rarely angry  Patient states they are never, rarely unusually tired/fatigued  Pain experienced in the last 7 days has been none  Patient states that she has experienced no weight loss or gain in last 6 months  Depression Screening:   PHQ-2 Score: 0      Fall Risk Screening: In the past year, patient has experienced: no history of falling in past year      Urinary Incontinence Screening:   Patient has not leaked urine accidently in the last six months  Home Safety:  Patient does not have trouble with stairs inside or outside of their home  Patient has working smoke alarms and has working carbon monoxide detector  Home safety hazards include: none  Nutrition:   Current diet is Regular  Medications:   Patient is currently taking over-the-counter supplements  OTC medications include: see medication list  Patient is able to manage medications  Activities of Daily Living (ADLs)/Instrumental Activities of Daily Living (IADLs):   Walk and transfer into and out of bed and chair?: Yes  Dress and groom yourself?: Yes    Bathe or shower yourself?: Yes    Feed yourself? Yes  Do your laundry/housekeeping?: Yes  Manage your money, pay your bills and track your expenses?: Yes  Make your own meals?: Yes    Do your own shopping?: Yes    Previous Hospitalizations:   Any hospitalizations or ED visits within the last 12 months?: No      Advance Care Planning:   Living will: Yes    Durable POA for healthcare:  Yes    Advanced directive: Yes    End of Life Decisions reviewed with patient: Yes      Cognitive Screening: Provider or family/friend/caregiver concerned regarding cognition?: No    PREVENTIVE SCREENINGS      Cardiovascular Screening:    General: History Lipid Disorder    Due for: Lipid Panel      Diabetes Screening:     General: History Diabetes and Screening Current      Colorectal Cancer Screening:     General: Screening Current      Breast Cancer Screening:       Due for: Mammogram        Cervical Cancer Screening:    General: Screening Not Indicated      Osteoporosis Screening:      Due for: DXA Appendicular      Abdominal Aortic Aneurysm (AAA) Screening:        General: Screening Not Indicated      Lung Cancer Screening:     General: Screening Not Indicated      Hepatitis C Screening:    General: Screening Not Indicated    Screening, Brief Intervention, and Referral to Treatment (SBIRT)    Screening  Typical number of drinks in a day: 0  Typical number of drinks in a week: 0  Interpretation: Low risk drinking behavior  Single Item Drug Screening:  How often have you used an illegal drug (including marijuana) or a prescription medication for non-medical reasons in the past year? never    Single Item Drug Screen Score: 0  Interpretation: Negative screen for possible drug use disorder    Other Counseling Topics:   Skin self-exam and regular weightbearing exercise and calcium and vitamin D intake  No results found  Physical Exam:     /76   Pulse 61   Temp (!) 97 1 °F (36 2 °C)   Ht 5' (1 524 m)   Wt 76 2 kg (168 lb)   LMP  (LMP Unknown)   SpO2 99%   BMI 32 81 kg/m²     Physical Exam  Vitals and nursing note reviewed  Constitutional:       General: She is not in acute distress  Appearance: She is well-developed  HENT:      Head: Normocephalic and atraumatic        Right Ear: Tympanic membrane, ear canal and external ear normal       Left Ear: Tympanic membrane, ear canal and external ear normal    Eyes:      Conjunctiva/sclera: Conjunctivae normal       Pupils: Pupils are equal, round, and reactive to light  Cardiovascular:      Rate and Rhythm: Normal rate and regular rhythm  Pulses: no weak pulses          Dorsalis pedis pulses are 2+ on the right side and 2+ on the left side  Posterior tibial pulses are 2+ on the right side and 2+ on the left side  Heart sounds: No murmur heard  Pulmonary:      Effort: Pulmonary effort is normal  No respiratory distress  Breath sounds: Normal breath sounds  Chest:   Breasts:     Right: No inverted nipple or nipple discharge  Abdominal:      General: Bowel sounds are normal       Palpations: Abdomen is soft  Tenderness: There is no abdominal tenderness  Musculoskeletal:         General: No swelling  Normal range of motion  Cervical back: Normal range of motion and neck supple  Right lower leg: No edema  Left lower leg: No edema  Feet:      Right foot:      Skin integrity: No ulcer, skin breakdown, erythema, warmth, callus or dry skin  Left foot:      Skin integrity: No ulcer, skin breakdown, erythema, warmth, callus or dry skin  Skin:     General: Skin is warm and dry  Neurological:      General: No focal deficit present  Mental Status: She is alert and oriented to person, place, and time  Psychiatric:         Mood and Affect: Mood and affect normal          Behavior: Behavior normal           Celina Davis MD     Reviewed available records  Labs & imaging results reviewed with patient

## 2023-03-10 ENCOUNTER — TELEPHONE (OUTPATIENT)
Dept: ENDOCRINOLOGY | Facility: CLINIC | Age: 83
End: 2023-03-10

## 2023-03-10 DIAGNOSIS — E11.65 TYPE 2 DIABETES MELLITUS WITH HYPERGLYCEMIA, WITHOUT LONG-TERM CURRENT USE OF INSULIN (HCC): ICD-10-CM

## 2023-03-10 NOTE — TELEPHONE ENCOUNTER
Pt called concerning rx for Ozempic she wants to talk to someone who knows how to write a script that her insurance will cover since Dr Laura Negron wrote it wrong  Noted pt sees Dr Jm Mcdonald her Dr Laura Negron probably was covering for her Dr  She prefers to deal with [facility] office   Phone number provided

## 2023-03-20 DIAGNOSIS — I10 ESSENTIAL HYPERTENSION: ICD-10-CM

## 2023-03-20 RX ORDER — AMLODIPINE BESYLATE 5 MG/1
5 TABLET ORAL DAILY
Qty: 90 TABLET | Refills: 1 | Status: SHIPPED | OUTPATIENT
Start: 2023-03-20

## 2023-05-03 ENCOUNTER — OFFICE VISIT (OUTPATIENT)
Dept: INTERNAL MEDICINE CLINIC | Facility: CLINIC | Age: 83
End: 2023-05-03

## 2023-05-03 VITALS
BODY MASS INDEX: 33.38 KG/M2 | OXYGEN SATURATION: 97 % | WEIGHT: 170 LBS | HEIGHT: 60 IN | DIASTOLIC BLOOD PRESSURE: 58 MMHG | TEMPERATURE: 96.9 F | HEART RATE: 73 BPM | SYSTOLIC BLOOD PRESSURE: 98 MMHG

## 2023-05-03 DIAGNOSIS — R91.1 PULMONARY NODULE: ICD-10-CM

## 2023-05-03 DIAGNOSIS — I10 ESSENTIAL HYPERTENSION: ICD-10-CM

## 2023-05-03 DIAGNOSIS — E03.9 HYPOTHYROIDISM, UNSPECIFIED TYPE: ICD-10-CM

## 2023-05-03 DIAGNOSIS — Z12.31 ENCOUNTER FOR SCREENING MAMMOGRAM FOR BREAST CANCER: ICD-10-CM

## 2023-05-03 DIAGNOSIS — M85.89 OSTEOPENIA OF MULTIPLE SITES: ICD-10-CM

## 2023-05-03 DIAGNOSIS — I10 PRIMARY HYPERTENSION: ICD-10-CM

## 2023-05-03 DIAGNOSIS — K22.70 BARRETT'S ESOPHAGUS WITHOUT DYSPLASIA: ICD-10-CM

## 2023-05-03 DIAGNOSIS — N18.31 STAGE 3A CHRONIC KIDNEY DISEASE (HCC): Chronic | ICD-10-CM

## 2023-05-03 DIAGNOSIS — E66.01 OBESITY, MORBID (HCC): ICD-10-CM

## 2023-05-03 DIAGNOSIS — E78.2 MIXED HYPERLIPIDEMIA: ICD-10-CM

## 2023-05-03 DIAGNOSIS — E04.1 THYROID NODULE: ICD-10-CM

## 2023-05-03 DIAGNOSIS — E11.65 TYPE 2 DIABETES MELLITUS WITH HYPERGLYCEMIA, WITHOUT LONG-TERM CURRENT USE OF INSULIN (HCC): Primary | ICD-10-CM

## 2023-05-03 DIAGNOSIS — E55.9 VITAMIN D DEFICIENCY: ICD-10-CM

## 2023-05-03 RX ORDER — AMLODIPINE BESYLATE 5 MG/1
TABLET ORAL
Qty: 90 TABLET | Refills: 1
Start: 2023-05-03

## 2023-05-03 NOTE — ASSESSMENT & PLAN NOTE
BP low today  Decrease amlodipine to 2 5 mg daily  Still on carvedilol bid, HCTZ, lisinopril bid and spironolactone

## 2023-05-03 NOTE — PROGRESS NOTES
Assessment/Plan:    Fernández's esophagus without dysplasia  On daily PPI  CKD (chronic kidney disease) stage 3, GFR 30-59 ml/min  Labs due  Sees nephrology  No NSAIDs  Hypertension  BP low today  Decrease amlodipine to 2 5 mg daily  Still on carvedilol bid, HCTZ, lisinopril bid and spironolactone  Hypothyroidism  Labs due  Type 2 diabetes mellitus with hyperglycemia, without long-term current use of insulin (HCC)  Repeat A1c due  On Ozempic, taking metformin daily  Follow up with Endo  Osteopenia of multiple sites  Schedule bone density  Hyperlipidemia  Lipids due, on atorvastatin  Obesity, morbid ([address] )  Weight stable  Pulmonary nodule  Schedule CT  Thyroid nodule  Repeat Ultrasound due in August        Diagnoses and all orders for this visit:    Type 2 diabetes mellitus with hyperglycemia, without long-term current use of insulin (HCC)    Thyroid nodule  -     US thyroid; Future    Fernández's esophagus without dysplasia    Stage 3a chronic kidney disease (HCC)    Mixed hyperlipidemia    Hypothyroidism, unspecified type    Primary hypertension    Pulmonary nodule    Osteopenia of multiple sites    Vitamin D deficiency    Essential hypertension  -     amLODIPine (NORVASC) 5 mg tablet; Take 1/2 tablet daily    Obesity, morbid ([address] )    Encounter for screening mammogram for breast cancer  -     Mammo screening bilateral w 3d & cad; Future    Follow up in 4 months or as needed  Subjective:      Patient ID: Sarah Her is a [de-identified] y o  female here for follow up  She is feeling well  She had diarrhea last week, stopped HCTZ for about 5 days  She was worried about developing colitis again, did not have blood in her stools  She reports some residents were also having diarrhea  No vomiting, abdominal pain  She reports bowel movements now back to normal     She remains very busy and active  She had eaten a lot of Peeps during Easter  She is glad she did not gain weight     She denies feeling dizzy or lightheaded, does not check her blood pressure  She complains of an irritation by her waistline, wants to have the skin lesions removed  The following portions of the patient's history were reviewed and updated as appropriate: allergies, current medications, past medical history, past social history and problem list     Review of Systems   Constitutional: Negative for activity change, appetite change and fatigue  HENT: Negative for congestion, ear pain and postnasal drip  Eyes: Negative for visual disturbance  Respiratory: Negative for cough and shortness of breath  Cardiovascular: Negative for chest pain and leg swelling  Gastrointestinal: Positive for diarrhea  Negative for abdominal pain, constipation, nausea and vomiting  Genitourinary: Negative for difficulty urinating, dysuria and frequency  Musculoskeletal: Negative for arthralgias and myalgias  Skin: Negative for rash and wound  Neurological: Negative for dizziness, numbness and headaches  Psychiatric/Behavioral: Negative for confusion  The patient is not nervous/anxious  Objective:      BP 98/58   Pulse 73   Temp (!) 96 9 °F (36 1 °C)   Ht 5' (1 524 m)   Wt 77 1 kg (170 lb)   LMP  (LMP Unknown)   SpO2 97%   BMI 33 20 kg/m²          Physical Exam  Vitals and nursing note reviewed  Constitutional:       General: She is not in acute distress  Appearance: She is well-developed  HENT:      Head: Normocephalic and atraumatic  Eyes:      Pupils: Pupils are equal, round, and reactive to light  Cardiovascular:      Rate and Rhythm: Normal rate and regular rhythm  Heart sounds: Normal heart sounds  Pulmonary:      Effort: Pulmonary effort is normal       Breath sounds: Normal breath sounds  No wheezing  Abdominal:      General: Bowel sounds are normal       Palpations: Abdomen is soft  Musculoskeletal:         General: No swelling  Right lower leg: No edema        Left lower leg: No edema  Skin:     General: Skin is warm  Findings: No rash or wound  Neurological:      General: No focal deficit present  Mental Status: She is alert and oriented to person, place, and time  Psychiatric:         Mood and Affect: Mood and affect normal  Mood is not anxious or depressed  Behavior: Behavior normal            Labs & imaging results reviewed with patient

## 2023-05-04 ENCOUNTER — TELEPHONE (OUTPATIENT)
Dept: INTERNAL MEDICINE CLINIC | Facility: CLINIC | Age: 83
End: 2023-05-04

## 2023-05-04 NOTE — TELEPHONE ENCOUNTER
Those BP readings are much better than what you had here at the office, which was less than 100  It is still within normal, less than 130/80  Continue half tablet of amlodipine as we had discussed

## 2023-05-04 NOTE — TELEPHONE ENCOUNTER
Patient states the two BP readings below are from today with No medication at all , nothing   Should she still be taking all of them ?

## 2023-05-04 NOTE — TELEPHONE ENCOUNTER
BP this morning 128/57 and this afternoon BP was 110/58  No SOB, chest pain, or lightheadedness  She is concerned

## 2023-05-04 NOTE — TELEPHONE ENCOUNTER
Yes, please continue taking it  The medications are still in your system and are still working  Your BP will begin to increase if you miss several days  Please decrease or stop amlodipine only

## 2023-05-23 ENCOUNTER — HOSPITAL ENCOUNTER (OUTPATIENT)
Dept: ULTRASOUND IMAGING | Facility: HOSPITAL | Age: 83
Discharge: HOME | End: 2023-05-23

## 2023-05-23 ENCOUNTER — HOSPITAL ENCOUNTER (OUTPATIENT)
Dept: CT IMAGING | Facility: HOSPITAL | Age: 83
Discharge: HOME | End: 2023-05-23

## 2023-05-23 DIAGNOSIS — E04.1 THYROID NODULE: ICD-10-CM

## 2023-05-23 DIAGNOSIS — R91.1 PULMONARY NODULE: ICD-10-CM

## 2023-05-24 ENCOUNTER — CLINICAL SUPPORT (OUTPATIENT)
Dept: INTERNAL MEDICINE CLINIC | Facility: CLINIC | Age: 83
End: 2023-05-24

## 2023-05-24 DIAGNOSIS — I10 PRIMARY HYPERTENSION: Primary | ICD-10-CM

## 2023-05-24 NOTE — PROGRESS NOTES
Patient here for BP Check  Check at home :     Today: 108/62 p70                110/70    Medications: amlodipine 5mg, lisinopril 40mg, hydrochlorothiazide 25mg, carvedilol 25mg, spirolactone 25mg      Comments: PCP recommended to stop Amplodipine  Patient will call back in two weeks with home numbers

## 2023-05-26 ENCOUNTER — TELEPHONE (OUTPATIENT)
Dept: INTERNAL MEDICINE CLINIC | Facility: CLINIC | Age: 83
End: 2023-05-26

## 2023-05-26 DIAGNOSIS — E04.1 THYROID NODULE: Primary | ICD-10-CM

## 2023-05-26 PROBLEM — K76.89 LIVER CYST: Status: ACTIVE | Noted: 2023-05-26

## 2023-05-26 NOTE — TELEPHONE ENCOUNTER
Thyroid ultrasound showed a large nodule, biopsy recommended  Ordered, please call and scheduled  Chest CT scan showed stable lung nodules  No further follow up recommended

## 2023-06-08 ENCOUNTER — CLINICAL SUPPORT (OUTPATIENT)
Dept: INTERNAL MEDICINE CLINIC | Facility: CLINIC | Age: 83
End: 2023-06-08
Payer: MEDICARE

## 2023-06-08 DIAGNOSIS — I10 ESSENTIAL HYPERTENSION: Primary | ICD-10-CM

## 2023-06-08 PROCEDURE — 99212 OFFICE O/P EST SF 10 MIN: CPT | Performed by: NURSE PRACTITIONER

## 2023-06-08 NOTE — PROGRESS NOTES
Patient here for BP Check  Check at home : [facility]     Today: 130/84    Medications: Spironolactone 25 mg , HCTZ 25 mg , Carvedilol 25 mg BID , Lisinopril 40 mg BID     Discontinued Amlodipine       Comments: Per CRNP continue medications as prescribed and check BP when not feeling well  Patient had question in regards to Ozempic, appetite is starting to come back    Advised to call prescriber- Endocrinology

## 2023-06-09 LAB
LEFT EYE DIABETIC RETINOPATHY: NORMAL
RIGHT EYE DIABETIC RETINOPATHY: NORMAL

## 2023-06-12 ENCOUNTER — TELEPHONE (OUTPATIENT)
Dept: ENDOCRINOLOGY | Facility: CLINIC | Age: 83
End: 2023-06-12

## 2023-06-12 NOTE — TELEPHONE ENCOUNTER
Pt called into office reporting a rapid increase in her appetite  She is currently taking 1mg of Ozempic weekly and 37 5 mcg of Levothyroxine daily  She is wondering if adjustments to either of these medication could help this sudden increase in appetite  She also noted that she has a thyroid biopsy scheduled for 7/19

## 2023-06-13 LAB — HBA1C MFR BLD HPLC: 6.8 %

## 2023-06-14 ENCOUNTER — OFFICE VISIT (OUTPATIENT)
Dept: ENDOCRINOLOGY | Facility: CLINIC | Age: 83
End: 2023-06-14
Payer: MEDICARE

## 2023-06-14 VITALS
BODY MASS INDEX: 33.96 KG/M2 | HEART RATE: 76 BPM | OXYGEN SATURATION: 98 % | DIASTOLIC BLOOD PRESSURE: 62 MMHG | WEIGHT: 173 LBS | HEIGHT: 60 IN | SYSTOLIC BLOOD PRESSURE: 106 MMHG | TEMPERATURE: 97.3 F

## 2023-06-14 DIAGNOSIS — E11.65 TYPE 2 DIABETES MELLITUS WITH HYPERGLYCEMIA, WITHOUT LONG-TERM CURRENT USE OF INSULIN (HCC): Primary | ICD-10-CM

## 2023-06-14 DIAGNOSIS — E03.9 HYPOTHYROIDISM, UNSPECIFIED TYPE: ICD-10-CM

## 2023-06-14 DIAGNOSIS — E55.9 VITAMIN D DEFICIENCY: ICD-10-CM

## 2023-06-14 DIAGNOSIS — E04.1 THYROID NODULE: ICD-10-CM

## 2023-06-14 DIAGNOSIS — M85.89 OSTEOPENIA OF MULTIPLE SITES: ICD-10-CM

## 2023-06-14 DIAGNOSIS — E78.2 MIXED HYPERLIPIDEMIA: ICD-10-CM

## 2023-06-14 PROCEDURE — 99214 OFFICE O/P EST MOD 30 MIN: CPT | Performed by: INTERNAL MEDICINE

## 2023-06-14 NOTE — PROGRESS NOTES
Follow-up Patient Progress Note      CC: diabetes     History of Present Illness:   81 yr female with type 2 diabetes for 10 yrs, CKD3a eGFR 37, HTN, HLD, obesity, hypothyroidism, multinodular thyroid,  microalbuminuria, Fernández's esophagus, thalasemia minor and vit d deficiency  Last visit was 8/3/22      Since last visit, she lost 7 lbs  She had lost about 20 lbs but increased about 4 lbs last week      Home blood glucose monitoring: does not check regularly  Hypoglycemia: no     Current meds:  Ozempic 1mg weekly     Opthamology: 6/1/2023-no significant retinopathy  Podiatry: no  vaccination:yes   Dental:  Pancreatitis: No     Ace/ARB: lisinopril 40mg qdaily  Statin:lipitor 10  Thyroid issues:hypothyroidism  On levothyroxine 37 5mcg qdaily  5/23/2023  thyroid: Nodule #1 right mid gland 1 6 cm TR 3 lesion  Nodule #2 right lower pole 1 6 x 1 8 x 1 4 cm TR 4 lesion [recommended biopsy]    Additional nodules found on prior studies were not recognized during this exam       Patient Active Problem List   Diagnosis   • Chronic allergic rhinitis   • Ganglion cyst   • Hyperlipidemia   • Hypertension   • Microalbuminuric diabetic nephropathy ([facility] [address] )   • OA (osteoarthritis)   • Osteopenia of multiple sites   • Thalassemia minor   • Type 2 diabetes mellitus with hyperglycemia, without long-term current use of insulin ([facility])   • Vitamin D deficiency   • CKD (chronic kidney disease) stage 3, GFR 30-59 ml/min ([facility])   • Flat foot   • H/O abnormal mammogram   • Chest pain   • Hypothyroidism   • Gall stones   • Pulmonary nodule   • Thyroid nodule   • Anemia   • Fernández's esophagus without dysplasia   • Hiatal hernia   • Hx of adenomatous colonic polyps   • DDD (degenerative disc disease), cervical   • Obesity, morbid (HCC)   • Nephrolithiasis   • Liver cyst     Past Medical History:   Diagnosis Date   • Abnormal mammogram    • Acute sinusitis    • Allergic reaction     Subsequent encounter, Resolved - 1/9/18   • Allergic rhinitis 2012   • Anaphylactic reaction     Other, Last assessed - 16   • Anemia 2012   • Ataxia    • Cellulitis of great toe of right foot    • Chronic allergic rhinitis     Unspec seasonality, unspec trigger; Last assessed - 17   • Contact dermatitis     Last assessed - 6/3/14   • Dermatitis    • Diabetes mellitus ([facility] [address] )    • Disorder of tympanic membrane     Last assessed - 10/15/12   • Dry skin     Last assessed - 4/15/14   • Dysfunction of eustachian tube     Unspec laterality, Last assessed - 10/19/12   • Ganglion cyst    • Glaucoma    • Hip pain, acute, unspecified laterality    • Hyperlipidemia    • Hypertension    • Labyrinthitis 2011   • Leg mass, left    • Limb pain    • 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   • Long term use of drug     Last assessed - 14   • Mammogram abnormal     Last assessed - 14   • Mass of thigh, left    • Pain in ankle joint    • Pes planus     Unspec laterality, Last assessed - 13   • Shoulder pain, right    • Vertigo 2012      Past Surgical History:   Procedure Laterality Date   • OTHER SURGICAL HISTORY      Surgery unk    • TONSILLECTOMY        Family History   Problem Relation Age of Onset   • Heart attack Mother         Ac MI   • Lung cancer Father    • Other Father         Cerebral Embolism   • Anemia Sister    • Glaucoma Sister    • Hypertension Sister    • Thyroid disease Sister    • Diabetes Brother    • Hypertension Brother    • Other Brother         Cardiac Disorder   • No Known Problems Daughter    • No Known Problems Sister    • No Known Problems Daughter    • No Known Problems Maternal Aunt    • No Known Problems Paternal Aunt      Social History     Tobacco Use   • Smoking status: Former     Types: Cigarettes     Quit date:      Years since quittin 4   • Smokeless tobacco: Never   Substance Use Topics   • Alcohol use:  Yes     Alcohol/week: 1 0 - 2 0 standard drink of alcohol     Types: 1 - 2 Glasses of wine per week     Comment: Social      Allergies   Allergen Reactions   • Cortisone Hyperactivity   • Bee Venom Edema   • Virtussin A-C [Guaifenesin-Codeine] Throat Swelling   • Bee Pollen Edema     Category:  Allergy;          Current Outpatient Medications:   •  aspirin 81 MG tablet, Take 1 tablet by mouth daily, Disp: , Rfl:   •  atorvastatin (LIPITOR) 10 mg tablet, TAKE 1 TABLET (10 MG TOTAL) BY MOUTH DAILY, Disp: 90 tablet, Rfl: 1  •  Blood Glucose Monitoring Suppl (OneTouch Verio Reflect) w/Device KIT, Use, Disp: , Rfl:   •  Calcium Carbonate 1500 (600 Ca) MG TABS, Take by mouth, Disp: , Rfl:   •  carvedilol (COREG) 25 mg tablet, Take 1 tablet (25 mg total) by mouth 2 (two) times a day, Disp: 180 tablet, Rfl: 3  •  Cholecalciferol (VITAMIN D3) 2000 units capsule, Take 1 tablet by mouth daily, Disp: , Rfl:   •  EPINEPHrine (EPIPEN) 0 3 mg/0 3 mL SOAJ, Inject 1 Syringe into a muscle, Disp: , Rfl:   •  fluticasone (FLONASE) 50 mcg/act nasal spray, USE 1 SPRAY IN EACH NOSTRIL EVERY DAY (Patient taking differently: daily), Disp: 48 mL, Rfl: 2  •  glucose blood (OneTouch Verio) test strip, Use to test blood glucose once daily, Disp: 100 strip, Rfl: 1  •  hydrochlorothiazide (HYDRODIURIL) 25 mg tablet, Take 1 tablet (25 mg total) by mouth daily, Disp: 90 tablet, Rfl: 1  •  Lancets (OneTouch Delica Plus [id number]) MISC, Use to test blood sugar once daily, Disp: 100 each, Rfl: 1  •  levothyroxine 25 mcg tablet, TAKE 1 AND 1/2 TABLETS BY MOUTH DAILY, Disp: 90 tablet, Rfl: 1  •  lisinopril (ZESTRIL) 40 mg tablet, Take 1 tablet (40 mg total) by mouth 2 (two) times a day, Disp: 180 tablet, Rfl: 3  •  Multiple Vitamins-Minerals (WOMENS ONE DAILY) TABS, Take 1 tablet by mouth daily, Disp: , Rfl:   •  Omega-3 Fatty Acids (OMEGA-3 FISH OIL) 1000 MG CAPS, Take 1 capsule by mouth daily , Disp: , Rfl:   •  pantoprazole (PROTONIX) 20 mg tablet, Take 1 tablet (20 mg total) by mouth daily, Disp: 90 tablet, Rfl: 3  •  spironolactone (ALDACTONE) 25 mg tablet, Take 1 tablet (25 mg total) by mouth daily, Disp: 90 tablet, Rfl: 3    Review of Systems   HENT: Negative  Eyes: Negative  Respiratory: Negative  Cardiovascular: Negative  Gastrointestinal: Negative  Endocrine: Negative  Musculoskeletal: Negative  Skin: Negative  Allergic/Immunologic: Negative  Neurological: Negative  Hematological: Negative  Psychiatric/Behavioral: Negative  Physical Exam:  Body mass index is 33 79 kg/m²  Ht 5' (1 524 m)   Wt 78 5 kg (173 lb)   LMP  (LMP Unknown)   BMI 33 79 kg/m²    Vitals:    06/14/23 1425   Weight: 78 5 kg (173 lb)        Physical Exam  Constitutional:       General: She is not in acute distress  Appearance: She is well-developed  She is not ill-appearing  HENT:      Head: Normocephalic and atraumatic  Nose: Nose normal       Mouth/Throat:      Pharynx: Oropharynx is clear  Eyes:      Extraocular Movements: Extraocular movements intact  Conjunctiva/sclera: Conjunctivae normal    Neck:      Thyroid: No thyromegaly  Cardiovascular:      Rate and Rhythm: Normal rate  Pulmonary:      Effort: Pulmonary effort is normal    Musculoskeletal:         General: No deformity  Cervical back: Normal range of motion  Skin:     Capillary Refill: Capillary refill takes less than 2 seconds  Coloration: Skin is not pale  Findings: No rash  Neurological:      Mental Status: She is alert and oriented to person, place, and time     Psychiatric:         Behavior: Behavior normal          Labs:   Lab Results   Component Value Date    HGBA1C 6 8 (H) 06/13/2023       Lab Results   Component Value Date    [id number] 3 570 05/23/2017       Lab Results   Component Value Date    BUN 24 06/19/2022    CL 99 06/19/2022    CO2 24 06/19/2022    CREATININE 1 29 06/19/2022    CREATININE 1 19 12/09/2020    CREATININE 1 18 07/24/2019    K 4 5 06/19/2022     03/26/2015     eGFR   Date Value Ref Range Status   06/19/2022 39 ml/min/1 73sq m Final       Lab Results   Component Value Date    ALKPHOS 65 06/19/2022    ALT 12 06/19/2022    AST 18 06/19/2022    BILITOT 0 43 03/26/2015       Lab Results   Component Value Date    CHOLESTEROL 166 07/24/2019    CHOLESTEROL 213 (H) 01/11/2019    CHOLESTEROL 190 11/29/2018     Lab Results   Component Value Date    HDL 38 (L) 07/24/2019    HDL 41 01/11/2019    HDL 40 11/29/2018     Lab Results   Component Value Date    TRIG 284 (H) 07/24/2019    TRIG 378 (H) 01/11/2019    TRIG 314 (H) 11/29/2018     Lab Results   Component Value Date    NONHDLC 128 07/24/2019    [place] 172 01/11/2019    [place] 150 11/29/2018         Impression:  1  Type 2 diabetes mellitus with hyperglycemia, without long-term current use of insulin ([address] )    2  Thyroid nodule    3  Hypothyroidism, unspecified type    4  Osteopenia of multiple sites    5  Vitamin D deficiency    6  Mixed hyperlipidemia         Plan:    Diagnoses and all orders for this visit:    Type 2 diabetes mellitus with hyperglycemia, without long-term current use of insulin ([address] )  She is uncontrolled with some postprandial hyperglycemia as well as recent weight gain  A1c is acceptable at 6 8%  Today we discussed options and she wishes to increase her Ozempic dose and I am agreeable to this  Advised to also include diet and lifestyle modifications  Follow-up in 3 months with repeat A1c     -     semaglutide, 2 mg/dose, (Ozempic, 2 MG/DOSE,) 8 mg/ mL injection pen; Inject 0 75 mL (2 mg total) under the skin every 7 days    Thyroid nodule  Recent ultrasound showed right midpole nodule #two 1 6 x 1 8 cm lesion recommended for biopsy  We will add AFIRMA testing to US guided biopsy  -     US guided thyroid biopsy with molecular testing; Future    Hypothyroidism, unspecified type  She seems clinically hypothyroid based on recent weight gain    We will check biochemical testing based on which we can continue dose adjustment of levothyroxine  For now advised to continue levothyroxine 37 5 mcg daily [1 5 tablets of 25 mcg tablet]  Follow-up in 3 months  -     T4, free; Future  -     TSH, 3rd generation; Future    Osteopenia of multiple sites  DEXA bone scan was previously ordered  Advised to finish prior to next visit  Vitamin D deficiency  Advised vitamin D3 5000 IU daily OTC  Mixed hyperlipidemia        I have spent 32 minutes with patient today in which greater than 50% of this time was spent in counseling/coordination of care  Discussed with the patient and all questioned fully answered  She will call me if any problems arise  Educated/ Counseled patient on diagnostic test results, prognosis, risk vs benefit of treatment options, importance of treatment compliance, healthy life and lifestyle choices        [address]

## 2023-06-22 ENCOUNTER — TELEPHONE (OUTPATIENT)
Dept: ENDOCRINOLOGY | Facility: CLINIC | Age: 83
End: 2023-06-22

## 2023-06-22 NOTE — TELEPHONE ENCOUNTER
Pt said since her Ozempic dosage was increased from 1 mg to 2 mgs, Dr Cm Allen ordered 2 pens for her instead of the 3 normally ordered  Pt said when less than 3 are ordered it costs her $450 copay, but when 3 are ordered it is $300 for 3  Pt canceled the current order from 6/14/23, and is asking if Dr Cm Allen if he will re-send a revised script ordering the 3 pens  (Pt said she realizes ordering the 3 instead of 2 could cause her to have to pay anyway at the end of things, but she feels she may be on the Ozempic for a while )     Please review and advise the pt as soon as possible because she only has 6 days left before the next shot

## 2023-06-23 DIAGNOSIS — E11.65 TYPE 2 DIABETES MELLITUS WITH HYPERGLYCEMIA, WITHOUT LONG-TERM CURRENT USE OF INSULIN (HCC): ICD-10-CM

## 2023-07-01 DIAGNOSIS — E03.9 HYPOTHYROIDISM, UNSPECIFIED TYPE: ICD-10-CM

## 2023-07-03 RX ORDER — LEVOTHYROXINE SODIUM 0.03 MG/1
TABLET ORAL
Qty: 90 TABLET | Refills: 1 | Status: SHIPPED | OUTPATIENT
Start: 2023-07-03

## 2023-07-19 ENCOUNTER — HOSPITAL ENCOUNTER (OUTPATIENT)
Dept: ULTRASOUND IMAGING | Facility: HOSPITAL | Age: 83
Discharge: HOME | End: 2023-07-19
Payer: MEDICARE

## 2023-07-19 DIAGNOSIS — E04.1 THYROID NODULE: ICD-10-CM

## 2023-07-19 PROCEDURE — 10005 FNA BX W/US GDN 1ST LES: CPT

## 2023-07-19 PROCEDURE — 88173 CYTOPATH EVAL FNA REPORT: CPT | Performed by: PATHOLOGY

## 2023-07-19 RX ORDER — LIDOCAINE HYDROCHLORIDE 10 MG/ML
5 INJECTION, SOLUTION EPIDURAL; INFILTRATION; INTRACAUDAL; PERINEURAL ONCE
Status: COMPLETED | OUTPATIENT
Start: 2023-07-19 | End: 2023-07-19

## 2023-07-19 RX ADMIN — LIDOCAINE HYDROCHLORIDE 5 ML: 10 INJECTION, SOLUTION EPIDURAL; INFILTRATION; INTRACAUDAL; PERINEURAL at 10:26

## 2023-07-21 ENCOUNTER — HOSPITAL ENCOUNTER (OUTPATIENT)
Dept: ULTRASOUND IMAGING | Facility: HOSPITAL | Age: 83
Discharge: HOME | End: 2023-07-21
Payer: MEDICARE

## 2023-07-21 PROCEDURE — 76775 US EXAM ABDO BACK WALL LIM: CPT

## 2023-07-21 PROCEDURE — 88173 CYTOPATH EVAL FNA REPORT: CPT | Performed by: PATHOLOGY

## 2023-08-04 ENCOUNTER — TELEPHONE (OUTPATIENT)
Dept: OTHER | Facility: OTHER | Age: 83
End: 2023-08-04

## 2023-08-04 NOTE — TELEPHONE ENCOUNTER
Pt would like to know if her results for her US scans can [facility]

## 2023-08-07 NOTE — TELEPHONE ENCOUNTER
Pt. Called. Adv'd per Dr. Silvia Renee that since she did not order the U/S or  that she needs to call Dr. Pulliam Ing office back. She did send them a [employer] message on 8/4 which they have not addressed yet.

## 2023-09-08 ENCOUNTER — APPOINTMENT (OUTPATIENT)
Dept: LAB | Age: 83
End: 2023-09-08
Payer: MEDICARE

## 2023-09-08 DIAGNOSIS — E11.65 TYPE 2 DIABETES MELLITUS WITH HYPERGLYCEMIA, WITHOUT LONG-TERM CURRENT USE OF INSULIN (HCC): ICD-10-CM

## 2023-09-08 DIAGNOSIS — E03.9 HYPOTHYROIDISM, UNSPECIFIED TYPE: ICD-10-CM

## 2023-09-08 LAB
T4 FREE SERPL-MCNC: 0.8 NG/DL (ref 0.61–1.12)
TSH SERPL DL<=0.05 MIU/L-ACNC: 1.39 UIU/ML (ref 0.45–4.5)

## 2023-09-08 PROCEDURE — 83036 HEMOGLOBIN GLYCOSYLATED A1C: CPT

## 2023-09-08 PROCEDURE — 36415 COLL VENOUS BLD VENIPUNCTURE: CPT

## 2023-09-08 PROCEDURE — 84439 ASSAY OF FREE THYROXINE: CPT

## 2023-09-08 PROCEDURE — 84443 ASSAY THYROID STIM HORMONE: CPT

## 2023-09-09 LAB
EST. AVERAGE GLUCOSE BLD GHB EST-MCNC: 163 MG/DL
HBA1C MFR BLD: 7.3 %

## 2023-09-12 ENCOUNTER — OFFICE VISIT (OUTPATIENT)
Dept: ENDOCRINOLOGY | Facility: CLINIC | Age: 83
End: 2023-09-12
Payer: MEDICARE

## 2023-09-12 VITALS
BODY MASS INDEX: 33.77 KG/M2 | WEIGHT: 172 LBS | TEMPERATURE: 98.1 F | OXYGEN SATURATION: 98 % | DIASTOLIC BLOOD PRESSURE: 70 MMHG | SYSTOLIC BLOOD PRESSURE: 114 MMHG | HEIGHT: 60 IN | HEART RATE: 62 BPM

## 2023-09-12 DIAGNOSIS — E03.9 HYPOTHYROIDISM, UNSPECIFIED TYPE: ICD-10-CM

## 2023-09-12 DIAGNOSIS — E04.1 THYROID NODULE: ICD-10-CM

## 2023-09-12 DIAGNOSIS — M85.80 OSTEOPENIA, UNSPECIFIED LOCATION: ICD-10-CM

## 2023-09-12 DIAGNOSIS — E66.01 OBESITY, MORBID (HCC): ICD-10-CM

## 2023-09-12 DIAGNOSIS — E11.65 TYPE 2 DIABETES MELLITUS WITH HYPERGLYCEMIA, WITHOUT LONG-TERM CURRENT USE OF INSULIN (HCC): Primary | ICD-10-CM

## 2023-09-12 PROCEDURE — 99214 OFFICE O/P EST MOD 30 MIN: CPT | Performed by: INTERNAL MEDICINE

## 2023-09-12 RX ORDER — LEVOTHYROXINE SODIUM 0.03 MG/1
TABLET ORAL
Qty: 100 TABLET | Refills: 1
Start: 2023-09-12

## 2023-09-12 NOTE — PROGRESS NOTES
Follow-up Patient Progress Note      CC: diabetes     History of Present Illness:   81 yr female with type 2 diabetes for 10 yrs, CKD3a eGFR 37, HTN, HLD, obesity, hypothyroidism, multinodular thyroid,  microalbuminuria, Fernández's esophagus, thalasemia minor and vit d deficiency. Last visit was 6/14/23.     Since last visit, she lost 1 lbs. She had lost about 20 lbs but increased about 4 lbs last week.     Home blood glucose monitoring: does not check regularly. Hypoglycemia: no     Current meds:  Ozempic 1mg weekly     Opthamology: 6/1/2023 - no significant retinopathy. Podiatry: no  vaccination:yes   Dental:  Pancreatitis: No     Ace/ARB: lisinopril 40mg qdaily  Statin:lipitor 10  Thyroid issues:hypothyroidism. On levothyroxine 37.5mcg qdaily. 5/23/2023 US thyroid: Nodule #1 right mid gland 1.6 cm TR 3 lesion. Nodule #2 right lower pole 1.6 x 1.8 x 1.4 cm TR 4 lesion [recommended biopsy].   Additional nodules found on prior studies were not recognized during this exam.  7/19/23 FNAC:  nodule #2  -benign bethesda 2       Patient Active Problem List   Diagnosis   • Chronic allergic rhinitis   • Ganglion cyst   • Hyperlipidemia   • Hypertension   • Microalbuminuric diabetic nephropathy (HCC)   • OA (osteoarthritis)   • Osteopenia of multiple sites   • Thalassemia minor   • Type 2 diabetes mellitus with hyperglycemia, without long-term current use of insulin (HCC)   • Vitamin D deficiency   • CKD (chronic kidney disease) stage 3, GFR 30-59 ml/min (HCC)   • Flat foot   • H/O abnormal mammogram   • Chest pain   • Hypothyroidism   • Gall stones   • Pulmonary nodule   • Thyroid nodule   • Anemia   • Fernández's esophagus without dysplasia   • Hiatal hernia   • Hx of adenomatous colonic polyps   • DDD (degenerative disc disease), cervical   • Obesity, morbid (HCC)   • Nephrolithiasis   • Liver cyst     Past Medical History:   Diagnosis Date   • Abnormal mammogram    • Acute sinusitis    • Allergic reaction Subsequent encounter, Resolved - 1/9/18   • Allergic rhinitis 01/16/2012   • Anaphylactic reaction     Other, Last assessed - 6/16/16   • Anemia 01/16/2012   • Ataxia    • Cellulitis of great toe of right foot    • Chronic allergic rhinitis     Unspec seasonality, unspec trigger;  Last assessed - 11/13/17   • Contact dermatitis     Last assessed - 6/3/14   • Dermatitis    • Diabetes mellitus ([address])    • Disorder of tympanic membrane     Last assessed - 10/15/12   • Dry skin     Last assessed - 4/15/14   • Dysfunction of eustachian tube     Unspec laterality, Last assessed - 10/19/12   • Ganglion cyst    • Glaucoma    • Hip pain, acute, unspecified laterality    • Hyperlipidemia    • Hypertension    • Labyrinthitis 11/17/2011   • Leg mass, left    • Limb pain    • 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   • Long term use of drug     Last assessed - 4/6/14   • Mammogram abnormal     Last assessed - 4/11/14   • Mass of thigh, left    • Pain in ankle joint    • Pes planus     Unspec laterality, Last assessed - 4/9/13   • Shoulder pain, right    • Vertigo 01/16/2012      Past Surgical History:   Procedure Laterality Date   • OTHER SURGICAL HISTORY      Surgery unk    • TONSILLECTOMY     • US GUIDED THYROID BIOPSY  7/19/2023      Family History   Problem Relation Age of Onset   • Heart attack Mother         Ac MI   • Lung cancer Father    • Other Father         Cerebral Embolism   • Anemia Sister    • Glaucoma Sister    • Hypertension Sister    • Thyroid disease Sister    • Diabetes Brother    • Hypertension Brother    • Other Brother         Cardiac Disorder   • No Known Problems Daughter    • No Known Problems Sister    • No Known Problems Daughter    • No Known Problems Maternal Aunt    • No Known Problems Paternal Aunt      Social History     Tobacco Use   • Smoking status: Former     Packs/day: 1.00     Years: 20.00     Total pack years: 20.00     Types: Cigarettes     Start date: 8/15/1958     Quit date: 1/1/1976 Years since quittin.7   • Smokeless tobacco: Never   • Tobacco comments:     quit for 9 mos. with each pregnancy   Substance Use Topics   • Alcohol use: Yes     Alcohol/week: 2.0 standard drinks of alcohol     Types: 1 Glasses of wine, 1 Cans of beer per week     Comment: beer in summer     Allergies   Allergen Reactions   • Cortisone Hyperactivity   • Bee Venom Edema   • Virtussin A-C [Guaifenesin-Codeine] Throat Swelling   • Bee Pollen Edema     Category:  Allergy;          Current Outpatient Medications:   •  aspirin 81 MG tablet, Take 1 tablet by mouth daily, Disp: , Rfl:   •  atorvastatin (LIPITOR) 10 mg tablet, TAKE 1 TABLET (10 MG TOTAL) BY MOUTH DAILY, Disp: 90 tablet, Rfl: 1  •  Blood Glucose Monitoring Suppl (OneTouch Verio Reflect) w/Device KIT, Use, Disp: , Rfl:   •  Calcium Carbonate 1500 (600 Ca) MG TABS, Take by mouth, Disp: , Rfl:   •  carvedilol (COREG) 25 mg tablet, Take 1 tablet (25 mg total) by mouth 2 (two) times a day, Disp: 180 tablet, Rfl: 3  •  Cholecalciferol (VITAMIN D3) 2000 units capsule, Take 1 tablet by mouth daily, Disp: , Rfl:   •  EPINEPHrine (EPIPEN) 0.3 mg/0.3 mL SOAJ, Inject 1 Syringe into a muscle, Disp: , Rfl:   •  fluticasone (FLONASE) 50 mcg/act nasal spray, USE 1 SPRAY IN EACH NOSTRIL EVERY DAY (Patient taking differently: daily), Disp: 48 mL, Rfl: 2  •  glucose blood (OneTouch Verio) test strip, Use to test blood glucose once daily, Disp: 100 strip, Rfl: 1  •  hydrochlorothiazide (HYDRODIURIL) 25 mg tablet, Take 1 tablet (25 mg total) by mouth daily, Disp: 90 tablet, Rfl: 1  •  Lancets (OneTouch Delica Plus [id number]) MISC, Use to test blood sugar once daily, Disp: 100 each, Rfl: 1  •  levothyroxine 25 mcg tablet, TAKE 1 AND 1/2 TABLETS BY MOUTH EVERY DAY, Disp: 90 tablet, Rfl: 1  •  lisinopril (ZESTRIL) 40 mg tablet, Take 1 tablet (40 mg total) by mouth 2 (two) times a day, Disp: 180 tablet, Rfl: 3  •  Multiple Vitamins-Minerals (WOMENS ONE DAILY) TABS, Take 1 tablet by mouth daily, Disp: , Rfl:   •  Omega-3 Fatty Acids (OMEGA-3 FISH OIL) 1000 MG CAPS, Take 1 capsule by mouth daily , Disp: , Rfl:   •  pantoprazole (PROTONIX) 20 mg tablet, Take 1 tablet (20 mg total) by mouth daily, Disp: 90 tablet, Rfl: 3  •  semaglutide, 2 mg/dose, (Ozempic, 2 MG/DOSE,) 8 mg/ mL injection pen, Inject 0.75 mL (2 mg total) under the skin every 7 days, Disp: 9 mL, Rfl: 0  •  spironolactone (ALDACTONE) 25 mg tablet, Take 1 tablet (25 mg total) by mouth daily, Disp: 90 tablet, Rfl: 3    Review of Systems   HENT: Negative. Eyes: Negative. Respiratory: Negative. Cardiovascular: Negative. Gastrointestinal: Negative. Endocrine: Negative. Musculoskeletal: Negative. Skin: Negative. Allergic/Immunologic: Negative. Neurological: Negative. Hematological: Negative. Psychiatric/Behavioral: Negative. Physical Exam:  Body mass index is 33.59 kg/m². /70   Pulse 62   Temp 98.1 °F (36.7 °C)   Ht 5' (1.524 m)   Wt 78 kg (172 lb)   LMP  (LMP Unknown)   SpO2 98%   BMI 33.59 kg/m²    Vitals:    09/12/23 1308   Weight: 78 kg (172 lb)        Physical Exam  Constitutional:       General: She is not in acute distress. Appearance: She is well-developed. She is not ill-appearing. HENT:      Head: Normocephalic and atraumatic. Nose: Nose normal.      Mouth/Throat:      Pharynx: Oropharynx is clear. Eyes:      Extraocular Movements: Extraocular movements intact. Conjunctiva/sclera: Conjunctivae normal.   Neck:      Thyroid: No thyromegaly. Cardiovascular:      Rate and Rhythm: Normal rate. Pulmonary:      Effort: Pulmonary effort is normal.   Musculoskeletal:         General: No deformity. Cervical back: Normal range of motion. Skin:     Capillary Refill: Capillary refill takes less than 2 seconds. Coloration: Skin is not pale. Findings: No rash.    Neurological:      Mental Status: She is alert and oriented to person, place, and time.   Psychiatric:         Behavior: Behavior normal.         Labs:   Lab Results   Component Value Date    HGBA1C 7.3 (H) 09/08/2023       Lab Results   Component Value Date    LSY4INBKUUWX 1.392 09/08/2023       Lab Results   Component Value Date    CREATININE 1.29 06/19/2022    CREATININE 1.19 12/09/2020    CREATININE 1.18 07/24/2019    BUN 24 06/19/2022     03/26/2015    K 4.5 06/19/2022    CL 99 06/19/2022    CO2 24 06/19/2022     eGFR   Date Value Ref Range Status   06/19/2022 39 ml/min/1.73sq m Final       Lab Results   Component Value Date    ALT 12 06/19/2022    AST 18 06/19/2022    ALKPHOS 65 06/19/2022    BILITOT 0.43 03/26/2015       Lab Results   Component Value Date    CHOLESTEROL 166 07/24/2019    CHOLESTEROL 213 (H) 01/11/2019    CHOLESTEROL 190 11/29/2018     Lab Results   Component Value Date    HDL 38 (L) 07/24/2019    HDL 41 01/11/2019    HDL 40 11/29/2018     Lab Results   Component Value Date    TRIG 284 (H) 07/24/2019    TRIG 378 (H) 01/11/2019    TRIG 314 (H) 11/29/2018     Lab Results   Component Value Date    NONHDLC 128 07/24/2019    [address] 172 01/11/2019    [address] 150 11/29/2018         Impression:  1. Type 2 diabetes mellitus with hyperglycemia, without long-term current use of insulin ([address])    2. Thyroid nodule    3. Hypothyroidism, unspecified type    4. Obesity, morbid ([address])    5. Osteopenia, unspecified location         Plan:    Diagnoses and all orders for this visit:    Type 2 diabetes mellitus with hyperglycemia, without long-term current use of insulin ([address] She seems reasonably controlled with an A1c of 7.3%. She is tolerating Ozempic 2 mg weekly and we agreed to continue the same at this time. Follow-up in 3 to 6 months.  -     Hemoglobin A1C; Future  -     Albumin / creatinine urine ratio; Future    Thyroid nodule. Biopsy of nodule #2 was benign. Will need repeat ultrasound for nodule #1.  -     US thyroid; Future    Hypothyroidism, unspecified type.   She seems clinically and biochemically euthyroid. Since she has difficulty cutting her thyroid pill, we agreed to switch the regimen as listed below. Repeat thyroid functions prior to next visit in 3 to 6 months. -     levothyroxine 25 mcg tablet; Take 1 tab M-W-F-[place] and 2tabs other days    Obesity, morbid ([address] She had decent weight loss with Ozempic. We will continue the same. Advise daily activity. Osteopenia, unspecified location. Adding for DEXA bone scan. I have spent 32 minutes with patient today in which greater than 50% of this time was spent in counseling/coordination of care. Discussed with the patient and all questioned fully answered. She will call me if any problems arise. Educated/ Counseled patient on diagnostic test results, prognosis, risk vs benefit of treatment options, importance of treatment compliance, healthy life and lifestyle choices.       Uzair

## 2023-09-14 ENCOUNTER — RA CDI HCC (OUTPATIENT)
Dept: OTHER | Facility: HOSPITAL | Age: 83
End: 2023-09-14

## 2023-09-14 DIAGNOSIS — E11.65 TYPE 2 DIABETES MELLITUS WITH HYPERGLYCEMIA, WITHOUT LONG-TERM CURRENT USE OF INSULIN (HCC): ICD-10-CM

## 2023-09-14 RX ORDER — SEMAGLUTIDE 2.68 MG/ML
INJECTION, SOLUTION SUBCUTANEOUS
Qty: 9 ML | Refills: 0 | Status: SHIPPED | OUTPATIENT
Start: 2023-09-14

## 2023-09-14 NOTE — PROGRESS NOTES
E11.22   [address] [facility] coding opportunities          Chart Reviewed number of suggestions sent to Provider: 1     Patients Insurance     Medicare Insurance: [employer]

## 2023-09-19 ENCOUNTER — APPOINTMENT (OUTPATIENT)
Dept: LAB | Age: 83
End: 2023-09-19
Payer: MEDICARE

## 2023-09-19 DIAGNOSIS — Z79.899 ENCOUNTER FOR LONG-TERM CURRENT USE OF MEDICATION: ICD-10-CM

## 2023-09-19 DIAGNOSIS — E78.2 MIXED HYPERLIPIDEMIA: ICD-10-CM

## 2023-09-19 DIAGNOSIS — N18.31 STAGE 3A CHRONIC KIDNEY DISEASE (HCC): Chronic | ICD-10-CM

## 2023-09-19 DIAGNOSIS — E03.9 HYPOTHYROIDISM, UNSPECIFIED TYPE: ICD-10-CM

## 2023-09-19 DIAGNOSIS — I10 PRIMARY HYPERTENSION: ICD-10-CM

## 2023-09-19 LAB
ALBUMIN SERPL BCP-MCNC: 4 G/DL (ref 3.5–5)
ALP SERPL-CCNC: 56 U/L (ref 34–104)
ALT SERPL W P-5'-P-CCNC: 11 U/L (ref 7–52)
ANION GAP SERPL CALCULATED.3IONS-SCNC: 9 MMOL/L
AST SERPL W P-5'-P-CCNC: 14 U/L (ref 13–39)
BASOPHILS # BLD AUTO: 0.06 THOUSANDS/ÂΜL (ref 0–0.1)
BASOPHILS NFR BLD AUTO: 1 % (ref 0–1)
BILIRUB SERPL-MCNC: 0.45 MG/DL (ref 0.2–1)
BUN SERPL-MCNC: 18 MG/DL (ref 5–25)
CALCIUM SERPL-MCNC: 9.2 MG/DL (ref 8.4–10.2)
CHLORIDE SERPL-SCNC: 105 MMOL/L (ref 96–108)
CHOLEST SERPL-MCNC: 153 MG/DL
CO2 SERPL-SCNC: 26 MMOL/L (ref 21–32)
CREAT SERPL-MCNC: 1.18 MG/DL (ref 0.6–1.3)
EOSINOPHIL # BLD AUTO: 0.24 THOUSAND/ÂΜL (ref 0–0.61)
EOSINOPHIL NFR BLD AUTO: 3 % (ref 0–6)
ERYTHROCYTE [DISTWIDTH] IN BLOOD BY AUTOMATED COUNT: 14.6 % (ref 11.6–15.1)
GFR SERPL CREATININE-BSD FRML MDRD: 43 ML/MIN/1.73SQ M
GLUCOSE P FAST SERPL-MCNC: 127 MG/DL (ref 65–99)
HCT VFR BLD AUTO: 38.1 % (ref 34.8–46.1)
HDLC SERPL-MCNC: 37 MG/DL
HGB BLD-MCNC: 11.8 G/DL (ref 11.5–15.4)
IMM GRANULOCYTES # BLD AUTO: 0.02 THOUSAND/UL (ref 0–0.2)
IMM GRANULOCYTES NFR BLD AUTO: 0 % (ref 0–2)
LDLC SERPL CALC-MCNC: 73 MG/DL (ref 0–100)
LYMPHOCYTES # BLD AUTO: 2.37 THOUSANDS/ÂΜL (ref 0.6–4.47)
LYMPHOCYTES NFR BLD AUTO: 29 % (ref 14–44)
MCH RBC QN AUTO: 22.4 PG (ref 26.8–34.3)
MCHC RBC AUTO-ENTMCNC: 31 G/DL (ref 31.4–37.4)
MCV RBC AUTO: 72 FL (ref 82–98)
MONOCYTES # BLD AUTO: 0.53 THOUSAND/ÂΜL (ref 0.17–1.22)
MONOCYTES NFR BLD AUTO: 6 % (ref 4–12)
NEUTROPHILS # BLD AUTO: 5.11 THOUSANDS/ÂΜL (ref 1.85–7.62)
NEUTS SEG NFR BLD AUTO: 61 % (ref 43–75)
NONHDLC SERPL-MCNC: 116 MG/DL
NRBC BLD AUTO-RTO: 0 /100 WBCS
PLATELET # BLD AUTO: 250 THOUSANDS/UL (ref 149–390)
PMV BLD AUTO: 10.3 FL (ref 8.9–12.7)
POTASSIUM SERPL-SCNC: 4.5 MMOL/L (ref 3.5–5.3)
PROT SERPL-MCNC: 6.9 G/DL (ref 6.4–8.4)
RBC # BLD AUTO: 5.27 MILLION/UL (ref 3.81–5.12)
SODIUM SERPL-SCNC: 140 MMOL/L (ref 135–147)
TRIGL SERPL-MCNC: 215 MG/DL
TSH SERPL DL<=0.05 MIU/L-ACNC: 1.93 UIU/ML (ref 0.45–4.5)
URATE SERPL-MCNC: 7.5 MG/DL (ref 2–7.5)
VIT B12 SERPL-MCNC: 349 PG/ML (ref 180–914)
WBC # BLD AUTO: 8.33 THOUSAND/UL (ref 4.31–10.16)

## 2023-09-19 PROCEDURE — 82607 VITAMIN B-12: CPT

## 2023-09-19 PROCEDURE — 84443 ASSAY THYROID STIM HORMONE: CPT

## 2023-09-19 PROCEDURE — 84550 ASSAY OF BLOOD/URIC ACID: CPT

## 2023-09-19 PROCEDURE — 80053 COMPREHEN METABOLIC PANEL: CPT

## 2023-09-19 PROCEDURE — 85025 COMPLETE CBC W/AUTO DIFF WBC: CPT

## 2023-09-19 PROCEDURE — 36415 COLL VENOUS BLD VENIPUNCTURE: CPT

## 2023-09-19 PROCEDURE — 80061 LIPID PANEL: CPT

## 2023-09-20 ENCOUNTER — OFFICE VISIT (OUTPATIENT)
Dept: INTERNAL MEDICINE CLINIC | Facility: CLINIC | Age: 83
End: 2023-09-20
Payer: MEDICARE

## 2023-09-20 VITALS
WEIGHT: 173 LBS | SYSTOLIC BLOOD PRESSURE: 130 MMHG | HEIGHT: 60 IN | HEART RATE: 90 BPM | TEMPERATURE: 97.6 F | BODY MASS INDEX: 33.96 KG/M2 | DIASTOLIC BLOOD PRESSURE: 84 MMHG | OXYGEN SATURATION: 96 %

## 2023-09-20 DIAGNOSIS — K22.70 BARRETT'S ESOPHAGUS WITHOUT DYSPLASIA: ICD-10-CM

## 2023-09-20 DIAGNOSIS — N18.31 STAGE 3A CHRONIC KIDNEY DISEASE (HCC): Chronic | ICD-10-CM

## 2023-09-20 DIAGNOSIS — E78.2 MIXED HYPERLIPIDEMIA: ICD-10-CM

## 2023-09-20 DIAGNOSIS — E04.1 THYROID NODULE: ICD-10-CM

## 2023-09-20 DIAGNOSIS — E11.65 TYPE 2 DIABETES MELLITUS WITH HYPERGLYCEMIA, WITHOUT LONG-TERM CURRENT USE OF INSULIN (HCC): ICD-10-CM

## 2023-09-20 DIAGNOSIS — E66.01 OBESITY, MORBID (HCC): ICD-10-CM

## 2023-09-20 DIAGNOSIS — I10 PRIMARY HYPERTENSION: Primary | ICD-10-CM

## 2023-09-20 DIAGNOSIS — E53.8 VITAMIN B12 DEFICIENCY: ICD-10-CM

## 2023-09-20 PROCEDURE — 99214 OFFICE O/P EST MOD 30 MIN: CPT | Performed by: INTERNAL MEDICINE

## 2023-09-20 RX ORDER — CHOLECALCIFEROL (VITAMIN D3) 125 MCG
500 CAPSULE ORAL DAILY
Qty: 90 TABLET | Refills: 1 | Status: SHIPPED | OUTPATIENT
Start: 2023-09-20

## 2023-09-20 NOTE — ASSESSMENT & PLAN NOTE
Lab Results   Component Value Date    EGFR 43 09/19/2023    EGFR 39 06/19/2022    EGFR 44 12/09/2020    CREATININE 1.18 09/19/2023    CREATININE 1.29 06/19/2022    CREATININE 1.19 12/09/2020     Stable. No NSAIDs. Sees nephrology.

## 2023-09-20 NOTE — ASSESSMENT & PLAN NOTE
BP stable. On carvedilol 25 mg bid, HCTZ daily, lisinopril 40 mg bid and spironolactone. Off amlodipine.

## 2023-09-20 NOTE — ASSESSMENT & PLAN NOTE
Lab Results   Component Value Date    HGBA1C 7.3 (H) 09/08/2023     A1c stable. Off metformin, on Ozempic 2 mg weekly. Per Endo.

## 2023-09-20 NOTE — PROGRESS NOTES
Assessment/Plan:    Hypertension  BP stable. On carvedilol 25 mg bid, HCTZ daily, lisinopril 40 mg bid and spironolactone. Off amlodipine. Type 2 diabetes mellitus with hyperglycemia, without long-term current use of insulin (HCC)    Lab Results   Component Value Date    HGBA1C 7.3 (H) 09/08/2023     A1c stable. Off metformin, on Ozempic 2 mg weekly. Per Endo. Hypothyroidism  Adequately replaced. Hyperlipidemia  TG remains elevated, on atorvastatin 10 mg. 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 CKD (chronic kidney disease) stage 3, GFR 30-59 ml/min  Lab Results   Component Value Date    EGFR 43 09/19/2023    EGFR 39 06/19/2022    EGFR 44 12/09/2020    CREATININE 1.18 09/19/2023    CREATININE 1.29 06/19/2022    CREATININE 1.19 12/09/2020     Stable. No NSAIDs. Sees nephrology. Nephrolithiasis  No recent issues. Saw urology. Adequate fluid intake. Thyroid nodule  Biopsy normal.  Repeat ultrasound 7/23. Pulmonary nodule  Stable, on recent CT. Obesity, morbid ([address])  Weight stable. Osteopenia of multiple sites  Continue daily walks and supplements. Bone density due. Vitamin B12 deficiency  Start daily supplement. Chronic allergic rhinitis  Using steroid nasal spray daily, may change brand. Recommend saline rinse prn, antihistamine prn. Diagnoses and all orders for this visit:    Primary hypertension    Fernández's esophagus without dysplasia    Mixed hyperlipidemia    Stage 3a chronic kidney disease (HCC)    Obesity, morbid ([address])    Type 2 diabetes mellitus with hyperglycemia, without long-term current use of insulin (HCC)    Thyroid nodule    Vitamin B12 deficiency  -     vitamin B-12 (VITAMIN B-12) 500 mcg tablet; Take 1 tablet (500 mcg total) by mouth daily      Follow up in 4 months or as needed. Subjective:      Patient ID: Angel Torres is a 82 y.o. female here for a follow up. She just came back from the [facility] last weekend.   When she returned, she had some chills which lasted about 2 days. No fevers. She feels increased nasal congestion, has developed a dry cough. No known sick contacts. She has been using a steroid nasal spray from [employer] every day. She feels it is a different kind, unsure if it is working as well. She does a saline rinse once in a while, does not take a daily antihistamine. She denies any chest pain, palpitations, headaches or dizziness. She reports loose bowels whenever she eats salads. She would then be cleaned out and have constipation for a few days. She not had a bowel movement, thinks she will move her bowels later today. She reports not taking her blood pressure pills earlier this day. She has not been checking her BP regularly. She remains very busy and active on a daily basis. The following portions of the patient's history were reviewed and updated as appropriate: allergies, current medications, past medical history, past social history and problem list.    Review of Systems   Constitutional: Positive for chills. Negative for appetite change, fatigue and fever. HENT: Positive for congestion and postnasal drip. Negative for ear pain, rhinorrhea, sinus pain and sore throat. Eyes: Negative for visual disturbance. Respiratory: Negative for cough and shortness of breath. Cardiovascular: Negative for chest pain and leg swelling. Gastrointestinal: Negative for abdominal pain, constipation and diarrhea. Genitourinary: Negative for dysuria, frequency and urgency. Musculoskeletal: Negative for arthralgias and myalgias. Skin: Negative for rash and wound. Neurological: Negative for dizziness, numbness and headaches. Hematological: Does not bruise/bleed easily. Psychiatric/Behavioral: Negative for confusion. The patient is not nervous/anxious.           Objective:      /84   Pulse 90   Temp 97.6 °F (36.4 °C)   Ht 5' (1.524 m)   Wt 78.5 kg (173 lb)   LMP  (LMP Unknown) SpO2 96%   BMI 33.79 kg/m²          Physical Exam  Vitals and nursing note reviewed. Constitutional:       General: She is not in acute distress. Appearance: She is well-developed. HENT:      Head: Normocephalic and atraumatic. Right Ear: Tympanic membrane, ear canal and external ear normal.      Left Ear: Tympanic membrane, ear canal and external ear normal.      Nose: Congestion present. No rhinorrhea. Right Turbinates: Swollen. Left Turbinates: Not swollen. Mouth/Throat:      Mouth: Mucous membranes are moist.   Eyes:      Pupils: Pupils are equal, round, and reactive to light. Cardiovascular:      Rate and Rhythm: Normal rate and regular rhythm. Heart sounds: Normal heart sounds. Pulmonary:      Effort: Pulmonary effort is normal.      Breath sounds: Normal breath sounds. No wheezing. Abdominal:      General: Bowel sounds are normal.      Palpations: Abdomen is soft. Musculoskeletal:         General: No swelling. Right lower leg: No edema. Left lower leg: No edema. Skin:     General: Skin is warm. Findings: No rash. Neurological:      General: No focal deficit present. Mental Status: She is alert and oriented to person, place, and time. Psychiatric:         Mood and Affect: Mood and affect normal. Mood is not anxious or depressed. Behavior: Behavior normal.           Labs & imaging results reviewed with patient.

## 2023-10-10 ENCOUNTER — TELEPHONE (OUTPATIENT)
Dept: INTERNAL MEDICINE CLINIC | Facility: CLINIC | Age: 83
End: 2023-10-10

## 2023-10-10 NOTE — TELEPHONE ENCOUNTER
Patient called in. Her Home is having a flu clinic. She has not been feeling well with a cold. Should she still get it?

## 2023-10-16 DIAGNOSIS — I10 HYPERTENSION, UNSPECIFIED TYPE: ICD-10-CM

## 2023-10-16 RX ORDER — HYDROCHLOROTHIAZIDE 25 MG/1
25 TABLET ORAL DAILY
Qty: 90 TABLET | Refills: 0 | Status: SHIPPED | OUTPATIENT
Start: 2023-10-16

## 2023-10-16 RX ORDER — HYDROCHLOROTHIAZIDE 25 MG/1
25 TABLET ORAL DAILY
Qty: 90 TABLET | Refills: 0 | OUTPATIENT
Start: 2023-10-16

## 2023-10-16 RX ORDER — HYDROCHLOROTHIAZIDE 25 MG/1
25 TABLET ORAL DAILY
Qty: 90 TABLET | Refills: 0 | Status: CANCELLED | OUTPATIENT
Start: 2023-10-16

## 2023-10-16 NOTE — TELEPHONE ENCOUNTER
Reason for call:   [x] Refill   [] Prior Auth  [] Other:     Office:   [x] PCP/Provider - Cora Franklin  [] Speciality/Provider -     Medication: hydrochlorothiazide    Dose/Frequency: 25 mg  daily    Quantity: 90    Pharmacy: [facility] [address]    Does the patient have enough for 3 days? [] Yes   [x] No - Send as HP to POD    Patient went to pharmacy to  refill pharmacy stated no refills. Patient has been out of medication for a week.

## 2023-10-16 NOTE — TELEPHONE ENCOUNTER
Reason for call:   [x] Refill   [] Prior Auth  [] Other:     Office:   [x] PCP/Provider - Pratik  [] Speciality/Provider -     Medication: hyrdochlorothiazide    Dose/Frequency: 25 mg daily     Quantity: 90    Pharmacy: [facility] [address]    Does the patient have enough for 3 days? [] Yes   [x] No - Send as HP to POD    Patient out for a week.  Went to pharmacy and  told her no refills

## 2023-10-30 DIAGNOSIS — E03.9 HYPOTHYROIDISM, UNSPECIFIED TYPE: ICD-10-CM

## 2023-10-31 RX ORDER — LEVOTHYROXINE SODIUM 0.03 MG/1
TABLET ORAL
Qty: 90 TABLET | Refills: 1 | Status: SHIPPED | OUTPATIENT
Start: 2023-10-31

## 2023-12-08 DIAGNOSIS — E11.65 TYPE 2 DIABETES MELLITUS WITH HYPERGLYCEMIA, WITHOUT LONG-TERM CURRENT USE OF INSULIN (HCC): ICD-10-CM

## 2023-12-08 RX ORDER — SEMAGLUTIDE 2.68 MG/ML
INJECTION, SOLUTION SUBCUTANEOUS
Qty: 9 ML | Refills: 0 | Status: SHIPPED | OUTPATIENT
Start: 2023-12-08

## 2023-12-21 ENCOUNTER — TELEPHONE (OUTPATIENT)
Dept: INTERNAL MEDICINE CLINIC | Facility: CLINIC | Age: 83
End: 2023-12-21

## 2023-12-21 ENCOUNTER — APPOINTMENT (OUTPATIENT)
Dept: LAB | Age: 83
End: 2023-12-21
Payer: MEDICARE

## 2023-12-21 DIAGNOSIS — N20.0 URIC ACID NEPHROLITHIASIS: ICD-10-CM

## 2023-12-21 DIAGNOSIS — E03.9 HYPOTHYROIDISM, UNSPECIFIED TYPE: ICD-10-CM

## 2023-12-21 DIAGNOSIS — E11.65 TYPE 2 DIABETES MELLITUS WITH HYPERGLYCEMIA, WITHOUT LONG-TERM CURRENT USE OF INSULIN (HCC): ICD-10-CM

## 2023-12-21 DIAGNOSIS — R80.9 PROTEINURIA, UNSPECIFIED TYPE: ICD-10-CM

## 2023-12-21 DIAGNOSIS — Z79.4 TYPE 2 DIABETES MELLITUS WITH OTHER DIABETIC KIDNEY COMPLICATION, WITH LONG-TERM CURRENT USE OF INSULIN (HCC): ICD-10-CM

## 2023-12-21 DIAGNOSIS — E11.29 TYPE 2 DIABETES MELLITUS WITH OTHER DIABETIC KIDNEY COMPLICATION, WITH LONG-TERM CURRENT USE OF INSULIN (HCC): ICD-10-CM

## 2023-12-21 DIAGNOSIS — I43 DILATED CARDIOMYOPATHY SECONDARY TO ELECTROLYTE DEFICIENCY (HCC): ICD-10-CM

## 2023-12-21 DIAGNOSIS — E87.8 DILATED CARDIOMYOPATHY SECONDARY TO ELECTROLYTE DEFICIENCY (HCC): ICD-10-CM

## 2023-12-21 LAB
ALBUMIN SERPL BCP-MCNC: 4 G/DL (ref 3.5–5)
ALP SERPL-CCNC: 50 U/L (ref 34–104)
ALT SERPL W P-5'-P-CCNC: 12 U/L (ref 7–52)
ANION GAP SERPL CALCULATED.3IONS-SCNC: 9 MMOL/L
AST SERPL W P-5'-P-CCNC: 16 U/L (ref 13–39)
BASOPHILS # BLD AUTO: 0.08 THOUSANDS/ÂΜL (ref 0–0.1)
BASOPHILS NFR BLD AUTO: 1 % (ref 0–1)
BILIRUB SERPL-MCNC: 0.43 MG/DL (ref 0.2–1)
BUN SERPL-MCNC: 30 MG/DL (ref 5–25)
CALCIUM SERPL-MCNC: 9.3 MG/DL (ref 8.4–10.2)
CHLORIDE SERPL-SCNC: 100 MMOL/L (ref 96–108)
CO2 SERPL-SCNC: 26 MMOL/L (ref 21–32)
CREAT SERPL-MCNC: 1.44 MG/DL (ref 0.6–1.3)
CREAT UR-MCNC: 150.5 MG/DL
EOSINOPHIL # BLD AUTO: 0.15 THOUSAND/ÂΜL (ref 0–0.61)
EOSINOPHIL NFR BLD AUTO: 2 % (ref 0–6)
ERYTHROCYTE [DISTWIDTH] IN BLOOD BY AUTOMATED COUNT: 13.9 % (ref 11.6–15.1)
EST. AVERAGE GLUCOSE BLD GHB EST-MCNC: 154 MG/DL
GFR SERPL CREATININE-BSD FRML MDRD: 34 ML/MIN/1.73SQ M
GLUCOSE P FAST SERPL-MCNC: 126 MG/DL (ref 65–99)
HBA1C MFR BLD: 7 %
HCT VFR BLD AUTO: 39.8 % (ref 34.8–46.1)
HGB BLD-MCNC: 12.7 G/DL (ref 11.5–15.4)
IMM GRANULOCYTES # BLD AUTO: 0.02 THOUSAND/UL (ref 0–0.2)
IMM GRANULOCYTES NFR BLD AUTO: 0 % (ref 0–2)
LYMPHOCYTES # BLD AUTO: 2.66 THOUSANDS/ÂΜL (ref 0.6–4.47)
LYMPHOCYTES NFR BLD AUTO: 31 % (ref 14–44)
MAGNESIUM SERPL-MCNC: 1.9 MG/DL (ref 1.9–2.7)
MCH RBC QN AUTO: 23.1 PG (ref 26.8–34.3)
MCHC RBC AUTO-ENTMCNC: 31.9 G/DL (ref 31.4–37.4)
MCV RBC AUTO: 73 FL (ref 82–98)
MICROALBUMIN UR-MCNC: 9.6 MG/L
MICROALBUMIN/CREAT 24H UR: 6 MG/G CREATININE (ref 0–30)
MONOCYTES # BLD AUTO: 0.51 THOUSAND/ÂΜL (ref 0.17–1.22)
MONOCYTES NFR BLD AUTO: 6 % (ref 4–12)
NEUTROPHILS # BLD AUTO: 5.15 THOUSANDS/ÂΜL (ref 1.85–7.62)
NEUTS SEG NFR BLD AUTO: 60 % (ref 43–75)
NRBC BLD AUTO-RTO: 0 /100 WBCS
PHOSPHATE SERPL-MCNC: 3.2 MG/DL (ref 2.3–4.1)
PLATELET # BLD AUTO: 265 THOUSANDS/UL (ref 149–390)
PMV BLD AUTO: 10.5 FL (ref 8.9–12.7)
POTASSIUM SERPL-SCNC: 4.2 MMOL/L (ref 3.5–5.3)
PROT SERPL-MCNC: 7.2 G/DL (ref 6.4–8.4)
RBC # BLD AUTO: 5.49 MILLION/UL (ref 3.81–5.12)
SODIUM SERPL-SCNC: 135 MMOL/L (ref 135–147)
T4 FREE SERPL-MCNC: 1.01 NG/DL (ref 0.61–1.12)
TSH SERPL DL<=0.05 MIU/L-ACNC: 1.64 UIU/ML (ref 0.45–4.5)
URATE SERPL-MCNC: 8.3 MG/DL (ref 2–7.5)
WBC # BLD AUTO: 8.57 THOUSAND/UL (ref 4.31–10.16)

## 2023-12-21 PROCEDURE — 83036 HEMOGLOBIN GLYCOSYLATED A1C: CPT

## 2023-12-21 PROCEDURE — 83735 ASSAY OF MAGNESIUM: CPT

## 2023-12-21 PROCEDURE — 84550 ASSAY OF BLOOD/URIC ACID: CPT

## 2023-12-21 PROCEDURE — 84443 ASSAY THYROID STIM HORMONE: CPT

## 2023-12-21 PROCEDURE — 84100 ASSAY OF PHOSPHORUS: CPT

## 2023-12-21 PROCEDURE — 84439 ASSAY OF FREE THYROXINE: CPT

## 2023-12-21 PROCEDURE — 82570 ASSAY OF URINE CREATININE: CPT

## 2023-12-21 PROCEDURE — 36415 COLL VENOUS BLD VENIPUNCTURE: CPT

## 2023-12-21 PROCEDURE — 85025 COMPLETE CBC W/AUTO DIFF WBC: CPT

## 2023-12-21 PROCEDURE — 80053 COMPREHEN METABOLIC PANEL: CPT

## 2023-12-21 PROCEDURE — 82043 UR ALBUMIN QUANTITATIVE: CPT

## 2023-12-21 NOTE — TELEPHONE ENCOUNTER
Please call patient.  Sugars improved, A1c at 7.3%.  Kidney function stable.    Please schedule f/u appt, next available.

## 2023-12-29 ENCOUNTER — OFFICE VISIT (OUTPATIENT)
Dept: INTERNAL MEDICINE CLINIC | Facility: CLINIC | Age: 83
End: 2023-12-29
Payer: MEDICARE

## 2023-12-29 ENCOUNTER — TELEPHONE (OUTPATIENT)
Age: 83
End: 2023-12-29

## 2023-12-29 VITALS
SYSTOLIC BLOOD PRESSURE: 118 MMHG | BODY MASS INDEX: 29.77 KG/M2 | OXYGEN SATURATION: 97 % | HEIGHT: 63 IN | HEART RATE: 86 BPM | DIASTOLIC BLOOD PRESSURE: 80 MMHG | WEIGHT: 168 LBS | TEMPERATURE: 96.9 F

## 2023-12-29 DIAGNOSIS — K52.9 COLITIS: ICD-10-CM

## 2023-12-29 DIAGNOSIS — I10 PRIMARY HYPERTENSION: Primary | ICD-10-CM

## 2023-12-29 DIAGNOSIS — N18.31 STAGE 3A CHRONIC KIDNEY DISEASE (HCC): Chronic | ICD-10-CM

## 2023-12-29 PROCEDURE — 99214 OFFICE O/P EST MOD 30 MIN: CPT | Performed by: NURSE PRACTITIONER

## 2023-12-29 NOTE — ASSESSMENT & PLAN NOTE
Lab Results   Component Value Date    EGFR 34 12/21/2023    EGFR 43 09/19/2023    EGFR 39 06/19/2022    CREATININE 1.44 (H) 12/21/2023    CREATININE 1.18 09/19/2023    CREATININE 1.29 06/19/2022   Recently numbers worse  Saw nephrology, no changes.

## 2023-12-29 NOTE — ASSESSMENT & PLAN NOTE
Recent hypotension likely caused from dehydration and poor PO intake related to recent colitis.   Recommend restarting carvedilol 25 mg twice daily.  Advised to monitor blood pressure daily and if SBP >120, ok to restart hctz and if SBP still >120 restart lisinopril 20 mg twice daily  Reviewed recent labs which were stable.

## 2023-12-29 NOTE — TELEPHONE ENCOUNTER
Pt calling to tell Dorothy her dose is carvedilol 25mg BID. She will drop off an updated med list next week.

## 2023-12-29 NOTE — PATIENT INSTRUCTIONS
Restart carvedilol 25 mg twice daily  Monitor blood pressure daily. If SBP >120 ok to restart hctz. Continue to monitor and if SBP continues to be >120 restart lisinopril 20 mg twice daily.

## 2023-12-29 NOTE — PROGRESS NOTES
Name: Maxine Acuña      : 1940      MRN: [id number]  Encounter Provider: EMILIA Taylor  Encounter Date: 2023   Encounter department: [facility] INTERNAL MEDICINE    Assessment & Plan     1. Primary hypertension  Assessment & Plan:  Recent hypotension likely caused from dehydration and poor PO intake related to recent colitis.   Recommend restarting carvedilol 25 mg twice daily.  Advised to monitor blood pressure daily and if SBP >120, ok to restart hctz and if SBP still >120 restart lisinopril 20 mg twice daily  Reviewed recent labs which were stable.         2. Colitis  Comments:  finished course of antibiotics  symptoms resolved    3. Stage 3a chronic kidney disease (HCC)  Assessment & Plan:  Lab Results   Component Value Date    EGFR 34 2023    EGFR 43 2023    EGFR 39 2022    CREATININE 1.44 (H) 2023    CREATININE 1.18 2023    CREATININE 1.29 2022   Recently numbers worse  Saw nephrology, no changes.              Subjective      Maxine is here today with BP concerns   About 2-3 weeks ago she had colitis. She was treated with antibiotics. Her GI symptoms have resolved. Appetite is still less.   Since then she continues to feel exhausted. Recently she had an appointment with her nephrologist and her BP was low 90/60's.   She is asymptomatic other than fatigue. She received the RSV vaccine a few days ago and her fatigue worsened.   She does not check her blood pressure daily but goes to the nurse at [place] occasionally to have it checked. Recently around 110/60. She is concerned because she hasn't taken any of her blood pressure medications in a few days. She is worried to restart it.       Review of Systems   Constitutional:  Positive for appetite change and fatigue. Negative for activity change and fever.   Respiratory:  Negative for cough and shortness of breath.    Cardiovascular:  Negative for chest pain, palpitations and leg swelling.    Gastrointestinal:  Negative for abdominal pain, constipation, diarrhea and nausea.   Neurological:  Positive for weakness. Negative for dizziness, light-headedness and headaches.       Current Outpatient Medications on File Prior to Visit   Medication Sig    aspirin 81 MG tablet Take 1 tablet by mouth daily    atorvastatin (LIPITOR) 10 mg tablet TAKE 1 TABLET (10 MG TOTAL) BY MOUTH DAILY    Blood Glucose Monitoring Suppl (OneTouch Verio Reflect) w/Device KIT Use    Calcium Carbonate 1500 (600 Ca) MG TABS Take by mouth    carvedilol (COREG) 25 mg tablet Take 1 tablet (25 mg total) by mouth 2 (two) times a day    Cholecalciferol (VITAMIN D3) 2000 units capsule Take 1 tablet by mouth daily    EPINEPHrine (EPIPEN) 0.3 mg/0.3 mL SOAJ Inject 1 Syringe into a muscle    fluticasone (FLONASE) 50 mcg/act nasal spray USE 1 SPRAY IN EACH NOSTRIL EVERY DAY (Patient taking differently: daily)    glucose blood (OneTouch Verio) test strip Use to test blood glucose once daily    hydrochlorothiazide (HYDRODIURIL) 25 mg tablet Take 1 tablet (25 mg total) by mouth daily    Lancets (OneTouch Delica Plus [id number]) MISC Use to test blood sugar once daily    levothyroxine 25 mcg tablet TAKE 1 AND 1/2 TABLETS BY MOUTH ONE TIME DAILY    lisinopril (ZESTRIL) 40 mg tablet Take 1 tablet (40 mg total) by mouth 2 (two) times a day    Multiple Vitamins-Minerals (WOMENS ONE DAILY) TABS Take 1 tablet by mouth daily    Omega-3 Fatty Acids (OMEGA-3 FISH OIL) 1000 MG CAPS Take 1 capsule by mouth daily     Ozempic, 2 MG/DOSE, 8 MG/3ML injection pen INJECT 0.75 MILLILITERS (2MG TOTAL) SUBCUTANEOUSLY (UNDER THE SKIN) EVERY 7 DAYS    pantoprazole (PROTONIX) 20 mg tablet Take 1 tablet (20 mg total) by mouth daily    spironolactone (ALDACTONE) 25 mg tablet Take 1 tablet (25 mg total) by mouth daily    vitamin B-12 (VITAMIN B-12) 500 mcg tablet Take 1 tablet (500 mcg total) by mouth daily       Objective     /80   Pulse 86   Temp (!) 96.9 °F (36.1  "°C)   Ht 5' 3\" (1.6 m)   Wt 76.2 kg (168 lb)   LMP  (LMP Unknown)   SpO2 97%   BMI 29.76 kg/m²     Physical Exam  Vitals reviewed.   Constitutional:       Appearance: Normal appearance.   HENT:      Head: Normocephalic and atraumatic.   Eyes:      Conjunctiva/sclera: Conjunctivae normal.   Cardiovascular:      Rate and Rhythm: Normal rate and regular rhythm.      Heart sounds: Normal heart sounds.   Pulmonary:      Effort: Pulmonary effort is normal.      Breath sounds: Normal breath sounds.   Skin:     General: Skin is warm and dry.   Neurological:      Mental Status: She is alert and oriented to person, place, and time.   Psychiatric:         Mood and Affect: Mood normal.         Behavior: Behavior normal.       EMILIA Taylor    "

## 2024-01-01 NOTE — TELEPHONE ENCOUNTER
Please call patient. If not feeling too well, can get it when she is feeling better. You can get it at your local pharmacy or can schedule at the office. (1) weak, irregular

## 2024-01-05 ENCOUNTER — TELEPHONE (OUTPATIENT)
Dept: INTERNAL MEDICINE CLINIC | Facility: CLINIC | Age: 84
End: 2024-01-05

## 2024-01-05 NOTE — TELEPHONE ENCOUNTER
Patient states her BP has been all over the place and she has been going to health center , did restart medications 2 day     Patient requesting to come in and go over all medications she feels our list is wrong and to get everything straightened out

## 2024-01-12 NOTE — TELEPHONE ENCOUNTER
Please call patient. She missed appointment today. Is she ok?   She had made the appointment to go over blood pressure medications and see how her blood pressures have been.

## 2024-01-15 DIAGNOSIS — K22.70 BARRETT'S ESOPHAGUS WITHOUT DYSPLASIA: ICD-10-CM

## 2024-01-15 DIAGNOSIS — I10 HYPERTENSION, UNSPECIFIED TYPE: ICD-10-CM

## 2024-01-16 ENCOUNTER — TELEPHONE (OUTPATIENT)
Age: 84
End: 2024-01-16

## 2024-01-16 RX ORDER — HYDROCHLOROTHIAZIDE 25 MG/1
25 TABLET ORAL DAILY
Qty: 90 TABLET | Refills: 1 | Status: SHIPPED | OUTPATIENT
Start: 2024-01-16

## 2024-01-16 RX ORDER — PANTOPRAZOLE SODIUM 20 MG/1
20 TABLET, DELAYED RELEASE ORAL DAILY
Qty: 30 TABLET | Refills: 1 | Status: SHIPPED | OUTPATIENT
Start: 2024-01-16 | End: 2024-01-23 | Stop reason: SDUPTHER

## 2024-01-16 NOTE — TELEPHONE ENCOUNTER
Patient requesting refills.  Has not been seen in over a year.  Please schedule an appointment.  Courtesy refill provided.

## 2024-01-16 NOTE — TELEPHONE ENCOUNTER
Pt called in regards to medication pantoprazole (PROTONIX) 20 mg tablet . Pt wanted to make sure that script was sent to the pharmacy. Informed pt that script was sent to [facility] pharmacy.

## 2024-01-23 ENCOUNTER — OFFICE VISIT (OUTPATIENT)
Dept: GASTROENTEROLOGY | Facility: AMBULARY SURGERY CENTER | Age: 84
End: 2024-01-23
Payer: MEDICARE

## 2024-01-23 VITALS
SYSTOLIC BLOOD PRESSURE: 132 MMHG | BODY MASS INDEX: 33.49 KG/M2 | WEIGHT: 170.6 LBS | HEART RATE: 74 BPM | DIASTOLIC BLOOD PRESSURE: 68 MMHG | HEIGHT: 60 IN | OXYGEN SATURATION: 97 %

## 2024-01-23 DIAGNOSIS — K59.00 CONSTIPATION, UNSPECIFIED CONSTIPATION TYPE: Primary | ICD-10-CM

## 2024-01-23 DIAGNOSIS — K22.70 BARRETT'S ESOPHAGUS WITHOUT DYSPLASIA: ICD-10-CM

## 2024-01-23 DIAGNOSIS — E66.01 OBESITY, MORBID (HCC): ICD-10-CM

## 2024-01-23 PROCEDURE — 99213 OFFICE O/P EST LOW 20 MIN: CPT | Performed by: PHYSICIAN ASSISTANT

## 2024-01-23 RX ORDER — PANTOPRAZOLE SODIUM 20 MG/1
20 TABLET, DELAYED RELEASE ORAL DAILY
Qty: 90 TABLET | Refills: 3 | Status: SHIPPED | OUTPATIENT
Start: 2024-01-23

## 2024-01-23 NOTE — PROGRESS NOTES
[facility] - Outpatient Follow-up Note  Maxine Acuña 83 y.o. female MRN: [id number]  Encounter: [id number]          ASSESSMENT AND PLAN:      Pleasant 83-year-old female with history of type 2 diabetes on Ozempic, hypothyroidism, CKD who presents the office for follow-up for Fernández's esophagus and alternating bowel habits.    1. Fernández's esophagus without dysplasia  Last noted on EGD 4/2021.  She has been taking Protonix daily with rare breakthrough symptoms.  She was recommended repeat EGD at last office visit which was not performed yet.    -It appears patient will be due for EGD 4/20204.  She will be seeing me for follow-up around that time and would like to schedule EGD at that time instead of now.    -Continue Protonix daily. Refills sent  -Patient is non-smoker, no significant alcohol use.    2. Alternating bowel habits, predominant constipation  Patient reports persistent alternating bowel habits.  Appears to be mainly constipated followed by episodes of diarrhea.  Her last colonoscopy in 2021 was unremarkable besides small polyp.  Symptoms seem most consistent for constipation with overflow diarrhea.    -Recommend patient start a fiber supplement such as Metamucil 1 tablespoon daily.  -If no improvement with this at next office visit, would recommend KUB to assess for stool burden and consider starting MiraLAX.    - Will see if abdominal pain and cramping improves with more regular bowel habits.  If not consider IBgard.  Will avoid Bentyl at this time due to age, could consider in the future.    -Patient would like to hold off on further colonoscopies.  No specific repeat was recommended after her last one.      Patient was recommended to follow up in 2 months. Patient advised to reach out via [employer] with any questions or concerns in the meantime.   ______________________________________________________________________    SUBJECTIVE:      Maxine Acuña is a 83 y.o. female  with Fernández's esophagus, type 2 diabetes on Ozempic, hypothyroidism, hypertension, CKD who presents the office for follow-up.      Patient was last seen in the office 9/2022 for Fernández's esophagus and abnormal bowel habits.  She was recommended EGD at that time which was not performed.    Patient reports still having alternating bowel habits.  She appears to be more predominantly constipated.  She reports passage of small hard stools which can occur for several days or weeks followed by severe abdominal cramping and diarrhea.  She is not currently on a fiber supplement.  She reports following with outside PCP few months ago and was given antibiotic course for suspected colitis.  She had no abdominal imaging or stool studies performed at that time.  Since starting Ozempic she has lost about 22 pounds.    She reports her reflux symptoms are well-controlled on Protonix.  She believes she takes this at nighttime.  She has rare breakthrough symptoms.    Most recent lab work last month with normal hemoglobin and liver enzymes.    Last EGD and colonoscopy 4/2021.  EGD was notable for Fernández's esophagus without dysplasia and was recommended repeat in 3 years.  Colonoscopy at that time with 1 small tubular adenoma removed.    REVIEW OF SYSTEMS IS OTHERWISE NEGATIVE.      Historical Information   Past Medical History:   Diagnosis Date    Abnormal mammogram     Acute sinusitis     Allergic 1960    Allergic reaction     Subsequent encounter, Resolved - 1/9/18    Allergic rhinitis 01/16/2012    Anaphylactic reaction     Other, Last assessed - 6/16/16    Anemia 01/16/2012    Arthritis 1970    Ataxia     Cellulitis of great toe of right foot     Chronic allergic rhinitis     Unspec seasonality, unspec trigger; Last assessed - 11/13/17    Chronic kidney disease     Contact dermatitis     Last assessed - 6/3/14    Dermatitis     Diabetes mellitus (HCC)     Disease of thyroid gland     Disorder of tympanic membrane     Last  assessed - 10/15/12    Dry skin     Last assessed - 4/15/14    Dysfunction of eustachian tube     Unspec laterality, Last assessed - 10/19/12    Ganglion cyst     GERD (gastroesophageal reflux disease)     Glaucoma     Hip pain, acute, unspecified laterality     Hyperlipidemia     Hypertension     Kidney stone     Labyrinthitis 2011    Leg mass, left     Limb pain     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    Long term use of drug     Last assessed - 14    Mammogram abnormal     Last assessed - 14    Mass of thigh, left     Obesity     Pain in ankle joint     Pes planus     Unspec laterality, Last assessed - 13    Shingles 1965    Shoulder pain, right     Vertigo 2012     Past Surgical History:   Procedure Laterality Date    OTHER SURGICAL HISTORY      Surgery unk     TONSILLECTOMY      US GUIDED THYROID BIOPSY  2023     Social History   Social History     Substance and Sexual Activity   Alcohol Use Yes    Alcohol/week: 2.0 standard drinks of alcohol    Types: 1 Glasses of wine, 1 Cans of beer per week    Comment: beer in summer     Social History     Substance and Sexual Activity   Drug Use No     Social History     Tobacco Use   Smoking Status Former    Current packs/day: 0.00    Average packs/day: 1 pack/day for 20.0 years (20.0 ttl pk-yrs)    Types: Cigarettes    Start date: 8/15/1958    Quit date: 1976    Years since quittin.0   Smokeless Tobacco Never   Tobacco Comments    quit for 9 mos. with each pregnancy     Family History   Problem Relation Age of Onset    Heart attack Mother         Ac MI    Arthritis Mother     Lung cancer Father     Other Father         Cerebral Embolism    Anemia Sister     Glaucoma Sister     Hypertension Sister     Thyroid disease Sister     Diabetes Brother     Hypertension Brother     Other Brother         Cardiac Disorder    Asthma Brother     No Known Problems Daughter     No Known Problems Sister     No Known Problems Daughter      No Known Problems Maternal Aunt     No Known Problems Paternal Aunt        Meds/Allergies       Current Outpatient Medications:     aspirin 81 MG tablet    atorvastatin (LIPITOR) 10 mg tablet    Blood Glucose Monitoring Suppl (OneTouch Verio Reflect) w/Device KIT    Calcium Carbonate 1500 (600 Ca) MG TABS    carvedilol (COREG) 25 mg tablet    Cholecalciferol (VITAMIN D3) 2000 units capsule    EPINEPHrine (EPIPEN) 0.3 mg/0.3 mL SOAJ    fluticasone (FLONASE) 50 mcg/act nasal spray    glucose blood (OneTouch Verio) test strip    hydrochlorothiazide (HYDRODIURIL) 25 mg tablet    Lancets (OneTouch Delica Plus [id number]) MISC    levothyroxine 25 mcg tablet    lisinopril (ZESTRIL) 40 mg tablet    Multiple Vitamins-Minerals (WOMENS ONE DAILY) TABS    Omega-3 Fatty Acids (OMEGA-3 FISH OIL) 1000 MG CAPS    Ozempic, 2 MG/DOSE, 8 MG/3ML injection pen    pantoprazole (PROTONIX) 20 mg tablet    spironolactone (ALDACTONE) 25 mg tablet    vitamin B-12 (VITAMIN B-12) 500 mcg tablet    Allergies   Allergen Reactions    Cortisone Hyperactivity    Bee Venom Edema    Virtussin A-C [Guaifenesin-Codeine] Throat Swelling    Bee Pollen Edema     Category: Allergy;            Objective     Blood pressure 132/68, pulse 74, height 5' (1.524 m), weight 77.4 kg (170 lb 9.6 oz), SpO2 97%, not currently breastfeeding. Body mass index is 33.32 kg/m².      PHYSICAL EXAM:      General Appearance:   Alert, cooperative, no distress   HEENT:   Normocephalic, atraumatic, anicteric.     Neck:  Supple, symmetrical, trachea midline   Lungs:   Clear to auscultation bilaterally; no rales, rhonchi or wheezing; respirations unlabored    Heart::   Regular rate and rhythm; no murmur, rub, or gallop.   Abdomen:   Soft, non-tender, non-distended; normal bowel sounds; no masses, no organomegaly. Benign abdomen.   Genitalia:   Deferred    Rectal:   Deferred    Extremities:  No cyanosis, clubbing or edema    Pulses:  2+ and symmetric    Skin:  No jaundice, rashes, or  lesions    Lymph nodes:  No palpable cervical lymphadenopathy        Lab Results:   No visits with results within 1 Day(s) from this visit.   Latest known visit with results is:   Transcribe Orders on 12/21/2023   Component Date Value    Creatinine, Ur 12/21/2023 150.1     Protein Urine Random 12/21/2023 8     Prot/Creat Ratio, Ur 12/21/2023 0.05          Radiology Results:   No results found.

## 2024-03-07 DIAGNOSIS — E11.65 TYPE 2 DIABETES MELLITUS WITH HYPERGLYCEMIA, WITHOUT LONG-TERM CURRENT USE OF INSULIN (HCC): ICD-10-CM

## 2024-03-08 RX ORDER — SEMAGLUTIDE 2.68 MG/ML
INJECTION, SOLUTION SUBCUTANEOUS
Qty: 9 ML | Refills: 0 | Status: SHIPPED | OUTPATIENT
Start: 2024-03-08

## 2024-04-04 ENCOUNTER — TELEPHONE (OUTPATIENT)
Dept: INTERNAL MEDICINE CLINIC | Facility: CLINIC | Age: 84
End: 2024-04-04

## 2024-04-04 DIAGNOSIS — I10 HYPERTENSION, UNSPECIFIED TYPE: ICD-10-CM

## 2024-04-04 DIAGNOSIS — E78.5 HYPERLIPIDEMIA, UNSPECIFIED HYPERLIPIDEMIA TYPE: ICD-10-CM

## 2024-04-04 RX ORDER — CARVEDILOL 25 MG/1
25 TABLET ORAL 2 TIMES DAILY
Qty: 180 TABLET | Refills: 1 | Status: SHIPPED | OUTPATIENT
Start: 2024-04-04

## 2024-04-04 RX ORDER — ATORVASTATIN CALCIUM 10 MG/1
10 TABLET, FILM COATED ORAL DAILY
Qty: 90 TABLET | Refills: 1 | Status: SHIPPED | OUTPATIENT
Start: 2024-04-04

## 2024-04-04 RX ORDER — SPIRONOLACTONE 25 MG/1
25 TABLET ORAL DAILY
Qty: 90 TABLET | Refills: 1 | Status: SHIPPED | OUTPATIENT
Start: 2024-04-04

## 2024-04-04 NOTE — TELEPHONE ENCOUNTER
Patient was previously getting this medication by her last PCP.    Reason for call:   [x] Refill   [] Prior Auth  [] Other:     Office:   [x] PCP/Provider -   [] Specialty/Provider -     Medication:     Quantity: 90 day supply    Pharmacy: [id number] - [place], [place] - [address] [phone]     Does the patient have enough for 3 days?   [x] Yes   [] No - Send as HP to POD

## 2024-04-04 NOTE — TELEPHONE ENCOUNTER
Patient called the RX Refill Line. Message is being forwarded to the office.     Patient is requesting a call  back to schedule her annual well visit with Dr. Christie.     Please contact patient at  [phone]

## 2024-04-09 ENCOUNTER — TELEPHONE (OUTPATIENT)
Age: 84
End: 2024-04-09

## 2024-04-09 DIAGNOSIS — E11.65 TYPE 2 DIABETES MELLITUS WITH HYPERGLYCEMIA, WITHOUT LONG-TERM CURRENT USE OF INSULIN (HCC): ICD-10-CM

## 2024-04-09 DIAGNOSIS — E78.2 MIXED HYPERLIPIDEMIA: ICD-10-CM

## 2024-04-09 DIAGNOSIS — E53.8 VITAMIN B12 DEFICIENCY: ICD-10-CM

## 2024-04-09 DIAGNOSIS — E03.9 HYPOTHYROIDISM, UNSPECIFIED TYPE: ICD-10-CM

## 2024-04-09 DIAGNOSIS — N20.0 NEPHROLITHIASIS: ICD-10-CM

## 2024-04-09 DIAGNOSIS — I10 PRIMARY HYPERTENSION: Primary | ICD-10-CM

## 2024-04-09 DIAGNOSIS — E55.9 VITAMIN D DEFICIENCY: ICD-10-CM

## 2024-04-09 DIAGNOSIS — N18.31 STAGE 3A CHRONIC KIDNEY DISEASE (HCC): Chronic | ICD-10-CM

## 2024-04-09 NOTE — TELEPHONE ENCOUNTER
Patient called and scheduled her MAW visit on 04/23 and needs lab orders placed prior.  She would like a call back to confirm when this has been completed

## 2024-04-10 DIAGNOSIS — E03.9 HYPOTHYROIDISM, UNSPECIFIED TYPE: ICD-10-CM

## 2024-04-10 RX ORDER — LEVOTHYROXINE SODIUM 0.03 MG/1
TABLET ORAL
Qty: 90 TABLET | Refills: 1 | Status: SHIPPED | OUTPATIENT
Start: 2024-04-10

## 2024-04-10 NOTE — TELEPHONE ENCOUNTER
Ordered.  If using [facility] lab, no need for scripts. If not, please ask if we will mail or fax to appropriate lab.

## 2024-04-15 ENCOUNTER — TELEPHONE (OUTPATIENT)
Age: 84
End: 2024-04-15

## 2024-04-15 NOTE — TELEPHONE ENCOUNTER
Patient called about her Ozempic. She has been on it for months, and was recently increased to the 2 mg pens. She noticed that the injection stings but she could handle it. But this last time, it hurt when she injected the medication and around the site, her skin swelled and formed a [place]. Now there is still a sebas where she inserted the needle.    She wants to know what to do, her next injection is Friday.

## 2024-04-16 ENCOUNTER — RA CDI HCC (OUTPATIENT)
Dept: OTHER | Facility: HOSPITAL | Age: 84
End: 2024-04-16

## 2024-04-17 NOTE — TELEPHONE ENCOUNTER
Patient called back- she has switched sides, she said she always does.     She has to take it on Friday though, and it concerned because it has never been painful before.

## 2024-04-17 NOTE — TELEPHONE ENCOUNTER
Called pt, she did not answered the phone. Lvm letting her know that I have some new information from the doctor about her Ozempic concern.

## 2024-04-17 NOTE — TELEPHONE ENCOUNTER
Returned call to patient and again reviewed providers message. Patient became frustrated and states that the needle hurts and that it has never hurt before. Advised patient to switch sites of administration, also reviewed injection technique. Patient states that a bubble formed after she injected and that she wanted to make sure she was not having an allergic reaction. Patient denied any additional systems. States that if it happens again she will go to the emergency room and then abruptly disconnected call.

## 2024-04-18 ENCOUNTER — APPOINTMENT (OUTPATIENT)
Dept: LAB | Age: 84
End: 2024-04-18
Payer: MEDICARE

## 2024-04-18 DIAGNOSIS — N18.31 STAGE 3A CHRONIC KIDNEY DISEASE (HCC): Chronic | ICD-10-CM

## 2024-04-18 DIAGNOSIS — E11.65 TYPE 2 DIABETES MELLITUS WITH HYPERGLYCEMIA, WITHOUT LONG-TERM CURRENT USE OF INSULIN (HCC): ICD-10-CM

## 2024-04-18 DIAGNOSIS — E78.2 MIXED HYPERLIPIDEMIA: ICD-10-CM

## 2024-04-18 DIAGNOSIS — N20.0 NEPHROLITHIASIS: ICD-10-CM

## 2024-04-18 DIAGNOSIS — E53.8 VITAMIN B12 DEFICIENCY: ICD-10-CM

## 2024-04-18 DIAGNOSIS — E03.9 HYPOTHYROIDISM, UNSPECIFIED TYPE: ICD-10-CM

## 2024-04-18 DIAGNOSIS — E55.9 VITAMIN D DEFICIENCY: ICD-10-CM

## 2024-04-18 LAB
ALBUMIN SERPL BCP-MCNC: 3.9 G/DL (ref 3.5–5)
ALP SERPL-CCNC: 59 U/L (ref 34–104)
ALT SERPL W P-5'-P-CCNC: 10 U/L (ref 7–52)
ANION GAP SERPL CALCULATED.3IONS-SCNC: 9 MMOL/L (ref 4–13)
AST SERPL W P-5'-P-CCNC: 13 U/L (ref 13–39)
BASOPHILS # BLD AUTO: 0.08 THOUSANDS/ÂΜL (ref 0–0.1)
BASOPHILS NFR BLD AUTO: 1 % (ref 0–1)
BILIRUB SERPL-MCNC: 0.54 MG/DL (ref 0.2–1)
BUN SERPL-MCNC: 28 MG/DL (ref 5–25)
CALCIUM SERPL-MCNC: 9.7 MG/DL (ref 8.4–10.2)
CHLORIDE SERPL-SCNC: 100 MMOL/L (ref 96–108)
CHOLEST SERPL-MCNC: 168 MG/DL
CO2 SERPL-SCNC: 27 MMOL/L (ref 21–32)
CREAT SERPL-MCNC: 1.24 MG/DL (ref 0.6–1.3)
EOSINOPHIL # BLD AUTO: 0.25 THOUSAND/ÂΜL (ref 0–0.61)
EOSINOPHIL NFR BLD AUTO: 3 % (ref 0–6)
ERYTHROCYTE [DISTWIDTH] IN BLOOD BY AUTOMATED COUNT: 14.4 % (ref 11.6–15.1)
GFR SERPL CREATININE-BSD FRML MDRD: 40 ML/MIN/1.73SQ M
GLUCOSE P FAST SERPL-MCNC: 134 MG/DL (ref 65–99)
HCT VFR BLD AUTO: 41.4 % (ref 34.8–46.1)
HDLC SERPL-MCNC: 40 MG/DL
HGB BLD-MCNC: 12.7 G/DL (ref 11.5–15.4)
IMM GRANULOCYTES # BLD AUTO: 0.03 THOUSAND/UL (ref 0–0.2)
IMM GRANULOCYTES NFR BLD AUTO: 0 % (ref 0–2)
LDLC SERPL CALC-MCNC: 80 MG/DL (ref 0–100)
LYMPHOCYTES # BLD AUTO: 3.02 THOUSANDS/ÂΜL (ref 0.6–4.47)
LYMPHOCYTES NFR BLD AUTO: 34 % (ref 14–44)
MCH RBC QN AUTO: 22.8 PG (ref 26.8–34.3)
MCHC RBC AUTO-ENTMCNC: 30.7 G/DL (ref 31.4–37.4)
MCV RBC AUTO: 74 FL (ref 82–98)
MONOCYTES # BLD AUTO: 0.63 THOUSAND/ÂΜL (ref 0.17–1.22)
MONOCYTES NFR BLD AUTO: 7 % (ref 4–12)
NEUTROPHILS # BLD AUTO: 4.88 THOUSANDS/ÂΜL (ref 1.85–7.62)
NEUTS SEG NFR BLD AUTO: 55 % (ref 43–75)
NONHDLC SERPL-MCNC: 128 MG/DL
NRBC BLD AUTO-RTO: 0 /100 WBCS
PLATELET # BLD AUTO: 242 THOUSANDS/UL (ref 149–390)
PMV BLD AUTO: 10.4 FL (ref 8.9–12.7)
POTASSIUM SERPL-SCNC: 4.4 MMOL/L (ref 3.5–5.3)
PROT SERPL-MCNC: 6.9 G/DL (ref 6.4–8.4)
RBC # BLD AUTO: 5.58 MILLION/UL (ref 3.81–5.12)
SODIUM SERPL-SCNC: 136 MMOL/L (ref 135–147)
TRIGL SERPL-MCNC: 238 MG/DL
URATE SERPL-MCNC: 8.1 MG/DL (ref 2–7.5)
VIT B12 SERPL-MCNC: 363 PG/ML (ref 180–914)
WBC # BLD AUTO: 8.89 THOUSAND/UL (ref 4.31–10.16)

## 2024-04-18 PROCEDURE — 80061 LIPID PANEL: CPT

## 2024-04-18 PROCEDURE — 82607 VITAMIN B-12: CPT

## 2024-04-18 PROCEDURE — 84550 ASSAY OF BLOOD/URIC ACID: CPT

## 2024-04-18 PROCEDURE — 85025 COMPLETE CBC W/AUTO DIFF WBC: CPT

## 2024-04-18 PROCEDURE — 36415 COLL VENOUS BLD VENIPUNCTURE: CPT

## 2024-04-18 PROCEDURE — 83036 HEMOGLOBIN GLYCOSYLATED A1C: CPT

## 2024-04-18 PROCEDURE — 84443 ASSAY THYROID STIM HORMONE: CPT

## 2024-04-18 PROCEDURE — 82306 VITAMIN D 25 HYDROXY: CPT

## 2024-04-18 PROCEDURE — 80053 COMPREHEN METABOLIC PANEL: CPT

## 2024-04-19 LAB
25(OH)D3 SERPL-MCNC: 35.7 NG/ML (ref 30–100)
EST. AVERAGE GLUCOSE BLD GHB EST-MCNC: 151 MG/DL
HBA1C MFR BLD: 6.9 %
TSH SERPL DL<=0.05 MIU/L-ACNC: 2.3 UIU/ML (ref 0.45–4.5)

## 2024-04-23 ENCOUNTER — OFFICE VISIT (OUTPATIENT)
Dept: INTERNAL MEDICINE CLINIC | Facility: CLINIC | Age: 84
End: 2024-04-23
Payer: MEDICARE

## 2024-04-23 VITALS
OXYGEN SATURATION: 97 % | DIASTOLIC BLOOD PRESSURE: 78 MMHG | SYSTOLIC BLOOD PRESSURE: 138 MMHG | WEIGHT: 174.4 LBS | BODY MASS INDEX: 32.93 KG/M2 | TEMPERATURE: 96.4 F | HEART RATE: 76 BPM | HEIGHT: 61 IN

## 2024-04-23 DIAGNOSIS — E11.21 MICROALBUMINURIC DIABETIC NEPHROPATHY (HCC): ICD-10-CM

## 2024-04-23 DIAGNOSIS — R25.1 TREMOR: ICD-10-CM

## 2024-04-23 DIAGNOSIS — E11.65 TYPE 2 DIABETES MELLITUS WITH HYPERGLYCEMIA, WITHOUT LONG-TERM CURRENT USE OF INSULIN (HCC): ICD-10-CM

## 2024-04-23 DIAGNOSIS — R91.1 PULMONARY NODULE: ICD-10-CM

## 2024-04-23 DIAGNOSIS — E79.0 ELEVATED URIC ACID IN BLOOD: ICD-10-CM

## 2024-04-23 DIAGNOSIS — J30.9 CHRONIC ALLERGIC RHINITIS: ICD-10-CM

## 2024-04-23 DIAGNOSIS — E66.01 OBESITY, MORBID (HCC): ICD-10-CM

## 2024-04-23 DIAGNOSIS — M85.89 OSTEOPENIA OF MULTIPLE SITES: ICD-10-CM

## 2024-04-23 DIAGNOSIS — K58.2 IRRITABLE BOWEL SYNDROME WITH ALTERNATING BOWEL HABITS: ICD-10-CM

## 2024-04-23 DIAGNOSIS — E03.9 HYPOTHYROIDISM, UNSPECIFIED TYPE: ICD-10-CM

## 2024-04-23 DIAGNOSIS — Z00.00 MEDICARE ANNUAL WELLNESS VISIT, SUBSEQUENT: ICD-10-CM

## 2024-04-23 DIAGNOSIS — K22.70 BARRETT'S ESOPHAGUS WITHOUT DYSPLASIA: ICD-10-CM

## 2024-04-23 DIAGNOSIS — E04.1 THYROID NODULE: ICD-10-CM

## 2024-04-23 DIAGNOSIS — E53.8 VITAMIN B12 DEFICIENCY: ICD-10-CM

## 2024-04-23 DIAGNOSIS — Z00.00 HEALTH MAINTENANCE EXAMINATION: ICD-10-CM

## 2024-04-23 DIAGNOSIS — Z12.31 ENCOUNTER FOR SCREENING MAMMOGRAM FOR BREAST CANCER: ICD-10-CM

## 2024-04-23 DIAGNOSIS — I10 PRIMARY HYPERTENSION: ICD-10-CM

## 2024-04-23 DIAGNOSIS — N18.31 STAGE 3A CHRONIC KIDNEY DISEASE (HCC): Chronic | ICD-10-CM

## 2024-04-23 DIAGNOSIS — E78.2 MIXED HYPERLIPIDEMIA: ICD-10-CM

## 2024-04-23 DIAGNOSIS — J34.89 NASAL DISCHARGE: ICD-10-CM

## 2024-04-23 DIAGNOSIS — R26.89 BALANCE DISORDER: Primary | ICD-10-CM

## 2024-04-23 PROCEDURE — G0439 PPPS, SUBSEQ VISIT: HCPCS | Performed by: INTERNAL MEDICINE

## 2024-04-23 PROCEDURE — 99214 OFFICE O/P EST MOD 30 MIN: CPT | Performed by: INTERNAL MEDICINE

## 2024-04-23 RX ORDER — AZELASTINE 1 MG/ML
1 SPRAY, METERED NASAL DAILY
Qty: 30 ML | Refills: 0 | Status: SHIPPED | OUTPATIENT
Start: 2024-04-23

## 2024-04-23 NOTE — ASSESSMENT & PLAN NOTE
Lab Results   Component Value Date    HGBA1C 6.9 (H) 04/18/2024     A1c improved. On Ozempic 2 mg.

## 2024-04-23 NOTE — ASSESSMENT & PLAN NOTE
BP stable. On carvedilol 25 mg bid, HCTZ 25 mg daily, lisinopril 40 mg bid and spironolactone 25 mg daily.    Discussed importance of regularly taking medication at similar times of the day.  Due to frequent urination, balance issues, recommend to stop HCTZ.  Monitor BP.

## 2024-04-23 NOTE — ASSESSMENT & PLAN NOTE
Lab Results   Component Value Date    EGFR 40 04/18/2024    EGFR 34 12/21/2023    EGFR 43 09/19/2023    CREATININE 1.24 04/18/2024    CREATININE 1.44 (H) 12/21/2023    CREATININE 1.18 09/19/2023     Stable.  No NSAIDs.

## 2024-04-23 NOTE — PROGRESS NOTES
Diabetic Foot Exam    Patient's shoes and socks removed.    Right Foot/Ankle   Right Foot Inspection  Skin Exam: skin normal and skin intact. No dry skin, no warmth, no callus, no erythema, no maceration, no abnormal color, no pre-ulcer, no ulcer and no callus.     Toe Exam: ROM and strength within normal limits.     Sensory   Monofilament testing: intact    Vascular  The right DP pulse is 2+.     Left Foot/Ankle  Left Foot Inspection  Skin Exam: skin normal and skin intact. No dry skin, no warmth, no erythema, no maceration, normal color, no pre-ulcer, no ulcer and no callus.     Toe Exam: ROM and strength within normal limits.     Sensory   Monofilament testing: intact    Vascular  The left DP pulse is 2+.     Assign Risk Category  No deformity present  No loss of protective sensation  No weak pulses  Risk: 0

## 2024-04-23 NOTE — PROGRESS NOTES
Assessment and Plan:     Problem List Items Addressed This Visit        Cardiovascular and Mediastinum    Hypertension     BP stable. On carvedilol 25 mg bid, HCTZ 25 mg daily, lisinopril 40 mg bid and spironolactone 25 mg daily.    Discussed importance of regularly taking medication at similar times of the day.  Due to frequent urination, balance issues, recommend to stop HCTZ.  Monitor BP.            Respiratory    Chronic allergic rhinitis     Using daily nasal spray. Instructed to use azelastine for a few days, then resume Flonase.         Pulmonary nodule     Stable on CT last year, no further follow up recommended.         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         Irritable bowel syndrome with alternating bowel habits     Adequate fiber and fluid intake, still with occasional hard stools.  Saw GI.            Endocrine    Hypothyroidism     Adequately replaced.         Microalbuminuric diabetic nephropathy (HCC)    Thyroid nodule     US scheduled.         Type 2 diabetes mellitus with hyperglycemia, without long-term current use of insulin (HCC)       Lab Results   Component Value Date    HGBA1C 6.9 (H) 04/18/2024     A1c improved. On Ozempic 2 mg.         Relevant Orders    Hemoglobin A1C       Musculoskeletal and Integument    Osteopenia of multiple sites    Relevant Orders    DXA bone density spine hip and pelvis       Genitourinary    CKD (chronic kidney disease) stage 3, GFR 30-59 ml/min ([facility]) (Chronic)     Lab Results   Component Value Date    EGFR 40 04/18/2024    EGFR 34 12/21/2023    EGFR 43 09/19/2023    CREATININE 1.24 04/18/2024    CREATININE 1.44 (H) 12/21/2023    CREATININE 1.18 09/19/2023     Stable.  No NSAIDs.         Relevant Orders    Comprehensive metabolic panel       Other    Elevated uric acid in blood     No gout episodes.         Hyperlipidemia     TG remains elevated, on  atorvastatin 10 mg.         Relevant Orders    Lipid panel    Obesity, morbid (HCC)     Stable weight.         Vitamin B12 deficiency     Restart daily B12.         Relevant Orders    Vitamin B12   Other Visit Diagnoses     Balance disorder    -  Primary    Agrees to go to physical therapy.    Relevant Orders    Ambulatory Referral to Neurology    Ambulatory Referral to Physical Therapy    Encounter for screening mammogram for breast cancer        Relevant Orders    Mammo screening bilateral w 3d & cad    Medicare annual wellness visit, subsequent        Tremor        Head and hand tremor, rule out PD.    Relevant Orders    Ambulatory Referral to Neurology    Health maintenance examination        Nasal discharge        Relevant Medications    azelastine (ASTELIN) 0.1 % nasal spray           Preventive health issues were discussed with patient, and age appropriate screening tests were ordered as noted in patient's After Visit Summary.  Personalized health advice and appropriate referrals for health education or preventive services given if needed, as noted in patient's After Visit Summary.     History of Present Illness:     Patient presents for a Medicare Wellness Visit    She has multiple complaints.  She reports her gait is not as good. No falls. She feels her balance is off, especially when turning.  She also complains of tremors of both hands, does not occur all the time.  She also noticed that her head or her jaw would shake when she is at rest.  Again, it is not consistent, does not occur daily.  She noticed that her handwriting is much smaller, says it is now illegible.  She also complains of excess tears and salivation with frequent clear nasal discharge from her right nostril.  She uses Flonase which does not really help.  She does use eyedrops for the excess tears.  She reports she would just notice her saliva come out of her without noticing anything.    She complains of spasms especially her hands.  She  needs to massage it frequently.  She says she is drinking enough water on a regular basis.  She does not sleep through the night since she wakes up every 1-2 hours to urinate.  She says she does not sleep through the night all the time anyway.    She complains of diffuse abdominal discomfort.  This does not occur daily, does not have a specific area of discomfort.  No nausea or vomiting.  She moves her bowels every few days.  She would have loose stools then would have several balls. She has been eating more fruits and veggies recently. She had seen GI.    She still has itching by her right breast. No rash.  She complains of scaly spots on her face. She has an appointment with dermatology.       Patient Care Team:  Cleopatra Christie MD as PCP - General (Internal Medicine)  MD Herb Pitt MD Michael Anthony Dunn, DO Henrietta Diehl DO     Review of Systems:     Review of Systems   Constitutional:  Negative for appetite change, fatigue and fever.   HENT:  Positive for postnasal drip and rhinorrhea. Negative for congestion and ear pain.    Eyes:  Negative for visual disturbance.   Respiratory:  Negative for cough and shortness of breath.    Cardiovascular:  Negative for chest pain and leg swelling.   Gastrointestinal:  Positive for constipation. Negative for abdominal pain, diarrhea and nausea.   Genitourinary:  Positive for frequency. Negative for difficulty urinating, dysuria, flank pain and urgency.   Musculoskeletal:  Positive for gait problem. Negative for arthralgias and myalgias.   Skin:  Negative for rash and wound.   Neurological:  Positive for tremors. Negative for dizziness, syncope, numbness and headaches.   Hematological:  Does not bruise/bleed easily.   Psychiatric/Behavioral:  Positive for sleep disturbance. Negative for confusion. The patient is not nervous/anxious.         Problem List:     Patient Active Problem List   Diagnosis   • Chronic allergic rhinitis   • Ganglion cyst   •  Hyperlipidemia   • Hypertension   • Microalbuminuric diabetic nephropathy (HCC)   • OA (osteoarthritis)   • Osteopenia of multiple sites   • Thalassemia minor   • Type 2 diabetes mellitus with hyperglycemia, without long-term current use of insulin (HCC)   • Vitamin D deficiency   • CKD (chronic kidney disease) stage 3, GFR 30-59 ml/min (HCC)   • Flat foot   • H/O abnormal mammogram   • Chest pain   • Hypothyroidism   • Gall stones   • Pulmonary nodule   • Thyroid nodule   • Anemia   • Fernández's esophagus without dysplasia   • Hiatal hernia   • Hx of adenomatous colonic polyps   • DDD (degenerative disc disease), cervical   • Obesity, morbid (HCC)   • Nephrolithiasis   • Liver cyst   • Vitamin B12 deficiency   • Elevated uric acid in blood   • Irritable bowel syndrome with alternating bowel habits      Past Medical and Surgical History:     Past Medical History:   Diagnosis Date   • Abnormal mammogram    • Acute sinusitis    • Allergic 1960   • Allergic reaction     Subsequent encounter, Resolved - 1/9/18   • Allergic rhinitis 01/16/2012   • Anaphylactic reaction     Other, Last assessed - 6/16/16   • Anemia 01/16/2012   • Arthritis 1970   • Ataxia    • Cellulitis of great toe of right foot    • Chronic allergic rhinitis     Unspec seasonality, unspec trigger; Last assessed - 11/13/17   • Chronic kidney disease    • Contact dermatitis     Last assessed - 6/3/14   • Dermatitis    • Diabetes mellitus ([facility])    • Disease of thyroid gland    • Disorder of tympanic membrane     Last assessed - 10/15/12   • Dry skin     Last assessed - 4/15/14   • Dysfunction of eustachian tube     Unspec laterality, Last assessed - 10/19/12   • Ganglion cyst    • GERD (gastroesophageal reflux disease) 1962   • Glaucoma    • Hip pain, acute, unspecified laterality    • Hyperlipidemia    • Hypertension    • Kidney stone    • Labyrinthitis 11/17/2011   • Leg mass, left    • Limb pain    • 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   • Long  term use of drug     Last assessed - 14   • Mammogram abnormal     Last assessed - 14   • Mass of thigh, left    • Obesity    • Pain in ankle joint    • Pes planus     Unspec laterality, Last assessed - 13   • Shingles 1965   • Shoulder pain, right    • Vertigo 2012     Past Surgical History:   Procedure Laterality Date   • OTHER SURGICAL HISTORY      Surgery unk    • TONSILLECTOMY     • US GUIDED THYROID BIOPSY  2023      Family History:     Family History   Problem Relation Age of Onset   • Heart attack Mother         Ac MI   • Arthritis Mother    • Lung cancer Father    • Other Father         Cerebral Embolism   • Anemia Sister    • Glaucoma Sister    • Hypertension Sister    • Thyroid disease Sister    • Diabetes Brother    • Hypertension Brother    • Other Brother         Cardiac Disorder   • Asthma Brother    • No Known Problems Daughter    • No Known Problems Sister    • No Known Problems Daughter    • No Known Problems Maternal Aunt    • No Known Problems Paternal Aunt       Social History:     Social History     Socioeconomic History   • Marital status: Single     Spouse name: None   • Number of children: None   • Years of education: None   • Highest education level: None   Occupational History   • None   Tobacco Use   • Smoking status: Former     Current packs/day: 0.00     Average packs/day: 1 pack/day for 20.0 years (20.0 ttl pk-yrs)     Types: Cigarettes     Start date: 8/15/1958     Quit date: 1976     Years since quittin.3   • Smokeless tobacco: Never   • Tobacco comments:     quit for 9 mos. with each pregnancy   Vaping Use   • Vaping status: Never Used   Substance and Sexual Activity   • Alcohol use: Yes     Alcohol/week: 2.0 standard drinks of alcohol     Types: 1 Glasses of wine, 1 Cans of beer per week     Comment: beer in summer   • Drug use: No   • Sexual activity: Not Currently     Partners: Male     Comment: who can remember   Other Topics Concern   • None    Social History Narrative    Lives independently at [facility]    2 daughter, one in [place] and one in [place]        Advance directive on file    Always uses seat belt    Daily caffeine consumption, 1 serving a day    Drinks coffee - 1 cup per day    Exercises 3x per week          Social Determinants of Health     Financial Resource Strain: Low Risk  (2/3/2023)    Overall Financial Resource Strain (CARDIA)    • Difficulty of Paying Living Expenses: Not very hard   Food Insecurity: No Food Insecurity (4/16/2024)    Hunger Vital Sign    • Worried About Running Out of Food in the Last Year: Never true    • Ran Out of Food in the Last Year: Never true   Transportation Needs: No Transportation Needs (4/16/2024)    PRAPARE - Transportation    • Lack of Transportation (Medical): No    • Lack of Transportation (Non-Medical): No   Physical Activity: Not on file   Stress: Not on file   Social Connections: Not on file   Intimate Partner Violence: Not on file   Housing Stability: Low Risk  (4/16/2024)    Housing Stability Vital Sign    • Unable to Pay for Housing in the Last Year: No    • Number of Places Lived in the Last Year: 1    • Unstable Housing in the Last Year: No      Medications and Allergies:     Current Outpatient Medications   Medication Sig Dispense Refill   • aspirin 81 MG tablet Take 1 tablet by mouth daily     • atorvastatin (LIPITOR) 10 mg tablet Take 1 tablet (10 mg total) by mouth daily 90 tablet 1   • azelastine (ASTELIN) 0.1 % nasal spray 1 spray into each nostril in the morning Use in each nostril as directed 30 mL 0   • Blood Glucose Monitoring Suppl (OneTouch Verio Reflect) w/Device KIT Use     • Calcium Carbonate 1500 (600 Ca) MG TABS Take by mouth     • carvedilol (COREG) 25 mg tablet Take 1 tablet (25 mg total) by mouth 2 (two) times a day 180 tablet 1   • Cholecalciferol (VITAMIN D3) 2000 units capsule Take 1 tablet by mouth daily     • EPINEPHrine (EPIPEN) 0.3 mg/0.3 mL SOAJ Inject 1  Syringe into a muscle     • fluticasone (FLONASE) 50 mcg/act nasal spray USE 1 SPRAY IN EACH NOSTRIL EVERY DAY (Patient taking differently: daily) 48 mL 2   • glucose blood (OneTouch Verio) test strip Use to test blood glucose once daily 100 strip 1   • hydrochlorothiazide (HYDRODIURIL) 25 mg tablet TAKE 1 TABLET BY MOUTH EVERY DAY 90 tablet 1   • Lancets (OneTouch Delica Plus [id number]) MISC Use to test blood sugar once daily 100 each 1   • levothyroxine 25 mcg tablet TAKE 1 AND 1/2 TABLETS BY MOUTH ONE TIME DAILY 90 tablet 1   • lisinopril (ZESTRIL) 40 mg tablet Take 1 tablet (40 mg total) by mouth 2 (two) times a day 180 tablet 3   • Multiple Vitamins-Minerals (WOMENS ONE DAILY) TABS Take 1 tablet by mouth daily     • Omega-3 Fatty Acids (OMEGA-3 FISH OIL) 1000 MG CAPS Take 1 capsule by mouth daily      • Ozempic, 2 MG/DOSE, 8 MG/3ML injection pen INJECT 2 MG SUBCUTANEOUSLY (UNDER THE SKIN) FOR 7 DAYS 9 mL 0   • pantoprazole (PROTONIX) 20 mg tablet Take 1 tablet (20 mg total) by mouth daily 90 tablet 3   • spironolactone (ALDACTONE) 25 mg tablet Take 1 tablet (25 mg total) by mouth daily 90 tablet 1   • vitamin B-12 (VITAMIN B-12) 500 mcg tablet Take 1 tablet (500 mcg total) by mouth daily 90 tablet 1     No current facility-administered medications for this visit.     Allergies   Allergen Reactions   • Cortisone Hyperactivity   • Bee Venom Edema   • Virtussin A-C [Guaifenesin-Codeine] Throat Swelling   • Bee Pollen Edema     Category: Allergy;       Immunizations:     Immunization History   Administered Date(s) Administered   • COVID-19 PFIZER VACCINE 0.3 ML IM 01/19/2021, 02/09/2021, 10/07/2021, 05/18/2022   • COVID-19 Pfizer Vac BIVALENT Joshua-sucrose 12 Yr+ IM 11/07/2022, 08/15/2023   • COVID-19 Pfizer vac (Joshua-sucrose, gray cap) 12 yr+ IM 05/18/2022   • INFLUENZA 11/06/2007, 10/15/2010, 09/09/2011, 10/15/2020, 10/29/2021, 11/08/2022   • Influenza Split High Dose Preservative Free IM 10/31/2014, 10/01/2015,  10/17/2016, 09/01/2017, 10/09/2017, 10/01/2018, 10/05/2018   • Influenza, Seasonal Vaccine, Quadrivalent, Adjuvanted, .5e 10/16/2020   • Influenza, seasonal, injectable 11/07/2012, 11/21/2021, 10/23/2023   • Pneumococcal Conjugate 13-Valent 06/16/2016, 10/05/2018   • Pneumococcal Conjugate PCV 7 10/01/2018   • Pneumococcal Polysaccharide PPV23 09/01/2007, 10/12/2019   • Td (adult), adsorbed 06/01/1998, 12/08/2009   • Zoster 10/31/2014, 08/05/2019, 11/01/2019   • Zoster Vaccine Recombinant 08/05/2019, 11/01/2019   • influenza, trivalent, adjuvanted 10/12/2019      Health Maintenance:         Topic Date Due   • Breast Cancer Screening: Mammogram  09/20/2024 (Originally 1/29/2021)         Topic Date Due   • COVID-19 Vaccine (8 - 2023-24 season) 10/10/2023      Medicare Screening Tests and Risk Assessments:     Maxine is here for her Subsequent Wellness visit. Last Medicare Wellness visit information reviewed, patient interviewed and updates made to the record today.      Health Risk Assessment:   Patient rates overall health as good. Patient feels that their physical health rating is slightly worse. Patient is satisfied with their life. Eyesight was rated as same. Hearing was rated as same. Patient feels that their emotional and mental health rating is same. Patients states they are sometimes angry. Patient states they are sometimes unusually tired/fatigued. Pain experienced in the last 7 days has been some. Patient's pain rating has been 5/10. Patient states that she has experienced no weight loss or gain in last 6 months.     Depression Screening:   PHQ-2 Score: 1      Fall Risk Screening:   In the past year, patient has experienced: no history of falling in past year      Urinary Incontinence Screening:   Patient has not leaked urine accidently in the last six months. sneezing or coughing    Home Safety:  Patient does not have trouble with stairs inside or outside of their home. Patient has working smoke alarms and  has working carbon monoxide detector. Home safety hazards include: none.     Nutrition:   Current diet is No Added Salt and Other (please comment). some meals of just carbs    Medications:   Patient is currently taking over-the-counter supplements. OTC medications include: see medication list. Patient is able to manage medications.     Activities of Daily Living (ADLs)/Instrumental Activities of Daily Living (IADLs):   Walk and transfer into and out of bed and chair?: Yes  Dress and groom yourself?: Yes    Bathe or shower yourself?: Yes    Feed yourself? Yes  Do your laundry/housekeeping?: Yes  Manage your money, pay your bills and track your expenses?: Yes  Make your own meals?: Yes    Do your own shopping?: Yes    Previous Hospitalizations:   Any hospitalizations or ED visits within the last 12 months?: No      Advance Care Planning:   Living will: Yes    Durable POA for healthcare: Yes    Advanced directive: Yes    End of Life Decisions reviewed with patient: Yes      Cognitive Screening:   Provider or family/friend/caregiver concerned regarding cognition?: No    PREVENTIVE SCREENINGS      Cardiovascular Screening:    General: History Lipid Disorder and Screening Current      Diabetes Screening:     General: History Diabetes and Screening Current      Colorectal Cancer Screening:     General: Screening Not Indicated      Breast Cancer Screening:       Due for: Mammogram        Cervical Cancer Screening:    General: Screening Not Indicated      Osteoporosis Screening:      Due for: DXA Appendicular      Abdominal Aortic Aneurysm (AAA) Screening:        General: Screening Not Indicated      Lung Cancer Screening:     General: Screening Not Indicated      Hepatitis C Screening:    General: Screening Not Indicated    Screening, Brief Intervention, and Referral to Treatment (SBIRT)    Screening  Typical number of drinks in a day: 0  Typical number of drinks in a week: 2  Interpretation: Low risk drinking  "behavior.    AUDIT-C Screenin) How often did you have a drink containing alcohol in the past year? monthly or less  2) How many drinks did you have on a typical day when you were drinking in the past year? 1 to 2  3) How often did you have 6 or more drinks on one occasion in the past year? never    AUDIT-C Score: 1  Interpretation: Score 0-2 (female): Negative screen for alcohol misuse    Single Item Drug Screening:  How often have you used an illegal drug (including marijuana) or a prescription medication for non-medical reasons in the past year? never    Single Item Drug Screen Score: 0  Interpretation: Negative screen for possible drug use disorder    Other Counseling Topics:   Calcium and vitamin D intake and regular weightbearing exercise.     No results found.     Physical Exam:     /78   Pulse 76   Temp (!) 96.4 °F (35.8 °C)   Ht 5' 1\" (1.549 m)   Wt 79.1 kg (174 lb 6.4 oz)   LMP  (LMP Unknown)   SpO2 97%   BMI 32.95 kg/m²     Physical Exam  Vitals and nursing note reviewed.   Constitutional:       General: She is not in acute distress.     Appearance: She is well-developed.   HENT:      Head: Normocephalic and atraumatic.      Mouth/Throat:      Mouth: Mucous membranes are moist.   Eyes:      Conjunctiva/sclera: Conjunctivae normal.   Cardiovascular:      Rate and Rhythm: Normal rate and regular rhythm.      Heart sounds: No murmur heard.  Pulmonary:      Effort: Pulmonary effort is normal. No respiratory distress.      Breath sounds: Normal breath sounds.   Abdominal:      Palpations: Abdomen is soft.      Tenderness: There is no abdominal tenderness.   Musculoskeletal:         General: No swelling.      Cervical back: Neck supple.   Skin:     General: Skin is warm and dry.   Neurological:      General: No focal deficit present.      Mental Status: She is alert and oriented to person, place, and time.      Motor: No tremor.   Psychiatric:         Mood and Affect: Mood normal.         " Behavior: Behavior normal.          Cleopatra Christie MD    Labs & imaging results reviewed with patient.

## 2024-04-23 NOTE — PATIENT INSTRUCTIONS
Medicare Preventive Visit Patient Instructions  Thank you for completing your Welcome to Medicare Visit or Medicare Annual Wellness Visit today. Your next wellness visit will be due in one year (4/24/2025).  The screening/preventive services that you may require over the next 5-10 years are detailed below. Some tests may not apply to you based off risk factors and/or age. Screening tests ordered at today's visit but not completed yet may show as past due. Also, please note that scanned in results may not display below.  Preventive Screenings:  Service Recommendations Previous Testing/Comments   Colorectal Cancer Screening  * Colonoscopy    * Fecal Occult Blood Test (FOBT)/Fecal Immunochemical Test (FIT)  * Fecal DNA/Cologuard Test  * Flexible Sigmoidoscopy Age: 45-75 years old   Colonoscopy: every 10 years (may be performed more frequently if at higher risk)  OR  FOBT/FIT: every 1 year  OR  Cologuard: every 3 years  OR  Sigmoidoscopy: every 5 years  Screening may be recommended earlier than age 45 if at higher risk for colorectal cancer. Also, an individualized decision between you and your healthcare provider will decide whether screening between the ages of 76-85 would be appropriate. Colonoscopy: 04/19/2021  FOBT/FIT: Not on file  Cologuard: Not on file  Sigmoidoscopy: Not on file          Breast Cancer Screening Age: 40+ years old  Frequency: every 1-2 years  Not required if history of left and right mastectomy Mammogram: 01/29/2020        Cervical Cancer Screening Between the ages of 21-29, pap smear recommended once every 3 years.   Between the ages of 30-65, can perform pap smear with HPV co-testing every 5 years.   Recommendations may differ for women with a history of total hysterectomy, cervical cancer, or abnormal pap smears in past. Pap Smear: Not on file    Screening Not Indicated   Hepatitis C Screening Once for adults born between 1945 and 1965  More frequently in patients at high risk for Hepatitis C  Hep C Antibody: Not on file        Diabetes Screening 1-2 times per year if you're at risk for diabetes or have pre-diabetes Fasting glucose: 134 mg/dL (4/18/2024)  A1C: 6.9 % (4/18/2024)  Screening Not Indicated  History Diabetes   Cholesterol Screening Once every 5 years if you don't have a lipid disorder. May order more often based on risk factors. Lipid panel: 04/18/2024    Screening Not Indicated  History Lipid Disorder     Other Preventive Screenings Covered by Medicare:  Abdominal Aortic Aneurysm (AAA) Screening: covered once if your at risk. You're considered to be at risk if you have a family history of AAA.  Lung Cancer Screening: covers low dose CT scan once per year if you meet all of the following conditions: (1) Age 55-77; (2) No signs or symptoms of lung cancer; (3) Current smoker or have quit smoking within the last 15 years; (4) You have a tobacco smoking history of at least 20 pack years (packs per day multiplied by number of years you smoked); (5) You get a written order from a healthcare provider.  Glaucoma Screening: covered annually if you're considered high risk: (1) You have diabetes OR (2) Family history of glaucoma OR (3)  aged 50 and older OR (4)  American aged 65 and older  Osteoporosis Screening: covered every 2 years if you meet one of the following conditions: (1) You're estrogen deficient and at risk for osteoporosis based off medical history and other findings; (2) Have a vertebral abnormality; (3) On glucocorticoid therapy for more than 3 months; (4) Have primary hyperparathyroidism; (5) On osteoporosis medications and need to assess response to drug therapy.   Last bone density test (DXA Scan): 03/23/2022.  HIV Screening: covered annually if you're between the age of 15-65. Also covered annually if you are younger than 15 and older than 65 with risk factors for HIV infection. For pregnant patients, it is covered up to 3 times per  pregnancy.    Immunizations:  Immunization Recommendations   Influenza Vaccine Annual influenza vaccination during flu season is recommended for all persons aged >= 6 months who do not have contraindications   Pneumococcal Vaccine   * Pneumococcal conjugate vaccine = PCV13 (Prevnar 13), PCV15 (Vaxneuvance), PCV20 (Prevnar 20)  * Pneumococcal polysaccharide vaccine = PPSV23 (Pneumovax) Adults 19-63 yo with certain risk factors or if 65+ yo  If never received any pneumonia vaccine: recommend Prevnar 20 (PCV20)  Give PCV20 if previously received 1 dose of PCV13 or PPSV23   Hepatitis B Vaccine 3 dose series if at intermediate or high risk (ex: diabetes, end stage renal disease, liver disease)   Respiratory syncytial virus (RSV) Vaccine - COVERED BY MEDICARE PART D  * RSVPreF3 (Arexvy) CDC recommends that adults 60 years of age and older may receive a single dose of RSV vaccine using shared clinical decision-making (SCDM)   Tetanus (Td) Vaccine - COST NOT COVERED BY MEDICARE PART B Following completion of primary series, a booster dose should be given every 10 years to maintain immunity against tetanus. Td may also be given as tetanus wound prophylaxis.   Tdap Vaccine - COST NOT COVERED BY MEDICARE PART B Recommended at least once for all adults. For pregnant patients, recommended with each pregnancy.   Shingles Vaccine (Shingrix) - COST NOT COVERED BY MEDICARE PART B  2 shot series recommended in those 19 years and older who have or will have weakened immune systems or those 50 years and older     Health Maintenance Due:      Topic Date Due   • Breast Cancer Screening: Mammogram  09/20/2024 (Originally 1/29/2021)     Immunizations Due:      Topic Date Due   • COVID-19 Vaccine (8 - 2023-24 season) 10/10/2023     Advance Directives   What are advance directives?  Advance directives are legal documents that state your wishes and plans for medical care. These plans are made ahead of time in case you lose your ability to  make decisions for yourself. Advance directives can apply to any medical decision, such as the treatments you want, and if you want to donate organs.   What are the types of advance directives?  There are many types of advance directives, and each state has rules about how to use them. You may choose a combination of any of the following:  Living will:  This is a written record of the treatment you want. You can also choose which treatments you do not want, which to limit, and which to stop at a certain time. This includes surgery, medicine, IV fluid, and tube feedings.   Durable power of  for healthcare (DPAHC):  This is a written record that states who you want to make healthcare choices for you when you are unable to make them for yourself. This person, called a proxy, is usually a family member or a friend. You may choose more than 1 proxy.  Do not resuscitate (DNR) order:  A DNR order is used in case your heart stops beating or you stop breathing. It is a request not to have certain forms of treatment, such as CPR. A DNR order may be included in other types of advance directives.  Medical directive:  This covers the care that you want if you are in a coma, near death, or unable to make decisions for yourself. You can list the treatments you want for each condition. Treatment may include pain medicine, surgery, blood transfusions, dialysis, IV or tube feedings, and a ventilator (breathing machine).  Values history:  This document has questions about your views, beliefs, and how you feel and think about life. This information can help others choose the care that you would choose.  Why are advance directives important?  An advance directive helps you control your care. Although spoken wishes may be used, it is better to have your wishes written down. Spoken wishes can be misunderstood, or not followed. Treatments may be given even if you do not want them. An advance directive may make it easier for your  family to make difficult choices about your care.   Weight Management   Why it is important to manage your weight:  Being overweight increases your risk of health conditions such as heart disease, high blood pressure, type 2 diabetes, and certain types of cancer. It can also increase your risk for osteoarthritis, sleep apnea, and other respiratory problems. Aim for a slow, steady weight loss. Even a small amount of weight loss can lower your risk of health problems.  How to lose weight safely:  A safe and healthy way to lose weight is to eat fewer calories and get regular exercise. You can lose up about 1 pound a week by decreasing the number of calories you eat by 500 calories each day.   Healthy meal plan for weight management:  A healthy meal plan includes a variety of foods, contains fewer calories, and helps you stay healthy. A healthy meal plan includes the following:  Eat whole-grain foods more often.  A healthy meal plan should contain fiber. Fiber is the part of grains, fruits, and vegetables that is not broken down by your body. Whole-grain foods are healthy and provide extra fiber in your diet. Some examples of whole-grain foods are whole-wheat breads and pastas, oatmeal, brown rice, and bulgur.  Eat a variety of vegetables every day.  Include dark, leafy greens such as spinach, kale, nancy greens, and mustard greens. Eat yellow and orange vegetables such as carrots, sweet potatoes, and winter squash.   Eat a variety of fruits every day.  Choose fresh or canned fruit (canned in its own juice or light syrup) instead of juice. Fruit juice has very little or no fiber.  Eat low-fat dairy foods.  Drink fat-free (skim) milk or 1% milk. Eat fat-free yogurt and low-fat cottage cheese. Try low-fat cheeses such as mozzarella and other reduced-fat cheeses.  Choose meat and other protein foods that are low in fat.  Choose beans or other legumes such as split peas or lentils. Choose fish, skinless poultry (chicken  or turkey), or lean cuts of red meat (beef or pork). Before you cook meat or poultry, cut off any visible fat.   Use less fat and oil.  Try baking foods instead of frying them. Add less fat, such as margarine, sour cream, regular salad dressing and mayonnaise to foods. Eat fewer high-fat foods. Some examples of high-fat foods include french fries, doughnuts, ice cream, and cakes.  Eat fewer sweets.  Limit foods and drinks that are high in sugar. This includes candy, cookies, regular soda, and sweetened drinks.  Exercise:  Exercise at least 30 minutes per day on most days of the week. Some examples of exercise include walking, biking, dancing, and swimming. You can also fit in more physical activity by taking the stairs instead of the elevator or parking farther away from stores. Ask your healthcare provider about the best exercise plan for you.      © Copyright [employer] 2018 Information is for End User's use only and may not be sold, redistributed or otherwise used for commercial purposes. All illustrations and images included in CareNotes® are the copyrighted property of [employer] Inc[employer] or [employer]

## 2024-04-24 ENCOUNTER — TELEPHONE (OUTPATIENT)
Dept: NEUROLOGY | Facility: CLINIC | Age: 84
End: 2024-04-24

## 2024-04-24 ENCOUNTER — OFFICE VISIT (OUTPATIENT)
Dept: DERMATOLOGY | Facility: CLINIC | Age: 84
End: 2024-04-24
Payer: MEDICARE

## 2024-04-24 VITALS — WEIGHT: 172 LBS | TEMPERATURE: 98.9 F | BODY MASS INDEX: 32.5 KG/M2

## 2024-04-24 DIAGNOSIS — D22.61 MULTIPLE BENIGN MELANOCYTIC NEVI OF BOTH UPPER EXTREMITIES, BOTH LOWER EXTREMITIES, AND TRUNK: Primary | ICD-10-CM

## 2024-04-24 DIAGNOSIS — L81.4 SOLAR LENTIGO: ICD-10-CM

## 2024-04-24 DIAGNOSIS — D22.5 MULTIPLE BENIGN MELANOCYTIC NEVI OF BOTH UPPER EXTREMITIES, BOTH LOWER EXTREMITIES, AND TRUNK: Primary | ICD-10-CM

## 2024-04-24 DIAGNOSIS — D22.62 MULTIPLE BENIGN MELANOCYTIC NEVI OF BOTH UPPER EXTREMITIES, BOTH LOWER EXTREMITIES, AND TRUNK: Primary | ICD-10-CM

## 2024-04-24 DIAGNOSIS — L57.0 KERATOSIS, ACTINIC: ICD-10-CM

## 2024-04-24 DIAGNOSIS — D22.71 MULTIPLE BENIGN MELANOCYTIC NEVI OF BOTH UPPER EXTREMITIES, BOTH LOWER EXTREMITIES, AND TRUNK: Primary | ICD-10-CM

## 2024-04-24 DIAGNOSIS — L82.1 SEBORRHEIC KERATOSIS: ICD-10-CM

## 2024-04-24 DIAGNOSIS — L85.3 XEROSIS CUTIS: ICD-10-CM

## 2024-04-24 DIAGNOSIS — D18.01 CHERRY ANGIOMA: ICD-10-CM

## 2024-04-24 DIAGNOSIS — D22.72 MULTIPLE BENIGN MELANOCYTIC NEVI OF BOTH UPPER EXTREMITIES, BOTH LOWER EXTREMITIES, AND TRUNK: Primary | ICD-10-CM

## 2024-04-24 PROCEDURE — 99213 OFFICE O/P EST LOW 20 MIN: CPT

## 2024-04-24 PROCEDURE — 17003 DESTRUCT PREMALG LES 2-14: CPT

## 2024-04-24 PROCEDURE — 17000 DESTRUCT PREMALG LESION: CPT

## 2024-04-24 NOTE — TELEPHONE ENCOUNTER
Insurance Comp:     MEDICARE/MEDICARE A AND B   Subscriber ID: [id number]     [employer] PROGRAM/[employer] PROGRAM   Subscriber ID: [id number]

## 2024-04-24 NOTE — PROGRESS NOTES
"[facility] Dermatology Clinic Note     Patient Name: Maxine Acuña  Encounter Date: 04/24/24     Have you been cared for by a [facility] Dermatologist in the last 3 years and, if so, which description applies to you?    Yes.  I have been here within the last 3 years, and my medical history has NOT changed since that time.  I am FEMALE/of child-bearing potential.    REVIEW OF SYSTEMS:  Have you recently had or currently have any of the following? No changes in my recent health.   PAST MEDICAL HISTORY:  Have you personally ever had or currently have any of the following?  If \"YES,\" then please provide more detail. No changes in my medical history.   HISTORY OF IMMUNOSUPPRESSION: Do you have a history of any of the following:  Systemic Immunosuppression such as Diabetes, Biologic or Immunotherapy, Chemotherapy, Organ Transplantation, Bone Marrow Transplantation?  No     Answering \"YES\" requires the addition of the dotphrase \"IMMUNOSUPPRESSED\" as the first diagnosis of the patient's visit.   FAMILY HISTORY:  Any \"first degree relatives\" (parent, brother, sister, or child) with the following?    No changes in my family's known health.   PATIENT EXPERIENCE:    Do you want the Dermatologist to perform a COMPLETE skin exam today including a clinical examination under the \"bra and underwear\" areas?  Yes  If necessary, do we have your permission to call and leave a detailed message on your Preferred Phone number that includes your specific medical information?  Yes      Allergies   Allergen Reactions    Cortisone Hyperactivity    Bee Venom Edema    Virtussin A-C [Guaifenesin-Codeine] Throat Swelling    Bee Pollen Edema     Category: Allergy;       Current Outpatient Medications:     aspirin 81 MG tablet, Take 1 tablet by mouth daily, Disp: , Rfl:     atorvastatin (LIPITOR) 10 mg tablet, Take 1 tablet (10 mg total) by mouth daily, Disp: 90 tablet, Rfl: 1    azelastine (ASTELIN) 0.1 % nasal spray, 1 spray into each nostril in " the morning Use in each nostril as directed, Disp: 30 mL, Rfl: 0    Blood Glucose Monitoring Suppl (OneTouch Verio Reflect) w/Device KIT, Use, Disp: , Rfl:     Calcium Carbonate 1500 (600 Ca) MG TABS, Take by mouth, Disp: , Rfl:     carvedilol (COREG) 25 mg tablet, Take 1 tablet (25 mg total) by mouth 2 (two) times a day, Disp: 180 tablet, Rfl: 1    Cholecalciferol (VITAMIN D3) 2000 units capsule, Take 1 tablet by mouth daily, Disp: , Rfl:     EPINEPHrine (EPIPEN) 0.3 mg/0.3 mL SOAJ, Inject 1 Syringe into a muscle, Disp: , Rfl:     fluticasone (FLONASE) 50 mcg/act nasal spray, USE 1 SPRAY IN EACH NOSTRIL EVERY DAY (Patient taking differently: daily), Disp: 48 mL, Rfl: 2    glucose blood (OneTouch Verio) test strip, Use to test blood glucose once daily, Disp: 100 strip, Rfl: 1    hydrochlorothiazide (HYDRODIURIL) 25 mg tablet, TAKE 1 TABLET BY MOUTH EVERY DAY, Disp: 90 tablet, Rfl: 1    Lancets (OneTouch Delica Plus Aegcgx48N) MISC, Use to test blood sugar once daily, Disp: 100 each, Rfl: 1    levothyroxine 25 mcg tablet, TAKE 1 AND 1/2 TABLETS BY MOUTH ONE TIME DAILY, Disp: 90 tablet, Rfl: 1    lisinopril (ZESTRIL) 40 mg tablet, Take 1 tablet (40 mg total) by mouth 2 (two) times a day, Disp: 180 tablet, Rfl: 3    Multiple Vitamins-Minerals (WOMENS ONE DAILY) TABS, Take 1 tablet by mouth daily, Disp: , Rfl:     Omega-3 Fatty Acids (OMEGA-3 FISH OIL) 1000 MG CAPS, Take 1 capsule by mouth daily , Disp: , Rfl:     Ozempic, 2 MG/DOSE, 8 MG/3ML injection pen, INJECT 2 MG SUBCUTANEOUSLY (UNDER THE SKIN) FOR 7 DAYS, Disp: 9 mL, Rfl: 0    pantoprazole (PROTONIX) 20 mg tablet, Take 1 tablet (20 mg total) by mouth daily, Disp: 90 tablet, Rfl: 3    spironolactone (ALDACTONE) 25 mg tablet, Take 1 tablet (25 mg total) by mouth daily, Disp: 90 tablet, Rfl: 1    vitamin B-12 (VITAMIN B-12) 500 mcg tablet, Take 1 tablet (500 mcg total) by mouth daily, Disp: 90 tablet, Rfl: 1          Whom besides the patient is providing clinical  information about today's encounter?   NO ADDITIONAL HISTORIAN (patient alone provided history)    Physical Exam and Assessment/Plan by Diagnosis:    ACTINIC KERATOSIS    Physical Exam:  Anatomic Location: left cheek   Morphologic Description:   Scaly pink papules    Additional History of Present Condition:  Present on exam   History of bleeding from this lesion? NO  History of pain, tenderness, and/or itching within this lesion? NO  Personal history of skin cancer? NO  Family history of skin cancer? NO  Previous treatment to the same site? NO    Plan:  Discussed that actinic keratosis is found on sun-damaged skin due to chronic sun exposure. These lesions are considered premalignant with the potential to evolve into a skin cancer called squamous cell carcinoma.   Discussed sun protection measures, including using sunscreen with an SPF 50+ year round, avoiding the sun, and wearing protective clothing such as long sleeves and pants when out in the sun.   Discussed various treatment options and their associated side effects including cryotherapy with liquid nitrogen and field therapies with topicals such as Efudex (5-fluorouracil) and/or Aldara (imiquimod). Efudex may be used alone or in combination with Calcipotriene.  Cryotherapy performed today. See procedure below.   Follow-up in 4-6 weeks if not resolved, at which time re-treatment or biopsy to rule out squamous cell carcinoma will be determined based on clinical findings.     PROCEDURES PERFORMED TODAY ASSOCIATED WITH THIS CONDITION:          Cryotherapy: PROCEDURE:  DESTRUCTION OF PRE-MALIGNANT LESIONS  After a thorough discussion of treatment options and risk/benefits/alternatives (including but not limited to local pain, scarring, dyspigmentation, blistering, and possible superinfection), verbal and written consent were obtained and the aforementioned lesions were treated on with cryotherapy using liquid nitrogen x 1 cycle for 5-10 seconds.    TOTAL NUMBER of  "2 pre-malignant lesions were treated today on the ANATOMIC LOCATION: left cheek.     The patient tolerated the procedure well, and after-care instructions were provided.               MELANOCYTIC NEVI (\"Moles\")    Physical Exam:  Anatomic Location Affected:   Mostly on sun-exposed areas of the trunk  Morphological Description:  Scattered, 1-4mm round to ovoid, symmetrical-appearing, even bordered, skin colored to dark brown macules/papules      Additional History of Present Condition:  Present on exam. Denies any pain, itch, bleeding. Present for years. Denies actively changing or growing moles.     Assessment and Plan:  Based on a thorough discussion of this condition and the management approach to it (including a comprehensive discussion of the known risks, side effects and potential benefits of treatment), the patient (family) agrees to implement the following specific plan:  Reassured benign.   Monitor for changes. ABCDE's of melanoma handout provided.   Practice sun protection. Apply broad spectrum (UVA and UVB) sunscreen, SPF 30 or higher every 2 hours. Wear sun protective clothing, hats, and sunglasses.       LENTIGO    Physical Exam:  Anatomic Location Affected:  sun exposed areas of upper extremities   Morphological Description:  multiple scattered tan to brown evenly pigmented macules      Additional History of Present Condition:  Present on exam.     Assessment and Plan:  Based on a thorough discussion of this condition and the management approach to it (including a comprehensive discussion of the known risks, side effects and potential benefits of treatment), the patient (family) agrees to implement the following specific plan:  Reassured benign.   Practice sun protection. Apply broad spectrum (UVA and UVB) sunscreen, SPF 30 or higher every 2 hours. Wear sun protective clothing, hats, and sunglasses.     SEBORRHEIC KERATOSIS; NON-INFLAMED    Physical Exam:  Anatomic Location Affected:  face, trunk, upper " "and lower extremities   Morphological Description:  Flat and raised, waxy, smooth to warty textured, yellow to brownish-grey to dark brown to blackish, discrete, \"stuck-on\" appearing papules.      Additional History of Present Condition:  Present on exam. Patient denies any itching.     Assessment and Plan:  Based on a thorough discussion of this condition and the management approach to it (including a comprehensive discussion of the known risks, side effects and potential benefits of treatment), the patient (family) agrees to implement the following specific plan:  Reassured benign.       ANGIOMA (\"CHERRY ANGIOMA\")     Physical Exam:  Anatomic Location: scattered across trunk and extremities   Morphologic Description: 1-2 mm firm red to maroon to purples to blue discrete papule, on dermoscopy lacunae  by white septae seen       Additional History of Present Condition:  Present on exam.     Plan:  Reassured benign.     XEROSIS (\"DRY SKIN\")    Physical Exam:  Anatomic Location Affected:  lower extremities   Morphological Description:  diffuse flaking       Additional History of Present Condition:  Present on exam. Patient states she has been using CeraVe daily moisturizer which helps.     Assessment and Plan:  Based on a thorough discussion of this condition and the management approach to it (including a comprehensive discussion of the known risks, side effects and potential benefits of treatment), the patient (family) agrees to implement the following specific plan:  Continue CeraVe daily moisturizer- applied multiple times per day. Alternatives include Aveeno and Eucerin.   Recommend following shower/bath with warm water, pat drying skin and while skin is still damp immediately applying the moisturizer.       Follow-up: 1 year for skin exam or sooner if needed.      Scribe Attestation      I,:  Tonio Vo am acting as a scribe while in the presence of the attending physician.:       I,:  Henrietta" RAZ Madrid personally performed the services described in this documentation    as scribed in my presence.:

## 2024-04-24 NOTE — PATIENT INSTRUCTIONS
Patient instructions:  Your pre-cancerous lesions (called actinic keratosis) were treated with liquid nitrogen today. The treated areas will get more red, crusted over the next few days. There might be some blistering. Apply vaseline to the treated area for the next week to help it heal fully. Do not pick at the area. Return in 3-4 weeks for another round of liquid nitrogen treatment if lesion(s)  fails to fully resolve.

## 2024-04-26 ENCOUNTER — EVALUATION (OUTPATIENT)
Dept: PHYSICAL THERAPY | Facility: CLINIC | Age: 84
End: 2024-04-26
Payer: MEDICARE

## 2024-04-26 DIAGNOSIS — R26.89 BALANCE DISORDER: ICD-10-CM

## 2024-04-26 PROCEDURE — 97162 PT EVAL MOD COMPLEX 30 MIN: CPT | Performed by: PHYSICAL THERAPIST

## 2024-04-26 NOTE — PROGRESS NOTES
PT Evaluation     Today's date: 2024  Patient name: Maxine Acuña  : 1940  MRN: [id number]  Referring provider: Cleopatra Christie MD  Dx:   Encounter Diagnosis     ICD-10-CM    1. Balance disorder  R26.89 Ambulatory Referral to Physical Therapy    Agrees to go to physical therapy.                     Assessment  Assessment details: Pt presents to physical therapy this session with decreased balance. At this time patient has decreased hip strength bilaterally, decreased plantarflexion strength, decreased balance and at a risk of falls per testing, decreased endurance and gait efficiency and overall decreased knowledge in appropriate HEP. Pt will benefit from skilled hterapy intervention 2x/week fo r8 weeks to improve strength, balance, endurance and overall improved safety to prevent falls.     Goals  STG:  Pt will improve ABC scale by 10% in 4 weeks  Pt will complete 6 mwt in 1350 ft in 4 weeks  Pt will have a gait speed of 1.4 m/s in 4 weeks  Pt will be independent with HEP within 4 weeks  Pt will complete FGA with a score of 25/30 in 4 weeks    LTG  Pt will complete ABC scale with a score of 80 % in 8 weeks  Will be indepndent with updated HEP within 8 weeks  Pt will be a reduced risk of falls in 8 weeks      Plan  Planned therapy interventions: neuromuscular re-education, balance, patient education, home exercise program, therapeutic exercise, therapeutic activities, gait training and coordination  Frequency: 2x week  Duration in weeks: 8  Plan of Care beginning date: 2024  Plan of Care expiration date: 2024  Treatment plan discussed with: patient        Subjective Evaluation    History of Present Illness  Mechanism of injury: Pt reports having difficulty with her balance. She asked to go to physical therapy. When she is bending forward she feels she might keep going forward. When she stands on one foot she has decreased balance. 24 years ago she tore ligaments in her feet and it took a  long itme to heal and walk normally. She feels she can't adjust her balance with her feet. When she does do exercises she is worried she will tear her ligaments. She has a particular pair of shoes that she feels is the most helpful and she only wears those shoes. She does not exercise on a regular basis. She does walk frequently. She can't come up on her toes to reach anything. She denies loss of sensation in her hands or feet. She does feel like her sensation from her knees down aren't quite as sensitive.   Patient Goals  Patient goals for therapy: improved balance    Pain  At best pain ratin  Location: feet    Social Support  Stairs in house: yes   Lives in: apartment  Lives with: alone          Objective     Strength/Myotome Testing     Left Hip   Planes of Motion   Flexion: 3+  Extension: 3+  Abduction: 3+  External rotation: 3+    Right Hip   Planes of Motion   Flexion: 3+  Extension: 3+  Abduction: 3+  External rotation: 3+    Left Knee   Flexion: 4  Extension: 4    Right Knee   Flexion: 4  Extension: 4    Left Ankle/Foot   Dorsiflexion: 4  Plantar flexion: 3+    Right Ankle/Foot   Dorsiflexion: 4  Plantar flexion: 3+  Neuro Exam:     Sensation   Light touch LE: left WNL and right WNL  Proprioception LE: left WNL and right WNL    Coordination   Heel to shin: left WNL and right WNL  Finger to nose: left WNL and right WNL  Rapid alternating movements: LE impaired     Functional outcomes   6 minute walk test: 1200ft  Gait speed: 1.3 m/s  5x sit to stand: 9.43 sec (seconds)  A -   ABC: 63%           Short Term Goal Expiration Date:(24)  Long Term Goal Expiration Date: (24)  POC Expiration Date: (24)      POC expires Unit limit Auth Expiration date PT/OT/ST + Visit Limit?   24 bomn bomn bomn                           Visit/Unit Tracking  AUTH Status:  Date               Used 1              Remaining                     Precautions fall risk        Manuals                                         Neuro Re-Ed                                                                 Ther Ex                                                                        Ther Activity                        Gait Training                        Modalities

## 2024-05-01 ENCOUNTER — HOSPITAL ENCOUNTER (OUTPATIENT)
Dept: RADIOLOGY | Age: 84
Discharge: HOME | End: 2024-05-01
Payer: MEDICARE

## 2024-05-01 ENCOUNTER — OFFICE VISIT (OUTPATIENT)
Dept: PHYSICAL THERAPY | Facility: CLINIC | Age: 84
End: 2024-05-01
Payer: MEDICARE

## 2024-05-01 DIAGNOSIS — R26.89 BALANCE DISORDER: Primary | ICD-10-CM

## 2024-05-01 DIAGNOSIS — M85.89 OSTEOPENIA OF MULTIPLE SITES: ICD-10-CM

## 2024-05-01 PROCEDURE — 77080 DXA BONE DENSITY AXIAL: CPT

## 2024-05-01 PROCEDURE — 97112 NEUROMUSCULAR REEDUCATION: CPT | Performed by: PHYSICAL THERAPIST

## 2024-05-01 PROCEDURE — 97110 THERAPEUTIC EXERCISES: CPT | Performed by: PHYSICAL THERAPIST

## 2024-05-01 NOTE — PROGRESS NOTES
Daily Note     Today's date: 2024  Patient name: Maxine Acuña  : 1940  MRN: [id number]  Referring provider: Cleopatra Christie MD  Dx:   Encounter Diagnosis     ICD-10-CM    1. Balance disorder  R26.89                      Subjective: Patient reports feeling well today, no new changes      Objective: See treatment diary below      Assessment:   Initiated HEP this session. She demonstrated good understanding of exercises. Held on sie stepping with resistance as patient experienced increased irritation of her back. She was fatigued at the end of the session and requested to end session at that time.     Plan: Continue per plan of care.      Short Term Goal Expiration Date:(24)  Long Term Goal Expiration Date: (24)  POC Expiration Date: (24)      POC expires Unit limit Auth Expiration date PT/OT/ST + Visit Limit?   24 bomn bomn bomn                           Visit/Unit Tracking  AUTH Status:  Date              Used 1 2/10             Remaining                     Precautions fall risk        Access Code: [email or web address]  URL: [email or web address]  Date: 2024  Prepared by: Cathy Sousa    Exercises  - Lateral Step Up  - 1 x daily - 7 x weekly - 1 sets - 15 reps  - Step Up  - 1 x daily - 7 x weekly - 1 sets - 15 reps  - Heel Raises with Counter Support  - 1 x daily - 7 x weekly - 3 sets - 10 reps  - Sit to Stand  - 1 x daily - 7 x weekly - 2 sets - 10 reps    Manuals 5/3                                       Neuro Re-Ed         hurdles 3 laps //bars       Side stepping Pink TB 3 laps //bars                                               Ther Ex                                                                        Ther Activity                        Gait Training                        Modalities

## 2024-05-03 ENCOUNTER — OFFICE VISIT (OUTPATIENT)
Dept: PHYSICAL THERAPY | Facility: CLINIC | Age: 84
End: 2024-05-03
Payer: MEDICARE

## 2024-05-03 DIAGNOSIS — R26.89 BALANCE DISORDER: Primary | ICD-10-CM

## 2024-05-03 PROCEDURE — 97112 NEUROMUSCULAR REEDUCATION: CPT

## 2024-05-03 PROCEDURE — 97110 THERAPEUTIC EXERCISES: CPT

## 2024-05-03 NOTE — PROGRESS NOTES
Daily Note     Today's date: 5/3/2024  Patient name: Maxine Acuña  : 1940  MRN: [id number]  Referring provider: Cleopatra Christie MD  Dx:   Encounter Diagnosis     ICD-10-CM    1. Balance disorder  R26.89                      Subjective: Fearful that she is not performing exercises due to increased joint pain since last session( shoulder, back, hips, knees, ankle and arches) . Explained that it is common to experience DOMS initially when starting new exercises but will monitor.and modify treatments if needed. Able to go for a mile walk yesterday without issue.       Objective: See treatment diary below      Assessment: Patient requesting to review HEP again today to assess form. Instructed by primary PT to hold on exercises over the weekend but to continue with walks. Proper form observed. Introduced nustep for endurance/LE strength and static balance.       Plan: Continue per plan of care.      Short Term Goal Expiration Date:(24)  Long Term Goal Expiration Date: (24)  POC Expiration Date: (24)      POC expires Unit limit Auth Expiration date PT/OT/ST + Visit Limit?   24 bomn bomn bomn                           Visit/Unit Tracking  AUTH Status:  Date 4/26 5/1 5/3            Used 1 2/10 3/10            Remaining                     Precautions fall risk        Access Code: [email or web address]  URL: [email or web address]  Date: 2024  Prepared by: Cathy Sousa    Exercises  - Lateral Step Up  - 1 x daily - 7 x weekly - 1 sets - 15 reps  - Step Up  - 1 x daily - 7 x weekly - 1 sets - 15 reps  - Heel Raises with Counter Support  - 1 x daily - 7 x weekly - 3 sets - 10 reps  - Sit to Stand  - 1 x daily - 7 x weekly - 2 sets - 10 reps    Manuals  5/3                                      Neuro Re-Ed   5/3      hurdles 3 laps //bars 2 laps @ bar  Fwd/lat      Side stepping Pink TB 3 laps //bars Pink TB @ bar  2 laps              Cone taps  No UE  10 ea. (1) cone      FTEC   "Foam  30\"x3                      Ther Ex  5/3      Nustep  L1 10 mins      STS  15 knee p! B/L      HR  Patient declined      Step ups  Fwd/lat  10 ea.                                       Ther Activity  5/3                      Gait Training  5/3                      Modalities                                      "

## 2024-05-06 ENCOUNTER — APPOINTMENT (OUTPATIENT)
Dept: PHYSICAL THERAPY | Facility: CLINIC | Age: 84
End: 2024-05-06
Payer: MEDICARE

## 2024-05-08 ENCOUNTER — OFFICE VISIT (OUTPATIENT)
Dept: PHYSICAL THERAPY | Facility: CLINIC | Age: 84
End: 2024-05-08
Payer: MEDICARE

## 2024-05-08 DIAGNOSIS — R26.89 BALANCE DISORDER: Primary | ICD-10-CM

## 2024-05-08 PROCEDURE — 97112 NEUROMUSCULAR REEDUCATION: CPT | Performed by: PHYSICAL THERAPIST

## 2024-05-08 PROCEDURE — 97140 MANUAL THERAPY 1/> REGIONS: CPT | Performed by: PHYSICAL THERAPIST

## 2024-05-08 NOTE — PROGRESS NOTES
Daily Note     Today's date: 2024  Patient name: Maxine Acuña  : 1940  MRN: [id number]  Referring provider: Cleopatra Christie MD  Dx:   Encounter Diagnosis     ICD-10-CM    1. Balance disorder  R26.89           Start Time: 1100  Stop Time: 1145  Total time in clinic (min): 45 minutes    Subjective: Patient reported feeling ok after last session and says that she keeps pulling things.       Objective: See treatment diary below      Assessment: Pt tolerated the new exercises well and showed progress from doing her HEP. Pt's balance was challenged with the lateral step overs when using the taller naseem, but couldn't complete last repetition due to pain in R knee. When pt's eyes were closed, she struggled at first to maintain her balance but improved to no UE support by last repetition. Pt static balance is mostly stable, but becomes unbalanced when putting dual task into the intervention. Plan to add more challenging factors to improve static balance. Narrow surfaces are challenging to the pt when walking, but does well with recovering when going off balanced.       Plan: Continue per plan of care.      Short Term Goal Expiration Date:(24)  Long Term Goal Expiration Date: (24)  POC Expiration Date: (24)      POC expires Unit limit Auth Expiration date PT/OT/ST + Visit Limit?   24 bomn bomn bomn                           Visit/Unit Tracking  AUTH Status:  Date 4/26 5/1 5/3            Used 1 2/10 3/10            Remaining                     Precautions fall risk        Access Code: [email or web address]  URL: [email or web address]  Date: 2024  Prepared by: Cathy Sousa    Exercises  - Lateral Step Up  - 1 x daily - 7 x weekly - 1 sets - 15 reps  - Step Up  - 1 x daily - 7 x weekly - 1 sets - 15 reps  - Heel Raises with Counter Support  - 1 x daily - 7 x weekly - 3 sets - 10 reps  - Sit to Stand  - 1 x daily - 7 x weekly - 2 sets - 10 reps    Manuals  5/3 5/08            "                          Neuro Re-Ed   5/3 5/08     hurdles 3 laps //bars 2 laps @ bar  Fwd/lat 3 laps @ // bars, fwd/lat, different heights used (6 total), minimal UE used     Side stepping Pink TB 3 laps //bars Pink TB @ bar  2 laps      River rocks   4 laps, 6 rocks of small/med height, UE support     Cone taps  No UE  10 ea. (1) cone No UE 2 x 10 ea (1) cone on 4 in box in // bars     FTEC  Foam  30\"x3 Foam 30\" x 3 in // bars      Tandem walking   3 laps in // bars with UE support      Ball tosses    2x15, on foam in // bars, toss to middle, L, & R sides      Ther Ex  5/3 5/08     Nustep  L1 10 mins L1 10 mins      STS  15 knee p! B/L      HR  Patient declined      Step ups  Fwd/lat  10 ea.                                       Ther Activity  5/3                      Gait Training  5/3                      Modalities                                        "

## 2024-05-10 ENCOUNTER — OFFICE VISIT (OUTPATIENT)
Dept: PHYSICAL THERAPY | Facility: CLINIC | Age: 84
End: 2024-05-10
Payer: MEDICARE

## 2024-05-10 DIAGNOSIS — R26.89 BALANCE DISORDER: Primary | ICD-10-CM

## 2024-05-10 PROCEDURE — 97112 NEUROMUSCULAR REEDUCATION: CPT | Performed by: PHYSICAL THERAPIST

## 2024-05-10 PROCEDURE — 97110 THERAPEUTIC EXERCISES: CPT | Performed by: PHYSICAL THERAPIST

## 2024-05-10 NOTE — PROGRESS NOTES
"Daily Note     Today's date: 5/10/2024  Patient name: Maxine Acuña  : 1940  MRN: [id number]  Referring provider: Cleopatra Christie MD  Dx:   Encounter Diagnosis     ICD-10-CM    1. Balance disorder  R26.89           Start Time: 1230  Stop Time: 1315  Total time in clinic (min): 45 minutes    Subjective: Patient reported feeling alright after the last session, but felt some pain in her L hip this morning when she woke up from the hurdles last session.     Objective: See treatment diary below      Assessment: Pt tolerated treatment well today and showed improvements by not relying on UE support when doing tandem walks and hurdles. Modified tandem walks for pt to have a slightly wider base of support and responded well. Pt used the 8\" hurdles for lateral walking due to hip pain last time and had no complaints of any pain this session. She responded well to the Blaze Pod taps instead of cone taps with more repetitions and endurance for 2 mins. Wobble board and easton-disc were attempted with the patient, but she struggled to maintain her balance in parallel bars, so green TheraPad was used for ball tossing.       Plan: Continue per plan of care.      Short Term Goal Expiration Date:(24)  Long Term Goal Expiration Date: (24)  POC Expiration Date: (24)      POC expires Unit limit Auth Expiration date PT/OT/ST + Visit Limit?   24 bomn bomn bomn                           Visit/Unit Tracking  AUTH Status:  Date 4/26 5/1 5/3 5/10           Used 1 2/10 3/10 4/10           Remaining                     Precautions fall risk        Access Code: [email or web address]  URL: [email or web address]  Date: 2024  Prepared by: Cathy Sousa    Exercises  - Lateral Step Up  - 1 x daily - 7 x weekly - 1 sets - 15 reps  - Step Up  - 1 x daily - 7 x weekly - 1 sets - 15 reps  - Heel Raises with Counter Support  - 1 x daily - 7 x weekly - 3 sets - 10 reps  - Sit to Stand  - 1 x daily - 7 x weekly - " "2 sets - 10 reps    Manuals  5/3 5/08 5/10                                    Neuro Re-Ed   5/3 5/08 5/10    hurdles 3 laps //bars 2 laps @ bar  Fwd/lat 3 laps @ // bars, fwd/lat, different heights used (6 total), minimal UE used 4 laps @ // bars, fwd/lat, 12 in for fwd, 8 in for lat, 5 total hurdles with minimal UE support    Side stepping Pink TB 3 laps //bars Pink TB @ bar  2 laps      River rocks   4 laps, 6 rocks of small/med height, UE support     Cone taps  No UE  10 ea. (1) cone No UE 2 x 10 ea (1) cone on 4 in box in // bars 2 blazepods on 6\" step and try to touch as fast as you can in 2 mins, 2 rounds    FTEC  Foam  30\"x3 Foam 30\" x 3 in // bars      Tandem walking   3 laps in // bars with UE support  3 laps in // bars      Ball tosses    2x15, on foam in // bars, toss to middle, L, & R sides  X25 on green therapads, tossing basketball in // bars    Ther Ex  5/3 5/08 5/10    Nustep  L1 10 mins L1 10 mins  L3 10 mins     STS  15 knee p! B/L      HR  Patient declined      Step ups  Fwd/lat  10 ea.                                       Ther Activity  5/3                      Gait Training  5/3                      Modalities                                          "

## 2024-05-14 ENCOUNTER — OFFICE VISIT (OUTPATIENT)
Dept: PHYSICAL THERAPY | Facility: CLINIC | Age: 84
End: 2024-05-14
Payer: MEDICARE

## 2024-05-14 DIAGNOSIS — R26.89 BALANCE DISORDER: Primary | ICD-10-CM

## 2024-05-14 PROCEDURE — 97112 NEUROMUSCULAR REEDUCATION: CPT | Performed by: PHYSICAL THERAPIST

## 2024-05-14 PROCEDURE — 97110 THERAPEUTIC EXERCISES: CPT | Performed by: PHYSICAL THERAPIST

## 2024-05-14 NOTE — PROGRESS NOTES
"Daily Note     Today's date: 2024  Patient name: Maxine Acuña  : 1940  MRN: [id number]  Referring provider: Cleopatra Christie MD  Dx:   Encounter Diagnosis     ICD-10-CM    1. Balance disorder  R26.89                      Subjective: Pt reports having very bad allergies today and her ears feel fully and she is more unsteady      Objective: See treatment diary below      Assessment: Patient was able to complete more activities with dynamic balance on foam today. She was able to complete hurdles and semi tandem walking on foam with occassional assist from UE but mostly independently.       Plan: Continue per plan of care.      Short Term Goal Expiration Date:(24)  Long Term Goal Expiration Date: (24)  POC Expiration Date: (24)      POC expires Unit limit Auth Expiration date PT/OT/ST + Visit Limit?   24 bomn bomn bomn                           Visit/Unit Tracking  AUTH Status:  Date 4/26 5/1 5/3 5/10 5/14          Used 1 2/10 3/10 4/10 5/10          Remaining                     Precautions fall risk        Access Code: [email or web address]  URL: [email or web address]  Date: 2024  Prepared by: Cathy Sousa    Exercises  - Lateral Step Up  - 1 x daily - 7 x weekly - 1 sets - 15 reps  - Step Up  - 1 x daily - 7 x weekly - 1 sets - 15 reps  - Heel Raises with Counter Support  - 1 x daily - 7 x weekly - 3 sets - 10 reps  - Sit to Stand  - 1 x daily - 7 x weekly - 2 sets - 10 reps    Manuals  5/3 5/08 5/10 5/14                                   Neuro Re-Ed   5/3 5/08 5/10    hurdles 3 laps //bars 2 laps @ bar  Fwd/lat 3 laps @ // bars, fwd/lat, different heights used (6 total), minimal UE used 4 laps @ // bars, fwd/lat, 12 in for fwd, 8 in for lat, 5 total hurdles with minimal UE support 5 lps //bars fwd/lat ea 6\" hurdles   Side stepping Pink TB 3 laps //bars Pink TB @ bar  2 laps   On foam beam - 5 laps no UE //bars   River rocks   4 laps, 6 rocks of small/med height, UE " "support     Cone taps  No UE  10 ea. (1) cone No UE 2 x 10 ea (1) cone on 4 in box in // bars 2 blazepods on 6\" step and try to touch as fast as you can in 2 mins, 2 rounds    FTEC  Foam  30\"x3 Foam 30\" x 3 in // bars      Airex pad     Step up and over - 5 laps no UE           Tandem walking   3 laps in // bars with UE support  3 laps in // bars      Ball tosses    2x15, on foam in // bars, toss to middle, L, & R sides  X25 on green therapads, tossing basketball in // bars    Ther Ex  5/3 5/08 5/10    Nustep  L1 10 mins L1 10 mins  L3 10 mins  L5 10 min - cv endurance    STS  15 knee p! B/L      HR  Patient declined      Step ups  Fwd/lat  10 ea.                                       Ther Activity  5/3                      Gait Training  5/3                      Modalities                                            "

## 2024-05-15 ENCOUNTER — APPOINTMENT (OUTPATIENT)
Dept: PHYSICAL THERAPY | Facility: CLINIC | Age: 84
End: 2024-05-15
Payer: MEDICARE

## 2024-05-17 ENCOUNTER — OFFICE VISIT (OUTPATIENT)
Dept: PHYSICAL THERAPY | Facility: CLINIC | Age: 84
End: 2024-05-17
Payer: MEDICARE

## 2024-05-17 DIAGNOSIS — R26.89 BALANCE DISORDER: Primary | ICD-10-CM

## 2024-05-17 PROCEDURE — 97110 THERAPEUTIC EXERCISES: CPT

## 2024-05-17 PROCEDURE — 97112 NEUROMUSCULAR REEDUCATION: CPT

## 2024-05-17 NOTE — PROGRESS NOTES
Daily Note     Today's date: 2024  Patient name: Maxine Acuña  : 1940  MRN: [id number]  Referring provider: Cleopatra Christie MD  Dx:   Encounter Diagnosis     ICD-10-CM    1. Balance disorder  R26.89           Start Time: 300  Stop Time: 345  Total time in clinic (min): 45 minutes    Subjective: Pt reported that she felt fine after the last session but her allergies were making her dizzy. Pt reports that today she is feeling much better and her allergies are under controlled.       Objective: See treatment diary below      Assessment: Pt tolerated treatment well and responded appropriately to the changes in the exercises. Pt is still challenged with the side stepping on a foam beam, continue to work on in future sessions. She is still continuing to work on single leg balance while walking on the foam beams, each session she has small improvements that allow the exercises to progress to challenge more. Pt is doing well with static balance, so dynamic balance is a main focus in her POC.     Plan: Continue per plan of care.      Short Term Goal Expiration Date:(24)  Long Term Goal Expiration Date: (24)  POC Expiration Date: (24)      POC expires Unit limit Auth Expiration date PT/OT/ST + Visit Limit?   24 bomn bomn bomn                           Visit/Unit Tracking  AUTH Status:  Date 4/26 5/1 5/3 5/10 5/14 5/17         Used 1 2/10 3/10 4/10 5/10 6/10         Remaining                     Precautions fall risk        Access Code: [email or web address]  URL: [email or web address]  Date: 2024  Prepared by: Cathy Sousa    Exercises  - Lateral Step Up  - 1 x daily - 7 x weekly - 1 sets - 15 reps  - Step Up  - 1 x daily - 7 x weekly - 1 sets - 15 reps  - Heel Raises with Counter Support  - 1 x daily - 7 x weekly - 3 sets - 10 reps  - Sit to Stand  - 1 x daily - 7 x weekly - 2 sets - 10 reps    Manuals 5/17 5/3 5/08 5/10 5/14                                   Neuro Re-Ed  " 5/17 5/3 5/08 5/10 5/14   hurdles  2 laps @ bar  Fwd/lat 3 laps @ // bars, fwd/lat, different heights used (6 total), minimal UE used 4 laps @ // bars, fwd/lat, 12 in for fwd, 8 in for lat, 5 total hurdles with minimal UE support 5 lps //bars fwd/lat ea 6\" hurdles   Side stepping On foam beam- 5 laps, no UE @ // bars  Pink TB @ bar  2 laps   On foam beam - 5 laps no UE //bars   River rocks   4 laps, 6 rocks of small/med height, UE support     Cone taps Walking on foam beam, 4 laps with cone taps (8), @ // bars and minimal UE support  No UE  10 ea. (1) cone No UE 2 x 10 ea (1) cone on 4 in box in // bars 2 blazepods on 6\" step and try to touch as fast as you can in 2 mins, 2 rounds    FTEC  Foam  30\"x3 Foam 30\" x 3 in // bars      Airex pad Step up and over - 5 laps, no UE @ // bars and #3 AW    Step up and over - 5 laps no UE   Blaze Pods        Tandem walking   3 laps in // bars with UE support  3 laps in // bars      Ball tosses    2x15, on foam in // bars, toss to middle, L, & R sides  X25 on green therapads, tossing basketball in // bars    Ther Ex 5/17 5/3 5/08 5/10 5/14   Nustep L5 10 mins- CV endurance  L1 10 mins L1 10 mins  L3 10 mins  L5 10 min - cv endurance    STS  15 knee p! B/L      HR  Patient declined      Step ups Fwd/lat 10 each, 6\" step @ // bars and #3 AW Fwd/lat  10 ea.                                       Ther Activity                        Gait Training                        Modalities                                              "

## 2024-05-22 ENCOUNTER — OFFICE VISIT (OUTPATIENT)
Dept: PHYSICAL THERAPY | Facility: CLINIC | Age: 84
End: 2024-05-22
Payer: MEDICARE

## 2024-05-22 DIAGNOSIS — R26.89 BALANCE DISORDER: Primary | ICD-10-CM

## 2024-05-22 PROCEDURE — 97112 NEUROMUSCULAR REEDUCATION: CPT | Performed by: PHYSICAL THERAPIST

## 2024-05-22 NOTE — PROGRESS NOTES
Daily Note     Today's date: 2024  Patient name: Maxine Acuña  : 1940  MRN: [id number]  Referring provider: Cleopatra Christie MD  Dx:   Encounter Diagnosis     ICD-10-CM    1. Balance disorder  R26.89                      Subjective: Feels she hasn't made progress with balance as she continues to have difficulty with tandem walking and can't put on pants while standing.       Objective: See treatment diary below    Functional outcomes   6 minute walk test: 1200ft  Gait speed: 1.3 m/s  5x sit to stand: 9.43 sec (seconds)  FGA -   ABC: 63%    Functional Outcomes: 24  6 mwt: 1150ft   Gait speed: 1.3 m/s   FGA:   ABC: 65%    Assessment: Pt presents to physical therapy this date requesting to complete re testing. She has had modest improvements in balance per testing this date and is no longer within a fall risk category per testing. She will continue to benefit from skilled therapy intervention at 2x week for 4 more weeks per initial POC written. Pt agreeable. Plan to shift to using solo step at next session and completing more challenging balance activities outside of //bars.       Plan: Continue per plan of care.      Short Term Goal Expiration Date:(24)  Long Term Goal Expiration Date: (24)  POC Expiration Date: (24)      POC expires Unit limit Auth Expiration date PT/OT/ST + Visit Limit?   24 bomn bomn bomn                           Visit/Unit Tracking  AUTH Status:  Date 4/26 5/1 5/3 5/10 5/14 5/17         Used 1 2/10 3/10 4/10 5/10 6/10         Remaining                     Precautions fall risk        Access Code: [email or web address]  URL: [email or web address]  Date: 2024  Prepared by: Cathy Sousa    Exercises  - Lateral Step Up  - 1 x daily - 7 x weekly - 1 sets - 15 reps  - Step Up  - 1 x daily - 7 x weekly - 1 sets - 15 reps  - Heel Raises with Counter Support  - 1 x daily - 7 x weekly - 3 sets - 10 reps  - Sit to Stand  - 1 x daily - 7 x  "weekly - 2 sets - 10 reps    Manuals 5/17 5/3 5/08 5/10 5/14                                   Neuro Re-Ed  5/17 5/3 5/08 5/10 5/14   hurdles  2 laps @ bar  Fwd/lat 3 laps @ // bars, fwd/lat, different heights used (6 total), minimal UE used 4 laps @ // bars, fwd/lat, 12 in for fwd, 8 in for lat, 5 total hurdles with minimal UE support 5 lps //bars fwd/lat ea 6\" hurdles   Side stepping On foam beam- 5 laps, no UE @ // bars  Pink TB @ bar  2 laps   On foam beam - 5 laps no UE //bars   River rocks   4 laps, 6 rocks of small/med height, UE support     Cone taps Walking on foam beam, 4 laps with cone taps (8), @ // bars and minimal UE support  No UE  10 ea. (1) cone No UE 2 x 10 ea (1) cone on 4 in box in // bars 2 blazepods on 6\" step and try to touch as fast as you can in 2 mins, 2 rounds    FTEC  Foam  30\"x3 Foam 30\" x 3 in // bars      Airex pad Step up and over - 5 laps, no UE @ // bars and #3 AW    Step up and over - 5 laps no UE   Blaze Pods        Tandem walking   3 laps in // bars with UE support  3 laps in // bars      Ball tosses    2x15, on foam in // bars, toss to middle, L, & R sides  X25 on green therapads, tossing basketball in // bars    Ther Ex 5/17 5/3 5/08 5/10 5/14   Nustep L5 10 mins- CV endurance  L1 10 mins L1 10 mins  L3 10 mins  L5 10 min - cv endurance    STS  15 knee p! B/L      HR  Patient declined      Step ups Fwd/lat 10 each, 6\" step @ // bars and #3 AW Fwd/lat  10 ea.                                       Ther Activity                        Gait Training                        Modalities                                                "

## 2024-05-24 ENCOUNTER — APPOINTMENT (OUTPATIENT)
Dept: PHYSICAL THERAPY | Facility: CLINIC | Age: 84
End: 2024-05-24
Payer: MEDICARE

## 2024-05-28 ENCOUNTER — OFFICE VISIT (OUTPATIENT)
Dept: PHYSICAL THERAPY | Facility: CLINIC | Age: 84
End: 2024-05-28
Payer: MEDICARE

## 2024-05-28 DIAGNOSIS — R26.89 BALANCE DISORDER: Primary | ICD-10-CM

## 2024-05-28 DIAGNOSIS — E11.65 TYPE 2 DIABETES MELLITUS WITH HYPERGLYCEMIA, WITHOUT LONG-TERM CURRENT USE OF INSULIN (HCC): ICD-10-CM

## 2024-05-28 PROCEDURE — 97112 NEUROMUSCULAR REEDUCATION: CPT | Performed by: PHYSICAL THERAPIST

## 2024-05-28 NOTE — PROGRESS NOTES
Daily Note     Today's date: 2024  Patient name: Maxine Acuña  : 1940  MRN: [id number]  Referring provider: Cleopatra Christie MD  Dx:   Encounter Diagnosis     ICD-10-CM    1. Balance disorder  R26.89                      Subjective: Has been dizzy and having difficulty with hayfever for the last week. Feels dizzy when she bends over and looks down. Would like to see her physician prior to starting to exercise.       Objective: See treatment diary below    R maria del carmen hallpike: negative  L Maria Del Carmen hallpike: 5 beat slow nystagmus no symptoms  Left and right roll tests: negative for symptoms or nystagmus      Assessment: Assessed pt for BPPV. Pt had several slow beat nystagmus with left maria del carmen hallpike but no reports of dizziness. Will defer futher tx until pt consults with physician.       Plan: Continue per plan of care.      Short Term Goal Expiration Date:(24)  Long Term Goal Expiration Date: (24)  POC Expiration Date: (24)      POC expires Unit limit Auth Expiration date PT/OT/ST + Visit Limit?   24 bomn bomn bomn                           Visit/Unit Tracking  AUTH Status:  Date 4/26 5/1 5/3 5/10 5/14 5/17         Used 1 2/10 3/10 4/10 5/10 6/10         Remaining                     Precautions fall risk        Access Code: [email or web address]  URL: [email or web address]  Date: 2024  Prepared by: Cathy Sousa    Exercises  - Lateral Step Up  - 1 x daily - 7 x weekly - 1 sets - 15 reps  - Step Up  - 1 x daily - 7 x weekly - 1 sets - 15 reps  - Heel Raises with Counter Support  - 1 x daily - 7 x weekly - 3 sets - 10 reps  - Sit to Stand  - 1 x daily - 7 x weekly - 2 sets - 10 reps    Manuals 5/17 5/3 5/08 5/10 5/14                                   Neuro Re-Ed  5/17 5/3 5/08 5/10 5/14   hurdles  2 laps @ bar  Fwd/lat 3 laps @ // bars, fwd/lat, different heights used (6 total), minimal UE used 4 laps @ // bars, fwd/lat, 12 in for fwd, 8 in for lat, 5 total hurdles with minimal  "UE support 5 lps //bars fwd/lat ea 6\" hurdles   Side stepping On foam beam- 5 laps, no UE @ // bars  Pink TB @ bar  2 laps   On foam beam - 5 laps no UE //bars   River rocks   4 laps, 6 rocks of small/med height, UE support     Cone taps Walking on foam beam, 4 laps with cone taps (8), @ // bars and minimal UE support  No UE  10 ea. (1) cone No UE 2 x 10 ea (1) cone on 4 in box in // bars 2 blazepods on 6\" step and try to touch as fast as you can in 2 mins, 2 rounds    FTEC  Foam  30\"x3 Foam 30\" x 3 in // bars      Airex pad Step up and over - 5 laps, no UE @ // bars and #3 AW    Step up and over - 5 laps no UE   Blaze Pods        Tandem walking   3 laps in // bars with UE support  3 laps in // bars      Ball tosses    2x15, on foam in // bars, toss to middle, L, & R sides  X25 on green therapads, tossing basketball in // bars    Ther Ex 5/17 5/3 5/08 5/10 5/14   Nustep L5 10 mins- CV endurance  L1 10 mins L1 10 mins  L3 10 mins  L5 10 min - cv endurance    STS  15 knee p! B/L      HR  Patient declined      Step ups Fwd/lat 10 each, 6\" step @ // bars and #3 AW Fwd/lat  10 ea.                                       Ther Activity                        Gait Training                        Modalities                                                  "

## 2024-05-29 RX ORDER — SEMAGLUTIDE 2.68 MG/ML
INJECTION, SOLUTION SUBCUTANEOUS
Qty: 9 ML | Refills: 0 | Status: SHIPPED | OUTPATIENT
Start: 2024-05-29

## 2024-06-12 ENCOUNTER — HOSPITAL ENCOUNTER (OUTPATIENT)
Dept: ULTRASOUND IMAGING | Facility: HOSPITAL | Age: 84
Discharge: HOME | End: 2024-06-12
Payer: MEDICARE

## 2024-06-12 DIAGNOSIS — E04.1 THYROID NODULE: ICD-10-CM

## 2024-06-12 PROCEDURE — 76536 US EXAM OF HEAD AND NECK: CPT

## 2024-06-18 ENCOUNTER — APPOINTMENT (OUTPATIENT)
Dept: LAB | Age: 84
End: 2024-06-18
Payer: MEDICARE

## 2024-06-18 DIAGNOSIS — N20.0 NEPHROLITHIASIS: ICD-10-CM

## 2024-06-18 DIAGNOSIS — E87.6 HYPOKALEMIA: ICD-10-CM

## 2024-06-18 DIAGNOSIS — N18.31 STAGE 3A CHRONIC KIDNEY DISEASE (HCC): ICD-10-CM

## 2024-06-18 DIAGNOSIS — R80.1 PERSISTENT PROTEINURIA: ICD-10-CM

## 2024-06-18 DIAGNOSIS — E11.29 TYPE 2 DIABETES MELLITUS WITH OTHER DIABETIC KIDNEY COMPLICATION, WITHOUT LONG-TERM CURRENT USE OF INSULIN (HCC): ICD-10-CM

## 2024-06-18 LAB
ALBUMIN SERPL BCP-MCNC: 3.9 G/DL (ref 3.5–5)
ALP SERPL-CCNC: 62 U/L (ref 34–104)
ALT SERPL W P-5'-P-CCNC: 11 U/L (ref 7–52)
ANION GAP SERPL CALCULATED.3IONS-SCNC: 9 MMOL/L (ref 4–13)
AST SERPL W P-5'-P-CCNC: 14 U/L (ref 13–39)
BASOPHILS # BLD AUTO: 0.06 THOUSANDS/ÂΜL (ref 0–0.1)
BASOPHILS NFR BLD AUTO: 1 % (ref 0–1)
BILIRUB SERPL-MCNC: 0.42 MG/DL (ref 0.2–1)
BUN SERPL-MCNC: 26 MG/DL (ref 5–25)
CALCIUM SERPL-MCNC: 9.1 MG/DL (ref 8.4–10.2)
CHLORIDE SERPL-SCNC: 102 MMOL/L (ref 96–108)
CO2 SERPL-SCNC: 25 MMOL/L (ref 21–32)
CREAT SERPL-MCNC: 1.23 MG/DL (ref 0.6–1.3)
CREAT UR-MCNC: 47.9 MG/DL
EOSINOPHIL # BLD AUTO: 0.2 THOUSAND/ÂΜL (ref 0–0.61)
EOSINOPHIL NFR BLD AUTO: 3 % (ref 0–6)
ERYTHROCYTE [DISTWIDTH] IN BLOOD BY AUTOMATED COUNT: 14.4 % (ref 11.6–15.1)
GFR SERPL CREATININE-BSD FRML MDRD: 41 ML/MIN/1.73SQ M
GLUCOSE P FAST SERPL-MCNC: 133 MG/DL (ref 65–99)
HCT VFR BLD AUTO: 40 % (ref 34.8–46.1)
HGB BLD-MCNC: 12.5 G/DL (ref 11.5–15.4)
IMM GRANULOCYTES # BLD AUTO: 0.03 THOUSAND/UL (ref 0–0.2)
IMM GRANULOCYTES NFR BLD AUTO: 0 % (ref 0–2)
LYMPHOCYTES # BLD AUTO: 2.65 THOUSANDS/ÂΜL (ref 0.6–4.47)
LYMPHOCYTES NFR BLD AUTO: 34 % (ref 14–44)
MAGNESIUM SERPL-MCNC: 2 MG/DL (ref 1.9–2.7)
MCH RBC QN AUTO: 22.9 PG (ref 26.8–34.3)
MCHC RBC AUTO-ENTMCNC: 31.3 G/DL (ref 31.4–37.4)
MCV RBC AUTO: 73 FL (ref 82–98)
MONOCYTES # BLD AUTO: 0.67 THOUSAND/ÂΜL (ref 0.17–1.22)
MONOCYTES NFR BLD AUTO: 9 % (ref 4–12)
NEUTROPHILS # BLD AUTO: 4.26 THOUSANDS/ÂΜL (ref 1.85–7.62)
NEUTS SEG NFR BLD AUTO: 53 % (ref 43–75)
NRBC BLD AUTO-RTO: 0 /100 WBCS
PHOSPHATE SERPL-MCNC: 3.6 MG/DL (ref 2.3–4.1)
PLATELET # BLD AUTO: 227 THOUSANDS/UL (ref 149–390)
PMV BLD AUTO: 10.8 FL (ref 8.9–12.7)
POTASSIUM SERPL-SCNC: 4.2 MMOL/L (ref 3.5–5.3)
PROT SERPL-MCNC: 6.8 G/DL (ref 6.4–8.4)
PROT UR-MCNC: 5 MG/DL
PROT/CREAT UR: 0.1 MG/G{CREAT} (ref 0–0.1)
RBC # BLD AUTO: 5.47 MILLION/UL (ref 3.81–5.12)
SODIUM SERPL-SCNC: 136 MMOL/L (ref 135–147)
URATE SERPL-MCNC: 7.8 MG/DL (ref 2–7.5)
WBC # BLD AUTO: 7.87 THOUSAND/UL (ref 4.31–10.16)

## 2024-06-18 PROCEDURE — 82570 ASSAY OF URINE CREATININE: CPT

## 2024-06-18 PROCEDURE — 84550 ASSAY OF BLOOD/URIC ACID: CPT

## 2024-06-18 PROCEDURE — 85025 COMPLETE CBC W/AUTO DIFF WBC: CPT

## 2024-06-18 PROCEDURE — 84156 ASSAY OF PROTEIN URINE: CPT

## 2024-06-18 PROCEDURE — 80053 COMPREHEN METABOLIC PANEL: CPT

## 2024-06-18 PROCEDURE — 83735 ASSAY OF MAGNESIUM: CPT

## 2024-06-18 PROCEDURE — 84100 ASSAY OF PHOSPHORUS: CPT

## 2024-06-18 PROCEDURE — 36415 COLL VENOUS BLD VENIPUNCTURE: CPT

## 2024-07-12 ENCOUNTER — TELEPHONE (OUTPATIENT)
Age: 84
End: 2024-07-12

## 2024-07-12 NOTE — TELEPHONE ENCOUNTER
Pt called because her cheek is swollen and she thinks she might have an abscessed tooth. She does not have any pain. No openings so I called the office and was advised to tell the patient to try her dentist or go to the u/c because there are no providers at the office today. I did tell the pt this and she is not sure if she will go to the u/c since she does not have any pain. Her dentist is not available.  Pt was wondering if she could just get an abx but I did tell her there were no providers available to send in a script. CHEN.

## 2024-07-30 DIAGNOSIS — J34.89 NASAL DISCHARGE: ICD-10-CM

## 2024-07-30 RX ORDER — AZELASTINE HYDROCHLORIDE 137 UG/1
SPRAY, METERED NASAL
Qty: 30 ML | Refills: 1 | Status: SHIPPED | OUTPATIENT
Start: 2024-07-30

## 2024-08-05 DIAGNOSIS — I10 HYPERTENSION, UNSPECIFIED TYPE: ICD-10-CM

## 2024-08-05 RX ORDER — HYDROCHLOROTHIAZIDE 25 MG/1
25 TABLET ORAL DAILY
Qty: 90 TABLET | Refills: 1 | Status: SHIPPED | OUTPATIENT
Start: 2024-08-05

## 2024-08-12 DIAGNOSIS — E11.65 TYPE 2 DIABETES MELLITUS WITH HYPERGLYCEMIA, WITHOUT LONG-TERM CURRENT USE OF INSULIN (HCC): ICD-10-CM

## 2024-08-12 RX ORDER — SEMAGLUTIDE 2.68 MG/ML
INJECTION, SOLUTION SUBCUTANEOUS
Qty: 9 ML | Refills: 1 | Status: SHIPPED | OUTPATIENT
Start: 2024-08-12 | End: 2024-08-19 | Stop reason: SDUPTHER

## 2024-08-13 DIAGNOSIS — E03.9 HYPOTHYROIDISM, UNSPECIFIED TYPE: ICD-10-CM

## 2024-08-13 RX ORDER — LEVOTHYROXINE SODIUM 25 UG/1
TABLET ORAL
Qty: 90 TABLET | Refills: 1 | Status: SHIPPED | OUTPATIENT
Start: 2024-08-13

## 2024-08-19 DIAGNOSIS — E11.65 TYPE 2 DIABETES MELLITUS WITH HYPERGLYCEMIA, WITHOUT LONG-TERM CURRENT USE OF INSULIN (HCC): ICD-10-CM

## 2024-08-19 NOTE — TELEPHONE ENCOUNTER
Reason for call:   [x] Refill   [] Prior Auth  [x] Other: NOT A DUPLICATE  pharmacy requesting a 3 month supply, stating last script received was for 1 month    Office:   [] PCP/Provider -   [x] Specialty/Provider - maria esther/ Laurent Hoover MD     Medication: semaglutide, 2 mg/dose, (Ozempic, 2 MG/DOSE,) 8 mg/ mL injection pen     Dose/Frequency:     INJECT 2 MG SUBCUTANEOUSLY (UNDER THE SKIN) FOR 7 DAYS           Pharmacy: [id number] - [place], [place] [phone]     Does the patient have enough for 3 days?   [] Yes   [x] No - Send as HP to POD

## 2024-09-09 ENCOUNTER — HOSPITAL ENCOUNTER (OUTPATIENT)
Dept: MAMMOGRAPHY | Facility: HOSPITAL | Age: 84
Discharge: HOME | End: 2024-09-09
Payer: MEDICARE

## 2024-09-09 VITALS — BODY MASS INDEX: 32.47 KG/M2 | WEIGHT: 172 LBS | HEIGHT: 61 IN

## 2024-09-09 DIAGNOSIS — Z12.31 ENCOUNTER FOR SCREENING MAMMOGRAM FOR BREAST CANCER: ICD-10-CM

## 2024-09-09 DIAGNOSIS — I10 HYPERTENSION, UNSPECIFIED TYPE: ICD-10-CM

## 2024-09-09 PROCEDURE — 77067 SCR MAMMO BI INCL CAD: CPT

## 2024-09-09 PROCEDURE — 77063 BREAST TOMOSYNTHESIS BI: CPT

## 2024-09-09 NOTE — TELEPHONE ENCOUNTER
Medication under previous provider    Reason for call:   [x] Refill   [] Prior Auth  [] Other:     Office:   [x] PCP/Provider - Cleopatra Christie[facility]   [] Specialty/Provider -     Medication: lisinopril (ZESTRIL) 40 mg tablet       Take 1 tablet (40 mg total) by mouth 2 (two) times a day       Pharmacy:  [id number] - [place], [place] - [email or web address]     Does the patient have enough for 3 days?   [x] Yes   [] No - Send as HP to POD

## 2024-09-10 RX ORDER — LISINOPRIL 40 MG/1
40 TABLET ORAL 2 TIMES DAILY
Qty: 180 TABLET | Refills: 0 | Status: SHIPPED | OUTPATIENT
Start: 2024-09-10

## 2024-09-10 NOTE — TELEPHONE ENCOUNTER
Refill must be reviewed and completed by the office or provider. The refill is unable to be approved or denied by the medication management team.    Last ordered by another provider. Please review to see if the refill is appropriate.

## 2024-09-11 ENCOUNTER — TELEPHONE (OUTPATIENT)
Dept: NEUROLOGY | Facility: CLINIC | Age: 84
End: 2024-09-11

## 2024-09-11 NOTE — TELEPHONE ENCOUNTER
CALLED PT REGARDING UPCOMING APPT WITH RAMIREZ she Is not in office 10 weeks due to an emergency, pt can also schedule with iveth or smith

## 2024-09-15 DIAGNOSIS — E78.5 HYPERLIPIDEMIA, UNSPECIFIED HYPERLIPIDEMIA TYPE: ICD-10-CM

## 2024-09-15 DIAGNOSIS — I10 HYPERTENSION, UNSPECIFIED TYPE: ICD-10-CM

## 2024-09-15 RX ORDER — ATORVASTATIN CALCIUM 10 MG/1
10 TABLET, FILM COATED ORAL DAILY
Qty: 90 TABLET | Refills: 1 | Status: SHIPPED | OUTPATIENT
Start: 2024-09-15

## 2024-09-15 RX ORDER — CARVEDILOL 25 MG/1
25 TABLET ORAL 2 TIMES DAILY
Qty: 180 TABLET | Refills: 1 | Status: SHIPPED | OUTPATIENT
Start: 2024-09-15

## 2024-09-25 ENCOUNTER — OFFICE VISIT (OUTPATIENT)
Dept: INTERNAL MEDICINE CLINIC | Facility: CLINIC | Age: 84
End: 2024-09-25
Payer: MEDICARE

## 2024-09-25 VITALS
DIASTOLIC BLOOD PRESSURE: 80 MMHG | SYSTOLIC BLOOD PRESSURE: 120 MMHG | BODY MASS INDEX: 32.66 KG/M2 | TEMPERATURE: 96.6 F | HEART RATE: 72 BPM | OXYGEN SATURATION: 98 % | HEIGHT: 61 IN | WEIGHT: 173 LBS

## 2024-09-25 DIAGNOSIS — E66.01 OBESITY, MORBID (HCC): ICD-10-CM

## 2024-09-25 DIAGNOSIS — E78.2 MIXED HYPERLIPIDEMIA: ICD-10-CM

## 2024-09-25 DIAGNOSIS — K22.70 BARRETT'S ESOPHAGUS WITHOUT DYSPLASIA: ICD-10-CM

## 2024-09-25 DIAGNOSIS — N18.31 STAGE 3A CHRONIC KIDNEY DISEASE (HCC): Chronic | ICD-10-CM

## 2024-09-25 DIAGNOSIS — J30.9 CHRONIC ALLERGIC RHINITIS: ICD-10-CM

## 2024-09-25 DIAGNOSIS — R25.1 TREMOR: Primary | ICD-10-CM

## 2024-09-25 DIAGNOSIS — L85.3 DRY SKIN: ICD-10-CM

## 2024-09-25 DIAGNOSIS — E11.65 TYPE 2 DIABETES MELLITUS WITH HYPERGLYCEMIA, WITHOUT LONG-TERM CURRENT USE OF INSULIN (HCC): ICD-10-CM

## 2024-09-25 DIAGNOSIS — M85.89 OSTEOPENIA OF MULTIPLE SITES: ICD-10-CM

## 2024-09-25 DIAGNOSIS — I10 PRIMARY HYPERTENSION: ICD-10-CM

## 2024-09-25 DIAGNOSIS — E04.1 THYROID NODULE: ICD-10-CM

## 2024-09-25 PROCEDURE — G2211 COMPLEX E/M VISIT ADD ON: HCPCS | Performed by: INTERNAL MEDICINE

## 2024-09-25 PROCEDURE — 99214 OFFICE O/P EST MOD 30 MIN: CPT | Performed by: INTERNAL MEDICINE

## 2024-09-25 RX ORDER — AMOXICILLIN AND CLAVULANATE POTASSIUM 500; 125 MG/1; MG/1
TABLET, FILM COATED ORAL
COMMUNITY
Start: 2024-07-13

## 2024-09-25 NOTE — ASSESSMENT & PLAN NOTE
Lab Results   Component Value Date    HGBA1C 6.9 (H) 04/18/2024     Repeat A1c due.  On Ozempic 2 mg.  Per Endo.

## 2024-09-25 NOTE — ASSESSMENT & PLAN NOTE
BP controlled on current regimen: carvedilol 25 mg bid, HCTZ 25 mg daily, lisinopril 40 mg bid and spironolactone 25 mg daily.

## 2024-09-25 NOTE — ASSESSMENT & PLAN NOTE
Lab Results   Component Value Date    EGFR 41 06/18/2024    EGFR 40 04/18/2024    EGFR 34 12/21/2023    CREATININE 1.23 06/18/2024    CREATININE 1.24 04/18/2024    CREATININE 1.44 (H) 12/21/2023     Stable.  Avoid NSAIDs.  Sees nephrology.

## 2024-09-25 NOTE — PROGRESS NOTES
Ambulatory Visit  Name: Maxine Acuña      : 1940      MRN: [id number]  Encounter Provider: Cleopatra Christie MD  Encounter Date: 2024   Encounter department: [facility]    Assessment & Plan  Tremor  Waiting for neurology appointment, moved later this year.  She is concerned about Parkinson's, ongoing head and hand tremors.  Went to physical therapy already for balance. No recent falls.         Primary hypertension  BP controlled on current regimen: carvedilol 25 mg bid, HCTZ 25 mg daily, lisinopril 40 mg bid and spironolactone 25 mg daily.          Stage 3a chronic kidney disease ([facility])  Lab Results   Component Value Date    EGFR 41 2024    EGFR 40 2024    EGFR 34 2023    CREATININE 1.23 2024    CREATININE 1.24 2024    CREATININE 1.44 (H) 2023     Stable.  Avoid NSAIDs.  Sees nephrology.         Type 2 diabetes mellitus with hyperglycemia, without long-term current use of insulin ([facility])    Lab Results   Component Value Date    HGBA1C 6.9 (H) 2024     Repeat A1c due.  On Ozempic 2 mg.  Per Endo.         Chronic allergic rhinitis  Using nasal spray prn, has azelastine or Flonase to use prn.         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         Osteopenia of multiple sites  Continue daily walks and supplements.         Thyroid nodule  Stable on recent ultrasound, no further follow up recommended.         Obesity, morbid (HCC)  No weight change.         Mixed hyperlipidemia  Lipids stable, on atorvastatin 10 mg.         Dry skin  Recommend moisturizing lotion bid, may use petroleum jelly on nipples daily.        Follow up in 6 months or as needed.    History of Present Illness     She joined a support group for Parkinson's at her facility.  She complains of her head is jerking, chin is shaking frequently. She feels her balance is off, did finish physical therapy.  Upset that her neurology appointment was delayed, now  "waiting for about 9 months.  She reports no falls.  She reports no issues with her handwriting, her hand just shakes but it does not get smaller.  Denies any dizziness, urinary incontinence.  No issues with her bowel movements.    She still experiences itchy nipples in her breasts, it was usually on her right nipple, feels it is a still happening on her left side as well.  She does apply lotion on it as needed.  She is concerned about rough skin all over her body.  She shows a portion of her chest, no concerning skin lesions.  She just feels it is bumpy, blames that she has \"goosebumps's\" all over her body.  She did see dermatology in the past.  She feels she is having more skin tags which she would like to be removed.        Review of Systems   Constitutional:  Negative for appetite change and fatigue.   HENT:  Negative for congestion, ear pain and postnasal drip.    Eyes:  Negative for visual disturbance.   Respiratory:  Negative for cough and shortness of breath.    Cardiovascular:  Negative for chest pain and leg swelling.   Gastrointestinal:  Negative for abdominal pain, constipation and diarrhea.   Genitourinary:  Negative for dysuria, frequency and urgency.   Musculoskeletal:  Negative for arthralgias and myalgias.   Skin:  Negative for color change, rash and wound.   Neurological:  Negative for dizziness, numbness and headaches.   Hematological:  Does not bruise/bleed easily.   Psychiatric/Behavioral:  Negative for confusion. The patient is not nervous/anxious.            Objective     /80   Pulse 72   Temp (!) 96.6 °F (35.9 °C)   Ht 5' 1\" (1.549 m)   Wt 78.5 kg (173 lb)   LMP  (LMP Unknown)   SpO2 98%   BMI 32.69 kg/m²     Physical Exam  Vitals and nursing note reviewed.   Constitutional:       General: She is not in acute distress.     Appearance: She is well-developed.   HENT:      Head: Normocephalic and atraumatic.      Right Ear: External ear normal.      Left Ear: External ear normal.     "  Mouth/Throat:      Mouth: Mucous membranes are moist.   Eyes:      Pupils: Pupils are equal, round, and reactive to light.   Cardiovascular:      Rate and Rhythm: Normal rate and regular rhythm.      Heart sounds: Normal heart sounds.   Pulmonary:      Effort: Pulmonary effort is normal.      Breath sounds: Normal breath sounds. No wheezing.   Abdominal:      General: Bowel sounds are normal.      Palpations: Abdomen is soft.   Musculoskeletal:         General: No swelling.      Right lower leg: No edema.      Left lower leg: No edema.   Skin:     General: Skin is warm and dry.      Findings: No erythema, rash or wound.          Neurological:      General: No focal deficit present.      Mental Status: She is alert and oriented to person, place, and time.   Psychiatric:         Mood and Affect: Mood and affect normal. Mood is not anxious or depressed.         Behavior: Behavior normal.           Labs & imaging results reviewed with patient.

## 2024-09-25 NOTE — ASSESSMENT & PLAN NOTE
Waiting for neurology appointment, moved later this year.  She is concerned about Parkinson's, ongoing head and hand tremors.  Went to physical therapy already for balance. No recent falls.

## 2024-09-27 DIAGNOSIS — I10 HYPERTENSION, UNSPECIFIED TYPE: ICD-10-CM

## 2024-09-27 RX ORDER — SPIRONOLACTONE 25 MG/1
25 TABLET ORAL DAILY
Qty: 90 TABLET | Refills: 1 | Status: SHIPPED | OUTPATIENT
Start: 2024-09-27

## 2024-10-08 ENCOUNTER — TELEPHONE (OUTPATIENT)
Age: 84
End: 2024-10-08

## 2024-10-08 NOTE — TELEPHONE ENCOUNTER
Patient called requesting a script for pool exercises and a script for gym access for the equipment.

## 2024-10-08 NOTE — TELEPHONE ENCOUNTER
I spoke w/ pt. She wants to do PT-wants to do pool and machines at [facility]. She said a PT will work with her and take her to the pool and get her set up with machines at the gym there. She said she used to go to another place for PT but would rather go to [place]. She said she has arthritis and is overweight. Mail script to pt.

## 2024-11-06 ENCOUNTER — TELEPHONE (OUTPATIENT)
Age: 84
End: 2024-11-06

## 2024-11-06 NOTE — TELEPHONE ENCOUNTER
Patient called stating she went to give her weekly dose of the ozempic and never took the clear top off so the medication sprayed out, and didn't go into her skin. She is asking what she should do since she is down a dose?

## 2024-11-13 NOTE — TELEPHONE ENCOUNTER
Patient called to report that she went to the pharmacy and explained to them about the missed dose and they informed her to call Ozempic. She called ozempic and they sent her a coupon for a free dose since she missed a week. She just wanted to let the doctor know this is an option for patients who run into the same issue.

## 2024-11-13 NOTE — TELEPHONE ENCOUNTER
Called patient and was not able to speak to her. Left a detailed voice mail with doctor's message.

## 2024-12-03 ENCOUNTER — TELEPHONE (OUTPATIENT)
Dept: NEUROLOGY | Facility: CLINIC | Age: 84
End: 2024-12-03

## 2024-12-03 NOTE — TELEPHONE ENCOUNTER
Tried calling PT to confirm upcoming appointment on 12/11/24 at 11am with Dr. Lake at the [address] [place] [address] location. Left Pt VM with call back number.

## 2024-12-06 ENCOUNTER — APPOINTMENT (OUTPATIENT)
Dept: LAB | Age: 84
End: 2024-12-06
Payer: MEDICARE

## 2024-12-06 DIAGNOSIS — E11.21 DIABETIC GLOMERULOPATHY (HCC): ICD-10-CM

## 2024-12-06 DIAGNOSIS — N18.31 CHRONIC KIDNEY DISEASE (CKD) STAGE G3A/A1, MODERATELY DECREASED GLOMERULAR FILTRATION RATE (GFR) BETWEEN 45-59 ML/MIN/1.73 SQUARE METER AND ALBUMINURIA CREATININE RATIO LESS THAN 30 MG/G (HCC): ICD-10-CM

## 2024-12-06 DIAGNOSIS — E53.8 VITAMIN B12 DEFICIENCY: ICD-10-CM

## 2024-12-06 DIAGNOSIS — N18.31 STAGE 3A CHRONIC KIDNEY DISEASE (HCC): Chronic | ICD-10-CM

## 2024-12-06 DIAGNOSIS — E87.6 HYPOPOTASSEMIA: ICD-10-CM

## 2024-12-06 DIAGNOSIS — I10 ESSENTIAL HYPERTENSION, MALIGNANT: ICD-10-CM

## 2024-12-06 DIAGNOSIS — R80.1 PERSISTENT PROTEINURIA: ICD-10-CM

## 2024-12-06 DIAGNOSIS — N20.0 URIC ACID NEPHROLITHIASIS: ICD-10-CM

## 2024-12-06 DIAGNOSIS — E11.65 TYPE 2 DIABETES MELLITUS WITH HYPERGLYCEMIA, WITHOUT LONG-TERM CURRENT USE OF INSULIN (HCC): ICD-10-CM

## 2024-12-06 DIAGNOSIS — E78.2 MIXED HYPERLIPIDEMIA: ICD-10-CM

## 2024-12-06 LAB
ALBUMIN SERPL BCG-MCNC: 4.2 G/DL (ref 3.5–5)
ALP SERPL-CCNC: 62 U/L (ref 34–104)
ALT SERPL W P-5'-P-CCNC: 12 U/L (ref 7–52)
ANION GAP SERPL CALCULATED.3IONS-SCNC: 4 MMOL/L (ref 4–13)
AST SERPL W P-5'-P-CCNC: 16 U/L (ref 13–39)
BASOPHILS # BLD AUTO: 0.07 THOUSANDS/ÂΜL (ref 0–0.1)
BASOPHILS NFR BLD AUTO: 1 % (ref 0–1)
BILIRUB SERPL-MCNC: 0.54 MG/DL (ref 0.2–1)
BUN SERPL-MCNC: 21 MG/DL (ref 5–25)
CALCIUM SERPL-MCNC: 9.9 MG/DL (ref 8.4–10.2)
CHLORIDE SERPL-SCNC: 102 MMOL/L (ref 96–108)
CHOLEST SERPL-MCNC: 170 MG/DL (ref ?–200)
CO2 SERPL-SCNC: 31 MMOL/L (ref 21–32)
CREAT SERPL-MCNC: 1.11 MG/DL (ref 0.6–1.3)
CREAT UR-MCNC: 114.1 MG/DL
EOSINOPHIL # BLD AUTO: 0.18 THOUSAND/ÂΜL (ref 0–0.61)
EOSINOPHIL NFR BLD AUTO: 2 % (ref 0–6)
ERYTHROCYTE [DISTWIDTH] IN BLOOD BY AUTOMATED COUNT: 13.8 % (ref 11.6–15.1)
GFR SERPL CREATININE-BSD FRML MDRD: 46 ML/MIN/1.73SQ M
GLUCOSE P FAST SERPL-MCNC: 120 MG/DL (ref 65–99)
HCT VFR BLD AUTO: 41.5 % (ref 34.8–46.1)
HDLC SERPL-MCNC: 40 MG/DL
HGB BLD-MCNC: 12.9 G/DL (ref 11.5–15.4)
IMM GRANULOCYTES # BLD AUTO: 0.03 THOUSAND/UL (ref 0–0.2)
IMM GRANULOCYTES NFR BLD AUTO: 0 % (ref 0–2)
LDLC SERPL CALC-MCNC: 83 MG/DL (ref 0–100)
LYMPHOCYTES # BLD AUTO: 2.66 THOUSANDS/ÂΜL (ref 0.6–4.47)
LYMPHOCYTES NFR BLD AUTO: 34 % (ref 14–44)
MAGNESIUM SERPL-MCNC: 2 MG/DL (ref 1.9–2.7)
MCH RBC QN AUTO: 22.6 PG (ref 26.8–34.3)
MCHC RBC AUTO-ENTMCNC: 31.1 G/DL (ref 31.4–37.4)
MCV RBC AUTO: 73 FL (ref 82–98)
MONOCYTES # BLD AUTO: 0.48 THOUSAND/ÂΜL (ref 0.17–1.22)
MONOCYTES NFR BLD AUTO: 6 % (ref 4–12)
NEUTROPHILS # BLD AUTO: 4.35 THOUSANDS/ÂΜL (ref 1.85–7.62)
NEUTS SEG NFR BLD AUTO: 57 % (ref 43–75)
NONHDLC SERPL-MCNC: 130 MG/DL
NRBC BLD AUTO-RTO: 0 /100 WBCS
PHOSPHATE SERPL-MCNC: 3.3 MG/DL (ref 2.3–4.1)
PLATELET # BLD AUTO: 221 THOUSANDS/UL (ref 149–390)
PMV BLD AUTO: 10.5 FL (ref 8.9–12.7)
POTASSIUM SERPL-SCNC: 4.2 MMOL/L (ref 3.5–5.3)
PROT SERPL-MCNC: 7.3 G/DL (ref 6.4–8.4)
PROT UR-MCNC: 5.3 MG/DL
PROT/CREAT UR: 0 MG/G{CREAT} (ref 0–0.1)
RBC # BLD AUTO: 5.7 MILLION/UL (ref 3.81–5.12)
SODIUM SERPL-SCNC: 137 MMOL/L (ref 135–147)
TRIGL SERPL-MCNC: 236 MG/DL (ref ?–150)
URATE SERPL-MCNC: 7.7 MG/DL (ref 2–7.5)
VIT B12 SERPL-MCNC: 365 PG/ML (ref 180–914)
WBC # BLD AUTO: 7.77 THOUSAND/UL (ref 4.31–10.16)

## 2024-12-06 PROCEDURE — 83036 HEMOGLOBIN GLYCOSYLATED A1C: CPT

## 2024-12-06 PROCEDURE — 84156 ASSAY OF PROTEIN URINE: CPT

## 2024-12-06 PROCEDURE — 80061 LIPID PANEL: CPT

## 2024-12-06 PROCEDURE — 84100 ASSAY OF PHOSPHORUS: CPT

## 2024-12-06 PROCEDURE — 85025 COMPLETE CBC W/AUTO DIFF WBC: CPT

## 2024-12-06 PROCEDURE — 80053 COMPREHEN METABOLIC PANEL: CPT

## 2024-12-06 PROCEDURE — 83735 ASSAY OF MAGNESIUM: CPT

## 2024-12-06 PROCEDURE — 82570 ASSAY OF URINE CREATININE: CPT

## 2024-12-06 PROCEDURE — 84550 ASSAY OF BLOOD/URIC ACID: CPT

## 2024-12-06 PROCEDURE — 36415 COLL VENOUS BLD VENIPUNCTURE: CPT

## 2024-12-06 PROCEDURE — 82607 VITAMIN B-12: CPT

## 2024-12-07 DIAGNOSIS — I10 HYPERTENSION, UNSPECIFIED TYPE: ICD-10-CM

## 2024-12-07 LAB
EST. AVERAGE GLUCOSE BLD GHB EST-MCNC: 157 MG/DL
HBA1C MFR BLD: 7.1 %

## 2024-12-09 RX ORDER — LISINOPRIL 40 MG/1
40 TABLET ORAL 2 TIMES DAILY
Qty: 180 TABLET | Refills: 1 | Status: SHIPPED | OUTPATIENT
Start: 2024-12-09

## 2024-12-11 ENCOUNTER — CONSULT (OUTPATIENT)
Dept: NEUROLOGY | Facility: CLINIC | Age: 84
End: 2024-12-11
Payer: MEDICARE

## 2024-12-11 VITALS
DIASTOLIC BLOOD PRESSURE: 98 MMHG | WEIGHT: 173 LBS | HEIGHT: 61 IN | BODY MASS INDEX: 32.66 KG/M2 | OXYGEN SATURATION: 94 % | SYSTOLIC BLOOD PRESSURE: 154 MMHG | HEART RATE: 75 BPM | TEMPERATURE: 97.4 F

## 2024-12-11 DIAGNOSIS — R25.1 TREMOR: ICD-10-CM

## 2024-12-11 DIAGNOSIS — R26.89 BALANCE DISORDER: ICD-10-CM

## 2024-12-11 PROCEDURE — 99204 OFFICE O/P NEW MOD 45 MIN: CPT | Performed by: PSYCHIATRY & NEUROLOGY

## 2024-12-11 NOTE — PROGRESS NOTES
"Name: Maxine Acuña      : 1940      MRN: [id number]  Encounter Provider: Tami Lake MD  Encounter Date: 2024   Encounter department: [facility] [place]  :  Assessment & Plan  Balance disorder  She reports having problems with her balance. Balance problems have been going for at least 5 years. Cannot stand on one foot or one foot in front of the other. No recent falls.  Was doing PT for the balance and no help. Reports having \"shredded ligaments and tendons in both her ankle\". No shuffling of gait.     Patient is very active at her senior living community and participates in multiple exercise programs including aquatherapy. Physical exam today is grossly benign with mild weakness on ankle inversion and eversion bilaterally. This potentially from prior injury. No findings on exam to warrant imaging of the CNS. Encouraged patient to continue good job keeping physically active.       Orders:  •  Ambulatory Referral to Neurology    Tremor  Patient reports having a chin tremor for approximately two years. No tremor of the head or arms. She reports having excessive saliva but denies any bulbar sx. No bradykinesia appreciated. PE unremarkable.     Unclear etiology at this time. No suspicion for a degenerative movement disorder. I dont believe this warrants treatment. Will continue monitoring.     Follow up in 6 months unless she develops new sx     Orders:  •  Ambulatory Referral to Neurology          History of Present Illness   HPI    Patient is a very pleasant 84-year-old female with history of CKD, cervical degenerative disc disease, hypertension, hyperlipidemia IBS, kidney stones, osteoarthritis, obesity, pulmonary nodule, thalassemia minor, thyroid nodule and diabetes who presents with multiple concerns.     She reports having problems with her balance. Balance problems have been going for at least 5 years. Cannot stand on one foot or one foot in front of the other. " "No recent falls.  Was doing PT for the balance and no help. Reports having \"shredded ligaments and tendons in both her ankle\". No shuffling of gait.      Patient reports having a chin tremor. She notices this when she is relaxed and is not thinking about it. He denies tremor of the head or hands.     Patient reports having trouble navigating her computer mouse.   Reports having pain in her arms   When she is relaxed and watching TV she readjusts herself. \"A lot of shoulder movement\".      Her hands cramp up   Gets transient spinning sensation when she gets allergies. Lasts briefly.   Reports having excessive saliva.   No double vision  No difficulty swallowing  Vision is good, gets yearly checkups  Recently started ozempic   No history of stroke   Neck discomfort, no pain     Lifestyle:    How do these symptoms impact ADLs? They dont. Hasn't stopped doing anything because of this stuff.     Sleep :  averages: 6-8 hours if she is in bed 12-13 H   Problems falling asleep?:   No  Problems staying asleep?:  Yes  Do you snore while asleep?Yes  Do you wake up with headaches? No  Have you ever had a sleep study done? No    Social:  Physical activity: Swims, aqua therapy, walking, gardening.   Water: 5 glasses of water at every meal  Caffeine: Decaf   Mood:  Denies history of anxiety or depression or other diagnosed mood disorder  ETOH: Occasional wine, maybe once a week. In the summer two beers a week  Tobacco:  No   Illicit drugs:  No   Work: Retired teacher, upper class science   Lives at [facility]     Family history:  Family history of older sister with essential tremor     Workup:  Vitamin B12: 365  LDL 83   MRI many years ago showed \"something\" in the back part of the brain, unclear what.     Review of Systems   Constitutional:  Negative for appetite change, fatigue and fever.   HENT: Negative.  Negative for hearing loss, tinnitus, trouble swallowing and voice change.    Eyes: Negative.  Negative for " "photophobia, pain and visual disturbance.   Respiratory: Negative.  Negative for shortness of breath.    Cardiovascular: Negative.  Negative for palpitations.   Gastrointestinal: Negative.  Negative for nausea and vomiting.   Endocrine: Negative.  Negative for cold intolerance.   Genitourinary: Negative.  Negative for dysuria, frequency and urgency.   Musculoskeletal:  Negative for back pain, gait problem, myalgias, neck pain and neck stiffness.   Skin: Negative.  Negative for rash.   Allergic/Immunologic: Negative.    Neurological:  Positive for dizziness (Only with ear clogging she gets dizzy. Example: looking down and the room spins.) and tremors (Jaw tremors subconsciosly. Only stops when she pays attention. Having trouble controlling Mouse on PC. Body twitch when idle, described as contraction/jerk with no pain.). Negative for seizures, syncope, facial asymmetry, speech difficulty, weakness, light-headedness, numbness and headaches.        Pt wants to be evaluated for a neurological issue. Pt states if she tries to stand on 1 foot, she can't. Having trouble with balance. Walking with one foot in front of the other is troublesome.   Hematological: Negative.  Does not bruise/bleed easily.   Psychiatric/Behavioral: Negative.  Negative for confusion, hallucinations and sleep disturbance.    All other systems reviewed and are negative.   I have personally reviewed the MA's review of systems and made changes as necessary.         Objective   /98 (BP Location: Right arm, Patient Position: Sitting, Cuff Size: Standard)   Pulse 75   Temp (!) 97.4 °F (36.3 °C) (Temporal)   Ht 5' 1\" (1.549 m)   Wt 78.5 kg (173 lb)   LMP  (LMP Unknown)   SpO2 94%   BMI 32.69 kg/m²     Physical Exam  Eyes:      General: Lids are normal.      Extraocular Movements: Extraocular movements intact.      Pupils: Pupils are equal, round, and reactive to light.   Neurological:      Motor: Motor strength is normal.     Coordination: " Romberg sign negative.      Deep Tendon Reflexes:      Reflex Scores:       Bicep reflexes are 1+ on the right side and 1+ on the left side.       Brachioradialis reflexes are 1+ on the right side and 1+ on the left side.       Patellar reflexes are 0 on the right side and 0 on the left side.      Neurological Exam  Mental Status  Awake, alert and oriented to person, place and time.    Cranial Nerves  CN II: Visual fields full to confrontation.  CN III, IV, VI: Extraocular movements intact bilaterally. Normal lids and orbits bilaterally. Pupils equal round and reactive to light bilaterally.  CN V: Facial sensation is normal.  CN VII: Full and symmetric facial movement.  CN VIII: Hearing is normal.  CN IX, X: Palate elevates symmetrically  CN XI: Shoulder shrug strength is normal.  CN XII: Tongue midline without atrophy or fasciculations.    Motor   Strength is 5/5 throughout all four extremities.  Mild decreased strength bilaterally on ankle inversion and eversion .    Sensory  Light touch is normal in upper and lower extremities. Temperature is normal in upper and lower extremities. Vibration is normal in upper and lower extremities.     Reflexes                                            Right                      Left  Brachioradialis                    1+                         1+  Biceps                                 1+                         1+  Patellar                                0                         0    Right pathological reflexes: Kady's absent.  Left pathological reflexes: Kady's absent.    Coordination  Right: Finger-to-nose normal.Left: Finger-to-nose normal.    Gait  Casual gait is normal including stance, stride, and arm swing. Romberg is absent.  Mild swaying on romberg but patient able to compensate very well. .

## 2024-12-11 NOTE — ASSESSMENT & PLAN NOTE
Patient reports having a chin tremor for approximately two years. No tremor of the head or arms. She reports having excessive saliva but denies any bulbar sx. No bradykinesia appreciated. PE unremarkable.     Unclear etiology at this time. No suspicion for a degenerative movement disorder. I dont believe this warrants treatment. Will continue monitoring.     Follow up in 6 months unless she develops new sx     Orders:  •  Ambulatory Referral to Neurology

## 2024-12-16 ENCOUNTER — RESULTS FOLLOW-UP (OUTPATIENT)
Dept: INTERNAL MEDICINE CLINIC | Facility: CLINIC | Age: 84
End: 2024-12-16

## 2025-01-09 DIAGNOSIS — E03.9 HYPOTHYROIDISM, UNSPECIFIED TYPE: ICD-10-CM

## 2025-01-13 ENCOUNTER — TELEPHONE (OUTPATIENT)
Age: 85
End: 2025-01-13

## 2025-01-13 DIAGNOSIS — E11.65 TYPE 2 DIABETES MELLITUS WITH HYPERGLYCEMIA, WITHOUT LONG-TERM CURRENT USE OF INSULIN (HCC): Primary | ICD-10-CM

## 2025-01-13 RX ORDER — LEVOTHYROXINE SODIUM 25 UG/1
37.5 TABLET ORAL DAILY
Qty: 90 TABLET | Refills: 0 | Status: SHIPPED | OUTPATIENT
Start: 2025-01-13

## 2025-01-13 NOTE — TELEPHONE ENCOUNTER
Patient called stating she can no longer afford Ozempic. She is asking if she can go back on her old regimen. She states she was previously on Metformin and another medication but she is not sure of the name.please send medications to the [facility]

## 2025-01-14 NOTE — TELEPHONE ENCOUNTER
Covering for Dr. Noble   Please check with pt what other medications she is currently taking ? Thanks     Jovana Hayward

## 2025-01-14 NOTE — TELEPHONE ENCOUNTER
Patient returning call. Reviewed medication list in EPIC. Patient states the only change is ASA was discontinued and B12 was added. Please advise, thank you.

## 2025-01-15 ENCOUNTER — TELEPHONE (OUTPATIENT)
Age: 85
End: 2025-01-15

## 2025-01-15 DIAGNOSIS — E11.65 TYPE 2 DIABETES MELLITUS WITH HYPERGLYCEMIA, WITHOUT LONG-TERM CURRENT USE OF INSULIN (HCC): ICD-10-CM

## 2025-01-15 RX ORDER — BLOOD SUGAR DIAGNOSTIC
STRIP MISCELLANEOUS
Qty: 100 STRIP | Refills: 1 | Status: CANCELLED | OUTPATIENT
Start: 2025-01-15

## 2025-01-15 RX ORDER — LANCETS 30 GAUGE
EACH MISCELLANEOUS
Qty: 100 EACH | Refills: 1 | Status: CANCELLED | OUTPATIENT
Start: 2025-01-15

## 2025-01-15 RX ORDER — LINAGLIPTIN 5 MG/1
5 TABLET, FILM COATED ORAL DAILY
Qty: 30 TABLET | Refills: 0 | Status: SHIPPED | OUTPATIENT
Start: 2025-01-15 | End: 2025-01-16 | Stop reason: SDUPTHER

## 2025-01-15 RX ORDER — BLOOD-GLUCOSE METER
KIT MISCELLANEOUS
OUTPATIENT
Start: 2025-01-15

## 2025-01-15 NOTE — TELEPHONE ENCOUNTER
Patient returning call and made aware:        1/15/25  2:02 PM  Note           Lab Results   Component Value Date     HGBA1C 7.1 (H) 12/06/2024         Please inform patient that metformin was stopped because of her kidney function test.  If she is not able to take Ozempic, we can try Tradjenta.  I will send the prescription to pharmacy to see if it is covered.  If it is covered, she can take Tradjenta in place of Ozempic  She should check her blood sugar twice daily before breakfast and before dinner and send log in 2 weeks     Jovana Hayward           Patient verbalized understanding.

## 2025-01-15 NOTE — TELEPHONE ENCOUNTER
Patient called back she would like for somebody to please respond to  her message about switching to  another medication . She stated that  you can call or send my mychart. Please advised

## 2025-01-15 NOTE — TELEPHONE ENCOUNTER
Lab Results   Component Value Date    HGBA1C 7.1 (H) 12/06/2024       Please inform patient that metformin was stopped because of her kidney function test.  If she is not able to take Ozempic, we can try Tradjenta.  I will send the prescription to pharmacy to see if it is covered.  If it is covered, she can take Tradjenta in place of Ozempic  She should check her blood sugar twice daily before breakfast and before dinner and send log in 2 weeks    Jovana Hayward

## 2025-01-15 NOTE — TELEPHONE ENCOUNTER
Patient called requesting rx for glucometer, test strips and lancets. States her glucometer is broken. Please see telephone encounter from today 1-15. Test strips and lancets rx need to be changed to test bid per encounter. Thank you.

## 2025-01-16 ENCOUNTER — TELEPHONE (OUTPATIENT)
Age: 85
End: 2025-01-16

## 2025-01-16 ENCOUNTER — TELEPHONE (OUTPATIENT)
Dept: INTERNAL MEDICINE CLINIC | Facility: CLINIC | Age: 85
End: 2025-01-16

## 2025-01-16 DIAGNOSIS — E11.65 TYPE 2 DIABETES MELLITUS WITH HYPERGLYCEMIA, WITHOUT LONG-TERM CURRENT USE OF INSULIN (HCC): ICD-10-CM

## 2025-01-16 RX ORDER — LINAGLIPTIN 5 MG/1
5 TABLET, FILM COATED ORAL DAILY
Qty: 30 TABLET | Refills: 5 | Status: SHIPPED | OUTPATIENT
Start: 2025-01-16

## 2025-01-16 RX ORDER — LANCETS 33 GAUGE
EACH MISCELLANEOUS
Qty: 200 EACH | Refills: 3 | Status: SHIPPED | OUTPATIENT
Start: 2025-01-16

## 2025-01-16 RX ORDER — BLOOD SUGAR DIAGNOSTIC
STRIP MISCELLANEOUS
Qty: 200 EACH | Refills: 3 | Status: SHIPPED | OUTPATIENT
Start: 2025-01-16

## 2025-01-16 RX ORDER — BLOOD-GLUCOSE METER
KIT MISCELLANEOUS
Qty: 1 KIT | Refills: 0 | Status: SHIPPED | OUTPATIENT
Start: 2025-01-16

## 2025-01-16 NOTE — TELEPHONE ENCOUNTER
Patient calling to relay this message about refilling her medications to PCP office.  Per previous messages here in this encounter, Fernando has contacted Dr. Christie.    Please call patient and let her know that this refill will be done.  Patient stated she is 2 days past her dose for Ozempic and has not taken anything for her sugars.  She monitors her sugars twice a day    Please send to [facility].

## 2025-01-16 NOTE — TELEPHONE ENCOUNTER
Patient is also requesting for the linaGLIPtin which is generic for Tradjenta to be called as well so she can save money. I looked in her chart and it shows a script was sent yesterday but she is stating it is not the generic that the pharmacy received.

## 2025-01-16 NOTE — TELEPHONE ENCOUNTER
Patient called and stated that the Tradjenta is just as expensive as the Ozempic was. She wants to know if an alternative can be ordered and sent? She would like a call back with an update.

## 2025-01-16 NOTE — TELEPHONE ENCOUNTER
Patient was updated that she has to contact her PCP for the glucometer and test strips. No further questions at this time.

## 2025-01-16 NOTE — TELEPHONE ENCOUNTER
The pt called to make an overdue office followup - pt last seen 9/2023 and is scheduled for 1-22-25 asking if she will need lab work done prior and if it can be entered and please call her to let her know either way.

## 2025-01-17 DIAGNOSIS — E03.9 HYPOTHYROIDISM, UNSPECIFIED TYPE: ICD-10-CM

## 2025-01-17 NOTE — TELEPHONE ENCOUNTER
Pt called in, in regards to her appt and labs. The order that is currently in pt's chart is dated for February 17th and pt has an upcoming appt for 01/22. Please update the date on the order and inform pt if anything else is needed for her appt. Thank you!

## 2025-01-17 NOTE — TELEPHONE ENCOUNTER
I have added a lab to assess patient's thyroid function. All diabetes related labs were completed in December and are, therefore, up to date. Thank you.

## 2025-01-21 ENCOUNTER — NURSE TRIAGE (OUTPATIENT)
Age: 85
End: 2025-01-21

## 2025-01-21 NOTE — TELEPHONE ENCOUNTER
"Called and spoke with patient. Patient cancelled her appointment for tomorrow due to having a cold. Patient doing finger sticks BID.  She said her Ozempic was stopped now on the Tradjenta 5 mg nightly.  She wants to make sure she is doing things correctly, should she be taking at night?       1/ AM only  1/  PM  1/  PM  1/ AM 71 PM  1/21 132 AM  Reason for Disposition   Nursing judgment    Answer Assessment - Initial Assessment Questions  1. REASON FOR CALL: \"What is the main reason for your call?\" or \"How can I best help you?\"      See notes.   2. SYMPTOMS : \"Do you have any symptoms?\"       No    Protocols used: Information Only Call - No Triage-Adult-[place]    "

## 2025-01-21 NOTE — TELEPHONE ENCOUNTER
Regarding: abnormal blood sugar  ----- Message from Argelia PRATHER sent at 1/21/2025 11:06 AM EST -----  Pt calling stated she is having issues with her blood sugar. She couldn't give me numbers stated they are all over the place. Pt wants a call back.

## 2025-01-22 ENCOUNTER — OFFICE VISIT (OUTPATIENT)
Dept: INTERNAL MEDICINE CLINIC | Facility: CLINIC | Age: 85
End: 2025-01-22
Payer: MEDICARE

## 2025-01-22 ENCOUNTER — TELEPHONE (OUTPATIENT)
Age: 85
End: 2025-01-22

## 2025-01-22 VITALS
SYSTOLIC BLOOD PRESSURE: 130 MMHG | DIASTOLIC BLOOD PRESSURE: 74 MMHG | WEIGHT: 169.7 LBS | BODY MASS INDEX: 32.04 KG/M2 | HEIGHT: 61 IN | OXYGEN SATURATION: 100 % | HEART RATE: 80 BPM | TEMPERATURE: 96.5 F

## 2025-01-22 DIAGNOSIS — J30.9 CHRONIC ALLERGIC RHINITIS: ICD-10-CM

## 2025-01-22 DIAGNOSIS — R05.1 ACUTE COUGH: Primary | ICD-10-CM

## 2025-01-22 DIAGNOSIS — R82.998 DARK URINE: ICD-10-CM

## 2025-01-22 LAB
SARS-COV-2 AG UPPER RESP QL IA: NEGATIVE
VALID CONTROL: NORMAL

## 2025-01-22 PROCEDURE — 87811 SARS-COV-2 COVID19 W/OPTIC: CPT | Performed by: INTERNAL MEDICINE

## 2025-01-22 PROCEDURE — 99213 OFFICE O/P EST LOW 20 MIN: CPT | Performed by: INTERNAL MEDICINE

## 2025-01-22 PROCEDURE — G2211 COMPLEX E/M VISIT ADD ON: HCPCS | Performed by: INTERNAL MEDICINE

## 2025-01-22 RX ORDER — AZELASTINE HYDROCHLORIDE 137 UG/1
1 SPRAY, METERED NASAL DAILY
Qty: 30 ML | Refills: 5 | Status: SHIPPED | OUTPATIENT
Start: 2025-01-22

## 2025-01-22 NOTE — ASSESSMENT & PLAN NOTE
May continue using azalastine daily. May use saline rinse or spray daily prn.  Monitor for nose bleeds.  Orders:  •  Azelastine HCl 137 MCG/SPRAY SOLN; 1 spray into each nostril daily

## 2025-01-22 NOTE — TELEPHONE ENCOUNTER
Pt. Would like to know if Dr. Christie would send an order [facility] to [facility] for [facility].   She is at [facility] and if you could send an order they will bring someone in to do a chest xray on her so she doesn't have to go outside.   Ty

## 2025-01-22 NOTE — PROGRESS NOTES
Name: Maxine Acuña      : 1940      MRN: [id number]  Encounter Provider: Cleopatra Christie MD  Encounter Date: 2025   Encounter department: [facility] INTERNAL MEDICINE  :  Assessment & Plan  Acute cough  Rapid COVID negative. Suspect due to post nasal drip.  Orders:  •  POCT Rapid Covid Ag    Chronic allergic rhinitis  May continue using azalastine daily. May use saline rinse or spray daily prn.  Monitor for nose bleeds.  Orders:  •  Azelastine HCl 137 MCG/SPRAY SOLN; 1 spray into each nostril daily    Dark urine  Resolved. Maintain daily adequate fluid intake.       Follow up as scheduled or as needed.         History of Present Illness   She reports symptoms started about a week ago.  She had a dry cough which lasted for about 3 or 4 days.  She was taking Robitussin which she felt did not really work.  She did not have a lot of phlegm.  She reports no fever or chills.  About 3 days ago, she noticed that the cough improved.  She then started to experience some nasal congestion and postnasal drip.  She did a home COVID test that day and it was negative.  She stopped taking Robitussin since cough is not as bad anymore.  She did started using azelastine more often.  She does not feel too sick, no chest tightness or wheezing.  Last week, she reports having an episode of sweats and some chest discomfort radiating to her back.  This has all since resolved and has not recurred.    She noticed dark orange urine yesterday.  She thought it might be due to her lack of fluid intake.  She started drinking more water and noticed that her urine was now clear today.      Review of Systems   Constitutional:  Negative for fatigue and fever.   HENT:  Positive for congestion, postnasal drip and rhinorrhea. Negative for sinus pressure, sinus pain and sore throat.    Respiratory:  Positive for cough. Negative for shortness of breath and wheezing.    Cardiovascular:  Negative for chest pain.   Genitourinary:   "Negative for difficulty urinating, dysuria and flank pain.   Musculoskeletal:  Negative for arthralgias.   Neurological:  Negative for dizziness and headaches.       Objective   /74   Pulse 80   Temp (!) 96.5 °F (35.8 °C)   Ht 5' 1\" (1.549 m)   Wt 77 kg (169 lb 11.2 oz)   LMP  (LMP Unknown)   SpO2 100%   BMI 32.06 kg/m²      Physical Exam  HENT:      Right Ear: Tympanic membrane, ear canal and external ear normal.      Left Ear: Tympanic membrane, ear canal and external ear normal.      Nose: Congestion and rhinorrhea present.      Mouth/Throat:      Mouth: Mucous membranes are moist.   Eyes:      Pupils: Pupils are equal, round, and reactive to light.   Cardiovascular:      Rate and Rhythm: Normal rate and regular rhythm.   Pulmonary:      Effort: Pulmonary effort is normal.      Breath sounds: Normal breath sounds.   Abdominal:      Palpations: Abdomen is soft.   Skin:     General: Skin is warm.   Neurological:      General: No focal deficit present.      Mental Status: She is oriented to person, place, and time.         "

## 2025-02-09 DIAGNOSIS — I10 HYPERTENSION, UNSPECIFIED TYPE: ICD-10-CM

## 2025-02-09 DIAGNOSIS — K22.70 BARRETT'S ESOPHAGUS WITHOUT DYSPLASIA: ICD-10-CM

## 2025-02-10 RX ORDER — HYDROCHLOROTHIAZIDE 25 MG/1
25 TABLET ORAL DAILY
Qty: 90 TABLET | Refills: 1 | Status: SHIPPED | OUTPATIENT
Start: 2025-02-10

## 2025-02-11 RX ORDER — PANTOPRAZOLE SODIUM 20 MG/1
20 TABLET, DELAYED RELEASE ORAL DAILY
Qty: 90 TABLET | Refills: 3 | Status: SHIPPED | OUTPATIENT
Start: 2025-02-11

## 2025-02-11 NOTE — TELEPHONE ENCOUNTER
Please call patient and schedule follow-up appointment.  Not seen in 1 year.  Did renew current prescription but needs to be seen in office for any further renewal of medication.

## 2025-03-03 ENCOUNTER — NURSE TRIAGE (OUTPATIENT)
Age: 85
End: 2025-03-03

## 2025-03-03 DIAGNOSIS — E03.9 HYPOTHYROIDISM, UNSPECIFIED TYPE: ICD-10-CM

## 2025-03-03 NOTE — TELEPHONE ENCOUNTER
I spoke with Maxine. She would like an Rx for Glimepiride sent to [facility] for a 30 day supply.  She will call Endocrinology this afternoon but says it takes days to hear back.

## 2025-03-03 NOTE — TELEPHONE ENCOUNTER
"Pt called to report that she was previously on ozempic 2 mg but due to the cost she changed to Tradjenta. Pt reported that she just \"wasn't feeling right \" the past couple of days so she decided to check her blood sugar. On 03/1 it was 251, 03/2 356, 03/3 251. Previously was 150s-170s. Pt reports being asymptomatic but inquires maybe restarting ozempic again. S/s to report to ED immediately was reviewed with pt. Pt reported that she stopped eating foods with sugar for a month and is concerned that it shouldn't be this high. Also reported having a fruit a day and was concerned if that can be it. Appt was made for tomorrow with Lisa NIETO, pt agreeable to see anyone and would like to go to [place] location if possible. Awaiting to confirm schedule with pep  due to YORDAN.     Update: appt confirmed   Pt aware of date, time and location     Pharmacy [facility].     Reason for Disposition   Patient wants to be seen    Answer Assessment - Initial Assessment Questions  1. BLOOD GLUCOSE: \"What is your blood glucose level?\"         2. ONSET: \"When did you check the blood glucose?\"      Unknown onset but pt wasn't feeling right and so she decided to start check  3. USUAL RANGE: \"What is your glucose level usually?\" (e.g., usual fasting morning value, usual evening value)       4. KETONES: \"Do you check for ketones (urine or blood test strips)?\" If Yes, ask: \"What does the test show now?\"         5. TYPE 1 or 2:  \"Do you know what type of diabetes you have?\"  (e.g., Type 1, Type 2, Gestational; doesn't know)       2  6. INSULIN: \"Do you take insulin?\" \"What type of insulin(s) do you use? What is the mode of delivery? (syringe, pen; injection or pump)?\"      No   7. DIABETES PILLS: \"Do you take any pills for your diabetes?\" If Yes, ask: \"Have you missed taking any pills recently?\"      Tradjenta   8. OTHER SYMPTOMS: \"Do you have any symptoms?\" (e.g., fever, frequent urination, difficulty breathing, " dizziness, weakness, vomiting)      Denies    Protocols used: Diabetes - High Blood Sugar-Adult-[place]

## 2025-03-03 NOTE — TELEPHONE ENCOUNTER
"Asking if medication should be change. Since starting Tradjenta and stopping Ozempic, she has noticed her blood sugars are going up as well as her weight ,  Reason for Disposition   Blood glucose > 300 mg/dL (16.7 mmol/L) AND two or more times in a row    Answer Assessment - Initial Assessment Questions  1. BLOOD GLUCOSE: \"What is your blood glucose level?\"       320 ,  2 hours after eating , also 360 reading yesterday   2. ONSET: \"When did you check the blood glucose?\"      This morning   3. USUAL RANGE: \"What is your glucose level usually?\" (e.g., usual fasting morning value, usual evening value)      Runs in the 100's   4. KETONES: \"Do you check for ketones (urine or blood test strips)?\" If Yes, ask: \"What does the test show now?\"       Denies   5. TYPE 1 or 2:  \"Do you know what type of diabetes you have?\"  (e.g., Type 1, Type 2, Gestational; doesn't know)       Type II   6. INSULIN: \"Do you take insulin?\" \"What type of insulin(s) do you use? What is the mode of delivery? (syringe, pen; injection or pump)?\"       Denies   7. DIABETES PILLS: \"Do you take any pills for your diabetes?\" If Yes, ask: \"Have you missed taking any pills recently?\"      Tradjenta 5mg   8. OTHER SYMPTOMS: \"Do you have any symptoms?\" (e.g., fever, frequent urination, difficulty breathing, dizziness, weakness, vomiting)      Weight gain , 9 lbs    Protocols used: Diabetes - High Blood Sugar-Adult-[place]    "

## 2025-03-03 NOTE — TELEPHONE ENCOUNTER
Please call patient.    We can start glimepiride again if you would like, in addition to Tradjenta.  You can also call Endocrinology for recommendations since they are handling your diabetes.

## 2025-03-04 ENCOUNTER — OFFICE VISIT (OUTPATIENT)
Dept: ENDOCRINOLOGY | Facility: CLINIC | Age: 85
End: 2025-03-04
Payer: MEDICARE

## 2025-03-04 VITALS
SYSTOLIC BLOOD PRESSURE: 138 MMHG | WEIGHT: 177.6 LBS | DIASTOLIC BLOOD PRESSURE: 86 MMHG | HEART RATE: 60 BPM | HEIGHT: 61 IN | OXYGEN SATURATION: 97 % | BODY MASS INDEX: 33.53 KG/M2

## 2025-03-04 DIAGNOSIS — N18.31 STAGE 3A CHRONIC KIDNEY DISEASE (HCC): ICD-10-CM

## 2025-03-04 DIAGNOSIS — E03.9 HYPOTHYROIDISM, UNSPECIFIED TYPE: ICD-10-CM

## 2025-03-04 DIAGNOSIS — I10 PRIMARY HYPERTENSION: ICD-10-CM

## 2025-03-04 DIAGNOSIS — E04.1 THYROID NODULE: ICD-10-CM

## 2025-03-04 DIAGNOSIS — E11.65 TYPE 2 DIABETES MELLITUS WITH HYPERGLYCEMIA, WITHOUT LONG-TERM CURRENT USE OF INSULIN (HCC): Primary | ICD-10-CM

## 2025-03-04 PROCEDURE — G2211 COMPLEX E/M VISIT ADD ON: HCPCS

## 2025-03-04 PROCEDURE — 99214 OFFICE O/P EST MOD 30 MIN: CPT

## 2025-03-04 RX ORDER — REPAGLINIDE 0.5 MG/1
0.5 TABLET ORAL
Qty: 90 TABLET | Refills: 2 | Status: SHIPPED | OUTPATIENT
Start: 2025-03-04

## 2025-03-04 RX ORDER — LEVOTHYROXINE SODIUM 25 UG/1
37.5 TABLET ORAL DAILY
Qty: 90 TABLET | Refills: 1 | Status: SHIPPED | OUTPATIENT
Start: 2025-03-04

## 2025-03-04 NOTE — PROGRESS NOTES
Name: Maxine Acuña      : 1940      MRN: [id number]  Encounter Provider: EMILIA Gayle  Encounter Date: 3/4/2025   Encounter department: [facility] DIABETES AND ENDOCRINOLOGY [place]    Chief Complaint   Patient presents with    Diabetes Type 2   :  Assessment & Plan  Type 2 diabetes mellitus with hyperglycemia, without long-term current use of insulin (HCC)  Diabetes control:  HGA1C well controlled, this was prior to stopping Ozempic  BGs have been elevated in past 2 weeks, reaching 350  Denies hypoglycemia   Treatment regimen:   Cost is an issue so unable to restart ozempic or start SGLT-2   Was on Metformin in the past, would not recommend due to kidney function  Continue Tradjenta at current dose  Will add repaglinide 0.5 mg TID prior to each meal  Check BGs 2-3 times daily and send BG log in about 2 weeks for review   Discussed risks/complications associated with uncontrolled diabetes.    Advised to adhere to diabetic diet, and recommended staying active/exercising routinely.  Keep carbohydrates consistent to limit blood glucose fluctuations.  Advised to call if blood sugars less than 70 mg/dl or over 300 mg/dl.   Discussed symptoms and treatment of hypoglycemia.    Recommended routine follow-up with podiatry and ophthalmology.   Ordered blood work to complete prior to next visit.   Lab Results   Component Value Date    HGBA1C 7.1 (H) 2024       Orders:    repaglinide (PRANDIN) 0.5 mg tablet; Take 1 tablet (0.5 mg total) by mouth 3 (three) times a day before meals    Hemoglobin A1C; Future    Comprehensive metabolic panel; Future    Stage 3a chronic kidney disease (HCC)  This has remained stable  Continue follow up with nephrology   Hypothyroidism, unspecified type  Continue current dose of levothyroxine  Repeat thyroid function prior to next follow up   Orders:    TSH, 3rd generation with Free T4 reflex; Future    Thyroid nodule  Thyroid nodules have been  stable  Denies any compressive symptoms  She will be due for repeat thyroid US in June, this was ordered today   Orders:    US thyroid; Future    Primary hypertension  BP at goal  Continue current medication regimen            History of Present Illness     Maxine Acuña is a 84 y.o. female with type 2 diabetes seen in follow up. Reports complications of CKD. Last A1C was 7.1. Denies recent illness, hospitalization or steroid use.     Was on Ozempic and doing well. Price got too high and had to stop. Tradjenta was started in place of Ozempic   Started checking BGs and has been elevated. She has also gained about 7 pounds.     BGs ranging 120-350. BGs higher in past week.    Current regimen:   Tradjenta 5 mg daily     Last Eye Exam: 06/09/2023  Last Foot Exam: 04/23/2024    Has hypertension: Taking lisinopril   Has hyperlipidemia: Taking simvastatin   Thyroid Hx: thyroid nodules with previous benign biopsies   On levothyroxine 37.5 mcg daily     Health Maintenance   Topic Date Due    Diabetic Foot Exam  04/23/2025    Diabetic Eye Exam  06/09/2025         Review of Systems   Constitutional:  Negative for fatigue, fever and unexpected weight change.   Eyes:  Negative for visual disturbance.   Respiratory:  Negative for shortness of breath.    Cardiovascular:  Negative for chest pain.   Gastrointestinal:  Negative for abdominal pain, constipation, diarrhea, nausea and vomiting.   Endocrine: Negative for polydipsia and polyuria.   Skin:  Negative for wound.   Neurological:  Negative for dizziness, syncope and numbness.   All other systems reviewed and are negative.   as per HPI    Current Outpatient Medications on File Prior to Visit   Medication Sig Dispense Refill    atorvastatin (LIPITOR) 10 mg tablet TAKE 1 TABLET BY MOUTH EVERY DAY 90 tablet 1    Azelastine HCl 137 MCG/SPRAY SOLN 1 spray into each nostril daily 30 mL 5    Blood Glucose Monitoring Suppl (OneTouch Verio Reflect) w/Device KIT Check blood sugars twice  daily. Please substitute with appropriate alternative as covered by patient's insurance. Dx: E11.65 1 kit 0    Calcium Carbonate 1500 (600 Ca) MG TABS Take by mouth      carvedilol (COREG) 25 mg tablet TAKE 1 TABLET BY MOUTH TWO TIMES DAILY 180 tablet 1    Cholecalciferol (VITAMIN D3) 2000 units capsule Take 1 tablet by mouth daily      EPINEPHrine (EPIPEN) 0.3 mg/0.3 mL SOAJ Inject 1 Syringe into a muscle      Ergocalciferol (VITAMIN D2 PO)       fluticasone (FLONASE) 50 mcg/act nasal spray USE 1 SPRAY IN EACH NOSTRIL EVERY DAY 48 mL 2    glucose blood (OneTouch Verio) test strip Check blood sugars twice daily. Please substitute with appropriate alternative as covered by patient's insurance. Dx: E11.65 200 each 3    hydroCHLOROthiazide 25 mg tablet TAKE 1 TABLET BY MOUTH EVERY DAY 90 tablet 1    Lancets (OneTouch Delica Plus [id number]) MISC Use to test blood sugar once daily 100 each 1    levothyroxine 25 mcg tablet TAKE 1 AND 1/2 TABLETS BY MOUTH ONE TIME DAILY 90 tablet 1    linaGLIPtin (Tradjenta) 5 MG TABS Take 5 mg by mouth daily 30 tablet 5    lisinopril (ZESTRIL) 40 mg tablet TAKE 1 TABLET BY MOUTH TWO TIMES DAILY 180 tablet 1    Multiple Vitamins-Minerals (WOMENS ONE DAILY) TABS Take 1 tablet by mouth daily      Omega-3 Fatty Acids (OMEGA-3 FISH OIL) 1000 MG CAPS Take 1 capsule by mouth daily       OneTouch Delica Lancets 33G MISC Check blood sugars twice daily. Please substitute with appropriate alternative as covered by patient's insurance. Dx: E11.65 200 each 3    pantoprazole (PROTONIX) 20 mg tablet TAKE 1 TABLET BY MOUTH EVERY DAY 90 tablet 3    SIMVASTATIN PO       spironolactone (ALDACTONE) 25 mg tablet TAKE 1 TABLET BY MOUTH EVERY DAY 90 tablet 1    [DISCONTINUED] levothyroxine 25 mcg tablet TAKE 1 AND 1/2 TABLETS BY MOUTH ONE TIME DAILY 90 tablet 0     No current facility-administered medications on file prior to visit.         Medical History Reviewed by provider this encounter:     .    Objective  "  /86   Pulse 60   Ht 5' 1\" (1.549 m)   Wt 80.6 kg (177 lb 9.6 oz)   LMP  (LMP Unknown)   SpO2 97%   BMI 33.56 kg/m²      Body mass index is 33.56 kg/m².  Wt Readings from Last 3 Encounters:   03/04/25 80.6 kg (177 lb 9.6 oz)   01/22/25 77 kg (169 lb 11.2 oz)   12/11/24 78.5 kg (173 lb)     Physical Exam  Vitals reviewed.   Constitutional:       Appearance: Normal appearance. She is obese.   Cardiovascular:      Rate and Rhythm: Normal rate.   Pulmonary:      Effort: Pulmonary effort is normal.   Neurological:      Mental Status: She is alert and oriented to person, place, and time.   Psychiatric:         Mood and Affect: Mood normal.     Labs:   Lab Results   Component Value Date    HGBA1C 7.1 (H) 12/06/2024    HGBA1C 6.9 (H) 04/18/2024    HGBA1C 7.0 (H) 12/21/2023     Lab Results   Component Value Date    CREATININE 1.11 12/06/2024    CREATININE 1.23 06/18/2024    CREATININE 1.24 04/18/2024    BUN 21 12/06/2024     03/26/2015    K 4.2 12/06/2024     12/06/2024    CO2 31 12/06/2024     GFR, Calculated   Date Value Ref Range Status   10/15/2020 43 (L) >60 mL/min/1.73m2 Final     Comment:     mL/min per 1.73 square meters                                            Normal Function or Mild Renal    Disease (if clinically at risk):  >or=60  Moderately Decreased:                30-59  Severely Decreased:                  15-29  Renal Failure:                         <15                                            -American GFR: multiply reported GFR by 1.16    Please note that the eGFR is based on the CKD-EPI calculation, and is not intended to be used for drug dosing.                                            Note: Calculated GFR may not be an accurate indicator of renal function if the patient's renal function is not in a steady state.     eGFRcr   Date Value Ref Range Status   06/13/2023 37 (L) >59 Final     eGFR   Date Value Ref Range Status   12/06/2024 46 ml/min/1.73sq m Final     Lab " Results   Component Value Date    CHOL 154 03/26/2015    HDL 40 (L) 12/06/2024    TRIG 236 (H) 12/06/2024     Lab Results   Component Value Date    ALT 12 12/06/2024    AST 16 12/06/2024    ALKPHOS 62 12/06/2024    BILITOT 0.43 03/26/2015     Lab Results   Component Value Date    ZOG4LQBNZOAF 2.300 04/18/2024    [id number] 1.641 12/21/2023    EZP4DQLEXZPG 1.933 09/19/2023       Patient Instructions   Continue Tradjenta at current dose  Start Repaglinide at 0.5 mg prior to each meal     Discussed with the patient and all questioned fully answered. She will call me if any problems arise.

## 2025-03-04 NOTE — ASSESSMENT & PLAN NOTE
Diabetes control:  HGA1C well controlled, this was prior to stopping Ozempic  BGs have been elevated in past 2 weeks, reaching 350  Denies hypoglycemia   Treatment regimen:   Cost is an issue so unable to restart ozempic or start SGLT-2   Was on Metformin in the past, would not recommend due to kidney function  Continue Tradjenta at current dose  Will add repaglinide 0.5 mg TID prior to each meal  Check BGs 2-3 times daily and send BG log in about 2 weeks for review   Discussed risks/complications associated with uncontrolled diabetes.    Advised to adhere to diabetic diet, and recommended staying active/exercising routinely.  Keep carbohydrates consistent to limit blood glucose fluctuations.  Advised to call if blood sugars less than 70 mg/dl or over 300 mg/dl.   Discussed symptoms and treatment of hypoglycemia.    Recommended routine follow-up with podiatry and ophthalmology.   Ordered blood work to complete prior to next visit.   Lab Results   Component Value Date    HGBA1C 7.1 (H) 12/06/2024       Orders:    repaglinide (PRANDIN) 0.5 mg tablet; Take 1 tablet (0.5 mg total) by mouth 3 (three) times a day before meals    Hemoglobin A1C; Future    Comprehensive metabolic panel; Future

## 2025-03-04 NOTE — TELEPHONE ENCOUNTER
Please call patient.  Let her know I did not send new medication to the pharmacy. Please have her keep her appt with Endo later today to discuss meds.

## 2025-03-04 NOTE — TELEPHONE ENCOUNTER
Left message stating Rx was not sent.  She should discuss this at her appt  this afternoon with Endocrinology/

## 2025-03-04 NOTE — ASSESSMENT & PLAN NOTE
Thyroid nodules have been stable  Denies any compressive symptoms  She will be due for repeat thyroid US in June, this was ordered today   Orders:    US thyroid; Future

## 2025-03-04 NOTE — ASSESSMENT & PLAN NOTE
Continue current dose of levothyroxine  Repeat thyroid function prior to next follow up   Orders:    TSH, 3rd generation with Free T4 reflex; Future

## 2025-03-18 ENCOUNTER — TELEPHONE (OUTPATIENT)
Age: 85
End: 2025-03-18

## 2025-03-18 DIAGNOSIS — E11.65 TYPE 2 DIABETES MELLITUS WITH HYPERGLYCEMIA, WITHOUT LONG-TERM CURRENT USE OF INSULIN (HCC): ICD-10-CM

## 2025-03-18 RX ORDER — REPAGLINIDE 0.5 MG/1
TABLET ORAL
Qty: 90 TABLET | Refills: 2 | Status: SHIPPED | OUTPATIENT
Start: 2025-03-18

## 2025-03-18 NOTE — TELEPHONE ENCOUNTER
Pt called to report that the tradjenta alone wasn't working reporting bs readings at 200s and was advised to start on repaglinide in addition to the tradjenta. Pt is calling today reporting that her sugars still has been in the 200s and its been 2 weeks since taking the repgalinide, last bs was 238. Pt reports that she does not usually eat breakfast , only drinks coffee with milk and so she was skipping the morning dose of repaglinide with concerns that her sugar would drop but noting that her bs has been in the 200s, she feels like she would be ok if she takes the morning dose. However, she would like to know how to space the doses apart reporting that she drinks her coffee around 9 am , eats lunch at noon and dinner around 4:30pm. Pt also wonders if maybe prescribing high dose and taking it twice a day would work better. Please advise.

## 2025-03-18 NOTE — TELEPHONE ENCOUNTER
Recommend she take 0.5 mg with coffee then increase to 1 mg with lunch and dinner. She can take 0.5 mg dose at 9 am, 1 mg at noon and 1 mg at 4:30.

## 2025-04-01 DIAGNOSIS — E11.65 TYPE 2 DIABETES MELLITUS WITH HYPERGLYCEMIA, WITHOUT LONG-TERM CURRENT USE OF INSULIN (HCC): ICD-10-CM

## 2025-04-01 RX ORDER — LANCETS 30 GAUGE
EACH MISCELLANEOUS
Qty: 100 EACH | Refills: 1 | Status: SHIPPED | OUTPATIENT
Start: 2025-04-01 | End: 2025-04-10 | Stop reason: SDUPTHER

## 2025-04-01 RX ORDER — BLOOD SUGAR DIAGNOSTIC
STRIP MISCELLANEOUS
Qty: 200 EACH | Refills: 3 | Status: SHIPPED | OUTPATIENT
Start: 2025-04-01 | End: 2025-04-10 | Stop reason: SDUPTHER

## 2025-04-08 DIAGNOSIS — E78.5 HYPERLIPIDEMIA, UNSPECIFIED HYPERLIPIDEMIA TYPE: ICD-10-CM

## 2025-04-09 RX ORDER — ATORVASTATIN CALCIUM 10 MG/1
10 TABLET, FILM COATED ORAL DAILY
Qty: 90 TABLET | Refills: 1 | Status: SHIPPED | OUTPATIENT
Start: 2025-04-09

## 2025-04-10 ENCOUNTER — TELEPHONE (OUTPATIENT)
Age: 85
End: 2025-04-10

## 2025-04-10 DIAGNOSIS — E11.65 TYPE 2 DIABETES MELLITUS WITH HYPERGLYCEMIA, WITHOUT LONG-TERM CURRENT USE OF INSULIN (HCC): ICD-10-CM

## 2025-04-10 NOTE — TELEPHONE ENCOUNTER
Patient called stating she needs a new rx for replaglinide with dose change from 3-19 encounter:    Recommend she take repaglinide 0.5 mg with coffee then increase to 1 mg with lunch and dinner. She can take 0.5 mg dose at 9 am, 1 mg at noon and 1 mg at 4:30     Patient states she was also told to check BS 4 times a day but rx states bid and lancets states once daily. Please send rx to [facility]. Patient also asking for a call back from provider.   Thank you.

## 2025-04-11 RX ORDER — LANCETS 30 GAUGE
EACH MISCELLANEOUS
Qty: 100 EACH | Refills: 1 | Status: SHIPPED | OUTPATIENT
Start: 2025-04-11

## 2025-04-11 RX ORDER — BLOOD SUGAR DIAGNOSTIC
STRIP MISCELLANEOUS
Qty: 200 EACH | Refills: 3 | Status: SHIPPED | OUTPATIENT
Start: 2025-04-11

## 2025-04-15 DIAGNOSIS — I10 HYPERTENSION, UNSPECIFIED TYPE: ICD-10-CM

## 2025-04-15 RX ORDER — CARVEDILOL 25 MG/1
25 TABLET ORAL 2 TIMES DAILY
Qty: 180 TABLET | Refills: 1 | Status: SHIPPED | OUTPATIENT
Start: 2025-04-15

## 2025-04-16 ENCOUNTER — TELEPHONE (OUTPATIENT)
Dept: ENDOCRINOLOGY | Facility: CLINIC | Age: 85
End: 2025-04-16

## 2025-04-16 ENCOUNTER — OFFICE VISIT (OUTPATIENT)
Dept: DERMATOLOGY | Facility: CLINIC | Age: 85
End: 2025-04-16
Payer: MEDICARE

## 2025-04-16 VITALS — TEMPERATURE: 97.9 F | BODY MASS INDEX: 33.16 KG/M2 | WEIGHT: 175.5 LBS

## 2025-04-16 DIAGNOSIS — D22.71 MULTIPLE BENIGN MELANOCYTIC NEVI OF UPPER AND LOWER EXTREMITIES AND TRUNK: Primary | ICD-10-CM

## 2025-04-16 DIAGNOSIS — D22.61 MULTIPLE BENIGN MELANOCYTIC NEVI OF UPPER AND LOWER EXTREMITIES AND TRUNK: Primary | ICD-10-CM

## 2025-04-16 DIAGNOSIS — L82.1 SEBORRHEIC KERATOSIS: ICD-10-CM

## 2025-04-16 DIAGNOSIS — D18.01 CHERRY ANGIOMA: ICD-10-CM

## 2025-04-16 DIAGNOSIS — D22.62 MULTIPLE BENIGN MELANOCYTIC NEVI OF UPPER AND LOWER EXTREMITIES AND TRUNK: Primary | ICD-10-CM

## 2025-04-16 DIAGNOSIS — D22.5 MULTIPLE BENIGN MELANOCYTIC NEVI OF UPPER AND LOWER EXTREMITIES AND TRUNK: Primary | ICD-10-CM

## 2025-04-16 DIAGNOSIS — D22.72 MULTIPLE BENIGN MELANOCYTIC NEVI OF UPPER AND LOWER EXTREMITIES AND TRUNK: Primary | ICD-10-CM

## 2025-04-16 DIAGNOSIS — L81.4 SOLAR LENTIGO: ICD-10-CM

## 2025-04-16 DIAGNOSIS — L82.0 SEBORRHEIC KERATOSIS, INFLAMED: ICD-10-CM

## 2025-04-16 DIAGNOSIS — L57.8 OTHER SKIN CHANGES DUE TO CHRONIC EXPOSURE TO NONIONIZING RADIATION: ICD-10-CM

## 2025-04-16 DIAGNOSIS — E11.65 TYPE 2 DIABETES MELLITUS WITH HYPERGLYCEMIA, WITHOUT LONG-TERM CURRENT USE OF INSULIN (HCC): ICD-10-CM

## 2025-04-16 PROCEDURE — 17110 DESTRUCTION B9 LES UP TO 14: CPT | Performed by: DERMATOLOGY

## 2025-04-16 PROCEDURE — 99214 OFFICE O/P EST MOD 30 MIN: CPT | Performed by: DERMATOLOGY

## 2025-04-16 RX ORDER — REPAGLINIDE 0.5 MG/1
TABLET ORAL
Qty: 450 TABLET | Refills: 2 | Status: SHIPPED | OUTPATIENT
Start: 2025-04-16 | End: 2025-04-18

## 2025-04-16 NOTE — PATIENT INSTRUCTIONS
INFLAMED SEBORRHEIC KERATOSIS    Assessment and Plan:  Based on a thorough discussion of this condition and the management approach to it (including a comprehensive discussion of the known risks, side effects and potential benefits of treatment), the patient (family) agrees to implement the following specific plan:  Treated with cryotherapy today due to associated itching and because the lesion is getting irritated; written and verbal consent obtained       Patient instructions:  Your lesions were treated with liquid nitrogen today. The treated areas will get more red, crusted over the next few days. There might be some blistering. Apply vaseline to the treated area for the next week to help it heal fully. Do not pick at the area. Return in 3-4 weeks for another round of liquid nitrogen treatment if lesion(s)  fails to fully resolve.      CARBAJAL ANGIOMAS     Assessment and Plan:  Based on a thorough discussion of this condition and the management approach to it (including a comprehensive discussion of the known risks, side effects and potential benefits of treatment), the patient (family) agrees to implement the following specific plan:  Reassure benign        SEBORRHEIC KERATOSIS; NON-INFLAMED     Assessment and Plan:  Based on a thorough discussion of this condition and the management approach to it (including a comprehensive discussion of the known risks, side effects and potential benefits of treatment), the patient (family) agrees to implement the following specific plan:  Reassure benign  Use sun protection.  Apply SPF 30 or higher at least three times a day.  Wear sun protecting clothing and hats.        SOLAR LENTIGINES   OTHER SKIN CHANGES DUE TO CHRONIC EXPOSURE TO NONIONIZING RADIATION     Assessment and Plan:  Based on a thorough discussion of this condition and the management approach to it (including a comprehensive discussion of the known risks, side effects and potential benefits of treatment), the  "patient (family) agrees to implement the following specific plan:  Reassure benign  Use sun protection.  Apply SPF 30 or higher at least three times a day.  Wear sun protecting clothing and hats.         MULTIPLE MELANOCYTIC NEVI (\"Moles\")      Assessment and Plan:  Based on a thorough discussion of this condition and the management approach to it (including a comprehensive discussion of the known risks, side effects and potential benefits of treatment), the patient (family) agrees to implement the following specific plan:  Reassure benign  Monitor for changes  Use sun protection.  Apply SPF 30 or higher at least three times a day.  Wear sun protecting clothing and hats.       Worrisome signs of skin malignancy discussed, questions answered. Regular self-skin check discussed. Advised to call or return to office if patient notices any spots of concern, rapidly growing/changing lesions, bleeding lesions, non-healing lesions. Advised regular SPF use.   "

## 2025-04-16 NOTE — TELEPHONE ENCOUNTER
Sent new script to pharmacy with dose change PT is taking 0.5 for breakfast and 1 mg for lunch and 1 mg for dinner

## 2025-04-16 NOTE — PROGRESS NOTES
"[facility] Dermatology Clinic Note     Patient Name: Maxine Acuña  Encounter Date: 4/16/25       Have you been cared for by a [facility] Dermatologist in the last 3 years and, if so, which description applies to you? Yes. I have been here within the last 3 years, and my medical history has NOT changed since that time. I am of child-bearing potential.     REVIEW OF SYSTEMS:  Have you recently had or currently have any of the following? No changes in my recent health.   PAST MEDICAL HISTORY:  Have you personally ever had or currently have any of the following?  If \"YES,\" then please provide more detail. No changes in my medical history.   HISTORY OF IMMUNOSUPPRESSION: Do you have a history of any of the following:  Systemic Immunosuppression such as Diabetes, Biologic or Immunotherapy, Chemotherapy, Organ Transplantation, Bone Marrow Transplantation or Prednisone?  No     Answering \"YES\" requires the addition of the dotphrase \"IMMUNOSUPPRESSED\" as the first diagnosis of the patient's visit.   FAMILY HISTORY:  Any \"first degree relatives\" (parent, brother, sister, or child) with the following?    No changes in my family's known health.   PATIENT EXPERIENCE:    Do you want the Dermatologist to perform a COMPLETE skin exam today including a clinical examination under the \"bra and underwear\" areas?  Yes  If necessary, do we have your permission to call and leave a detailed message on your Preferred Phone number that includes your specific medical information?  Yes      Allergies   Allergen Reactions   • Cortisone Hyperactivity   • Bee Venom Edema   • Virtussin A-C [Guaifenesin-Codeine] Throat Swelling   • Bee Pollen Edema     Category: Allergy;    • Pollen Extract Allergic Rhinitis, Eye Swelling, Itching, Nasal Congestion and Sneezing      Current Outpatient Medications:   •  atorvastatin (LIPITOR) 10 mg tablet, TAKE 1 TABLET BY MOUTH EVERY DAY, Disp: 90 tablet, Rfl: 1  •  Azelastine HCl 137 MCG/SPRAY SOLN, 1 spray into " each nostril daily, Disp: 30 mL, Rfl: 5  •  Blood Glucose Monitoring Suppl (OneTouch Verio Reflect) w/Device KIT, Check blood sugars twice daily. Please substitute with appropriate alternative as covered by patient's insurance. Dx: E11.65, Disp: 1 kit, Rfl: 0  •  Calcium Carbonate 1500 (600 Ca) MG TABS, Take by mouth, Disp: , Rfl:   •  carvedilol (COREG) 25 mg tablet, TAKE 1 TABLET BY MOUTH TWO TIMES DAILY, Disp: 180 tablet, Rfl: 1  •  Cholecalciferol (VITAMIN D3) 2000 units capsule, Take 1 tablet by mouth daily, Disp: , Rfl:   •  EPINEPHrine (EPIPEN) 0.3 mg/0.3 mL SOAJ, Inject 1 Syringe into a muscle, Disp: , Rfl:   •  Ergocalciferol (VITAMIN D2 PO), , Disp: , Rfl:   •  fluticasone (FLONASE) 50 mcg/act nasal spray, USE 1 SPRAY IN EACH NOSTRIL EVERY DAY, Disp: 48 mL, Rfl: 2  •  glucose blood (OneTouch Verio) test strip, Check blood sugars three times daily. Please substitute with appropriate alternative as covered by patient's insurance. Dx: E11.65, Disp: 200 each, Rfl: 3  •  hydroCHLOROthiazide 25 mg tablet, TAKE 1 TABLET BY MOUTH EVERY DAY, Disp: 90 tablet, Rfl: 1  •  Lancets (OneTouch Delica Plus [id number]) MISC, Use to test blood sugar 3 times daily, Disp: 100 each, Rfl: 1  •  levothyroxine 25 mcg tablet, TAKE 1 AND 1/2 TABLETS BY MOUTH ONE TIME DAILY, Disp: 90 tablet, Rfl: 1  •  linaGLIPtin (Tradjenta) 5 MG TABS, Take 5 mg by mouth daily, Disp: 30 tablet, Rfl: 5  •  lisinopril (ZESTRIL) 40 mg tablet, TAKE 1 TABLET BY MOUTH TWO TIMES DAILY, Disp: 180 tablet, Rfl: 1  •  Multiple Vitamins-Minerals (WOMENS ONE DAILY) TABS, Take 1 tablet by mouth daily, Disp: , Rfl:   •  Omega-3 Fatty Acids (OMEGA-3 FISH OIL) 1000 MG CAPS, Take 1 capsule by mouth daily , Disp: , Rfl:   •  OneTouch Delica Lancets 33G MISC, Check blood sugars twice daily. Please substitute with appropriate alternative as covered by patient's insurance. Dx: E11.65, Disp: 200 each, Rfl: 3  •  pantoprazole (PROTONIX) 20 mg tablet, TAKE 1 TABLET BY MOUTH  EVERY DAY, Disp: 90 tablet, Rfl: 3  •  repaglinide (PRANDIN) 0.5 mg tablet, Take 0.5 mg with breakfast and 1 mg with lunch and dinner., Disp: 90 tablet, Rfl: 2  •  SIMVASTATIN PO, , Disp: , Rfl:   •  spironolactone (ALDACTONE) 25 mg tablet, TAKE 1 TABLET BY MOUTH EVERY DAY, Disp: 90 tablet, Rfl: 1              Whom besides the patient is providing clinical information about today's encounter?   NO ADDITIONAL HISTORIAN (patient alone provided history)    Physical Exam and Assessment/Plan by Diagnosis:    INFLAMED SEBORRHEIC KERATOSIS    Physical Exam:  Anatomic Location Affected:  left elbow  Morphological Description:  slightly tan to brown stuck-on papule(s)      Assessment and Plan:  Based on a thorough discussion of this condition and the management approach to it (including a comprehensive discussion of the known risks, side effects and potential benefits of treatment), the patient (family) agrees to implement the following specific plan:  Treated with cryotherapy today due to associated itching and because the lesion is getting irritated; written and verbal consent obtained     PROCEDURE:  DESTRUCTION OF BENIGN LESIONS  After a thorough discussion of treatment options and risk/benefits/alternatives (including but not limited to local pain, scarring, dyspigmentation, blistering, and possible superinfection), verbal and written consent were obtained and the aforementioned lesions were treated on with cryotherapy using liquid nitrogen x 3 cycles for 5-10 seconds. The patient tolerated the procedure well, and after-care instructions were provided.     TOTAL NUMBER of 1 lesion(s) were treated today on the ANATOMIC LOCATION: left elbow      Patient instructions:  Your lesions were treated with liquid nitrogen today. The treated areas will get more red, crusted over the next few days. There might be some blistering. Apply vaseline to the treated area for the next week to help it heal fully. Do not pick at the area.  "Return in 3-4 weeks for another round of liquid nitrogen treatment if lesion(s)  fails to fully resolve.      CARBAJAL ANGIOMAS     Physical Exam:  Anatomic Location Affected:  Trunk and extremities  Morphological Description:  Scattered cherry red papules  Denies pain, itch, bleeding. No treatments tried. Present for years. Present constantly; no modifying factors which make it worse or better.     Assessment and Plan:  Based on a thorough discussion of this condition and the management approach to it (including a comprehensive discussion of the known risks, side effects and potential benefits of treatment), the patient (family) agrees to implement the following specific plan:  Reassure benign        SEBORRHEIC KERATOSIS; NON-INFLAMED     Physical Exam:  Anatomic Location Affected:  Trunk and extremities  Morphological Description:  Waxy, smooth to warty textured, yellow to brownish-grey to dark brown to blackish, discrete, \"stuck-on\" appearing papules.  Present for years. Denies pain, itch, bleeding.      Additional History of Present Condition:  Present constantly; no modifying factors which make it worse or better. No prior treatment.       Assessment and Plan:  Based on a thorough discussion of this condition and the management approach to it (including a comprehensive discussion of the known risks, side effects and potential benefits of treatment), the patient (family) agrees to implement the following specific plan:  Reassure benign  Use sun protection.  Apply SPF 30 or higher at least three times a day.  Wear sun protecting clothing and hats.        SOLAR LENTIGINES   OTHER SKIN CHANGES DUE TO CHRONIC EXPOSURE TO NONIONIZING RADIATION     Physical Exam:  Anatomic Location Affected:  Sun exposed areas of back, chest, arms, legs  Morphological Description:  Multiple scattered brown to tan evenly pigmented macules   Denies pain, itch, bleeding. No treatments tried. Present for months - years. Reports getting " "newer lesions with sun exposure.         Assessment and Plan:  Based on a thorough discussion of this condition and the management approach to it (including a comprehensive discussion of the known risks, side effects and potential benefits of treatment), the patient (family) agrees to implement the following specific plan:  Reassure benign  Use sun protection.  Apply SPF 30 or higher at least three times a day.  Wear sun protecting clothing and hats.         MULTIPLE MELANOCYTIC NEVI (\"Moles\")     Physical Exam:  Anatomic Location Affected: Trunk and extremities  Morphological Description:  Scattered, round to ovoid, symmetrical-appearing, even bordered, skin colored to dark brown macules/papules  Denies pain, itch, bleeding. No treatments tried. Present for years. Present constantly; no modifying factors which make it worse or better. Denies actively changing or growing moles.   Brown macules on the right foot     Assessment and Plan:  Based on a thorough discussion of this condition and the management approach to it (including a comprehensive discussion of the known risks, side effects and potential benefits of treatment), the patient (family) agrees to implement the following specific plan:  Reassure benign  Monitor for changes  Use sun protection.  Apply SPF 30 or higher at least three times a day.  Wear sun protecting clothing and hats.       Worrisome signs of skin malignancy discussed, questions answered. Regular self-skin check discussed. Advised to call or return to office if patient notices any spots of concern, rapidly growing/changing lesions, bleeding lesions, non-healing lesions. Advised regular SPF use.     Scribe Attestation    I,:  Dalila Marinelli MA am acting as a scribe while in the presence of the attending physician.:       I,:  Beatrice Oliver MD personally performed the services described in this documentation    as scribed in my presence.:           "

## 2025-04-18 DIAGNOSIS — E11.65 TYPE 2 DIABETES MELLITUS WITH HYPERGLYCEMIA, WITHOUT LONG-TERM CURRENT USE OF INSULIN (HCC): Primary | ICD-10-CM

## 2025-04-18 RX ORDER — REPAGLINIDE 1 MG/1
TABLET ORAL
Qty: 450 TABLET | Refills: 1 | Status: SHIPPED | OUTPATIENT
Start: 2025-04-18

## 2025-04-28 ENCOUNTER — TELEPHONE (OUTPATIENT)
Age: 85
End: 2025-04-28

## 2025-04-28 ENCOUNTER — OFFICE VISIT (OUTPATIENT)
Dept: INTERNAL MEDICINE CLINIC | Facility: CLINIC | Age: 85
End: 2025-04-28
Payer: MEDICARE

## 2025-04-28 VITALS
BODY MASS INDEX: 33.42 KG/M2 | TEMPERATURE: 97.9 F | HEART RATE: 80 BPM | DIASTOLIC BLOOD PRESSURE: 74 MMHG | OXYGEN SATURATION: 97 % | RESPIRATION RATE: 14 BRPM | HEIGHT: 61 IN | WEIGHT: 177 LBS | SYSTOLIC BLOOD PRESSURE: 120 MMHG

## 2025-04-28 DIAGNOSIS — M85.89 OSTEOPENIA OF MULTIPLE SITES: ICD-10-CM

## 2025-04-28 DIAGNOSIS — T78.40XS ALLERGY, SEQUELA: ICD-10-CM

## 2025-04-28 DIAGNOSIS — Z00.00 MEDICARE ANNUAL WELLNESS VISIT, SUBSEQUENT: ICD-10-CM

## 2025-04-28 DIAGNOSIS — E03.9 HYPOTHYROIDISM, UNSPECIFIED TYPE: ICD-10-CM

## 2025-04-28 DIAGNOSIS — E55.9 VITAMIN D DEFICIENCY: ICD-10-CM

## 2025-04-28 DIAGNOSIS — E78.2 MIXED HYPERLIPIDEMIA: ICD-10-CM

## 2025-04-28 DIAGNOSIS — R25.1 TREMOR: ICD-10-CM

## 2025-04-28 DIAGNOSIS — R06.09 OTHER FORM OF DYSPNEA: ICD-10-CM

## 2025-04-28 DIAGNOSIS — N18.31 STAGE 3A CHRONIC KIDNEY DISEASE (HCC): Chronic | ICD-10-CM

## 2025-04-28 DIAGNOSIS — E53.8 VITAMIN B12 DEFICIENCY: ICD-10-CM

## 2025-04-28 DIAGNOSIS — E11.65 TYPE 2 DIABETES MELLITUS WITH HYPERGLYCEMIA, WITHOUT LONG-TERM CURRENT USE OF INSULIN (HCC): Primary | ICD-10-CM

## 2025-04-28 DIAGNOSIS — I10 PRIMARY HYPERTENSION: ICD-10-CM

## 2025-04-28 DIAGNOSIS — J30.9 CHRONIC ALLERGIC RHINITIS: ICD-10-CM

## 2025-04-28 DIAGNOSIS — K22.70 BARRETT'S ESOPHAGUS WITHOUT DYSPLASIA: ICD-10-CM

## 2025-04-28 DIAGNOSIS — E66.01 OBESITY, MORBID (HCC): ICD-10-CM

## 2025-04-28 PROCEDURE — G0439 PPPS, SUBSEQ VISIT: HCPCS | Performed by: INTERNAL MEDICINE

## 2025-04-28 PROCEDURE — G2211 COMPLEX E/M VISIT ADD ON: HCPCS | Performed by: INTERNAL MEDICINE

## 2025-04-28 PROCEDURE — 99214 OFFICE O/P EST MOD 30 MIN: CPT | Performed by: INTERNAL MEDICINE

## 2025-04-28 RX ORDER — EPINEPHRINE 0.3 MG/.3ML
0.3 INJECTION SUBCUTANEOUS ONCE
Qty: 0.6 ML | Refills: 0 | Status: SHIPPED | OUTPATIENT
Start: 2025-04-28 | End: 2025-04-28

## 2025-04-28 NOTE — PROGRESS NOTES
Diabetic Foot Exam    Patient's shoes and socks removed.    Right Foot/Ankle   Right Foot Inspection  Skin Exam: skin normal and skin intact. No dry skin, no warmth, no callus, no erythema, no maceration, no abnormal color, no pre-ulcer, no ulcer and no callus.     Toe Exam: ROM and strength within normal limits.     Sensory   Monofilament testing: intact    Vascular  Capillary refills: < 3 seconds  The right DP pulse is 2+. The right PT pulse is 2+.     Left Foot/Ankle  Left Foot Inspection  Skin Exam: skin normal and skin intact. No dry skin, no warmth, no erythema, no maceration, normal color, no pre-ulcer, no ulcer and no callus.     Toe Exam: ROM and strength within normal limits.     Sensory   Monofilament testing: intact    Vascular  Capillary refills: < 3 seconds  The left DP pulse is 2+. The left PT pulse is 2+.     Assign Risk Category  No deformity present  No loss of protective sensation  No weak pulses  Risk: 0        Name: Maxine Acuña      : 1940      MRN: [id number]  Encounter Provider: Cleopatra Christie MD  Encounter Date: 2025   Encounter department: [facility] MEDICINE  :  Assessment & Plan  Type 2 diabetes mellitus with hyperglycemia, without long-term current use of insulin (HCC)    Lab Results   Component Value Date    HGBA1C 7.1 (H) 2024     Repeat A1c due, per Endo.  On Tradjenta 5 mg daily, Prandin 2-2-1 mg dosing tid.  Orders:  •  Diabetic foot exam; Future    Stage 3a chronic kidney disease (HCC)  Lab Results   Component Value Date    EGFR 46 2024    EGFR 41 2024    EGFR 40 2024    CREATININE 1.11 2024    CREATININE 1.23 2024    CREATININE 1.24 2024     Stable.  Sees nephrology.  Repeat uric acid, previously elevated.  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       Primary hypertension  BP controlled: carvedilol 25 mg bid, HCTZ 25 mg daily, lisinopril 40 mg bid  "and spironolactone 25 mg daily.   Monitor leg swelling.    Orders:  •  CBC and differential; Future    Other form of dyspnea  Saw cardiology 9/2022, did not do echo.    Orders:  •  ECG 12 lead; Future    Hypothyroidism, unspecified type  Stable.       Mixed hyperlipidemia  Lipids due, on atorvastatin 10 mg.  Orders:  •  Lipid panel; Future    Chronic allergic rhinitis  Using nasal sprays daily.       Obesity, morbid (HCC)  Weight gain 4 lbs since last visit. Resume swimming.  Detailed discussion regarding strength training, recommend to work with a . Add stretching exercises.       Osteopenia of multiple sites  Continue daily walks and supplements.       Tremor  Mild, saw neurology.       Vitamin B12 deficiency    Orders:  •  Vitamin B12; Future    Vitamin D deficiency    Orders:  •  Vitamin D 25 hydroxy; Future    Allergy, sequela    Orders:  •  EPINEPHrine (EPIPEN) 0.3 mg/0.3 mL SOAJ; Inject 0.3 mL (0.3 mg total) into a muscle once for 1 dose    Medicare annual wellness visit, subsequent         Follow up in 6 months or as needed.    Urinary Incontinence Plan of Care: counseling topics discussed: limiting fluid intake 3-4 hours before bed.       Preventive health issues were discussed with patient, and age appropriate screening tests were ordered as noted in patient's After Visit Summary. Personalized health advice and appropriate referrals for health education or preventive services given if needed, as noted in patient's After Visit Summary.    History of Present Illness     Multiple concerns today.  First, she feels that she is \"holding water\" in her ankles and hands.  She feels the tendons of her ankles are more swollen.  She denies a lot of swelling, just feels she is gaining weight.  She reports gaining about 7 pounds since she stopped taking Ozempic.  She is on spironolactone which she takes daily.  She complains of occasional shortness of breath with activity, does not occur all the time.  She denies " any chest pain or pressure, no palpitations.  She had stopped swimming during the winter, caused her skin to be too dry.  She has been more sedentary in the last few months.    Second, she is very concerned about losing muscle mass.  She feels weak and concerned about this.  She does use the gym, uses several machines only during the week. She feels her balance is about the same. She walks straight if she focuses on walking. No falls. She tries to do her balance exercises several times during the week.    Third, she feels her abdomen is bigger. She has occasional pain when getting out of bed. Denies any nausea or vomiting, no symptoms with other movements. She feels it is bigger and harder that a few months ago.    Fourth, she reports right hand tremors are slightly worse since last time.  She did see neurology, has a follow-up appointment with them.    She recently changed her Prandin dosing, as instructed by endocrinology.  She reports her sugars are still high, not as good as it was when she was on Ozempic.  She does not want to start Ozempic again due to cost.       Patient Care Team:  Cleopatra Christie MD as PCP - General (Internal Medicine)  MD Herb Pitt MD Michael Anthony Dunn, DO Henrietta Diehl, EMILIA Leach as Nurse Practitioner (Endocrinology)    Review of Systems   Constitutional:  Negative for appetite change and fatigue.   HENT:  Negative for congestion, ear pain and postnasal drip.    Eyes:  Negative for visual disturbance.   Respiratory:  Negative for cough and shortness of breath.    Cardiovascular:  Negative for chest pain and leg swelling.   Gastrointestinal:  Negative for abdominal pain, constipation and diarrhea.   Genitourinary:  Negative for dysuria, frequency and urgency.   Musculoskeletal:  Positive for gait problem. Negative for arthralgias and myalgias.   Skin:  Negative for rash and wound.   Neurological:  Positive for tremors. Negative for  dizziness, numbness and headaches.   Hematological:  Does not bruise/bleed easily.   Psychiatric/Behavioral:  Negative for confusion. The patient is not nervous/anxious.      Medical History Reviewed by provider this encounter:       Annual Wellness Visit Questionnaire   Maxine is here for her Subsequent Wellness visit. Last Medicare Wellness visit information reviewed, patient interviewed and updates made to the record today.      Health Risk Assessment:   Patient rates overall health as fair. Patient feels that their physical health rating is same. Patient is satisfied with their life. Eyesight was rated as same. Hearing was rated as same. Patient feels that their emotional and mental health rating is same. Patients states they are sometimes angry. Patient states they are sometimes unusually tired/fatigued. Pain experienced in the last 7 days has been some. Patient's pain rating has been 5/10. Patient states that she has experienced no weight loss or gain in last 6 months.     Fall Risk Screening:   In the past year, patient has experienced: no history of falling in past year      Urinary Incontinence Screening:   Patient has leaked urine accidently in the last six months.     Home Safety:  Patient does not have trouble with stairs inside or outside of their home. Patient has working smoke alarms and has working carbon monoxide detector. Home safety hazards include: none.     Nutrition:   Current diet is Regular, Diabetic, No Added Salt and Limited junk food.     Medications:   Patient is currently taking over-the-counter supplements. OTC medications include: see medication list. Patient is able to manage medications.     Activities of Daily Living (ADLs)/Instrumental Activities of Daily Living (IADLs):   Walk and transfer into and out of bed and chair?: Yes  Dress and groom yourself?: Yes    Bathe or shower yourself?: Yes    Feed yourself? Yes  Do your laundry/housekeeping?: Yes  Manage your money, pay your  bills and track your expenses?: Yes  Make your own meals?: Yes    Do your own shopping?: Yes    Previous Hospitalizations:   Any hospitalizations or ED visits within the last 12 months?: No      Advance Care Planning:   Living will: Yes    Durable POA for healthcare: Yes    Advanced directive: Yes    End of Life Decisions reviewed with patient: Yes      Cognitive Screening:   Provider or family/friend/caregiver concerned regarding cognition?: No    Preventive Screenings      Cardiovascular Screening:    General: History Lipid Disorder    Due for: Lipid Panel      Diabetes Screening:     General: History Diabetes    Due for: Blood Glucose      Colorectal Cancer Screening:     General: Screening Not Indicated      Breast Cancer Screening:     General: Screening Current      Cervical Cancer Screening:    General: Screening Not Indicated      Osteoporosis Screening:    General: Screening Current      Abdominal Aortic Aneurysm (AAA) Screening:        General: Screening Not Indicated      Lung Cancer Screening:     General: Screening Not Indicated      Hepatitis C Screening:    General: Screening Not Indicated    Screening, Brief Intervention, and Referral to Treatment (SBIRT)     Screening  Typical number of drinks in a day: 0  Typical number of drinks in a week: 2  Interpretation: Low risk drinking behavior.    AUDIT-C Screenin) How often did you have a drink containing alcohol in the past year? monthly or less  2) How many drinks did you have on a typical day when you were drinking in the past year? 0  3) How often did you have 6 or more drinks on one occasion in the past year? never    AUDIT-C Score: 1  Interpretation: Score 0-2 (female): Negative screen for alcohol misuse    Single Item Drug Screening:  How often have you used an illegal drug (including marijuana) or a prescription medication for non-medical reasons in the past year? never    Single Item Drug Screen Score: 0  Interpretation: Negative screen for  "possible drug use disorder    Other Counseling Topics:   Calcium and vitamin D intake and regular weightbearing exercise.     Social Drivers of Health     Financial Resource Strain: Low Risk  (2/3/2023)    Overall Financial Resource Strain (CARDIA)    • Difficulty of Paying Living Expenses: Not very hard   Food Insecurity: No Food Insecurity (4/24/2025)    Hunger Vital Sign    • Worried About Running Out of Food in the Last Year: Never true    • Ran Out of Food in the Last Year: Never true   Transportation Needs: No Transportation Needs (4/24/2025)    PRAPARE - Transportation    • Lack of Transportation (Medical): No    • Lack of Transportation (Non-Medical): No   Housing Stability: Unknown (4/24/2025)    Housing Stability Vital Sign    • Unable to Pay for Housing in the Last Year: No    • Homeless in the Last Year: No   Utilities: Not At Risk (4/24/2025)    [facility] Utilities    • Threatened with loss of utilities: No     No results found.    Objective   /74 (BP Location: Right arm, Patient Position: Sitting, Cuff Size: Large)   Pulse 80   Temp 97.9 °F (36.6 °C)   Resp 14   Ht 5' 1\" (1.549 m)   Wt 80.3 kg (177 lb)   LMP  (LMP Unknown)   SpO2 97%   BMI 33.44 kg/m²     Physical Exam  Vitals and nursing note reviewed.   Constitutional:       General: She is not in acute distress.     Appearance: She is well-developed.   HENT:      Head: Normocephalic and atraumatic.      Mouth/Throat:      Mouth: Mucous membranes are moist.   Eyes:      Pupils: Pupils are equal, round, and reactive to light.   Cardiovascular:      Rate and Rhythm: Normal rate and regular rhythm.      Pulses: no weak pulses.           Dorsalis pedis pulses are 2+ on the right side and 2+ on the left side.        Posterior tibial pulses are 2+ on the right side and 2+ on the left side.      Heart sounds: Normal heart sounds.   Pulmonary:      Effort: Pulmonary effort is normal.      Breath sounds: Normal breath sounds. No wheezing.   Abdominal: "      General: Bowel sounds are normal.      Palpations: Abdomen is soft.   Musculoskeletal:         General: No swelling.      Right lower leg: No edema.      Left lower leg: No edema.   Feet:      Right foot:      Skin integrity: No ulcer, skin breakdown, erythema, warmth, callus or dry skin.      Left foot:      Skin integrity: No ulcer, skin breakdown, erythema, warmth, callus or dry skin.   Skin:     General: Skin is warm.      Findings: No rash.   Neurological:      General: No focal deficit present.      Mental Status: She is alert and oriented to person, place, and time.   Psychiatric:         Mood and Affect: Mood and affect normal. Mood is not anxious or depressed.         Behavior: Behavior normal.           Labs & imaging results reviewed with patient.

## 2025-04-28 NOTE — PATIENT INSTRUCTIONS
Medicare Preventive Visit Patient Instructions  Thank you for completing your Welcome to Medicare Visit or Medicare Annual Wellness Visit today. Your next wellness visit will be due in one year (4/29/2026).  The screening/preventive services that you may require over the next 5-10 years are detailed below. Some tests may not apply to you based off risk factors and/or age. Screening tests ordered at today's visit but not completed yet may show as past due. Also, please note that scanned in results may not display below.  Preventive Screenings:  Service Recommendations Previous Testing/Comments   Colorectal Cancer Screening  * Colonoscopy    * Fecal Occult Blood Test (FOBT)/Fecal Immunochemical Test (FIT)  * Fecal DNA/Cologuard Test  * Flexible Sigmoidoscopy Age: 45-75 years old   Colonoscopy: every 10 years (may be performed more frequently if at higher risk)  OR  FOBT/FIT: every 1 year  OR  Cologuard: every 3 years  OR  Sigmoidoscopy: every 5 years  Screening may be recommended earlier than age 45 if at higher risk for colorectal cancer. Also, an individualized decision between you and your healthcare provider will decide whether screening between the ages of 76-85 would be appropriate. Colonoscopy: 04/19/2021  FOBT/FIT: Not on file  Cologuard: Not on file  Sigmoidoscopy: Not on file          Breast Cancer Screening Age: 40+ years old  Frequency: every 1-2 years  Not required if history of left and right mastectomy Mammogram: 09/09/2024    Screening Current   Cervical Cancer Screening Between the ages of 21-29, pap smear recommended once every 3 years.   Between the ages of 30-65, can perform pap smear with HPV co-testing every 5 years.   Recommendations may differ for women with a history of total hysterectomy, cervical cancer, or abnormal pap smears in past. Pap Smear: Not on file    Screening Not Indicated   Hepatitis C Screening Once for adults born between 1945 and 1965  More frequently in patients at high risk  for Hepatitis C Hep C Antibody: Not on file        Diabetes Screening 1-2 times per year if you're at risk for diabetes or have pre-diabetes Fasting glucose: 120 mg/dL (12/6/2024)  A1C: 7.1 % (12/6/2024)  Screening Not Indicated  History Diabetes   Cholesterol Screening Once every 5 years if you don't have a lipid disorder. May order more often based on risk factors. Lipid panel: 12/06/2024    Screening Not Indicated  History Lipid Disorder     Other Preventive Screenings Covered by Medicare:  Abdominal Aortic Aneurysm (AAA) Screening: covered once if your at risk. You're considered to be at risk if you have a family history of AAA.  Lung Cancer Screening: covers low dose CT scan once per year if you meet all of the following conditions: (1) Age 55-77; (2) No signs or symptoms of lung cancer; (3) Current smoker or have quit smoking within the last 15 years; (4) You have a tobacco smoking history of at least 20 pack years (packs per day multiplied by number of years you smoked); (5) You get a written order from a healthcare provider.  Glaucoma Screening: covered annually if you're considered high risk: (1) You have diabetes OR (2) Family history of glaucoma OR (3)  aged 50 and older OR (4)  American aged 65 and older  Osteoporosis Screening: covered every 2 years if you meet one of the following conditions: (1) You're estrogen deficient and at risk for osteoporosis based off medical history and other findings; (2) Have a vertebral abnormality; (3) On glucocorticoid therapy for more than 3 months; (4) Have primary hyperparathyroidism; (5) On osteoporosis medications and need to assess response to drug therapy.   Last bone density test (DXA Scan): 05/01/2024.  HIV Screening: covered annually if you're between the age of 15-65. Also covered annually if you are younger than 15 and older than 65 with risk factors for HIV infection. For pregnant patients, it is covered up to 3 times per  pregnancy.    Immunizations:  Immunization Recommendations   Influenza Vaccine Annual influenza vaccination during flu season is recommended for all persons aged >= 6 months who do not have contraindications   Pneumococcal Vaccine   * Pneumococcal conjugate vaccine = PCV13 (Prevnar 13), PCV15 (Vaxneuvance), PCV20 (Prevnar 20)  * Pneumococcal polysaccharide vaccine = PPSV23 (Pneumovax) Adults 19-65 yo with certain risk factors or if 65+ yo  If never received any pneumonia vaccine: recommend Prevnar 20 (PCV20)  Give PCV20 if previously received 1 dose of PCV13 or PPSV23   Hepatitis B Vaccine 3 dose series if at intermediate or high risk (ex: diabetes, end stage renal disease, liver disease)   Respiratory syncytial virus (RSV) Vaccine - COVERED BY MEDICARE PART D  * RSVPreF3 (Arexvy) CDC recommends that adults 60 years of age and older may receive a single dose of RSV vaccine using shared clinical decision-making (SCDM)   Tetanus (Td) Vaccine - COST NOT COVERED BY MEDICARE PART B Following completion of primary series, a booster dose should be given every 10 years to maintain immunity against tetanus. Td may also be given as tetanus wound prophylaxis.   Tdap Vaccine - COST NOT COVERED BY MEDICARE PART B Recommended at least once for all adults. For pregnant patients, recommended with each pregnancy.   Shingles Vaccine (Shingrix) - COST NOT COVERED BY MEDICARE PART B  2 shot series recommended in those 19 years and older who have or will have weakened immune systems or those 50 years and older     Health Maintenance Due:      Topic Date Due   • Breast Cancer Screening: Mammogram  09/09/2025     Immunizations Due:      Topic Date Due   • COVID-19 Vaccine (9 - 2024-25 season) 03/17/2025     Advance Directives   What are advance directives?  Advance directives are legal documents that state your wishes and plans for medical care. These plans are made ahead of time in case you lose your ability to make decisions for  yourself. Advance directives can apply to any medical decision, such as the treatments you want, and if you want to donate organs.   What are the types of advance directives?  There are many types of advance directives, and each state has rules about how to use them. You may choose a combination of any of the following:  Living will:  This is a written record of the treatment you want. You can also choose which treatments you do not want, which to limit, and which to stop at a certain time. This includes surgery, medicine, IV fluid, and tube feedings.   Durable power of  for healthcare (DPAHC):  This is a written record that states who you want to make healthcare choices for you when you are unable to make them for yourself. This person, called a proxy, is usually a family member or a friend. You may choose more than 1 proxy.  Do not resuscitate (DNR) order:  A DNR order is used in case your heart stops beating or you stop breathing. It is a request not to have certain forms of treatment, such as CPR. A DNR order may be included in other types of advance directives.  Medical directive:  This covers the care that you want if you are in a coma, near death, or unable to make decisions for yourself. You can list the treatments you want for each condition. Treatment may include pain medicine, surgery, blood transfusions, dialysis, IV or tube feedings, and a ventilator (breathing machine).  Values history:  This document has questions about your views, beliefs, and how you feel and think about life. This information can help others choose the care that you would choose.  Why are advance directives important?  An advance directive helps you control your care. Although spoken wishes may be used, it is better to have your wishes written down. Spoken wishes can be misunderstood, or not followed. Treatments may be given even if you do not want them. An advance directive may make it easier for your family to make  difficult choices about your care.   Urinary Incontinence   Urinary incontinence (UI)  is when you lose control of your bladder. UI develops because your bladder cannot store or empty urine properly. The 3 most common types of UI are stress incontinence, urge incontinence, or both.  Medicines:   May be given to help strengthen your bladder control. Report any side effects of medication to your healthcare provider.  Do pelvic muscle exercises often:  Your pelvic muscles help you stop urinating. Squeeze these muscles tight for 5 seconds, then relax for 5 seconds. Gradually work up to squeezing for 10 seconds. Do 3 sets of 15 repetitions a day, or as directed. This will help strengthen your pelvic muscles and improve bladder control.  Train your bladder:  Go to the bathroom at set times, such as every 2 hours, even if you do not feel the urge to go. You can also try to hold your urine when you feel the urge to go. For example, hold your urine for 5 minutes when you feel the urge to go. As that becomes easier, hold your urine for 10 minutes.   Self-care:   Keep a UI record.  Write down how often you leak urine and how much you leak. Make a note of what you were doing when you leaked urine.  Drink liquids as directed. You may need to limit the amount of liquid you drink to help control your urine leakage. Do not drink any liquid right before you go to bed. Limit or do not have drinks that contain caffeine or alcohol.   Prevent constipation.  Eat a variety of high-fiber foods. Good examples are high-fiber cereals, beans, vegetables, and whole-grain breads. Walking is the best way to trigger your intestines to have a bowel movement.  Exercise regularly and maintain a healthy weight.  Weight loss and exercise will decrease pressure on your bladder and help you control your leakage.   Use a catheter as directed  to help empty your bladder. A catheter is a tiny, plastic tube that is put into your bladder to drain your urine.    Go to behavior therapy as directed.  Behavior therapy may be used to help you learn to control your urge to urinate.    Weight Management   Why it is important to manage your weight:  Being overweight increases your risk of health conditions such as heart disease, high blood pressure, type 2 diabetes, and certain types of cancer. It can also increase your risk for osteoarthritis, sleep apnea, and other respiratory problems. Aim for a slow, steady weight loss. Even a small amount of weight loss can lower your risk of health problems.  How to lose weight safely:  A safe and healthy way to lose weight is to eat fewer calories and get regular exercise. You can lose up about 1 pound a week by decreasing the number of calories you eat by 500 calories each day.   Healthy meal plan for weight management:  A healthy meal plan includes a variety of foods, contains fewer calories, and helps you stay healthy. A healthy meal plan includes the following:  Eat whole-grain foods more often.  A healthy meal plan should contain fiber. Fiber is the part of grains, fruits, and vegetables that is not broken down by your body. Whole-grain foods are healthy and provide extra fiber in your diet. Some examples of whole-grain foods are whole-wheat breads and pastas, oatmeal, brown rice, and bulgur.  Eat a variety of vegetables every day.  Include dark, leafy greens such as spinach, kale, nancy greens, and mustard greens. Eat yellow and orange vegetables such as carrots, sweet potatoes, and winter squash.   Eat a variety of fruits every day.  Choose fresh or canned fruit (canned in its own juice or light syrup) instead of juice. Fruit juice has very little or no fiber.  Eat low-fat dairy foods.  Drink fat-free (skim) milk or 1% milk. Eat fat-free yogurt and low-fat cottage cheese. Try low-fat cheeses such as mozzarella and other reduced-fat cheeses.  Choose meat and other protein foods that are low in fat.  Choose beans or other legumes  such as split peas or lentils. Choose fish, skinless poultry (chicken or turkey), or lean cuts of red meat (beef or pork). Before you cook meat or poultry, cut off any visible fat.   Use less fat and oil.  Try baking foods instead of frying them. Add less fat, such as margarine, sour cream, regular salad dressing and mayonnaise to foods. Eat fewer high-fat foods. Some examples of high-fat foods include french fries, doughnuts, ice cream, and cakes.  Eat fewer sweets.  Limit foods and drinks that are high in sugar. This includes candy, cookies, regular soda, and sweetened drinks.  Exercise:  Exercise at least 30 minutes per day on most days of the week. Some examples of exercise include walking, biking, dancing, and swimming. You can also fit in more physical activity by taking the stairs instead of the elevator or parking farther away from stores. Ask your healthcare provider about the best exercise plan for you.      © Copyright [employer] 2018 Information is for End User's use only and may not be sold, redistributed or otherwise used for commercial purposes. All illustrations and images included in CareNotes® are the copyrighted property of [employer] Inc[employer] or "[employer]

## 2025-04-28 NOTE — ASSESSMENT & PLAN NOTE
Lab Results   Component Value Date    EGFR 46 12/06/2024    EGFR 41 06/18/2024    EGFR 40 04/18/2024    CREATININE 1.11 12/06/2024    CREATININE 1.23 06/18/2024    CREATININE 1.24 04/18/2024     Stable.  Sees nephrology.  Repeat uric acid, previously elevated.  Orders:  •  Uric acid; Future

## 2025-04-28 NOTE — ASSESSMENT & PLAN NOTE
Lab Results   Component Value Date    HGBA1C 7.1 (H) 12/06/2024     Repeat A1c due, per Endo.  On Tradjenta 5 mg daily, Prandin 2-2-1 mg dosing tid.  Orders:  •  Diabetic foot exam; Future

## 2025-04-28 NOTE — ASSESSMENT & PLAN NOTE
Weight gain 4 lbs since last visit. Resume swimming.  Detailed discussion regarding strength training, recommend to work with a . Add stretching exercises.

## 2025-04-28 NOTE — TELEPHONE ENCOUNTER
Patient called in asking if she needed her blood work done before her appointment today for her physical.  I advised her that she did not; that she could get it at any time, and we would call her with the results.

## 2025-04-28 NOTE — ASSESSMENT & PLAN NOTE
BP controlled: carvedilol 25 mg bid, HCTZ 25 mg daily, lisinopril 40 mg bid and spironolactone 25 mg daily.   Monitor leg swelling.    Orders:  •  CBC and differential; Future

## 2025-06-02 ENCOUNTER — APPOINTMENT (OUTPATIENT)
Dept: LAB | Age: 85
End: 2025-06-02
Payer: MEDICARE

## 2025-06-02 DIAGNOSIS — N18.31 STAGE 3A CHRONIC KIDNEY DISEASE (HCC): Chronic | ICD-10-CM

## 2025-06-02 DIAGNOSIS — E78.2 MIXED HYPERLIPIDEMIA: ICD-10-CM

## 2025-06-02 DIAGNOSIS — E11.65 TYPE 2 DIABETES MELLITUS WITH HYPERGLYCEMIA, WITHOUT LONG-TERM CURRENT USE OF INSULIN (HCC): ICD-10-CM

## 2025-06-02 DIAGNOSIS — E55.9 VITAMIN D DEFICIENCY: ICD-10-CM

## 2025-06-02 DIAGNOSIS — E03.9 HYPOTHYROIDISM, UNSPECIFIED TYPE: ICD-10-CM

## 2025-06-02 DIAGNOSIS — E53.8 VITAMIN B12 DEFICIENCY: ICD-10-CM

## 2025-06-02 DIAGNOSIS — I10 PRIMARY HYPERTENSION: ICD-10-CM

## 2025-06-02 LAB
25(OH)D3 SERPL-MCNC: 42.1 NG/ML (ref 30–100)
ALBUMIN SERPL BCG-MCNC: 4.1 G/DL (ref 3.5–5)
ALP SERPL-CCNC: 68 U/L (ref 34–104)
ALT SERPL W P-5'-P-CCNC: 12 U/L (ref 7–52)
ANION GAP SERPL CALCULATED.3IONS-SCNC: 9 MMOL/L (ref 4–13)
AST SERPL W P-5'-P-CCNC: 14 U/L (ref 13–39)
BASOPHILS # BLD AUTO: 0.05 THOUSANDS/ÂΜL (ref 0–0.1)
BASOPHILS NFR BLD AUTO: 1 % (ref 0–1)
BILIRUB SERPL-MCNC: 0.51 MG/DL (ref 0.2–1)
BUN SERPL-MCNC: 29 MG/DL (ref 5–25)
CALCIUM SERPL-MCNC: 9.7 MG/DL (ref 8.4–10.2)
CHLORIDE SERPL-SCNC: 102 MMOL/L (ref 96–108)
CHOLEST SERPL-MCNC: 186 MG/DL (ref ?–200)
CO2 SERPL-SCNC: 26 MMOL/L (ref 21–32)
CREAT SERPL-MCNC: 1.12 MG/DL (ref 0.6–1.3)
EOSINOPHIL # BLD AUTO: 0.18 THOUSAND/ÂΜL (ref 0–0.61)
EOSINOPHIL NFR BLD AUTO: 2 % (ref 0–6)
ERYTHROCYTE [DISTWIDTH] IN BLOOD BY AUTOMATED COUNT: 14.4 % (ref 11.6–15.1)
EST. AVERAGE GLUCOSE BLD GHB EST-MCNC: 197 MG/DL
GFR SERPL CREATININE-BSD FRML MDRD: 45 ML/MIN/1.73SQ M
GLUCOSE P FAST SERPL-MCNC: 168 MG/DL (ref 65–99)
HBA1C MFR BLD: 8.5 %
HCT VFR BLD AUTO: 43.2 % (ref 34.8–46.1)
HDLC SERPL-MCNC: 43 MG/DL
HGB BLD-MCNC: 13.1 G/DL (ref 11.5–15.4)
IMM GRANULOCYTES # BLD AUTO: 0.03 THOUSAND/UL (ref 0–0.2)
IMM GRANULOCYTES NFR BLD AUTO: 0 % (ref 0–2)
LDLC SERPL CALC-MCNC: 83 MG/DL (ref 0–100)
LYMPHOCYTES # BLD AUTO: 2.36 THOUSANDS/ÂΜL (ref 0.6–4.47)
LYMPHOCYTES NFR BLD AUTO: 28 % (ref 14–44)
MCH RBC QN AUTO: 22.4 PG (ref 26.8–34.3)
MCHC RBC AUTO-ENTMCNC: 30.3 G/DL (ref 31.4–37.4)
MCV RBC AUTO: 74 FL (ref 82–98)
MONOCYTES # BLD AUTO: 0.67 THOUSAND/ÂΜL (ref 0.17–1.22)
MONOCYTES NFR BLD AUTO: 8 % (ref 4–12)
NEUTROPHILS # BLD AUTO: 5.07 THOUSANDS/ÂΜL (ref 1.85–7.62)
NEUTS SEG NFR BLD AUTO: 61 % (ref 43–75)
NONHDLC SERPL-MCNC: 143 MG/DL
NRBC BLD AUTO-RTO: 0 /100 WBCS
PLATELET # BLD AUTO: 220 THOUSANDS/UL (ref 149–390)
PMV BLD AUTO: 10.9 FL (ref 8.9–12.7)
POTASSIUM SERPL-SCNC: 4.6 MMOL/L (ref 3.5–5.3)
PROT SERPL-MCNC: 6.9 G/DL (ref 6.4–8.4)
RBC # BLD AUTO: 5.85 MILLION/UL (ref 3.81–5.12)
SODIUM SERPL-SCNC: 137 MMOL/L (ref 135–147)
TRIGL SERPL-MCNC: 299 MG/DL (ref ?–150)
TSH SERPL DL<=0.05 MIU/L-ACNC: 3.07 UIU/ML (ref 0.45–4.5)
URATE SERPL-MCNC: 7.4 MG/DL (ref 2–7.5)
VIT B12 SERPL-MCNC: 608 PG/ML (ref 180–914)
WBC # BLD AUTO: 8.36 THOUSAND/UL (ref 4.31–10.16)

## 2025-06-02 PROCEDURE — 85025 COMPLETE CBC W/AUTO DIFF WBC: CPT

## 2025-06-02 PROCEDURE — 80061 LIPID PANEL: CPT

## 2025-06-02 PROCEDURE — 36415 COLL VENOUS BLD VENIPUNCTURE: CPT

## 2025-06-02 PROCEDURE — 82306 VITAMIN D 25 HYDROXY: CPT

## 2025-06-02 PROCEDURE — 82607 VITAMIN B-12: CPT

## 2025-06-02 PROCEDURE — 84550 ASSAY OF BLOOD/URIC ACID: CPT

## 2025-06-02 PROCEDURE — 80053 COMPREHEN METABOLIC PANEL: CPT

## 2025-06-02 PROCEDURE — 83036 HEMOGLOBIN GLYCOSYLATED A1C: CPT

## 2025-06-02 PROCEDURE — 84443 ASSAY THYROID STIM HORMONE: CPT

## 2025-06-03 ENCOUNTER — RESULTS FOLLOW-UP (OUTPATIENT)
Dept: ENDOCRINOLOGY | Facility: CLINIC | Age: 85
End: 2025-06-03

## 2025-06-03 ENCOUNTER — RESULTS FOLLOW-UP (OUTPATIENT)
Dept: INTERNAL MEDICINE CLINIC | Facility: CLINIC | Age: 85
End: 2025-06-03

## 2025-06-04 ENCOUNTER — OFFICE VISIT (OUTPATIENT)
Dept: ENDOCRINOLOGY | Facility: CLINIC | Age: 85
End: 2025-06-04
Payer: MEDICARE

## 2025-06-04 VITALS
RESPIRATION RATE: 16 BRPM | BODY MASS INDEX: 33.99 KG/M2 | SYSTOLIC BLOOD PRESSURE: 112 MMHG | HEART RATE: 64 BPM | TEMPERATURE: 97.6 F | DIASTOLIC BLOOD PRESSURE: 78 MMHG | OXYGEN SATURATION: 97 % | HEIGHT: 61 IN | WEIGHT: 180 LBS

## 2025-06-04 DIAGNOSIS — E66.01 OBESITY, MORBID (HCC): ICD-10-CM

## 2025-06-04 DIAGNOSIS — E11.65 TYPE 2 DIABETES MELLITUS WITH HYPERGLYCEMIA, WITHOUT LONG-TERM CURRENT USE OF INSULIN (HCC): ICD-10-CM

## 2025-06-04 DIAGNOSIS — E06.3 HYPOTHYROIDISM DUE TO HASHIMOTO THYROIDITIS: Primary | ICD-10-CM

## 2025-06-04 DIAGNOSIS — E04.1 THYROID NODULE: ICD-10-CM

## 2025-06-04 DIAGNOSIS — E55.9 VITAMIN D DEFICIENCY: ICD-10-CM

## 2025-06-04 PROCEDURE — 99215 OFFICE O/P EST HI 40 MIN: CPT | Performed by: INTERNAL MEDICINE

## 2025-06-04 PROCEDURE — G2211 COMPLEX E/M VISIT ADD ON: HCPCS | Performed by: INTERNAL MEDICINE

## 2025-06-04 NOTE — PROGRESS NOTES
"    Follow-up Patient Progress Note      CC: diabetes     History of Present Illness:   72 yr female with type 2 diabetes for 10 yrs, CKD3a eGFR 37, HTN, HLD, obesity, hypothyroidism, multinodular thyroid,  microalbuminuria, Fernández's esophagus, thalasemia minor and vit d deficiency. Last visit was 6/14/23.     Since last visit, she lost 1 lbs. She had lost about 20 lbs but increased about 4 lbs last week.     Home blood glucose monitoring: does not check regularly.  Hypoglycemia: no     Current meds:  Ozempic 1mg weekly     Opthamology: 6/1/2023 - no significant retinopathy.  Podiatry: no  vaccination:yes   Dental:  Pancreatitis: No     Ace/ARB: lisinopril 40mg qdaily  Statin:lipitor 10  Thyroid issues:hypothyroidism. On levothyroxine 37.5mcg qdaily.     6/12/2024  thyroid:   Nodule #1 right mid gland 1.7 cm TR 3 lesion.    Nodule #2 right lower pole 1.2 x 1.8 x 1.4 cm TR 4 - 7/19/23 FNAC:  nodule #2  -benign bethesda 2      Physical Exam:  Body mass index is 34.01 kg/m².  /78 (BP Location: Left arm, Patient Position: Sitting)   Pulse 64   Temp 97.6 °F (36.4 °C) (Temporal)   Resp 16   Ht 5' 1\" (1.549 m)   Wt 81.6 kg (180 lb)   LMP  (LMP Unknown)   SpO2 97%   BMI 34.01 kg/m²    Vitals:    06/04/25 1351   Weight: 81.6 kg (180 lb)        Physical Exam  Constitutional:       General: She is not in acute distress.     Appearance: She is well-developed.   HENT:      Head: Normocephalic and atraumatic.      Nose: Nose normal.     Eyes:      Conjunctiva/sclera: Conjunctivae normal.     Pulmonary:      Effort: Pulmonary effort is normal.   Abdominal:      General: There is no distension.     Musculoskeletal:      Cervical back: Normal range of motion and neck supple.     Skin:     Findings: No rash.      Comments: No icterus     Neurological:      Mental Status: She is alert and oriented to person, place, and time.         Labs:   Lab Results   Component Value Date    HGBA1C 8.5 (H) 06/02/2025       Lab " Results   Component Value Date    LMD6XKBWUOUB 3.067 06/02/2025    TSH 2.85 11/23/2021       Lab Results   Component Value Date    CREATININE 1.12 06/02/2025    CREATININE 1.11 12/06/2024    CREATININE 1.23 06/18/2024    BUN 29 (H) 06/02/2025     03/26/2015    K 4.6 06/02/2025     06/02/2025    CO2 26 06/02/2025     GFR, Calculated   Date Value Ref Range Status   10/15/2020 43 (L) >60 mL/min/1.73m2 Final     Comment:     mL/min per 1.73 square meters                                            Normal Function or Mild Renal    Disease (if clinically at risk):  >or=60  Moderately Decreased:                30-59  Severely Decreased:                  15-29  Renal Failure:                         <15                                            -American GFR: multiply reported GFR by 1.16    Please note that the eGFR is based on the CKD-EPI calculation, and is not intended to be used for drug dosing.                                            Note: Calculated GFR may not be an accurate indicator of renal function if the patient's renal function is not in a steady state.     eGFRcr   Date Value Ref Range Status   06/13/2023 37 (L) >59 Final     eGFR   Date Value Ref Range Status   06/02/2025 45 ml/min/1.73sq m Final       Lab Results   Component Value Date    ALT 12 06/02/2025    AST 14 06/02/2025    ALKPHOS 68 06/02/2025    BILITOT 0.43 03/26/2015       Lab Results   Component Value Date    CHOLESTEROL 186 06/02/2025    CHOLESTEROL 170 12/06/2024    CHOLESTEROL 168 04/18/2024     Lab Results   Component Value Date    HDL 43 (L) 06/02/2025    HDL 40 (L) 12/06/2024    HDL 40 (L) 04/18/2024     Lab Results   Component Value Date    TRIG 299 (H) 06/02/2025    TRIG 236 (H) 12/06/2024    TRIG 238 (H) 04/18/2024     Lab Results   Component Value Date    NONHDLC 143 06/02/2025    NONHDLC 130 12/06/2024    NONHDLC 128 04/18/2024         Assessment/Plan:    1. Hypothyroidism due to Hashimoto thyroiditis  Assessment  & Plan:  She seems clinically and biochemically euthyroid on levothyroxine 37.5mcg qdaily.    Repeat labs and monitor in 3-6 months.  2. Thyroid nodule  Assessment & Plan:  She seems to have stable thyroid nodules dating back to 2023 when last FNAC was benign.  We may repeat US next year.      6/12/2024 US thyroid:   Nodule #1 right mid gland 1.7 cm TR 3 lesion.    Nodule #2 right lower pole 1.2 x 1.8 x 1.4 cm TR 4 - 7/19/23 FNAC:  nodule #2  -benign bethesda 2    3. Type 2 diabetes mellitus with hyperglycemia, without long-term current use of insulin (HCC)  Assessment & Plan:  She seems uncontrolled partly due to unaffordable medications including GLP 1 agonists and SGLT2i. Note eGFR 45.    Today we agreed to provide samples of SGLT2i -Jardiance 12.5mg qdaily - monitor for UTI  Continue Repaglinide 1mg po TIDAC and Tradjenta 5mg qdaily.  Encouraged daily activity.    Start a cgm if covered in future.  In future, we may consider adding 500mg metformin daily if eGFR remains> 45.    Follow up in 3 months.        Lab Results   Component Value Date    HGBA1C 8.5 (H) 06/02/2025     4. Vitamin D deficiency  5. Obesity, morbid (HCC)        I have spent a total time of 40 minutes on 06/04/25 in caring for this patient including greater than 50% of this time was spent in counseling/coordination of care as listed above.       Discussed with the patient and all questioned fully answered. She will contact me with concerns.    Laurent Hoover

## 2025-06-04 NOTE — ASSESSMENT & PLAN NOTE
She seems clinically and biochemically euthyroid on levothyroxine 37.5mcg qdaily.    Repeat labs and monitor in 3-6 months.

## 2025-06-04 NOTE — ASSESSMENT & PLAN NOTE
She seems uncontrolled partly due to unaffordable medications including GLP 1 agonists and SGLT2i. Note eGFR 45.    Today we agreed to provide samples of SGLT2i -Jardiance 12.5mg qdaily - monitor for UTI  Continue Repaglinide 1mg po TIDAC and Tradjenta 5mg qdaily.  Encouraged daily activity.    Start a cgm if covered in future.  In future, we may consider adding 500mg metformin daily if eGFR remains> 45.    Follow up in 3 months.        Lab Results   Component Value Date    HGBA1C 8.5 (H) 06/02/2025

## 2025-06-04 NOTE — ASSESSMENT & PLAN NOTE
She seems to have stable thyroid nodules dating back to 2023 when last FNAC was benign.  We may repeat US next year.      6/12/2024 US thyroid:   Nodule #1 right mid gland 1.7 cm TR 3 lesion.    Nodule #2 right lower pole 1.2 x 1.8 x 1.4 cm TR 4 - 7/19/23 FNAC:  nodule #2  -benign bethesda 2

## 2025-06-05 ENCOUNTER — TELEPHONE (OUTPATIENT)
Dept: NEUROLOGY | Facility: CLINIC | Age: 85
End: 2025-06-05

## 2025-06-05 NOTE — TELEPHONE ENCOUNTER
Tried calling PT to confirm upcoming appointment on 6/12/25 at 12:30pm with Dr. Lake at the [address] [place] [address] location. Left Pt VM with call back number.

## 2025-06-09 ENCOUNTER — OFFICE VISIT (OUTPATIENT)
Dept: OBGYN CLINIC | Facility: CLINIC | Age: 85
End: 2025-06-09
Payer: MEDICARE

## 2025-06-09 ENCOUNTER — APPOINTMENT (OUTPATIENT)
Dept: RADIOLOGY | Facility: AMBULARY SURGERY CENTER | Age: 85
End: 2025-06-09
Attending: PHYSICAL MEDICINE & REHABILITATION
Payer: MEDICARE

## 2025-06-09 VITALS — BODY MASS INDEX: 33.99 KG/M2 | HEIGHT: 61 IN | WEIGHT: 180 LBS

## 2025-06-09 DIAGNOSIS — M25.571 PAIN, JOINT, ANKLE AND FOOT, RIGHT: ICD-10-CM

## 2025-06-09 DIAGNOSIS — M25.571 PAIN, JOINT, ANKLE AND FOOT, RIGHT: Primary | ICD-10-CM

## 2025-06-09 DIAGNOSIS — M76.60 NON-INSERTIONAL ACHILLES TENDINOPATHY: Primary | ICD-10-CM

## 2025-06-09 PROCEDURE — 73610 X-RAY EXAM OF ANKLE: CPT

## 2025-06-09 PROCEDURE — 99204 OFFICE O/P NEW MOD 45 MIN: CPT | Performed by: PHYSICAL MEDICINE & REHABILITATION

## 2025-06-09 NOTE — PATIENT INSTRUCTIONS
You can obtain diclofenac cream/gel/ointment over the counter.      Many brands make this medication and they include Voltaren, Aspercreme and Aleve.    You can use this up to 3 times per day.  It is best to wash the area with water, pat dry and then apply.  The cream absorbs better after this.    Be careful not to let any pets or children get in contact with the medication as it can be harmful to them.    If you develop a skin reaction, stop using the cream.     Prostatitis     Pulmonary embolism on right [facility]) 2011    upper lobe-coumadin 2011    Sacroiliitis [facility])        Past Surgical History:   Procedure Laterality Date    CHOLECYSTECTOMY  2007    COLONOSCOPY  9/21/2012    dr Donna Liang  05/17/2019    RIGHT SIDED URETEROSCOPY  Diagnostic, retrograde pyelogram and fulguration of previously resected bladder tumor base    CYSTOSCOPY Right 5/17/2019    RIGHT SIDED URETEROSCOPY FLUGERATION  OF BLADDER performed by Vicente Levin MD at [address] [place] [address]  2015    LITHOTRIPSY      PACEMAKER PLACEMENT      SINUS SURGERY      Made opening larger in sinuses    UPPER GASTROINTESTINAL ENDOSCOPY  3/28/2014    dr Jacqui mercedes       Current Outpatient Medications   Medication Sig Dispense Refill    sodium chloride, Inhalant, (NEBUSAL) 3 % nebulizer solution Take 4 mLs by nebulization every 4 hours as needed for Other or Cough (Cough) 360 mL 5    warfarin (COUMADIN) 5 MG tablet TAKE 1 TABLET BY MOUTH EVERY DAY EXCEPT MONDAY AND FRIDAY, TAKE 1 1/2 TABLET 105 tablet 1    methocarbamol (ROBAXIN) 500 MG tablet TAKE 1 TABLET BY MOUTH EVERY 8 HOURS AS NEEDED FOR BACK PAIN OR SPASMS 45 tablet 0    zolpidem (AMBIEN) 10 MG tablet TAKE 1 TABLET BY MOUTH  EVERY NIGHT AT BEDTIME 90 tablet 0    pregabalin (LYRICA) 75 MG capsule TAKE 1 CAPSULE BY MOUTH  EVERY MORNING AND 2  CAPSULES EVERY EVENING 270 capsule 0    DEXILANT 30 MG CPDR delayed release capsule TAKE 1 CAPSULE BY MOUTH  DAILY 90 capsule 3    metoprolol succinate (TOPROL XL) 25 MG extended release tablet TAKE 1 TABLET BY MOUTH TWO  TIMES DAILY 180 tablet 3    predniSONE (DELTASONE) 10 MG tablet TAKE TWO TABLETS TWICE EVERY DAY FOR 3 DAYS, TAKE ONE TABLET TWICE EVERY DAY FOR 3 DAYS, TAKE ONE TABLET EVERY MORNING FOR 3 DAYS 21 tablet 0    furosemide (LASIX) 20 MG tablet Take 1 tablet by mouth daily as needed (take 20 mg tablet daily as needed for increased weight, swelling or shortness of breath) 30 tablet 1    albuterol (PROVENTIL) (2.5 MG/3ML) 0.083% nebulizer solution USE 3 ML VIA NEBULIZER EVERY 4 HOURS AS NEEDED FOR WHEEZING OR SHORTNESS OF BREATH 360 mL 2    tamsulosin (FLOMAX) 0.4 MG capsule TAKE ONE CAPSULE BY MOUTH TWICE DAILY 180 capsule 0    polyethylene glycol (MIRALAX) PACK packet Take 17 g by mouth daily      fexofenadine (ALLEGRA ALLERGY) 180 MG tablet Take 180 mg by mouth daily      ondansetron (ZOFRAN ODT) 4 MG disintegrating tablet Take 1-2 tablets by mouth every 8 hours as needed for Nausea May Sub regular tablet (non-ODT) if insurance does not cover ODT. 20 tablet 0    sacubitril-valsartan (ENTRESTO) 24-26 MG per tablet TAKE 2 TABLETS BY MOUTH IN  THE MORNING AND 1 TABLET IN THE EVENING 270 tablet 3    pravastatin (PRAVACHOL) 40 MG tablet TAKE 1 TABLET BY MOUTH  DAILY 90 tablet 3    aspirin 81 MG tablet Take 81 mg by mouth daily      flavoxate (URISPAS) 100 MG tablet TAKE 1 TABLET BY MOUTH THREE TIMES DAILY AS NEEDED FOR URINARY SPASM 270 tablet 0    PROCTOZONE-HC 2.5 % rectal cream USE RECTALLY TWICE DAILY (Patient taking differently: USE RECTALLY TWICE DAILY PRN) 30 g 0    dicyclomine (BENTYL) 10 MG capsule TAKE ONE CAPSULE BY MOUTH  FOUR TIMES DAILY AS NEEDED (Patient taking differently: Indications: Pain in the Abdominal Region TAKE ONE CAPSULE BY MOUTH FOUR TIMES DAILY AS NEEDED) 360 capsule 1    azelastine (ASTELIN) 0.1 % nasal spray 2 sprays by Nasal route 2 times daily Use in each nostril as directed 1 Bottle 3    Cholecalciferol (VITAMIN D3) 59104 units CAPS Take 1 capsule by mouth once a week      albuterol (PROAIR HFA) 108 (90 BASE) MCG/ACT inhaler Inhale 2 puffs into the lungs every 6 hours as needed for Wheezing or Shortness of Breath        No current facility-administered medications for this visit.         Allergies   Allergen Reactions    Ipratropium Bromide Hfa Shortness Of Breath    Atrovent Nasal Spray [Ipratropium] Other (See Comments) Chest tightness    Doxycycline Hives    Morphine Other (See Comments)     \"makes me feel funny\"      Nitroglycerin Other (See Comments)     Severe hypotension (went from 836 to 66 systolic)    Sulfa Antibiotics Rash    Vicodin [Hydrocodone-Acetaminophen] Rash       Family History   Problem Relation Age of Onset    High Blood Pressure Mother     Dementia Mother     Cancer Father         Pancreatic Ca       Social History     Socioeconomic History    Marital status:      Spouse name: Not on file    Number of children: Not on file    Years of education: Not on file    Highest education level: Not on file   Occupational History    Not on file   Social Needs    Financial resource strain: Not on file    Food insecurity:     Worry: Not on file     Inability: Not on file    Transportation needs:     Medical: Not on file     Non-medical: Not on file   Tobacco Use    Smoking status: Never Smoker    Smokeless tobacco: Never Used   Substance and Sexual Activity    Alcohol use: No     Comment: occ    Drug use: No    Sexual activity: Yes     Partners: Female     Comment: wife   Lifestyle    Physical activity:     Days per week: Not on file     Minutes per session: Not on file    Stress: Not on file   Relationships    Social connections:     Talks on phone: Not on file     Gets together: Not on file     Attends Taoist service: Not on file     Active member of club or organization: Not on file     Attends meetings of clubs or organizations: Not on file     Relationship status: Not on file    Intimate partner violence:     Fear of current or ex partner: Not on file     Emotionally abused: Not on file     Physically abused: Not on file     Forced sexual activity: Not on file   Other Topics Concern    Not on file   Social History Narrative    Not on file   Never smoked, but his mother smoked outside of the home    Immunization History   Administered Date(s) Administered    Influenza, Quadv, IM, PF (6 mo and older Fluzone, Flulaval, Fluarix, and 3 yrs and older Afluria) 10/27/2017, 09/23/2019    Influenza, Arnette Bickers, Recombinant, IM PF (Flublok 18 yrs and older) 10/03/2018    Pneumococcal Conjugate 13-valent ([id number]) 09/11/2015    Pneumococcal Conjugate Vaccine 08/15/2017    Pneumococcal Polysaccharide ([id number]) 08/01/2007    Tdap (Boostrix, Adacel) 08/19/2014       ROS:  GENERAL:  No fevers, no chills, +10lb weight gain in 1 year  RESPIRATORY:  No shortness of breath,  + cough      PHYSICAL EXAM:  Vitals:    01/02/20 1031   BP: 124/82   Site: Right Upper Arm   Position: Sitting   Cuff Size: Medium Adult   Pulse: 70   Resp: 16   Temp: 97.7 °F (36.5 °C)   TempSrc: Oral   SpO2: 96%   Weight: 209 lb (94.8 kg)   Height: 5' 6\" (1.676 m)   on RA    Gen: Well developed; well nourished  Eyes: No scleral icterus. No conjunctival injection. ENT:  Oropharynx clear. External appearance of ears and nose normal.  Neck: Trachea midline. No obvious mass. No visible thyroid enlargement    Respiratory: Clear to auscultation bilaterally, no accessory muscle use  Cardiovascular: Regular rate and rhythm, no appreciable murmurs. No edema. Gastrointestinal: Soft, non-tender. No hernia  Skin: Warm and dry. No rashes or ulcers on visible areas. Normal texture and turgor  Lymphatic: No cervical LAD. No supraclavicular LAD. Musculoskeletal: No cyanosis, clubbing or joint deformity.     Psychiatric: Normal mood and affect; exhibits normal insight and judgement     Data reviewed:  Pulmonary Function Testing (4/16/14)  FVC 3.69 L at 86% predicted ---> 3.68L at 86% predicted  FEV1 1.89 L at 57% predicted ----> 1.9L at 57% predicted  FEV1/FVC ratio at 51% ---->52% predicted  TLC 4.64 L at 80% predicted  VC 3.73L at 86% predicted  ERV 0.8L at 57% predicted  RV/TLC at 20% predicted  DLCO 24.9 at 97% predicted  DLCO/VA 5.18L at 131 % predicted     Overall: Mild restriction; obstruction is at least moderate without significant

## 2025-06-09 NOTE — PROGRESS NOTES
"Assessment & Plan  Pain, joint, ankle and foot, right  Right ankle pain likely secondary to midsubstance Achilles tendinopathy.  We discussed different treatment options and decided to proceed with gel heel cups, Voltaren gel and physical therapy.  I will see her back in 5 weeks if needed.  Orders:    XR ankle 3+ vw right; Future    Non-insertional Achilles tendinopathy    Orders:    Ambulatory referral to Physical Therapy; Future    Imaging Studies (I personally reviewed images in PACS and report):  Right ankle x-rays most recent to this encounter reviewed.  These images show pes planovalgus.    Return in about 5 weeks (around 7/14/2025).    Patient is in agreement with the above plan.    HPI:  Maxine Acuña is a 84 y.o. female  who presents for evaluation of   Chief Complaint   Patient presents with    Right Ankle - Pain       Onset/Mechanism: Chronic pain for years but worsened 6 months ago.  Location: In the back of the ankle.  Radiation: Denies.  Provocative: Activity.  Severity: Painful.  Associated Symptoms: Denies.  Treatment so far: No recent treatment.    Following history reviewed and updated:  Past Medical History[1]  Past Surgical History[2]  Social History   Social History     Substance and Sexual Activity   Alcohol Use Yes    Alcohol/week: 2.0 standard drinks of alcohol    Types: 1 Glasses of wine, 1 Cans of beer per week    Comment: beer in summer     Social History     Substance and Sexual Activity   Drug Use No     Tobacco Use History[3]  Family History[4]  Allergies[5]     Constitutional:  Ht 5' 1\" (1.549 m)   Wt 81.6 kg (180 lb)   LMP  (LMP Unknown)   BMI 34.01 kg/m²    General: NAD.  Eyes: Anicteric sclerae.  Neck: Supple.  Lungs: Unlabored breathing.  Cardiovascular: No lower extremity edema.  Skin: Intact without erythema.  Neurologic: Sensation intact to light touch.  Psychiatric: Mood and affect are appropriate.    Right Ankle Exam     Tenderness   Right ankle tenderness location: " Midsubstance Achilles.  Swelling: none    Range of Motion   The patient has normal right ankle ROM.    Muscle Strength   Dorsiflexion:  5/5  Plantar flexion:  4/5    Other   Erythema: absent  Scars: absent  Sensation: normal  Pulse: present              Procedures                   [1]   Past Medical History:  Diagnosis Date    Abnormal mammogram     Acute sinusitis     Allergic 1960    Allergic reaction     Subsequent encounter, Resolved - 1/9/18    Allergic rhinitis 01/16/2012    Anaphylactic reaction     Other, Last assessed - 6/16/16    Anemia 01/16/2012    Arthritis 1970    Ataxia     Cellulitis of great toe of right foot     Chronic allergic rhinitis     Unspec seasonality, unspec trigger; Last assessed - 11/13/17    Chronic kidney disease     Contact dermatitis     Last assessed - 6/3/14    Dermatitis     Diabetes mellitus (HCC)     Disease of thyroid gland     Disorder of tympanic membrane     Last assessed - 10/15/12    Dry skin     Last assessed - 4/15/14    Dysfunction of eustachian tube     Unspec laterality, Last assessed - 10/19/12    Ganglion cyst     GERD (gastroesophageal reflux disease) 1962    Glaucoma     Hip pain, acute, unspecified laterality     Hyperlipidemia     Hypertension     Kidney stone     Labyrinthitis 11/17/2011    Leg mass, left     Limb pain     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    Long term use of drug     Last assessed - 4/6/14    Mammogram abnormal     Last assessed - 4/11/14    Mass of thigh, left     Obesity     Pain in ankle joint     Pes planus     Unspec laterality, Last assessed - 4/9/13    Shingles 1965    Shoulder pain, right     Vertigo 01/16/2012   [2]   Past Surgical History:  Procedure Laterality Date    OTHER SURGICAL HISTORY      Surgery unk     TONSILLECTOMY      US GUIDED THYROID BIOPSY  7/19/2023   [3]   Social History  Tobacco Use   Smoking Status Former    Current packs/day: 0.00    Average packs/day: 1 pack/day for 20.0 years (20.0 ttl pk-yrs)     Types: Cigarettes    Start date: 8/15/1958    Quit date: 1976    Years since quittin.4   Smokeless Tobacco Never   Tobacco Comments    quit for 9 mos. with each pregnancy   [4]   Family History  Problem Relation Name Age of Onset    Heart attack Mother louisa         Ac MI    Arthritis Mother louisa     Lung cancer Father      Other Father          Cerebral Embolism    Anemia Sister valentín     Glaucoma Sister valentín     Hypertension Sister valentín     Thyroid disease Sister valentín     No Known Problems Sister      No Known Problems Daughter      No Known Problems Daughter      No Known Problems Maternal Grandmother      No Known Problems Maternal Grandfather      No Known Problems Paternal Grandmother      No Known Problems Paternal Grandfather      Diabetes Brother jj     Hypertension Brother jj     Other Brother jj         Cardiac Disorder    Asthma Brother jj     No Known Problems Maternal Aunt      No Known Problems Paternal Aunt     [5]   Allergies  Allergen Reactions    Cortisone Hyperactivity    Bee Venom Edema    Virtussin A-C [Guaifenesin-Codeine] Throat Swelling    Bee Pollen Edema     Category: Allergy;     Pollen Extract Allergic Rhinitis, Eye Swelling, Itching, Nasal Congestion and Sneezing

## 2025-06-12 ENCOUNTER — TELEPHONE (OUTPATIENT)
Age: 85
End: 2025-06-12

## 2025-06-12 ENCOUNTER — OFFICE VISIT (OUTPATIENT)
Dept: NEUROLOGY | Facility: CLINIC | Age: 85
End: 2025-06-12
Payer: MEDICARE

## 2025-06-12 VITALS
DIASTOLIC BLOOD PRESSURE: 78 MMHG | BODY MASS INDEX: 33.61 KG/M2 | OXYGEN SATURATION: 97 % | HEIGHT: 61 IN | TEMPERATURE: 98 F | WEIGHT: 178 LBS | SYSTOLIC BLOOD PRESSURE: 132 MMHG | HEART RATE: 60 BPM

## 2025-06-12 DIAGNOSIS — R40.4 TRANSIENT ALTERATION OF AWARENESS: Primary | ICD-10-CM

## 2025-06-12 PROCEDURE — G2211 COMPLEX E/M VISIT ADD ON: HCPCS | Performed by: PSYCHIATRY & NEUROLOGY

## 2025-06-12 PROCEDURE — 99213 OFFICE O/P EST LOW 20 MIN: CPT | Performed by: PSYCHIATRY & NEUROLOGY

## 2025-06-12 NOTE — TELEPHONE ENCOUNTER
Patient called stating she was given Jardiance samples in the office on 6/4. They are 25 mg tabs and she was told to take 0.5 tab to equal 12.5. she has been doing that for a week now and reports no improvement with her BG, she states it is actually higher than before in the 200s sometimes.  She is asking if she should start taking the full dose?  Please advise

## 2025-06-12 NOTE — PROGRESS NOTES
Name: Maxine Acuña      : 1940      MRN: [id number]  Encounter Provider: Tami Lake MD  Encounter Date: 2025   Encounter [facility]  :  Assessment & Plan  Transient alteration of awareness  Patient reports having 2 episodes of a transient loss of awareness.  First episode occurred in October.  Patient had just spent a week at the [facility].  She was driving back to [place].  She remembers driving to [place] however she did not remember the trip from [place] to [facility]. No MVA. Svseral weeks ago patient was leaving her apt and did not remember the trip from the elevator to her car. Patient has not been able to correlate this with any changes in her medical history.  She denies additional seizure like activity.    At this time, differential remains broad. Cannot rule out stroke vs TIA vs seizure vs TGA which would be diagnosis of exclusion. Discussed with patient doing an MRI of the brain vs a routine EEG to evaluate for underlying seizure predisposition. Patient opted against this. She would like to monitor symptoms for a recurring pattern. I cautioned her on driving. I discussed with her things to take into consideration when correlating sx such as timing of day, blood sugar, sleep quality etc... Follow up in 4 months              History of Present Illness   HPI   Patient is a very pleasant 84-year-old female with history of CKD, cervical degenerative disc disease, hypertension, hyperlipidemia IBS, kidney stones, osteoarthritis, obesity, pulmonary nodule, thalassemia minor, thyroid nodule and diabetes who presents with multiple concerns.     Initial Visit (2024):  Balance disorder:  She reports having problems with her balance. Balance problems have been going for at least 5 years. Cannot stand on one foot or one foot in front of the other. No recent falls.  Was doing PT for the balance and no help. Reports having  "\"shredded ligaments and tendons in both her ankle\". No shuffling of gait.      Patient is very active at her senior living community and participates in multiple exercise programs including aquatherapy. Physical exam today is grossly benign with mild weakness on ankle inversion and eversion bilaterally. This potentially from prior injury. No findings on exam to warrant imaging of the CNS. Encouraged patient to continue good job keeping physically active.     Tremor:  Patient reports having a chin tremor for approximately two years. No tremor of the head or arms. She reports having excessive saliva but denies any bulbar sx. No bradykinesia appreciated. PE unremarkable.      Unclear etiology at this time. No suspicion for a degenerative movement disorder. I dont believe this warrants treatment. Will continue monitoring.      Follow up in 6 months unless she develops new sx     Interval history:  Balance and chin tremor are stable   Patient has had several instances of forgetfulness  Patient with 2 episodes  Patient in October was in the [place] for a week.   She remembers driving to [place]. Did not remember the trip from [place] to her parking spot in [place].   She drove there. She had no memory of the trip when she got to her destination   A couple of weeks ago, was where she lives in 3 story [facility] building at [place]. Took the elevator and did not remember the elevator ride nor the walk out to the parking lot   No car accidents while driving   They recently switched her off ozempic, tried trajenta and now on Jardiance.   The only thing that has gotten worse is that has had trouble with the mouse, doesn't always happen.     Review of Systems   Constitutional:  Negative for appetite change, fatigue and fever.   HENT: Negative.  Negative for hearing loss, tinnitus, trouble swallowing and voice change.    Eyes: Negative.  Negative for photophobia, pain and visual disturbance.   Respiratory: Negative.  " "Negative for shortness of breath.    Cardiovascular: Negative.  Negative for palpitations.   Gastrointestinal: Negative.  Negative for nausea and vomiting.   Endocrine: Negative.  Negative for cold intolerance.   Genitourinary: Negative.  Negative for dysuria, frequency and urgency.   Musculoskeletal:  Negative for back pain, gait problem, myalgias, neck pain and neck stiffness.   Skin: Negative.  Negative for rash.   Allergic/Immunologic: Negative.    Neurological:  Negative for dizziness, tremors, seizures, syncope, facial asymmetry, speech difficulty, weakness, light-headedness, numbness and headaches.   Hematological: Negative.  Does not bruise/bleed easily.   Psychiatric/Behavioral: Negative.  Negative for confusion, hallucinations and sleep disturbance.    All other systems reviewed and are negative.   I have personally reviewed the MA's review of systems and made changes as necessary.         Objective   Ht 5' 1\" (1.549 m)   LMP  (LMP Unknown)   BMI 34.01 kg/m²     Physical Exam  Neurological Exam          "

## 2025-06-16 NOTE — TELEPHONE ENCOUNTER
Patient called back and stated she was unable to understand the message that was left for her. I was unable to reach a team member at this time. Please call patient back and advise.

## 2025-06-24 DIAGNOSIS — I10 HYPERTENSION, UNSPECIFIED TYPE: ICD-10-CM

## 2025-06-24 RX ORDER — LISINOPRIL 40 MG/1
40 TABLET ORAL 2 TIMES DAILY
Qty: 180 TABLET | Refills: 1 | Status: SHIPPED | OUTPATIENT
Start: 2025-06-24

## 2025-06-24 RX ORDER — SPIRONOLACTONE 25 MG/1
25 TABLET ORAL DAILY
Qty: 90 TABLET | Refills: 1 | Status: SHIPPED | OUTPATIENT
Start: 2025-06-24

## 2025-07-15 ENCOUNTER — TELEPHONE (OUTPATIENT)
Age: 85
End: 2025-07-15

## 2025-07-18 ENCOUNTER — OFFICE VISIT (OUTPATIENT)
Dept: OBGYN CLINIC | Facility: CLINIC | Age: 85
End: 2025-07-18
Payer: MEDICARE

## 2025-07-18 VITALS — HEIGHT: 61 IN | BODY MASS INDEX: 33.61 KG/M2 | WEIGHT: 178 LBS

## 2025-07-18 DIAGNOSIS — M76.60 NON-INSERTIONAL ACHILLES TENDINOPATHY: Primary | ICD-10-CM

## 2025-07-18 PROCEDURE — 99213 OFFICE O/P EST LOW 20 MIN: CPT | Performed by: PHYSICAL MEDICINE & REHABILITATION

## 2025-07-18 NOTE — ASSESSMENT & PLAN NOTE
Patient is here in follow up of right ankle pain likely secondary to midsubstance Achilles tendinopathy.  Treatment has included gel heel cups, Voltaren gel. She is feeling better.  We discussed starting physical therapy but she is against this for now.  She will start eccentric exercises that I demonstrated for her today.  We also discussed obtaining advanced imaging if she has continued symptoms since she has been treated for this over the past few years with physical therapy, orthotics and oral medications.  She will reach out if pain worsens any.  I will see her back if needed.

## 2025-07-18 NOTE — PROGRESS NOTES
Assessment & Plan  Non-insertional Achilles tendinopathy  Patient is here in follow up of right ankle pain likely secondary to midsubstance Achilles tendinopathy.  Treatment has included gel heel cups, Voltaren gel. She is feeling better.  We discussed starting physical therapy but she is against this for now.  She will start eccentric exercises that I demonstrated for her today.  We also discussed obtaining advanced imaging if she has continued symptoms since she has been treated for this over the past few years with physical therapy, orthotics and oral medications.  She will reach out if pain worsens any.  I will see her back if needed.       No follow-ups on file.    Patient is in agreement with the above plan.    HPI:  Maxine Acuña is a 84 y.o. female  who presents in follow up.  Here for No chief complaint on file.      Since last visit: See above.    Following history reviewed and updated:  Past Medical History[1]  Past Surgical History[2]  Social History   Social History     Substance and Sexual Activity   Alcohol Use Yes    Alcohol/week: 2.0 standard drinks of alcohol    Types: 1 Glasses of wine, 1 Cans of beer per week    Comment: rare     Social History     Substance and Sexual Activity   Drug Use No     Tobacco Use History[3]  Family History[4]  Allergies[5]     Constitutional:  LMP  (LMP Unknown)    General: NAD.  Eyes: Clear sclerae.  ENT: No inflammation, lesion, or mass of lips.  No tracheal deviation.  Musculoskeletal: As mentioned below.  Integumentary: No visible rashes or skin lesions.  Pulmonary/Chest: Effort normal. No respiratory distress.   Neuro: CN's grossly intact, PENA.  Psych: Normal affect and judgement.  Vascular: WWP.    Right Ankle Exam     Tenderness   Right ankle tenderness location: Midsubstance Achilles.    Range of Motion   The patient has normal right ankle ROM.    Other   Erythema: absent  Scars: absent  Sensation: normal  Pulse: present              Procedures         [1]    Past Medical History:  Diagnosis Date    Abnormal mammogram     Acute sinusitis     Allergic 1960    Allergic reaction     Subsequent encounter, Resolved - 18    Allergic rhinitis 2012    Anaphylactic reaction     Other, Last assessed - 16    Anemia 2012    Arthritis 1970    Ataxia     Cellulitis of great toe of right foot     Chronic allergic rhinitis     Unspec seasonality, unspec trigger; Last assessed - 17    Chronic kidney disease     Contact dermatitis     Last assessed - 6/3/14    Dermatitis     Diabetes mellitus (HCC)     Disease of thyroid gland     Disorder of tympanic membrane     Last assessed - 10/15/12    Dry skin     Last assessed - 4/15/14    Dysfunction of eustachian tube     Unspec laterality, Last assessed - 10/19/12    Ganglion cyst     GERD (gastroesophageal reflux disease)     Glaucoma     Hip pain, acute, unspecified laterality     Hyperlipidemia     Hypertension     Kidney stone     Labyrinthitis 2011    Leg mass, left     Limb pain     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    Long term use of drug     Last assessed - 14    Mammogram abnormal     Last assessed - 14    Mass of thigh, left     Obesity     Pain in ankle joint     Pes planus     Unspec laterality, Last assessed - 13    Shingles 1965    Shoulder pain, right     Vertigo 2012   [2]   Past Surgical History:  Procedure Laterality Date    OTHER SURGICAL HISTORY      Surgery unk     TONSILLECTOMY      US GUIDED THYROID BIOPSY  2023   [3]   Social History  Tobacco Use   Smoking Status Former    Current packs/day: 0.00    Average packs/day: 1 pack/day for 20.0 years (20.0 ttl pk-yrs)    Types: Cigarettes    Start date: 8/15/1958    Quit date: 1976    Years since quittin.5   Smokeless Tobacco Never   Tobacco Comments    quit for 9 mos. with each pregnancy   [4]   Family History  Problem Relation Name Age of Onset    Heart attack Mother louisa MORSE     Arthritis Mother louisa     Lung cancer Father      Other Father          Cerebral Embolism    Anemia Sister valentín     Glaucoma Sister valentín     Hypertension Sister valentín     Thyroid disease Sister valentín     No Known Problems Sister      No Known Problems Daughter      No Known Problems Daughter      No Known Problems Maternal Grandmother      No Known Problems Maternal Grandfather      No Known Problems Paternal Grandmother      No Known Problems Paternal Grandfather      Diabetes Brother jj     Hypertension Brother jj     Other Brother jj         Cardiac Disorder    Asthma Brother jj     No Known Problems Maternal Aunt      No Known Problems Paternal Aunt     [5]   Allergies  Allergen Reactions    Cortisone Hyperactivity    Bee Venom Edema    Virtussin A-C [Guaifenesin-Codeine] Throat Swelling    Bee Pollen Edema     Category: Allergy;     Pollen Extract Allergic Rhinitis, Eye Swelling, Itching, Nasal Congestion and Sneezing

## 2025-07-21 DIAGNOSIS — E03.9 HYPOTHYROIDISM, UNSPECIFIED TYPE: ICD-10-CM

## 2025-07-22 RX ORDER — LEVOTHYROXINE SODIUM 25 UG/1
37.5 TABLET ORAL DAILY
Qty: 90 TABLET | Refills: 1 | Status: SHIPPED | OUTPATIENT
Start: 2025-07-22

## 2025-07-25 DIAGNOSIS — E11.65 TYPE 2 DIABETES MELLITUS WITH HYPERGLYCEMIA, WITHOUT LONG-TERM CURRENT USE OF INSULIN (HCC): Primary | ICD-10-CM

## 2025-07-25 RX ORDER — REPAGLINIDE 1 MG/1
1 TABLET ORAL
Qty: 90 TABLET | Refills: 2 | Status: SHIPPED | OUTPATIENT
Start: 2025-07-25

## 2025-07-25 NOTE — TELEPHONE ENCOUNTER
Called patient and lvm reviewing providers message and requesting callback to confirm message was received.

## 2025-07-28 NOTE — TELEPHONE ENCOUNTER
Patient called to try and get clarification to the message from provider.  Patient was started on Jardiance 12.5mg daily at last OV in June. Which she is taking and doing well with no side effects.    At that time she STOPPED both Repaglinide and Trandjenta. She did not have the understanding that she was supposed to continue all three medications. Ozempic was d/c'd due to cost issues.    Patient is going to do her homework and call insurance company to find out pricing for Repaglinide, Tradjenta, Jardiance, as well as Ozempic.    Patient is willing to d/c oral DM medications to go back on Ozempic (if this is what provider wants to do)- however she does not think she will be able to afford the orals in addition to Ozempic.    Patient advised to upload 2 weeks worth of blood sugar logs via My Chart. Patient states she has been having trouble logging in. If she can not get on she will stop by the office to drop off logs for provider review.    Patient is requesting to speak to provider directly regarding medication changes. She feels that she is not on the same page with provider.    ( Triage done in error)  Reason for Disposition   Health information question, no triage required and triager able to answer question    Protocols used: Information Only Call - No Triage-Adult-[place]

## 2025-07-29 NOTE — TELEPHONE ENCOUNTER
Patient brought in daily glucose readings covering dates from 7/15/25-7/26/25, & 7/29/25. They are as follows :    7/15 - 210  7/16 - 173  7/17 - 140  7/18 - 226  7/19 - 161  7/20 - 205  7/21 - 185  7/22 - 188  7/23 - 243  7/24 - 272  7/25 - 429  7/26 - 195  7/29 - 251    Patient reiterated her desire to me about hoping to go back onto Ozempic due to affordability and effectiveness.

## 2025-07-29 NOTE — TELEPHONE ENCOUNTER
Pt called reporting she is going to come in to drop off her bs reading logs and reporting that she decided that she will like to go back on the ozempic instead of the regimen recommended due to ozempic being cheaper.

## 2025-08-06 ENCOUNTER — OFFICE VISIT (OUTPATIENT)
Dept: GASTROENTEROLOGY | Facility: AMBULARY SURGERY CENTER | Age: 85
End: 2025-08-06
Payer: MEDICARE

## 2025-08-06 VITALS
HEIGHT: 61 IN | HEART RATE: 63 BPM | DIASTOLIC BLOOD PRESSURE: 72 MMHG | SYSTOLIC BLOOD PRESSURE: 126 MMHG | BODY MASS INDEX: 32.81 KG/M2 | OXYGEN SATURATION: 97 % | WEIGHT: 173.8 LBS

## 2025-08-06 DIAGNOSIS — K22.70 BARRETT'S ESOPHAGUS WITHOUT DYSPLASIA: Primary | ICD-10-CM

## 2025-08-06 PROCEDURE — 99213 OFFICE O/P EST LOW 20 MIN: CPT | Performed by: PHYSICIAN ASSISTANT

## 2025-08-06 RX ORDER — PANTOPRAZOLE SODIUM 20 MG/1
20 TABLET, DELAYED RELEASE ORAL DAILY
Qty: 90 TABLET | Refills: 3 | Status: SHIPPED | OUTPATIENT
Start: 2025-08-06

## 2025-08-14 ENCOUNTER — TELEPHONE (OUTPATIENT)
Age: 85
End: 2025-08-14

## 2025-08-18 ENCOUNTER — TELEPHONE (OUTPATIENT)
Dept: ENDOCRINOLOGY | Facility: CLINIC | Age: 85
End: 2025-08-18